# Patient Record
Sex: FEMALE | Race: WHITE | Employment: UNEMPLOYED
[De-identification: names, ages, dates, MRNs, and addresses within clinical notes are randomized per-mention and may not be internally consistent; named-entity substitution may affect disease eponyms.]

---

## 2017-01-04 ENCOUNTER — TELEPHONE (OUTPATIENT)
Dept: GASTROENTEROLOGY | Facility: CLINIC | Age: 42
End: 2017-01-04

## 2017-01-11 ENCOUNTER — TELEPHONE (OUTPATIENT)
Dept: GASTROENTEROLOGY | Facility: CLINIC | Age: 42
End: 2017-01-11

## 2017-01-20 ENCOUNTER — TELEPHONE (OUTPATIENT)
Dept: FAMILY MEDICINE CLINIC | Facility: CLINIC | Age: 42
End: 2017-01-20

## 2017-01-25 RX ORDER — TIZANIDINE 4 MG/1
TABLET ORAL
Qty: 21 TABLET | Refills: 0 | OUTPATIENT
Start: 2017-01-25

## 2017-04-10 ENCOUNTER — OFFICE VISIT (OUTPATIENT)
Dept: FAMILY MEDICINE CLINIC | Age: 42
End: 2017-04-10
Payer: MEDICARE

## 2017-04-10 ENCOUNTER — HOSPITAL ENCOUNTER (OUTPATIENT)
Age: 42
Setting detail: SPECIMEN
Discharge: HOME | End: 2017-04-10
Payer: MEDICARE

## 2017-04-10 VITALS
BODY MASS INDEX: 36.37 KG/M2 | HEART RATE: 78 BPM | WEIGHT: 213 LBS | SYSTOLIC BLOOD PRESSURE: 140 MMHG | DIASTOLIC BLOOD PRESSURE: 90 MMHG | OXYGEN SATURATION: 98 % | TEMPERATURE: 97.9 F | HEIGHT: 64 IN

## 2017-04-10 DIAGNOSIS — Z00.00 HEALTHCARE MAINTENANCE: ICD-10-CM

## 2017-04-10 DIAGNOSIS — I10 ESSENTIAL HYPERTENSION: ICD-10-CM

## 2017-04-10 DIAGNOSIS — G89.29 CHRONIC MIDLINE LOW BACK PAIN WITHOUT SCIATICA: ICD-10-CM

## 2017-04-10 DIAGNOSIS — F10.10 ALCOHOL USE DISORDER, MILD, ABUSE: Primary | ICD-10-CM

## 2017-04-10 DIAGNOSIS — E66.9 OBESITY, UNSPECIFIED OBESITY SEVERITY, UNSPECIFIED OBESITY TYPE: ICD-10-CM

## 2017-04-10 DIAGNOSIS — K21.9 GASTROESOPHAGEAL REFLUX DISEASE WITHOUT ESOPHAGITIS: ICD-10-CM

## 2017-04-10 DIAGNOSIS — Z76.89 ENCOUNTER TO ESTABLISH CARE: ICD-10-CM

## 2017-04-10 DIAGNOSIS — J30.2 SEASONAL ALLERGIC RHINITIS, UNSPECIFIED ALLERGIC RHINITIS TRIGGER: ICD-10-CM

## 2017-04-10 DIAGNOSIS — M54.50 CHRONIC MIDLINE LOW BACK PAIN WITHOUT SCIATICA: ICD-10-CM

## 2017-04-10 LAB
ABSOLUTE EOS #: 0.2 K/UL (ref 0–0.4)
ABSOLUTE LYMPH #: 3.9 K/UL (ref 1–4.8)
ABSOLUTE MONO #: 0.8 K/UL (ref 0.1–1.2)
ALBUMIN SERPL-MCNC: 4.1 G/DL (ref 3.5–5.2)
ALBUMIN/GLOBULIN RATIO: 1.2 (ref 1–2.5)
ALP BLD-CCNC: 85 U/L (ref 35–104)
ALT SERPL-CCNC: 11 U/L (ref 5–33)
ANION GAP SERPL CALCULATED.3IONS-SCNC: 16 MMOL/L (ref 9–17)
AST SERPL-CCNC: 15 U/L
BASOPHILS # BLD: 0 % (ref 0–2)
BASOPHILS ABSOLUTE: 0 K/UL (ref 0–0.2)
BILIRUB SERPL-MCNC: 0.22 MG/DL (ref 0.3–1.2)
BUN BLDV-MCNC: 13 MG/DL (ref 6–20)
BUN/CREAT BLD: ABNORMAL (ref 9–20)
CALCIUM SERPL-MCNC: 8.9 MG/DL (ref 8.6–10.4)
CHLORIDE BLD-SCNC: 104 MMOL/L (ref 98–107)
CHOLESTEROL/HDL RATIO: 3.7
CHOLESTEROL: 202 MG/DL
CO2: 25 MMOL/L (ref 20–31)
CREAT SERPL-MCNC: 0.55 MG/DL (ref 0.5–0.9)
DIFFERENTIAL TYPE: ABNORMAL
EOSINOPHILS RELATIVE PERCENT: 1 % (ref 1–4)
GFR AFRICAN AMERICAN: >60 ML/MIN
GFR NON-AFRICAN AMERICAN: >60 ML/MIN
GFR SERPL CREATININE-BSD FRML MDRD: ABNORMAL ML/MIN/{1.73_M2}
GFR SERPL CREATININE-BSD FRML MDRD: ABNORMAL ML/MIN/{1.73_M2}
GLUCOSE BLD-MCNC: 99 MG/DL (ref 70–99)
HCT VFR BLD CALC: 42.3 % (ref 36–46)
HDLC SERPL-MCNC: 55 MG/DL
HEMOGLOBIN: 14.3 G/DL (ref 12–16)
LDL CHOLESTEROL: 130 MG/DL (ref 0–130)
LYMPHOCYTES # BLD: 31 % (ref 24–44)
MCH RBC QN AUTO: 32.6 PG (ref 26–34)
MCHC RBC AUTO-ENTMCNC: 33.8 G/DL (ref 31–37)
MCV RBC AUTO: 96.3 FL (ref 80–100)
MONOCYTES # BLD: 6 % (ref 2–11)
PDW BLD-RTO: 13.8 % (ref 12.5–15.4)
PLATELET # BLD: 284 K/UL (ref 140–450)
PLATELET ESTIMATE: ABNORMAL
PMV BLD AUTO: 8.1 FL (ref 6–12)
POTASSIUM SERPL-SCNC: 4.6 MMOL/L (ref 3.7–5.3)
RBC # BLD: 4.39 M/UL (ref 4–5.2)
RBC # BLD: ABNORMAL 10*6/UL
SEG NEUTROPHILS: 62 % (ref 36–66)
SEGMENTED NEUTROPHILS ABSOLUTE COUNT: 7.6 K/UL (ref 1.8–7.7)
SODIUM BLD-SCNC: 145 MMOL/L (ref 135–144)
TOTAL PROTEIN: 7.4 G/DL (ref 6.4–8.3)
TRIGL SERPL-MCNC: 85 MG/DL
VLDLC SERPL CALC-MCNC: ABNORMAL MG/DL (ref 1–30)
WBC # BLD: 12.5 K/UL (ref 3.5–11)
WBC # BLD: ABNORMAL 10*3/UL

## 2017-04-10 PROCEDURE — 99203 OFFICE O/P NEW LOW 30 MIN: CPT | Performed by: NURSE PRACTITIONER

## 2017-04-10 RX ORDER — ONDANSETRON 4 MG/1
4 TABLET, FILM COATED ORAL DAILY PRN
Qty: 30 TABLET | Refills: 0 | Status: SHIPPED | OUTPATIENT
Start: 2017-04-10 | End: 2017-05-09 | Stop reason: SDUPTHER

## 2017-04-10 RX ORDER — RANITIDINE 150 MG/1
150 TABLET ORAL 2 TIMES DAILY
Qty: 60 TABLET | Refills: 3 | Status: SHIPPED | OUTPATIENT
Start: 2017-04-10 | End: 2017-09-21 | Stop reason: SDUPTHER

## 2017-04-10 RX ORDER — TRIAMTERENE AND HYDROCHLOROTHIAZIDE 37.5; 25 MG/1; MG/1
1 TABLET ORAL DAILY
Qty: 30 TABLET | Refills: 3 | Status: SHIPPED | OUTPATIENT
Start: 2017-04-10 | End: 2017-09-21 | Stop reason: SDUPTHER

## 2017-04-10 RX ORDER — OLMESARTAN MEDOXOMIL 40 MG/1
TABLET ORAL
Qty: 30 TABLET | Refills: 5 | Status: SHIPPED | OUTPATIENT
Start: 2017-04-10 | End: 2017-11-07 | Stop reason: SDUPTHER

## 2017-04-10 RX ORDER — MELOXICAM 15 MG/1
15 TABLET ORAL DAILY
Qty: 30 TABLET | Refills: 2 | Status: SHIPPED | OUTPATIENT
Start: 2017-04-10 | End: 2017-07-06 | Stop reason: SDUPTHER

## 2017-04-10 RX ORDER — LORATADINE 10 MG/1
10 TABLET ORAL DAILY
Qty: 30 TABLET | Refills: 3 | Status: SHIPPED | OUTPATIENT
Start: 2017-04-10 | End: 2017-09-21 | Stop reason: SDUPTHER

## 2017-04-10 RX ORDER — ESCITALOPRAM OXALATE 20 MG/1
20 TABLET ORAL DAILY
Qty: 30 TABLET | Refills: 3 | Status: SHIPPED | OUTPATIENT
Start: 2017-04-10 | End: 2017-12-27 | Stop reason: ALTCHOICE

## 2017-04-10 RX ORDER — TIZANIDINE 4 MG/1
4 TABLET ORAL 2 TIMES DAILY
Qty: 60 TABLET | Refills: 2 | Status: SHIPPED | OUTPATIENT
Start: 2017-04-10 | End: 2017-07-06 | Stop reason: SDUPTHER

## 2017-04-10 ASSESSMENT — PATIENT HEALTH QUESTIONNAIRE - PHQ9
6. FEELING BAD ABOUT YOURSELF - OR THAT YOU ARE A FAILURE OR HAVE LET YOURSELF OR YOUR FAMILY DOWN: 3
2. FEELING DOWN, DEPRESSED OR HOPELESS: 3
3. TROUBLE FALLING OR STAYING ASLEEP: 3
8. MOVING OR SPEAKING SO SLOWLY THAT OTHER PEOPLE COULD HAVE NOTICED. OR THE OPPOSITE, BEING SO FIGETY OR RESTLESS THAT YOU HAVE BEEN MOVING AROUND A LOT MORE THAN USUAL: 3
5. POOR APPETITE OR OVEREATING: 3
10. IF YOU CHECKED OFF ANY PROBLEMS, HOW DIFFICULT HAVE THESE PROBLEMS MADE IT FOR YOU TO DO YOUR WORK, TAKE CARE OF THINGS AT HOME, OR GET ALONG WITH OTHER PEOPLE: 2
SUM OF ALL RESPONSES TO PHQ QUESTIONS 1-9: 25
4. FEELING TIRED OR HAVING LITTLE ENERGY: 3
9. THOUGHTS THAT YOU WOULD BE BETTER OFF DEAD, OR OF HURTING YOURSELF: 3
SUM OF ALL RESPONSES TO PHQ9 QUESTIONS 1 & 2: 4
7. TROUBLE CONCENTRATING ON THINGS, SUCH AS READING THE NEWSPAPER OR WATCHING TELEVISION: 3
1. LITTLE INTEREST OR PLEASURE IN DOING THINGS: 1

## 2017-04-10 ASSESSMENT — ENCOUNTER SYMPTOMS
COUGH: 0
SHORTNESS OF BREATH: 0
RESPIRATORY NEGATIVE: 1
DIARRHEA: 0
GASTROINTESTINAL NEGATIVE: 1
ALLERGIC/IMMUNOLOGIC NEGATIVE: 1
EYES NEGATIVE: 1
BACK PAIN: 1
ABDOMINAL PAIN: 0
WHEEZING: 0
CONSTIPATION: 0

## 2017-04-17 ENCOUNTER — TELEPHONE (OUTPATIENT)
Dept: FAMILY MEDICINE CLINIC | Age: 42
End: 2017-04-17

## 2017-04-17 VITALS — TEMPERATURE: 99 F

## 2017-04-17 DIAGNOSIS — N39.0 URINARY TRACT INFECTION, SITE UNSPECIFIED: Primary | ICD-10-CM

## 2017-04-17 LAB
BILIRUBIN, POC: ABNORMAL
BLOOD URINE, POC: ABNORMAL
CLARITY, POC: ABNORMAL
COLOR, POC: YELLOW
GLUCOSE URINE, POC: ABNORMAL
KETONES, POC: ABNORMAL
LEUKOCYTE EST, POC: ABNORMAL
NITRITE, POC: ABNORMAL
PH, POC: 5.5
PROTEIN, POC: ABNORMAL
SPECIFIC GRAVITY, POC: 1.03
UROBILINOGEN, POC: 0.2

## 2017-04-17 PROCEDURE — 81003 URINALYSIS AUTO W/O SCOPE: CPT | Performed by: NURSE PRACTITIONER

## 2017-04-20 RX ORDER — NITROFURANTOIN 25; 75 MG/1; MG/1
100 CAPSULE ORAL 2 TIMES DAILY
Qty: 10 CAPSULE | Refills: 0 | Status: SHIPPED | OUTPATIENT
Start: 2017-04-20 | End: 2017-04-25

## 2017-04-21 ENCOUNTER — TELEPHONE (OUTPATIENT)
Dept: FAMILY MEDICINE CLINIC | Age: 42
End: 2017-04-21

## 2017-05-04 ENCOUNTER — TELEPHONE (OUTPATIENT)
Dept: FAMILY MEDICINE CLINIC | Age: 42
End: 2017-05-04

## 2017-05-08 RX ORDER — METRONIDAZOLE 7.5 MG/G
GEL VAGINAL
Qty: 25 G | Refills: 0 | Status: SHIPPED | OUTPATIENT
Start: 2017-05-08 | End: 2017-05-15

## 2017-05-12 RX ORDER — ONDANSETRON 4 MG/1
TABLET, FILM COATED ORAL
Qty: 30 TABLET | Refills: 0 | Status: SHIPPED | OUTPATIENT
Start: 2017-05-12 | End: 2017-08-29

## 2017-07-06 DIAGNOSIS — M54.50 CHRONIC MIDLINE LOW BACK PAIN WITHOUT SCIATICA: ICD-10-CM

## 2017-07-06 DIAGNOSIS — G89.29 CHRONIC MIDLINE LOW BACK PAIN WITHOUT SCIATICA: ICD-10-CM

## 2017-07-07 RX ORDER — TIZANIDINE 4 MG/1
TABLET ORAL
Qty: 30 TABLET | Refills: 0 | Status: SHIPPED | OUTPATIENT
Start: 2017-07-07 | End: 2017-08-15 | Stop reason: SDUPTHER

## 2017-07-07 RX ORDER — MELOXICAM 15 MG/1
TABLET ORAL
Qty: 30 TABLET | Refills: 0 | Status: SHIPPED | OUTPATIENT
Start: 2017-07-07 | End: 2017-08-15 | Stop reason: SDUPTHER

## 2017-08-15 DIAGNOSIS — M54.50 CHRONIC MIDLINE LOW BACK PAIN WITHOUT SCIATICA: ICD-10-CM

## 2017-08-15 DIAGNOSIS — G89.29 CHRONIC MIDLINE LOW BACK PAIN WITHOUT SCIATICA: ICD-10-CM

## 2017-08-16 ENCOUNTER — HOSPITAL ENCOUNTER (EMERGENCY)
Age: 42
Discharge: HOME | End: 2017-08-16
Attending: EMERGENCY MEDICINE
Payer: MEDICARE

## 2017-08-16 VITALS
WEIGHT: 224.5 LBS | TEMPERATURE: 98.8 F | SYSTOLIC BLOOD PRESSURE: 158 MMHG | HEIGHT: 62 IN | RESPIRATION RATE: 12 BRPM | DIASTOLIC BLOOD PRESSURE: 92 MMHG | HEART RATE: 77 BPM | OXYGEN SATURATION: 98 % | BODY MASS INDEX: 41.31 KG/M2

## 2017-08-16 DIAGNOSIS — J40 BRONCHITIS: Primary | ICD-10-CM

## 2017-08-16 PROCEDURE — 99283 EMERGENCY DEPT VISIT LOW MDM: CPT

## 2017-08-16 RX ORDER — AMOXICILLIN AND CLAVULANATE POTASSIUM 875; 125 MG/1; MG/1
1 TABLET, FILM COATED ORAL 2 TIMES DAILY
Qty: 20 TABLET | Refills: 0 | Status: SHIPPED | OUTPATIENT
Start: 2017-08-16 | End: 2017-08-26

## 2017-08-16 RX ORDER — MELOXICAM 15 MG/1
TABLET ORAL
Qty: 30 TABLET | Refills: 0 | Status: SHIPPED | OUTPATIENT
Start: 2017-08-16 | End: 2017-09-21 | Stop reason: SDUPTHER

## 2017-08-16 RX ORDER — TIZANIDINE 4 MG/1
TABLET ORAL
Qty: 30 TABLET | Refills: 0 | Status: SHIPPED | OUTPATIENT
Start: 2017-08-16 | End: 2017-08-29 | Stop reason: SDUPTHER

## 2017-08-16 RX ORDER — GUAIFENESIN AND CODEINE PHOSPHATE 100; 10 MG/5ML; MG/5ML
5 SOLUTION ORAL 3 TIMES DAILY PRN
Qty: 120 ML | Refills: 0 | Status: SHIPPED | OUTPATIENT
Start: 2017-08-16 | End: 2017-08-23

## 2017-08-16 ASSESSMENT — PAIN DESCRIPTION - LOCATION: LOCATION: CHEST;EAR

## 2017-08-16 ASSESSMENT — PAIN DESCRIPTION - FREQUENCY: FREQUENCY: CONTINUOUS

## 2017-08-16 ASSESSMENT — PAIN SCALES - GENERAL: PAINLEVEL_OUTOF10: 10

## 2017-08-21 ENCOUNTER — HOSPITAL ENCOUNTER (EMERGENCY)
Age: 42
Discharge: HOME | End: 2017-08-21
Attending: EMERGENCY MEDICINE
Payer: MEDICARE

## 2017-08-21 VITALS
HEIGHT: 62 IN | HEART RATE: 76 BPM | WEIGHT: 229.8 LBS | BODY MASS INDEX: 42.29 KG/M2 | OXYGEN SATURATION: 97 % | TEMPERATURE: 98.4 F | RESPIRATION RATE: 18 BRPM | DIASTOLIC BLOOD PRESSURE: 88 MMHG | SYSTOLIC BLOOD PRESSURE: 171 MMHG

## 2017-08-21 DIAGNOSIS — J01.10 ACUTE FRONTAL SINUSITIS, RECURRENCE NOT SPECIFIED: Primary | ICD-10-CM

## 2017-08-21 DIAGNOSIS — J20.9 ACUTE BRONCHITIS, UNSPECIFIED ORGANISM: ICD-10-CM

## 2017-08-21 PROCEDURE — 99282 EMERGENCY DEPT VISIT SF MDM: CPT

## 2017-08-21 RX ORDER — IBUPROFEN 600 MG/1
600 TABLET ORAL EVERY 6 HOURS PRN
Qty: 30 TABLET | Refills: 0 | Status: SHIPPED | OUTPATIENT
Start: 2017-08-21 | End: 2017-12-27 | Stop reason: ALTCHOICE

## 2017-08-21 RX ORDER — GUAIFENESIN AND CODEINE PHOSPHATE 100; 10 MG/5ML; MG/5ML
10 SOLUTION ORAL 4 TIMES DAILY PRN
Qty: 280 ML | Refills: 0 | Status: SHIPPED | OUTPATIENT
Start: 2017-08-21 | End: 2017-09-06 | Stop reason: SDUPTHER

## 2017-08-21 RX ORDER — TRIAMCINOLONE ACETONIDE 55 UG/1
2 SPRAY, METERED NASAL 2 TIMES DAILY
Qty: 1 INHALER | Refills: 0 | Status: SHIPPED | OUTPATIENT
Start: 2017-08-21 | End: 2017-12-27 | Stop reason: ALTCHOICE

## 2017-08-21 RX ORDER — IBUPROFEN 800 MG/1
800 TABLET ORAL ONCE
Status: DISCONTINUED | OUTPATIENT
Start: 2017-08-21 | End: 2017-08-21 | Stop reason: HOSPADM

## 2017-08-21 RX ORDER — BENZONATATE 100 MG/1
100 CAPSULE ORAL 3 TIMES DAILY PRN
Qty: 30 CAPSULE | Refills: 0 | Status: SHIPPED | OUTPATIENT
Start: 2017-08-21 | End: 2017-08-28

## 2017-08-21 ASSESSMENT — ENCOUNTER SYMPTOMS
EYES NEGATIVE: 1
CHEST TIGHTNESS: 0
GASTROINTESTINAL NEGATIVE: 1
COUGH: 1
WHEEZING: 0

## 2017-08-21 ASSESSMENT — PAIN SCALES - GENERAL: PAINLEVEL_OUTOF10: 7

## 2017-08-21 ASSESSMENT — PAIN DESCRIPTION - FREQUENCY: FREQUENCY: CONTINUOUS

## 2017-08-21 ASSESSMENT — PAIN DESCRIPTION - DESCRIPTORS: DESCRIPTORS: THROBBING;SHARP

## 2017-08-21 ASSESSMENT — PAIN DESCRIPTION - LOCATION: LOCATION: CHEST;HEAD

## 2017-08-29 ENCOUNTER — OFFICE VISIT (OUTPATIENT)
Dept: FAMILY MEDICINE CLINIC | Age: 42
End: 2017-08-29
Payer: MEDICARE

## 2017-08-29 VITALS
OXYGEN SATURATION: 98 % | SYSTOLIC BLOOD PRESSURE: 132 MMHG | DIASTOLIC BLOOD PRESSURE: 88 MMHG | WEIGHT: 228 LBS | HEART RATE: 79 BPM | BODY MASS INDEX: 41.7 KG/M2 | TEMPERATURE: 98.6 F

## 2017-08-29 DIAGNOSIS — J40 BRONCHITIS: Primary | ICD-10-CM

## 2017-08-29 DIAGNOSIS — M54.50 CHRONIC MIDLINE LOW BACK PAIN WITHOUT SCIATICA: ICD-10-CM

## 2017-08-29 DIAGNOSIS — M96.1 FAILED BACK SYNDROME, LUMBAR: ICD-10-CM

## 2017-08-29 DIAGNOSIS — G89.29 CHRONIC MIDLINE LOW BACK PAIN WITHOUT SCIATICA: ICD-10-CM

## 2017-08-29 PROCEDURE — 99213 OFFICE O/P EST LOW 20 MIN: CPT | Performed by: NURSE PRACTITIONER

## 2017-08-29 RX ORDER — ORPHENADRINE CITRATE 30 MG/ML
60 INJECTION INTRAMUSCULAR; INTRAVENOUS ONCE
Status: COMPLETED | OUTPATIENT
Start: 2017-08-29 | End: 2017-08-29

## 2017-08-29 RX ORDER — METHYLPREDNISOLONE 4 MG/1
TABLET ORAL
Qty: 1 KIT | Refills: 0 | Status: SHIPPED | OUTPATIENT
Start: 2017-08-29 | End: 2017-09-04

## 2017-08-29 RX ORDER — TIZANIDINE 4 MG/1
4 TABLET ORAL EVERY 6 HOURS PRN
Qty: 120 TABLET | Refills: 2 | Status: SHIPPED | OUTPATIENT
Start: 2017-08-29 | End: 2017-11-07 | Stop reason: SDUPTHER

## 2017-08-29 RX ORDER — HYDROCODONE BITARTRATE AND ACETAMINOPHEN 5; 325 MG/1; MG/1
1 TABLET ORAL 2 TIMES DAILY
Qty: 14 TABLET | Refills: 0 | Status: SHIPPED | OUTPATIENT
Start: 2017-08-29 | End: 2017-09-05

## 2017-08-29 RX ORDER — KETOROLAC TROMETHAMINE 30 MG/ML
30 INJECTION, SOLUTION INTRAMUSCULAR; INTRAVENOUS ONCE
Status: COMPLETED | OUTPATIENT
Start: 2017-08-29 | End: 2017-08-29

## 2017-08-29 RX ORDER — GUAIFENESIN 600 MG/1
600 TABLET, EXTENDED RELEASE ORAL 2 TIMES DAILY
Qty: 30 TABLET | Refills: 0 | Status: SHIPPED | OUTPATIENT
Start: 2017-08-29 | End: 2017-11-07 | Stop reason: SDUPTHER

## 2017-08-29 RX ORDER — ONDANSETRON 4 MG/1
4 TABLET, FILM COATED ORAL DAILY PRN
Qty: 20 TABLET | Refills: 0 | Status: SHIPPED | OUTPATIENT
Start: 2017-08-29 | End: 2017-11-07 | Stop reason: SDUPTHER

## 2017-08-29 RX ORDER — BENZONATATE 100 MG/1
100 CAPSULE ORAL 3 TIMES DAILY PRN
COMMUNITY
End: 2017-09-06 | Stop reason: SDUPTHER

## 2017-08-29 RX ADMIN — ORPHENADRINE CITRATE 60 MG: 30 INJECTION INTRAMUSCULAR; INTRAVENOUS at 14:49

## 2017-08-29 RX ADMIN — KETOROLAC TROMETHAMINE 30 MG: 30 INJECTION, SOLUTION INTRAMUSCULAR; INTRAVENOUS at 14:49

## 2017-08-30 ASSESSMENT — ENCOUNTER SYMPTOMS
SHORTNESS OF BREATH: 0
WHEEZING: 1
ABDOMINAL PAIN: 0
CHEST TIGHTNESS: 1
ALLERGIC/IMMUNOLOGIC NEGATIVE: 1
DIARRHEA: 0
COUGH: 1
GASTROINTESTINAL NEGATIVE: 1
CONSTIPATION: 0
EYES NEGATIVE: 1
BACK PAIN: 1

## 2017-09-07 RX ORDER — BENZONATATE 100 MG/1
100 CAPSULE ORAL 3 TIMES DAILY PRN
Qty: 30 CAPSULE | Refills: 0 | Status: SHIPPED | OUTPATIENT
Start: 2017-09-07 | End: 2017-12-27 | Stop reason: ALTCHOICE

## 2017-09-07 RX ORDER — GUAIFENESIN AND CODEINE PHOSPHATE 100; 10 MG/5ML; MG/5ML
10 SOLUTION ORAL 4 TIMES DAILY PRN
Qty: 120 ML | Refills: 0 | Status: SHIPPED | OUTPATIENT
Start: 2017-09-07 | End: 2017-09-14

## 2017-09-12 PROBLEM — J40 BRONCHITIS: Status: ACTIVE | Noted: 2017-09-12

## 2017-09-12 RX ORDER — PREDNISONE 20 MG/1
20 TABLET ORAL DAILY
Qty: 5 TABLET | Refills: 0 | Status: SHIPPED | OUTPATIENT
Start: 2017-09-12 | End: 2017-09-17

## 2017-09-12 RX ORDER — LEVOFLOXACIN 500 MG/1
500 TABLET, FILM COATED ORAL DAILY
Qty: 10 TABLET | Refills: 0 | Status: SHIPPED | OUTPATIENT
Start: 2017-09-12 | End: 2017-09-22

## 2017-09-21 DIAGNOSIS — J30.2 SEASONAL ALLERGIC RHINITIS, UNSPECIFIED ALLERGIC RHINITIS TRIGGER: ICD-10-CM

## 2017-09-21 DIAGNOSIS — M54.50 CHRONIC MIDLINE LOW BACK PAIN WITHOUT SCIATICA: ICD-10-CM

## 2017-09-21 DIAGNOSIS — G89.29 CHRONIC MIDLINE LOW BACK PAIN WITHOUT SCIATICA: ICD-10-CM

## 2017-09-21 DIAGNOSIS — I10 ESSENTIAL HYPERTENSION: ICD-10-CM

## 2017-09-21 DIAGNOSIS — K21.9 GASTROESOPHAGEAL REFLUX DISEASE WITHOUT ESOPHAGITIS: ICD-10-CM

## 2017-09-22 RX ORDER — ALCOHOL 62 ML/100ML
LIQUID TOPICAL
Qty: 30 TABLET | Refills: 5 | Status: SHIPPED | OUTPATIENT
Start: 2017-09-22 | End: 2018-05-03 | Stop reason: SDUPTHER

## 2017-09-22 RX ORDER — TRIAMTERENE AND HYDROCHLOROTHIAZIDE 37.5; 25 MG/1; MG/1
TABLET ORAL
Qty: 30 TABLET | Refills: 5 | Status: SHIPPED | OUTPATIENT
Start: 2017-09-22 | End: 2018-04-02 | Stop reason: SDUPTHER

## 2017-09-22 RX ORDER — RANITIDINE 150 MG/1
TABLET ORAL
Qty: 60 TABLET | Refills: 5 | Status: SHIPPED | OUTPATIENT
Start: 2017-09-22 | End: 2018-04-02 | Stop reason: SDUPTHER

## 2017-09-22 RX ORDER — MELOXICAM 15 MG/1
TABLET ORAL
Qty: 30 TABLET | Refills: 0 | Status: SHIPPED | OUTPATIENT
Start: 2017-09-22 | End: 2017-10-23 | Stop reason: SDUPTHER

## 2017-10-23 DIAGNOSIS — G89.29 CHRONIC MIDLINE LOW BACK PAIN WITHOUT SCIATICA: ICD-10-CM

## 2017-10-23 DIAGNOSIS — M54.50 CHRONIC MIDLINE LOW BACK PAIN WITHOUT SCIATICA: ICD-10-CM

## 2017-10-23 RX ORDER — MELOXICAM 15 MG/1
TABLET ORAL
Qty: 30 TABLET | Refills: 0 | Status: SHIPPED | OUTPATIENT
Start: 2017-10-23 | End: 2017-11-29 | Stop reason: SDUPTHER

## 2017-11-07 ENCOUNTER — OFFICE VISIT (OUTPATIENT)
Dept: FAMILY MEDICINE CLINIC | Age: 42
End: 2017-11-07
Payer: MEDICARE

## 2017-11-07 VITALS
TEMPERATURE: 98.2 F | BODY MASS INDEX: 43.53 KG/M2 | OXYGEN SATURATION: 98 % | SYSTOLIC BLOOD PRESSURE: 138 MMHG | HEART RATE: 72 BPM | DIASTOLIC BLOOD PRESSURE: 80 MMHG | WEIGHT: 238 LBS

## 2017-11-07 DIAGNOSIS — G89.29 CHRONIC MIDLINE LOW BACK PAIN WITHOUT SCIATICA: ICD-10-CM

## 2017-11-07 DIAGNOSIS — J18.9 COMMUNITY ACQUIRED PNEUMONIA, UNSPECIFIED LATERALITY: Primary | ICD-10-CM

## 2017-11-07 DIAGNOSIS — M96.1 FAILED BACK SYNDROME, LUMBAR: ICD-10-CM

## 2017-11-07 DIAGNOSIS — M54.50 CHRONIC MIDLINE LOW BACK PAIN WITHOUT SCIATICA: ICD-10-CM

## 2017-11-07 DIAGNOSIS — I10 ESSENTIAL HYPERTENSION: ICD-10-CM

## 2017-11-07 DIAGNOSIS — J44.1 CHRONIC OBSTRUCTIVE PULMONARY DISEASE WITH ACUTE EXACERBATION (HCC): ICD-10-CM

## 2017-11-07 PROCEDURE — G8427 DOCREV CUR MEDS BY ELIG CLIN: HCPCS | Performed by: NURSE PRACTITIONER

## 2017-11-07 PROCEDURE — G8926 SPIRO NO PERF OR DOC: HCPCS | Performed by: NURSE PRACTITIONER

## 2017-11-07 PROCEDURE — 4004F PT TOBACCO SCREEN RCVD TLK: CPT | Performed by: NURSE PRACTITIONER

## 2017-11-07 PROCEDURE — 99214 OFFICE O/P EST MOD 30 MIN: CPT | Performed by: NURSE PRACTITIONER

## 2017-11-07 PROCEDURE — G8417 CALC BMI ABV UP PARAM F/U: HCPCS | Performed by: NURSE PRACTITIONER

## 2017-11-07 PROCEDURE — 3023F SPIROM DOC REV: CPT | Performed by: NURSE PRACTITIONER

## 2017-11-07 PROCEDURE — G8484 FLU IMMUNIZE NO ADMIN: HCPCS | Performed by: NURSE PRACTITIONER

## 2017-11-07 RX ORDER — ALBUTEROL SULFATE 90 UG/1
2 AEROSOL, METERED RESPIRATORY (INHALATION) EVERY 6 HOURS PRN
Qty: 1 INHALER | Refills: 3 | Status: SHIPPED | OUTPATIENT
Start: 2017-11-07 | End: 2017-12-27 | Stop reason: ALTCHOICE

## 2017-11-07 RX ORDER — DOXYCYCLINE HYCLATE 100 MG/1
100 CAPSULE ORAL 2 TIMES DAILY
Qty: 20 CAPSULE | Refills: 0 | Status: SHIPPED | OUTPATIENT
Start: 2017-11-07 | End: 2017-11-20 | Stop reason: SDUPTHER

## 2017-11-07 RX ORDER — GUAIFENESIN 600 MG/1
600 TABLET, EXTENDED RELEASE ORAL 2 TIMES DAILY
Qty: 30 TABLET | Refills: 0 | Status: SHIPPED | OUTPATIENT
Start: 2017-11-07 | End: 2017-12-27 | Stop reason: ALTCHOICE

## 2017-11-07 RX ORDER — TIZANIDINE 4 MG/1
4 TABLET ORAL EVERY 6 HOURS PRN
Qty: 120 TABLET | Refills: 2 | Status: SHIPPED | OUTPATIENT
Start: 2017-11-07 | End: 2018-03-02 | Stop reason: SDUPTHER

## 2017-11-07 RX ORDER — PREDNISONE 20 MG/1
20 TABLET ORAL DAILY
Qty: 10 TABLET | Refills: 0 | Status: SHIPPED | OUTPATIENT
Start: 2017-11-07 | End: 2017-11-17

## 2017-11-07 RX ORDER — ONDANSETRON 4 MG/1
4 TABLET, FILM COATED ORAL DAILY PRN
Qty: 20 TABLET | Refills: 0 | Status: SHIPPED | OUTPATIENT
Start: 2017-11-07 | End: 2017-12-27 | Stop reason: ALTCHOICE

## 2017-11-07 RX ORDER — OLMESARTAN MEDOXOMIL 40 MG/1
TABLET ORAL
Qty: 90 TABLET | Refills: 1 | Status: SHIPPED | OUTPATIENT
Start: 2017-11-07 | End: 2018-05-03 | Stop reason: SDUPTHER

## 2017-11-07 ASSESSMENT — ENCOUNTER SYMPTOMS
CONSTIPATION: 0
COUGH: 1
BACK PAIN: 1
WHEEZING: 1
SHORTNESS OF BREATH: 0
ALLERGIC/IMMUNOLOGIC NEGATIVE: 1
DIARRHEA: 0
EYES NEGATIVE: 1
CHEST TIGHTNESS: 1
ABDOMINAL PAIN: 0
GASTROINTESTINAL NEGATIVE: 1

## 2017-11-07 NOTE — PROGRESS NOTES
Patient was given an inhaler as a sample. Medication was working. Needed prior auth that was apparently denied nothing else was sent in for the patient for breathing issues. Patient still has a bad cough and is fatigue. Visit Information    Have you changed or started any medications since your last visit including any over-the-counter medicines, vitamins, or herbal medicines? no   Have you stopped taking any of your medications? Is so, why? -  no  Are you having any side effects from any of your medications? - no    Have you seen any other physician or provider since your last visit?  no   Have you had any other diagnostic tests since your last visit?  no   Have you been seen in the emergency room and/or had an admission in a hospital since we last saw you?  no   Have you had your routine dental cleaning in the past 6 months?  no     Do you have an active MyChart account? If no, what is the barrier?   No:     Patient Care Team:  Klarissa Colin NP as PCP - General (Certified Nurse Practitioner)  Dangelo Galan MD as Referring Physician (Family Medicine)  Lenard Luz MD as Obstetrician (Obstetrics & Gynecology)    Medical History Review  Past Medical, Family, and Social History reviewed and does contribute to the patient presenting condition    Health Maintenance   Topic Date Due    Cervical cancer screen  01/18/1997    Flu vaccine (1) 09/01/2017    Lipid screen  04/10/2022    DTaP/Tdap/Td vaccine (2 - Td) 04/10/2025    Pneumococcal med risk  Completed    HIV screen  Completed

## 2017-11-07 NOTE — PROGRESS NOTES
Surgical History:   Procedure Laterality Date     SECTION      ELBOW JOINT FUSION      HERNIA REPAIR      HYSTERECTOMY      SPINE SURGERY         Family History   Problem Relation Age of Onset    Eczema Mother     COPD Mother     Other Mother     Liver Disease Mother     Heart Disease Mother        Social History   Substance Use Topics    Smoking status: Former Smoker     Packs/day: 0.50    Smokeless tobacco: Never Used    Alcohol use 4.8 oz/week     4 Glasses of wine, 4 Shots of liquor per week      Current Outpatient Prescriptions   Medication Sig Dispense Refill    guaiFENesin (MUCINEX) 600 MG extended release tablet Take 1 tablet by mouth 2 times daily 30 tablet 0    olmesartan (BENICAR) 40 MG tablet take 1 tablet by mouth once daily 90 tablet 1    ondansetron (ZOFRAN) 4 MG tablet Take 1 tablet by mouth daily as needed for Nausea or Vomiting 20 tablet 0    tiZANidine (ZANAFLEX) 4 MG tablet Take 1 tablet by mouth every 6 hours as needed (back pain) 120 tablet 2    doxycycline hyclate (VIBRAMYCIN) 100 MG capsule Take 1 capsule by mouth 2 times daily for 10 days Take with food, but avoid dairy, calcium and MTV's 2 hours before and after the dose 20 capsule 0    predniSONE (DELTASONE) 20 MG tablet Take 1 tablet by mouth daily for 10 days 10 tablet 0    albuterol sulfate HFA (PROAIR HFA) 108 (90 Base) MCG/ACT inhaler Inhale 2 puffs into the lungs every 6 hours as needed for Wheezing 1 Inhaler 3    meloxicam (MOBIC) 15 MG tablet take 1 tablet by mouth once daily 30 tablet 0    triamterene-hydrochlorothiazide (MAXZIDE-25) 37.5-25 MG per tablet take 1 tablet by mouth once daily 30 tablet 5    RA LORATADINE 10 MG tablet take 1 tablet by mouth once daily 30 tablet 5    ranitidine (ZANTAC) 150 MG tablet take 1 tablet by mouth twice a day 60 tablet 5    Tiotropium Bromide-Olodaterol 2.5-2.5 MCG/ACT AERS Inhale 2 puffs into the lungs daily 1 Inhaler 5    benzonatate (TESSALON) 100 MG capsule °F (36.8 °C) (Oral)   Wt 238 lb (108 kg)   SpO2 98%   BMI 43.53 kg/m²   Physical Exam   Constitutional: She is oriented to person, place, and time. Vital signs are normal. She appears well-developed and well-nourished. HENT:   Head: Atraumatic. Nose: Nose normal.   Mouth/Throat: Oropharynx is clear and moist.   Eyes: Conjunctivae are normal. Right eye exhibits no discharge. Left eye exhibits no discharge. Neck: Normal range of motion. Neck supple. No thyromegaly present. Cardiovascular: Normal rate, regular rhythm and normal heart sounds. Exam reveals no gallop and no friction rub. No murmur heard. Pulmonary/Chest: Effort normal. No accessory muscle usage. No respiratory distress. She has no wheezes. She has no rhonchi. She has rales in the right lower field and the left lower field. Abdominal: Soft. Bowel sounds are normal. She exhibits no distension. There is no tenderness. Musculoskeletal: Normal range of motion. She exhibits no edema. Neurological: She is alert and oriented to person, place, and time. Coordination normal.   Skin: Skin is warm and dry. No rash noted. No erythema. Psychiatric: She has a normal mood and affect. Her behavior is normal. Judgment and thought content normal.   Vitals reviewed. Assessment:      1. Community acquired pneumonia, unspecified laterality    2. Essential hypertension    3. Chronic obstructive pulmonary disease with acute exacerbation ([facility] [address]    4. Chronic midline low back pain without sciatica    5. Failed back syndrome, lumbar        Plan:     1. Community acquired pneumonia, unspecified laterality  Doxy, mucinex,     2. Essential hypertension    - olmesartan (BENICAR) 40 MG tablet; take 1 tablet by mouth once daily  Dispense: 90 tablet; Refill: 1    3. Chronic obstructive pulmonary disease with acute exacerbation (HCC)    - XR CHEST STANDARD (2 VW); Future  - Tiotropium Bromide-Olodaterol (STIOLTO RESPIMAT) 2.5-2.5 MCG/ACT AERS;  Inhale 2 puffs

## 2017-11-20 ENCOUNTER — OFFICE VISIT (OUTPATIENT)
Dept: FAMILY MEDICINE CLINIC | Age: 42
End: 2017-11-20
Payer: MEDICARE

## 2017-11-20 VITALS
HEART RATE: 74 BPM | SYSTOLIC BLOOD PRESSURE: 120 MMHG | OXYGEN SATURATION: 98 % | TEMPERATURE: 98 F | WEIGHT: 245 LBS | DIASTOLIC BLOOD PRESSURE: 76 MMHG | BODY MASS INDEX: 44.81 KG/M2

## 2017-11-20 DIAGNOSIS — B96.89 ACUTE BACTERIAL SINUSITIS: Primary | ICD-10-CM

## 2017-11-20 DIAGNOSIS — M96.1 FAILED BACK SYNDROME, LUMBAR: ICD-10-CM

## 2017-11-20 DIAGNOSIS — J01.90 ACUTE BACTERIAL SINUSITIS: Primary | ICD-10-CM

## 2017-11-20 DIAGNOSIS — M48.061 NEURAL FORAMINAL STENOSIS OF LUMBAR SPINE: ICD-10-CM

## 2017-11-20 PROCEDURE — G8417 CALC BMI ABV UP PARAM F/U: HCPCS | Performed by: NURSE PRACTITIONER

## 2017-11-20 PROCEDURE — 1036F TOBACCO NON-USER: CPT | Performed by: NURSE PRACTITIONER

## 2017-11-20 PROCEDURE — 99214 OFFICE O/P EST MOD 30 MIN: CPT | Performed by: NURSE PRACTITIONER

## 2017-11-20 PROCEDURE — G8427 DOCREV CUR MEDS BY ELIG CLIN: HCPCS | Performed by: NURSE PRACTITIONER

## 2017-11-20 PROCEDURE — G8484 FLU IMMUNIZE NO ADMIN: HCPCS | Performed by: NURSE PRACTITIONER

## 2017-11-20 RX ORDER — DULOXETIN HYDROCHLORIDE 60 MG/1
60 CAPSULE, DELAYED RELEASE ORAL DAILY
Qty: 30 CAPSULE | Refills: 3 | Status: SHIPPED | OUTPATIENT
Start: 2017-12-20 | End: 2018-04-02 | Stop reason: SDUPTHER

## 2017-11-20 RX ORDER — KETOROLAC TROMETHAMINE 30 MG/ML
30 INJECTION, SOLUTION INTRAMUSCULAR; INTRAVENOUS ONCE
Status: COMPLETED | OUTPATIENT
Start: 2017-11-20 | End: 2017-11-20

## 2017-11-20 RX ORDER — DOXYCYCLINE HYCLATE 100 MG/1
100 CAPSULE ORAL 2 TIMES DAILY
Qty: 14 CAPSULE | Refills: 0 | Status: SHIPPED | OUTPATIENT
Start: 2017-11-20 | End: 2017-11-27

## 2017-11-20 RX ORDER — DULOXETIN HYDROCHLORIDE 30 MG/1
30 CAPSULE, DELAYED RELEASE ORAL DAILY
Qty: 30 CAPSULE | Refills: 0 | Status: SHIPPED | OUTPATIENT
Start: 2017-11-20 | End: 2017-12-27 | Stop reason: DRUGHIGH

## 2017-11-20 RX ADMIN — KETOROLAC TROMETHAMINE 30 MG: 30 INJECTION, SOLUTION INTRAMUSCULAR; INTRAVENOUS at 15:42

## 2017-11-20 NOTE — PROGRESS NOTES
Family History   Problem Relation Age of Onset    Eczema Mother     COPD Mother     Other Mother     Liver Disease Mother     Heart Disease Mother        Social History   Substance Use Topics    Smoking status: Former Smoker     Packs/day: 0.50    Smokeless tobacco: Never Used    Alcohol use 4.8 oz/week     4 Glasses of wine, 4 Shots of liquor per week      Current Outpatient Prescriptions   Medication Sig Dispense Refill    doxycycline hyclate (VIBRAMYCIN) 100 MG capsule Take 1 capsule by mouth 2 times daily for 7 days Take with food, but avoid dairy, calcium and MTV's 2 hours before and after the dose 14 capsule 0    DULoxetine (CYMBALTA) 30 MG extended release capsule Take 1 capsule by mouth daily 30 capsule 0    [START ON 12/20/2017] DULoxetine (CYMBALTA) 60 MG extended release capsule Take 1 capsule by mouth daily 30 capsule 3    guaiFENesin (MUCINEX) 600 MG extended release tablet Take 1 tablet by mouth 2 times daily 30 tablet 0    olmesartan (BENICAR) 40 MG tablet take 1 tablet by mouth once daily 90 tablet 1    ondansetron (ZOFRAN) 4 MG tablet Take 1 tablet by mouth daily as needed for Nausea or Vomiting 20 tablet 0    tiZANidine (ZANAFLEX) 4 MG tablet Take 1 tablet by mouth every 6 hours as needed (back pain) 120 tablet 2    albuterol sulfate HFA (PROAIR HFA) 108 (90 Base) MCG/ACT inhaler Inhale 2 puffs into the lungs every 6 hours as needed for Wheezing 1 Inhaler 3    Tiotropium Bromide-Olodaterol (STIOLTO RESPIMAT) 2.5-2.5 MCG/ACT AERS Inhale 2 puffs into the lungs daily 1 Inhaler 0    meloxicam (MOBIC) 15 MG tablet take 1 tablet by mouth once daily 30 tablet 0    triamterene-hydrochlorothiazide (MAXZIDE-25) 37.5-25 MG per tablet take 1 tablet by mouth once daily 30 tablet 5    RA LORATADINE 10 MG tablet take 1 tablet by mouth once daily 30 tablet 5    ranitidine (ZANTAC) 150 MG tablet take 1 tablet by mouth twice a day 60 tablet 5    ibuprofen (ADVIL;MOTRIN) 600 MG tablet Take 1 tablet by mouth every 6 hours as needed for Pain 30 tablet 0    escitalopram (LEXAPRO) 20 MG tablet Take 1 tablet by mouth daily 30 tablet 3    benzonatate (TESSALON) 100 MG capsule Take 1 capsule by mouth 3 times daily as needed for Cough 30 capsule 0    triamcinolone (NASACORT ALLERGY 24HR) 55 MCG/ACT nasal inhaler 2 sprays by Nasal route 2 times daily for 14 days 1 Inhaler 0     No current facility-administered medications for this visit. Allergies   Allergen Reactions    Dye [Iodides] Shortness Of Breath    Morphine Nausea And Vomiting       Health Maintenance   Topic Date Due    Cervical cancer screen  11/07/2018 (Originally 1/18/1997)    Flu vaccine (1) 11/07/2018 (Originally 9/1/2017)    Lipid screen  04/10/2022    DTaP/Tdap/Td vaccine (2 - Td) 04/10/2025    Pneumococcal med risk  Completed    HIV screen  Completed          Review of Systems   Constitutional: Positive for fatigue. Negative for activity change, appetite change and fever. HENT: Positive for congestion. Negative for ear pain. Eyes: Negative. Respiratory: Positive for cough, chest tightness and wheezing. Negative for shortness of breath. Cardiovascular: Negative. Negative for chest pain and palpitations. Gastrointestinal: Negative. Negative for abdominal pain, constipation and diarrhea. Endocrine: Negative. Negative for cold intolerance and heat intolerance. Genitourinary: Negative. Negative for difficulty urinating, frequency and urgency. Musculoskeletal: Positive for arthralgias and back pain. Skin: Negative. Negative for pallor and rash. Allergic/Immunologic: Negative. Neurological: Negative. Negative for weakness, numbness and headaches. Hematological: Negative. Psychiatric/Behavioral: Positive for dysphoric mood and sleep disturbance. The patient is nervous/anxious.         Objective:     /76 (Site: Left Arm, Position: Sitting, Cuff Size: Large Adult)   Pulse 74   Temp 98 °F (36.7 °C) (Oral)   Wt 245 lb (111.1 kg)   SpO2 98%   BMI 44.81 kg/m²   Physical Exam   Constitutional: She is oriented to person, place, and time. Vital signs are normal. She appears well-developed and well-nourished. HENT:   Head: Atraumatic. Nose: Mucosal edema and rhinorrhea present. Right sinus exhibits maxillary sinus tenderness. Left sinus exhibits maxillary sinus tenderness. Mouth/Throat: Oropharynx is clear and moist.   Eyes: Conjunctivae are normal. Right eye exhibits no discharge. Left eye exhibits no discharge. Neck: Normal range of motion. Neck supple. No thyromegaly present. Cardiovascular: Normal rate, regular rhythm and normal heart sounds. Exam reveals no gallop and no friction rub. No murmur heard. Pulmonary/Chest: Effort normal. No accessory muscle usage. No respiratory distress. She has no wheezes. She has no rhonchi. Abdominal: Soft. Bowel sounds are normal. She exhibits no distension. There is no tenderness. Musculoskeletal: Normal range of motion. She exhibits no edema. Neurological: She is alert and oriented to person, place, and time. Coordination normal.   Skin: Skin is warm and dry. No rash noted. No erythema. Psychiatric: She has a normal mood and affect. Her behavior is normal. Judgment and thought content normal.   Vitals reviewed. Assessment:      1. Acute bacterial sinusitis    2. Failed back syndrome, lumbar    3.  Neural foraminal stenosis of lumbar spine                     Plan:      Orders Placed This Encounter   Procedures   Chiki Walden MD, [facility] [place]*     Referral Priority:   Routine     Referral Type:   Consult for Advice and Opinion     Referral Reason:   Specialty Services Required     Referred to Provider:   Micha Lerner MD     Requested Specialty:   Pain Management     Number of Visits Requested:   1     Orders Placed This Encounter   Medications    doxycycline hyclate (VIBRAMYCIN) 100 MG capsule     Sig: Take 1 capsule by mouth 2 times daily for 7 days Take with food, but avoid dairy, calcium and MTV's 2 hours before and after the dose     Dispense:  14 capsule     Refill:  0    DULoxetine (CYMBALTA) 30 MG extended release capsule     Sig: Take 1 capsule by mouth daily     Dispense:  30 capsule     Refill:  0    DULoxetine (CYMBALTA) 60 MG extended release capsule     Sig: Take 1 capsule by mouth daily     Dispense:  30 capsule     Refill:  3    ketorolac (TORADOL) injection 30 mg         No Follow-up on file. Patient given educational materials - see patient instructions. Discussed use, benefit, and side effects of prescribed medications. All patient questions answered. Pt voiced understanding. Reviewed health maintenance. Instructed to continue current medications, diet and exercise. Patient agreed with treatment plan. Follow up as directed.      Electronically signed by Josiane Bustillo NP on 11/22/2017

## 2017-11-20 NOTE — PROGRESS NOTES
Patient has not had chest xray done she is in to much pain. Almost cancelled todays appt to go to the er. Pain management will not see patient and wouldn't give her a reason why. Patient states she doesn't deserve to be in this much pain. Patient is still coughing, yellow/white mucus. Finished prednisone and antibiotic. Still no better. Visit Information    Have you changed or started any medications since your last visit including any over-the-counter medicines, vitamins, or herbal medicines? no   Have you stopped taking any of your medications? Is so, why? -  no  Are you having any side effects from any of your medications? - no    Have you seen any other physician or provider since your last visit?  no   Have you had any other diagnostic tests since your last visit?  no   Have you been seen in the emergency room and/or had an admission in a hospital since we last saw you?  no   Have you had your routine dental cleaning in the past 6 months?  no     Do you have an active MyChart account? If no, what is the barrier?   No:     Patient Care Team:  Idalmis Simpson NP as PCP - General (Certified Nurse Practitioner)  Robina Wilde MD as Obstetrician (Obstetrics & Gynecology)  Reji Jarquin MD as Consulting Physician (Neurosurgery)    Medical History Review  Past Medical, Family, and Social History reviewed and does contribute to the patient presenting condition    Health Maintenance   Topic Date Due    Cervical cancer screen  11/07/2018 (Originally 1/18/1997)    Flu vaccine (1) 11/07/2018 (Originally 9/1/2017)    Lipid screen  04/10/2022    DTaP/Tdap/Td vaccine (2 - Td) 04/10/2025    Pneumococcal med risk  Completed    HIV screen  Completed

## 2017-11-22 ASSESSMENT — ENCOUNTER SYMPTOMS
CONSTIPATION: 0
GASTROINTESTINAL NEGATIVE: 1
COUGH: 1
ALLERGIC/IMMUNOLOGIC NEGATIVE: 1
BACK PAIN: 1
WHEEZING: 1
SHORTNESS OF BREATH: 0
CHEST TIGHTNESS: 1
DIARRHEA: 0
EYES NEGATIVE: 1
ABDOMINAL PAIN: 0

## 2017-11-29 DIAGNOSIS — G89.29 CHRONIC MIDLINE LOW BACK PAIN WITHOUT SCIATICA: ICD-10-CM

## 2017-11-29 DIAGNOSIS — M54.50 CHRONIC MIDLINE LOW BACK PAIN WITHOUT SCIATICA: ICD-10-CM

## 2017-11-29 RX ORDER — MELOXICAM 15 MG/1
TABLET ORAL
Qty: 30 TABLET | Refills: 0 | Status: SHIPPED | OUTPATIENT
Start: 2017-11-29 | End: 2018-01-04 | Stop reason: SDUPTHER

## 2017-11-29 NOTE — TELEPHONE ENCOUNTER
Last visit 11/20/17  Next visit NONE   Next Visit Date:  Future Appointments  Date Time Provider Nena Garrison   12/7/2017 2:30 PM Salima Jackson MD [facility] SURGERY Pain [address] Maintenance   Topic Date Due    Cervical cancer screen  11/07/2018 (Originally 1/18/1997)    Flu vaccine (1) 11/07/2018 (Originally 9/1/2017)    Lipid screen  04/10/2022    DTaP/Tdap/Td vaccine (2 - Td) 04/10/2025    Pneumococcal med risk  Completed    HIV screen  Completed       Hemoglobin A1C (%)   Date Value   07/22/2016 4.9             ( goal A1C is < 7)   Microalb/Crt.  Ratio (mcg/mg creat)   Date Value   03/08/2016 18     LDL Cholesterol (mg/dL)   Date Value   04/10/2017 130       (goal LDL is <100)   AST (U/L)   Date Value   04/10/2017 15     ALT (U/L)   Date Value   04/10/2017 11     BUN (mg/dL)   Date Value   04/10/2017 13     BP Readings from Last 3 Encounters:   11/20/17 120/76   11/07/17 138/80   08/29/17 132/88          (goal 120/80)    All Future Testing planned in [facility]  Lab Frequency Next Occurrence   XR CHEST STANDARD (2 VW) Once 12/29/2017               Patient Active Problem List:     Essential hypertension     Gastroesophageal reflux disease without esophagitis     Seasonal allergies     Smoker     Alcohol use disorder, mild, abuse     Obesity     Failed back syndrome, lumbar     Chronic midline low back pain without sciatica     Bronchitis     Chronic obstructive pulmonary disease with acute exacerbation (HCC)     Neural foraminal stenosis of lumbar spine

## 2017-12-27 ENCOUNTER — HOSPITAL ENCOUNTER (INPATIENT)
Age: 42
LOS: 3 days | Discharge: HOME | DRG: 463 | End: 2017-12-30
Admitting: INTERNAL MEDICINE
Payer: MEDICARE

## 2017-12-27 ENCOUNTER — APPOINTMENT (OUTPATIENT)
Dept: CT IMAGING | Age: 42
DRG: 463 | End: 2017-12-27
Payer: MEDICARE

## 2017-12-27 DIAGNOSIS — N10 ACUTE PYELONEPHRITIS: Primary | ICD-10-CM

## 2017-12-27 PROBLEM — N13.30 HYDRONEPHROSIS OF RIGHT KIDNEY: Status: ACTIVE | Noted: 2017-12-27

## 2017-12-27 PROBLEM — E66.01 OBESITY, MORBID, BMI 40.0-49.9 (HCC): Status: ACTIVE | Noted: 2017-04-10

## 2017-12-27 PROBLEM — N12 PYELONEPHRITIS: Status: ACTIVE | Noted: 2017-12-27

## 2017-12-27 LAB
-: ABNORMAL
ABSOLUTE EOS #: 0 K/UL (ref 0–0.4)
ABSOLUTE IMMATURE GRANULOCYTE: ABNORMAL K/UL (ref 0–0.3)
ABSOLUTE LYMPH #: 1.5 K/UL (ref 1–4.8)
ABSOLUTE MONO #: 1.2 K/UL (ref 0.2–0.8)
ALBUMIN SERPL-MCNC: 3.6 G/DL (ref 3.5–5.2)
ALBUMIN/GLOBULIN RATIO: NORMAL (ref 1–2.5)
ALP BLD-CCNC: 77 U/L (ref 35–104)
ALT SERPL-CCNC: 9 U/L (ref 5–33)
AMORPHOUS: ABNORMAL
ANION GAP SERPL CALCULATED.3IONS-SCNC: 16 MMOL/L (ref 9–17)
AST SERPL-CCNC: 11 U/L
BACTERIA: ABNORMAL
BASOPHILS # BLD: 1 % (ref 0–2)
BASOPHILS ABSOLUTE: 0.1 K/UL (ref 0–0.2)
BILIRUB SERPL-MCNC: 0.38 MG/DL (ref 0.3–1.2)
BILIRUBIN DIRECT: 0.09 MG/DL
BILIRUBIN URINE: NEGATIVE
BILIRUBIN, INDIRECT: 0.29 MG/DL (ref 0–1)
BUN BLDV-MCNC: 8 MG/DL (ref 6–20)
BUN/CREAT BLD: 15 (ref 9–20)
CALCIUM SERPL-MCNC: 8.7 MG/DL (ref 8.6–10.4)
CASTS UA: ABNORMAL /LPF
CHLORIDE BLD-SCNC: 98 MMOL/L (ref 98–107)
CK MB: <1 NG/ML
CO2: 23 MMOL/L (ref 20–31)
COLOR: YELLOW
COMMENT UA: ABNORMAL
CREAT SERPL-MCNC: 0.55 MG/DL (ref 0.5–0.9)
CRYSTALS, UA: ABNORMAL /HPF
DIFFERENTIAL TYPE: ABNORMAL
EOSINOPHILS RELATIVE PERCENT: 0 % (ref 1–4)
EPITHELIAL CELLS UA: ABNORMAL /HPF (ref 0–5)
GFR AFRICAN AMERICAN: >60 ML/MIN
GFR NON-AFRICAN AMERICAN: >60 ML/MIN
GFR SERPL CREATININE-BSD FRML MDRD: ABNORMAL ML/MIN/{1.73_M2}
GFR SERPL CREATININE-BSD FRML MDRD: ABNORMAL ML/MIN/{1.73_M2}
GLOBULIN: NORMAL G/DL (ref 1.5–3.8)
GLUCOSE BLD-MCNC: 145 MG/DL (ref 70–99)
GLUCOSE URINE: NEGATIVE
HCG QUALITATIVE: NEGATIVE
HCT VFR BLD CALC: 35.3 % (ref 36–46)
HEMOGLOBIN: 12.3 G/DL (ref 12–16)
IMMATURE GRANULOCYTES: ABNORMAL %
KETONES, URINE: ABNORMAL
LEUKOCYTE ESTERASE, URINE: ABNORMAL
LIPASE: 13 U/L (ref 13–60)
LYMPHOCYTES # BLD: 8 % (ref 24–44)
MCH RBC QN AUTO: 32.7 PG (ref 26–34)
MCHC RBC AUTO-ENTMCNC: 34.9 G/DL (ref 31–37)
MCV RBC AUTO: 94 FL (ref 80–100)
MONOCYTES # BLD: 6 % (ref 1–7)
MUCUS: ABNORMAL
NITRITE, URINE: POSITIVE
OTHER OBSERVATIONS UA: ABNORMAL
PDW BLD-RTO: 12.8 % (ref 11.5–14.5)
PH UA: 6 (ref 5–8)
PLATELET # BLD: 222 K/UL (ref 130–400)
PLATELET ESTIMATE: ABNORMAL
PMV BLD AUTO: ABNORMAL FL (ref 6–12)
POTASSIUM SERPL-SCNC: 3 MMOL/L (ref 3.7–5.3)
PROTEIN UA: ABNORMAL
RBC # BLD: 3.75 M/UL (ref 4–5.2)
RBC # BLD: ABNORMAL 10*6/UL
RBC UA: ABNORMAL /HPF (ref 0–2)
RENAL EPITHELIAL, UA: ABNORMAL /HPF
SEG NEUTROPHILS: 85 % (ref 36–66)
SEGMENTED NEUTROPHILS ABSOLUTE COUNT: 16 K/UL (ref 1.8–7.7)
SODIUM BLD-SCNC: 137 MMOL/L (ref 135–144)
SPECIFIC GRAVITY UA: 1.02 (ref 1–1.03)
TOTAL CK: 27 U/L (ref 26–192)
TOTAL PROTEIN: 6.6 G/DL (ref 6.4–8.3)
TRICHOMONAS: ABNORMAL
TURBIDITY: CLEAR
URINE HGB: ABNORMAL
UROBILINOGEN, URINE: NORMAL
WBC # BLD: 18.8 K/UL (ref 3.5–11)
WBC # BLD: ABNORMAL 10*3/UL
WBC UA: ABNORMAL /HPF (ref 0–5)
YEAST: ABNORMAL

## 2017-12-27 PROCEDURE — 87149 DNA/RNA DIRECT PROBE: CPT

## 2017-12-27 PROCEDURE — 74176 CT ABD & PELVIS W/O CONTRAST: CPT

## 2017-12-27 PROCEDURE — 84703 CHORIONIC GONADOTROPIN ASSAY: CPT

## 2017-12-27 PROCEDURE — 96376 TX/PRO/DX INJ SAME DRUG ADON: CPT

## 2017-12-27 PROCEDURE — 83690 ASSAY OF LIPASE: CPT

## 2017-12-27 PROCEDURE — 85025 COMPLETE CBC W/AUTO DIFF WBC: CPT

## 2017-12-27 PROCEDURE — 87086 URINE CULTURE/COLONY COUNT: CPT

## 2017-12-27 PROCEDURE — 99222 1ST HOSP IP/OBS MODERATE 55: CPT | Performed by: INTERNAL MEDICINE

## 2017-12-27 PROCEDURE — 81001 URINALYSIS AUTO W/SCOPE: CPT

## 2017-12-27 PROCEDURE — 6360000002 HC RX W HCPCS: Performed by: INTERNAL MEDICINE

## 2017-12-27 PROCEDURE — 99285 EMERGENCY DEPT VISIT HI MDM: CPT

## 2017-12-27 PROCEDURE — 96372 THER/PROPH/DIAG INJ SC/IM: CPT

## 2017-12-27 PROCEDURE — G0378 HOSPITAL OBSERVATION PER HR: HCPCS

## 2017-12-27 PROCEDURE — 87205 SMEAR GRAM STAIN: CPT

## 2017-12-27 PROCEDURE — 96375 TX/PRO/DX INJ NEW DRUG ADDON: CPT

## 2017-12-27 PROCEDURE — 6370000000 HC RX 637 (ALT 250 FOR IP): Performed by: INTERNAL MEDICINE

## 2017-12-27 PROCEDURE — 1200000000 HC SEMI PRIVATE

## 2017-12-27 PROCEDURE — 96374 THER/PROPH/DIAG INJ IV PUSH: CPT

## 2017-12-27 PROCEDURE — 36415 COLL VENOUS BLD VENIPUNCTURE: CPT

## 2017-12-27 PROCEDURE — 87040 BLOOD CULTURE FOR BACTERIA: CPT

## 2017-12-27 PROCEDURE — 80048 BASIC METABOLIC PNL TOTAL CA: CPT

## 2017-12-27 PROCEDURE — 2580000003 HC RX 258

## 2017-12-27 PROCEDURE — 6370000000 HC RX 637 (ALT 250 FOR IP)

## 2017-12-27 PROCEDURE — 87186 SC STD MICRODIL/AGAR DIL: CPT

## 2017-12-27 PROCEDURE — 80076 HEPATIC FUNCTION PANEL: CPT

## 2017-12-27 PROCEDURE — 2580000003 HC RX 258: Performed by: INTERNAL MEDICINE

## 2017-12-27 PROCEDURE — 82553 CREATINE MB FRACTION: CPT

## 2017-12-27 PROCEDURE — 87077 CULTURE AEROBIC IDENTIFY: CPT

## 2017-12-27 PROCEDURE — 82550 ASSAY OF CK (CPK): CPT

## 2017-12-27 PROCEDURE — 6360000002 HC RX W HCPCS

## 2017-12-27 RX ORDER — SODIUM CHLORIDE 0.9 % (FLUSH) 0.9 %
10 SYRINGE (ML) INJECTION PRN
Status: DISCONTINUED | OUTPATIENT
Start: 2017-12-27 | End: 2017-12-30 | Stop reason: HOSPADM

## 2017-12-27 RX ORDER — 0.9 % SODIUM CHLORIDE 0.9 %
1000 INTRAVENOUS SOLUTION INTRAVENOUS ONCE
Status: COMPLETED | OUTPATIENT
Start: 2017-12-27 | End: 2017-12-27

## 2017-12-27 RX ORDER — NICOTINE 21 MG/24HR
1 PATCH, TRANSDERMAL 24 HOURS TRANSDERMAL DAILY PRN
Status: DISCONTINUED | OUTPATIENT
Start: 2017-12-27 | End: 2017-12-30 | Stop reason: HOSPADM

## 2017-12-27 RX ORDER — BISACODYL 10 MG
10 SUPPOSITORY, RECTAL RECTAL DAILY PRN
Status: DISCONTINUED | OUTPATIENT
Start: 2017-12-27 | End: 2017-12-30 | Stop reason: HOSPADM

## 2017-12-27 RX ORDER — ONDANSETRON 2 MG/ML
4 INJECTION INTRAMUSCULAR; INTRAVENOUS ONCE
Status: COMPLETED | OUTPATIENT
Start: 2017-12-27 | End: 2017-12-27

## 2017-12-27 RX ORDER — GUAIFENESIN 600 MG/1
600 TABLET, EXTENDED RELEASE ORAL 2 TIMES DAILY
Status: DISCONTINUED | OUTPATIENT
Start: 2017-12-27 | End: 2017-12-27

## 2017-12-27 RX ORDER — HYDROCODONE BITARTRATE AND ACETAMINOPHEN 5; 325 MG/1; MG/1
2 TABLET ORAL EVERY 4 HOURS PRN
Status: DISCONTINUED | OUTPATIENT
Start: 2017-12-27 | End: 2017-12-30 | Stop reason: HOSPADM

## 2017-12-27 RX ORDER — FLUTICASONE PROPIONATE 50 MCG
2 SPRAY, SUSPENSION (ML) NASAL DAILY
Status: DISCONTINUED | OUTPATIENT
Start: 2017-12-27 | End: 2017-12-27 | Stop reason: CLARIF

## 2017-12-27 RX ORDER — ALBUTEROL SULFATE 90 UG/1
2 AEROSOL, METERED RESPIRATORY (INHALATION) EVERY 6 HOURS PRN
Status: DISCONTINUED | OUTPATIENT
Start: 2017-12-27 | End: 2017-12-30 | Stop reason: HOSPADM

## 2017-12-27 RX ORDER — MELOXICAM 7.5 MG/1
15 TABLET ORAL DAILY
Status: DISCONTINUED | OUTPATIENT
Start: 2017-12-27 | End: 2017-12-30 | Stop reason: HOSPADM

## 2017-12-27 RX ORDER — ESCITALOPRAM OXALATE 10 MG/1
20 TABLET ORAL DAILY
Status: DISCONTINUED | OUTPATIENT
Start: 2017-12-27 | End: 2017-12-27 | Stop reason: CLARIF

## 2017-12-27 RX ORDER — CETIRIZINE HYDROCHLORIDE 10 MG/1
10 TABLET ORAL DAILY
Status: DISCONTINUED | OUTPATIENT
Start: 2017-12-27 | End: 2017-12-27

## 2017-12-27 RX ORDER — LOSARTAN POTASSIUM 100 MG/1
100 TABLET ORAL DAILY
Status: DISCONTINUED | OUTPATIENT
Start: 2017-12-27 | End: 2017-12-30 | Stop reason: HOSPADM

## 2017-12-27 RX ORDER — BENZONATATE 100 MG/1
100 CAPSULE ORAL 3 TIMES DAILY PRN
Status: DISCONTINUED | OUTPATIENT
Start: 2017-12-27 | End: 2017-12-27

## 2017-12-27 RX ORDER — TIZANIDINE 4 MG/1
4 TABLET ORAL EVERY 6 HOURS PRN
Status: DISCONTINUED | OUTPATIENT
Start: 2017-12-27 | End: 2017-12-29

## 2017-12-27 RX ORDER — FAMOTIDINE 20 MG/1
20 TABLET, FILM COATED ORAL DAILY
Status: DISCONTINUED | OUTPATIENT
Start: 2017-12-27 | End: 2017-12-30 | Stop reason: HOSPADM

## 2017-12-27 RX ORDER — DULOXETIN HYDROCHLORIDE 30 MG/1
30 CAPSULE, DELAYED RELEASE ORAL DAILY
Status: DISCONTINUED | OUTPATIENT
Start: 2017-12-27 | End: 2017-12-27

## 2017-12-27 RX ORDER — SODIUM CHLORIDE 0.9 % (FLUSH) 0.9 %
10 SYRINGE (ML) INJECTION EVERY 12 HOURS SCHEDULED
Status: DISCONTINUED | OUTPATIENT
Start: 2017-12-27 | End: 2017-12-30 | Stop reason: HOSPADM

## 2017-12-27 RX ORDER — DULOXETIN HYDROCHLORIDE 60 MG/1
60 CAPSULE, DELAYED RELEASE ORAL DAILY
Status: DISCONTINUED | OUTPATIENT
Start: 2017-12-27 | End: 2017-12-30 | Stop reason: HOSPADM

## 2017-12-27 RX ORDER — ONDANSETRON 2 MG/ML
4 INJECTION INTRAMUSCULAR; INTRAVENOUS EVERY 4 HOURS PRN
Status: DISCONTINUED | OUTPATIENT
Start: 2017-12-27 | End: 2017-12-30 | Stop reason: HOSPADM

## 2017-12-27 RX ORDER — ACETAMINOPHEN 325 MG/1
650 TABLET ORAL EVERY 4 HOURS PRN
Status: DISCONTINUED | OUTPATIENT
Start: 2017-12-27 | End: 2017-12-30 | Stop reason: HOSPADM

## 2017-12-27 RX ORDER — ESCITALOPRAM OXALATE 10 MG/1
20 TABLET ORAL DAILY
Status: DISCONTINUED | OUTPATIENT
Start: 2017-12-27 | End: 2017-12-27

## 2017-12-27 RX ORDER — HYDROCODONE BITARTRATE AND ACETAMINOPHEN 5; 325 MG/1; MG/1
1 TABLET ORAL EVERY 4 HOURS PRN
Status: DISCONTINUED | OUTPATIENT
Start: 2017-12-27 | End: 2017-12-30 | Stop reason: HOSPADM

## 2017-12-27 RX ORDER — POTASSIUM CHLORIDE 7.45 MG/ML
10 INJECTION INTRAVENOUS PRN
Status: DISCONTINUED | OUTPATIENT
Start: 2017-12-27 | End: 2017-12-30 | Stop reason: HOSPADM

## 2017-12-27 RX ORDER — SODIUM CHLORIDE 9 MG/ML
INJECTION, SOLUTION INTRAVENOUS CONTINUOUS
Status: DISCONTINUED | OUTPATIENT
Start: 2017-12-27 | End: 2017-12-30 | Stop reason: HOSPADM

## 2017-12-27 RX ORDER — POTASSIUM CHLORIDE 20MEQ/15ML
40 LIQUID (ML) ORAL PRN
Status: DISCONTINUED | OUTPATIENT
Start: 2017-12-27 | End: 2017-12-30 | Stop reason: HOSPADM

## 2017-12-27 RX ORDER — M-VIT,TX,IRON,MINS/CALC/FOLIC 27MG-0.4MG
1 TABLET ORAL DAILY
COMMUNITY
End: 2019-04-30 | Stop reason: SDUPTHER

## 2017-12-27 RX ORDER — POTASSIUM CHLORIDE 20 MEQ/1
40 TABLET, EXTENDED RELEASE ORAL PRN
Status: DISCONTINUED | OUTPATIENT
Start: 2017-12-27 | End: 2017-12-30 | Stop reason: HOSPADM

## 2017-12-27 RX ADMIN — ACETAMINOPHEN 650 MG: 325 TABLET ORAL at 18:40

## 2017-12-27 RX ADMIN — Medication 1 MG: at 09:59

## 2017-12-27 RX ADMIN — ENOXAPARIN SODIUM 40 MG: 40 INJECTION SUBCUTANEOUS at 21:52

## 2017-12-27 RX ADMIN — WATER 1 G: 1 INJECTION INTRAMUSCULAR; INTRAVENOUS; SUBCUTANEOUS at 10:04

## 2017-12-27 RX ADMIN — HYDROCODONE BITARTRATE AND ACETAMINOPHEN 2 TABLET: 5; 325 TABLET ORAL at 19:42

## 2017-12-27 RX ADMIN — SODIUM CHLORIDE: 9 INJECTION, SOLUTION INTRAVENOUS at 16:00

## 2017-12-27 RX ADMIN — TIZANIDINE 4 MG: 4 TABLET ORAL at 18:40

## 2017-12-27 RX ADMIN — ONDANSETRON 4 MG: 2 INJECTION INTRAMUSCULAR; INTRAVENOUS at 09:59

## 2017-12-27 RX ADMIN — SODIUM CHLORIDE 1000 ML: 9 INJECTION, SOLUTION INTRAVENOUS at 09:59

## 2017-12-27 RX ADMIN — MELOXICAM 15 MG: 7.5 TABLET ORAL at 18:01

## 2017-12-27 RX ADMIN — LOSARTAN POTASSIUM 100 MG: 100 TABLET, FILM COATED ORAL at 18:04

## 2017-12-27 RX ADMIN — Medication 1 MG: at 14:41

## 2017-12-27 RX ADMIN — POTASSIUM CHLORIDE 60 MEQ: 20 TABLET, EXTENDED RELEASE ORAL at 12:01

## 2017-12-27 RX ADMIN — DULOXETINE HYDROCHLORIDE 60 MG: 60 CAPSULE, DELAYED RELEASE ORAL at 21:52

## 2017-12-27 ASSESSMENT — PAIN DESCRIPTION - LOCATION
LOCATION: ABDOMEN
LOCATION: ABDOMEN

## 2017-12-27 ASSESSMENT — PAIN SCALES - GENERAL
PAINLEVEL_OUTOF10: 10
PAINLEVEL_OUTOF10: 5
PAINLEVEL_OUTOF10: 8
PAINLEVEL_OUTOF10: 2
PAINLEVEL_OUTOF10: 4
PAINLEVEL_OUTOF10: 8
PAINLEVEL_OUTOF10: 9
PAINLEVEL_OUTOF10: 10
PAINLEVEL_OUTOF10: 10
PAINLEVEL_OUTOF10: 4

## 2017-12-27 ASSESSMENT — ENCOUNTER SYMPTOMS
EYE PAIN: 0
EYE ITCHING: 0
BACK PAIN: 1
NAUSEA: 1
COUGH: 1
EYE DISCHARGE: 0
ABDOMINAL PAIN: 1
PHOTOPHOBIA: 0
ABDOMINAL DISTENTION: 1
SINUS PAIN: 0
SORE THROAT: 0
VOMITING: 1

## 2017-12-27 ASSESSMENT — PAIN DESCRIPTION - PAIN TYPE
TYPE: ACUTE PAIN
TYPE: ACUTE PAIN

## 2017-12-27 ASSESSMENT — PAIN DESCRIPTION - DESCRIPTORS: DESCRIPTORS: ACHING;SHARP

## 2017-12-27 NOTE — FLOWSHEET NOTE
Patient arrived to room 2002 via wheelchair from ED. Patient is alert and oriented and denies pain and nausea at this time. Bed locked and placed in lowest position. Side rails up x2. Call light placed at the patient's bedside. Bedside report completed. Pulses palpable. Vital signs obtained (see flowsheet). RN educated the patient on plan of care. Patient verbalizes understanding.

## 2017-12-27 NOTE — H&P
change in bowel habits, positive for abdominal pain   GENITOURINARY:  negative for difficulty of urination, positive for burning with urination, frequency   INTEGUMENT:  negative for rash, skin lesions, easy bruising   HEMATOLOGIC/LYMPHATIC:  negative for swelling/edema   ALLERGIC/IMMUNOLOGIC:  negative for urticaria , itching  ENDOCRINE:  negative increase in drinking, increase in urination, hot or cold intolerance  MUSCULOSKELETAL:  negative joint pains, muscle aches, swelling of joints  NEUROLOGICAL:  negative for headaches, dizziness, lightheadedness, numbness, pain, tingling extremities  BEHAVIOR/PSYCH:  negative for depression, anxiety    Physical Exam:   /84   Pulse 81   Temp 98.1 °F (36.7 °C)   Resp 16   Ht 5' 2\" (1.575 m)   Wt 242 lb 1 oz (109.8 kg)   SpO2 98%   BMI 44.27 kg/m²   Temp (24hrs), Av.6 °F (37 °C), Min:98.1 °F (36.7 °C), Max:99.6 °F (37.6 °C)    No results for input(s): POCGLU in the last 72 hours. No intake or output data in the 24 hours ending 17 1716    General Appearance:  alert, well appearing, and in no acute distress  Mental status: oriented to person, place, and time with normal affect  Head:  normocephalic, atraumatic. Eye: no icterus, redness, pupils equal and reactive, extraocular eye movements intact, conjunctiva clear  Ear: normal external ear, no discharge, hearing intact  Nose:  no drainage noted  Mouth: mucous membranes moist  Neck: supple, no carotid bruits, thyroid not palpable  Lungs: Bilateral equal air entry, clear to ausculation, no wheezing, rales or rhonchi, normal effort  Cardiovascular: normal rate, regular rhythm, no murmur, gallop, rub.   Abdomen: Soft, nondistended, normal bowel sounds, no hepatomegaly or splenomegaly, positive right-sided abdominal pain  Neurologic: There are no new focal motor or sensory deficits, normal muscle tone and bulk, no abnormal sensation, normal speech, cranial nerves II through XII grossly intact  Skin: No gross AM   Result Value Ref Range    WBC 18.8 (H) 3.5 - 11.0 k/uL    RBC 3.75 (L) 4.0 - 5.2 m/uL    Hemoglobin 12.3 12.0 - 16.0 g/dL    Hematocrit 35.3 (L) 36 - 46 %    MCV 94.0 80 - 100 fL    MCH 32.7 26 - 34 pg    MCHC 34.9 31 - 37 g/dL    RDW 12.8 11.5 - 14.5 %    Platelets 854 201 - 722 k/uL    MPV NOT REPORTED 6.0 - 12.0 fL    Differential Type NOT REPORTED     Immature Granulocytes NOT REPORTED 0 %    Absolute Immature Granulocyte NOT REPORTED 0.00 - 0.30 k/uL    WBC Morphology NOT REPORTED     RBC Morphology NOT REPORTED     Platelet Estimate NOT REPORTED     Seg Neutrophils 85 (H) 36 - 66 %    Lymphocytes 8 (L) 24 - 44 %    Monocytes 6 1 - 7 %    Eosinophils % 0 (L) 1 - 4 %    Basophils 1 0 - 2 %    Segs Absolute 16.00 (H) 1.8 - 7.7 k/uL    Absolute Lymph # 1.50 1.0 - 4.8 k/uL    Absolute Mono # 1.20 (H) 0.2 - 0.8 k/uL    Absolute Eos # 0.00 0.0 - 0.4 k/uL    Basophils # 0.10 0.0 - 0.2 k/uL   CK    Collection Time: 12/27/17  8:49 AM   Result Value Ref Range    Total CK 27 26 - 192 U/L   CK MB    Collection Time: 12/27/17  8:49 AM   Result Value Ref Range    CK-MB <1.0 <5.4 ng/mL   HCG Qualitative, Serum    Collection Time: 12/27/17  8:49 AM   Result Value Ref Range    hCG Qual NEGATIVE NEG   Hepatic Function Panel    Collection Time: 12/27/17  8:49 AM   Result Value Ref Range    Alb 3.6 3.5 - 5.2 g/dL    Alkaline Phosphatase 77 35 - 104 U/L    ALT 9 5 - 33 U/L    AST 11 <32 U/L    Total Bilirubin 0.38 0.3 - 1.2 mg/dL    Bilirubin, Direct 0.09 <0.31 mg/dL    Bilirubin, Indirect 0.29 0.00 - 1.00 mg/dL    Total Protein 6.6 6.4 - 8.3 g/dL    Globulin NOT REPORTED 1.5 - 3.8 g/dL    Albumin/Globulin Ratio NOT REPORTED 1.0 - 2.5   Culture Blood #1    Collection Time: 12/27/17  8:50 AM   Result Value Ref Range    Specimen Description       . BLOOD LH SILVER 5ML RED 1ML Performed at [address] [facility] [address]    Specimen Description  [facility] [phone]     Special Requests NOT REPORTED

## 2017-12-27 NOTE — ED PROVIDER NOTES
[address] [place] [address] ED  eMERGENCY dEPARTMENT eNCOUnter      Pt Name: Nishant Mendez  MRN: [id number]  [place] 1975  Date of evaluation: 12/27/2017  Provider: Sadia Simmons MD    CHIEF COMPLAINT       Chief Complaint   Patient presents with    Abdominal Pain     UTI symptoms started yesterday    Fever         HISTORY OF PRESENT ILLNESS  (Location/Symptom, Timing/Onset, Context/Setting, Quality, Duration, Modifying Factors, Severity.)   Nishant Mendez is a 40 y.o. female who presents to the emergency department Presents with dysuria for the past 24 hours developed a fever earlier this morning with nausea vomiting. Patient states that she's had frequency of urination off-and-on for the past several days however states the pain is a 10 on a scale of all 1-10 initially associated with nausea and vomiting. Nursing Notes were reviewed.     ALLERGIES     Dye [iodides] and Morphine    CURRENT MEDICATIONS       Previous Medications    ALBUTEROL SULFATE HFA (PROAIR HFA) 108 (90 BASE) MCG/ACT INHALER    Inhale 2 puffs into the lungs every 6 hours as needed for Wheezing    BENZONATATE (TESSALON) 100 MG CAPSULE    Take 1 capsule by mouth 3 times daily as needed for Cough    DULOXETINE (CYMBALTA) 30 MG EXTENDED RELEASE CAPSULE    Take 1 capsule by mouth daily    DULOXETINE (CYMBALTA) 60 MG EXTENDED RELEASE CAPSULE    Take 1 capsule by mouth daily    ESCITALOPRAM (LEXAPRO) 20 MG TABLET    Take 1 tablet by mouth daily    GUAIFENESIN (MUCINEX) 600 MG EXTENDED RELEASE TABLET    Take 1 tablet by mouth 2 times daily    IBUPROFEN (ADVIL;MOTRIN) 600 MG TABLET    Take 1 tablet by mouth every 6 hours as needed for Pain    MELOXICAM (MOBIC) 15 MG TABLET    take 1 tablet by mouth once daily    OLMESARTAN (BENICAR) 40 MG TABLET    take 1 tablet by mouth once daily    ONDANSETRON (ZOFRAN) 4 MG TABLET    Take 1 tablet by mouth daily as needed for Nausea or Vomiting    RA LORATADINE 10 MG TABLET    take 1 tablet by mouth once daily    RANITIDINE (ZANTAC) 150 MG TABLET    take 1 tablet by mouth twice a day    TIOTROPIUM BROMIDE-OLODATEROL (STIOLTO RESPIMAT) 2.5-2.5 MCG/ACT AERS    Inhale 2 puffs into the lungs daily    TIZANIDINE (ZANAFLEX) 4 MG TABLET    Take 1 tablet by mouth every 6 hours as needed (back pain)    TRIAMCINOLONE (NASACORT ALLERGY 24HR) 55 MCG/ACT NASAL INHALER    2 sprays by Nasal route 2 times daily for 14 days    TRIAMTERENE-HYDROCHLOROTHIAZIDE (MAXZIDE-25) 37.5-25 MG PER TABLET    take 1 tablet by mouth once daily       PAST MEDICAL HISTORY         Diagnosis Date    Alcohol use disorder, mild, abuse 4/10/2017    Chronic midline low back pain without sciatica 2017    Chronic obstructive pulmonary disease with acute exacerbation ([facility] [address] 2017    Depression     Essential hypertension 3/8/2016    Gastroesophageal reflux disease without esophagitis 3/8/2016    Obesity 4/10/2017    Seasonal allergies 3/8/2016       SURGICAL HISTORY           Procedure Laterality Date     SECTION      ELBOW JOINT FUSION      HERNIA REPAIR      HYSTERECTOMY      SPINE SURGERY           FAMILY HISTORY           Problem Relation Age of Onset    Eczema Mother     COPD Mother     Other Mother     Liver Disease Mother     Heart Disease Mother      Family Status   Relation Status    Mother Alive    Father Alive        SOCIAL HISTORY      reports that she has quit smoking. She smoked 0.50 packs per day. She has never used smokeless tobacco. She reports that she drinks about 4.8 oz of alcohol per week . She reports that she does not use drugs. REVIEW OF SYSTEMS    (2-9 systems for level 4, 10 or more for level 5)     Review of Systems   Constitutional: Positive for activity change, appetite change, chills, diaphoresis, fatigue and fever. HENT: Positive for congestion. Negative for sinus pain and sore throat. Eyes: Negative for photophobia, pain, discharge, itching and visual disturbance.    Respiratory: pelvis. Prior hysterectomy. Prominent bilateral inguinal lymph nodes without overt inguinal or pelvic sidewall lymphadenopathy. Peritoneum/Retroperitoneum: Mild atherosclerotic calcification of the abdominal aorta. No retroperitoneal lymphadenopathy. Psoas muscles normal in size and symmetric in appearance. Bones/Soft Tissues: Calcifications in the midline anterior pelvic wall which may be related to sequela of prior surgery. Fat containing ventral hernia in the midline anterior abdominal wall on image 102, series 2. Prior spinal fusion at L5-S1. Mild diffuse degenerative changes throughout the spine. 1. Mild enlargement of the right kidney with perinephric stranding and mild right-sided periureteral stranding and equivocal mild hydroureter without obstructing calculus. Findings may be related to a recently passed calculus. Punctate nonobstructing midpole left renal calculus. Pyelonephritis difficult to exclude in the absence of IV contrast administration. 2. Appendix not definitively visualized. 3. Cholelithiasis. 4. Probable adrenal hyperplasia. 5. Prior hysterectomy. Interpretation per the Radiologist below, if available at the time of this note:    CT ABDOMEN PELVIS WO IV CONTRAST   Final Result   1. Mild enlargement of the right kidney with perinephric stranding and mild   right-sided periureteral stranding and equivocal mild hydroureter without   obstructing calculus. Findings may be related to a recently passed calculus. Punctate nonobstructing midpole left renal calculus. Pyelonephritis   difficult to exclude in the absence of IV contrast administration. 2. Appendix not definitively visualized. 3. Cholelithiasis. 4. Probable adrenal hyperplasia. 5. Prior hysterectomy.                ED BEDSIDE ULTRASOUND:   Performed by ED Physician - none    LABS:  Labs Reviewed   BASIC METABOLIC PANEL - Abnormal; Notable for the following:        Result Value    Glucose 145 (*)     Potassium 3.0

## 2017-12-28 PROBLEM — B96.20 E COLI BACTEREMIA: Status: ACTIVE | Noted: 2017-12-28

## 2017-12-28 PROBLEM — R78.81 E COLI BACTEREMIA: Status: ACTIVE | Noted: 2017-12-28

## 2017-12-28 LAB
ANION GAP SERPL CALCULATED.3IONS-SCNC: 11 MMOL/L (ref 9–17)
BUN BLDV-MCNC: 9 MG/DL (ref 6–20)
BUN/CREAT BLD: 18 (ref 9–20)
CALCIUM SERPL-MCNC: 8.3 MG/DL (ref 8.6–10.4)
CHLORIDE BLD-SCNC: 104 MMOL/L (ref 98–107)
CO2: 25 MMOL/L (ref 20–31)
CREAT SERPL-MCNC: 0.51 MG/DL (ref 0.5–0.9)
CULTURE: ABNORMAL
CULTURE: ABNORMAL
GFR AFRICAN AMERICAN: >60 ML/MIN
GFR NON-AFRICAN AMERICAN: >60 ML/MIN
GFR SERPL CREATININE-BSD FRML MDRD: ABNORMAL ML/MIN/{1.73_M2}
GFR SERPL CREATININE-BSD FRML MDRD: ABNORMAL ML/MIN/{1.73_M2}
GLUCOSE BLD-MCNC: 98 MG/DL (ref 70–99)
HCT VFR BLD CALC: 33.8 % (ref 36–46)
HEMOGLOBIN: 11.3 G/DL (ref 12–16)
Lab: ABNORMAL
MCH RBC QN AUTO: 31.7 PG (ref 26–34)
MCHC RBC AUTO-ENTMCNC: 33.3 G/DL (ref 31–37)
MCV RBC AUTO: 95.3 FL (ref 80–100)
ORGANISM: ABNORMAL
PDW BLD-RTO: 13.1 % (ref 11.5–14.5)
PLATELET # BLD: 198 K/UL (ref 130–400)
PMV BLD AUTO: ABNORMAL FL (ref 6–12)
POTASSIUM SERPL-SCNC: 4 MMOL/L (ref 3.7–5.3)
RBC # BLD: 3.55 M/UL (ref 4–5.2)
SODIUM BLD-SCNC: 140 MMOL/L (ref 135–144)
SPECIMEN DESCRIPTION: ABNORMAL
SPECIMEN DESCRIPTION: ABNORMAL
STATUS: ABNORMAL
WBC # BLD: 10 K/UL (ref 3.5–11)

## 2017-12-28 PROCEDURE — 6370000000 HC RX 637 (ALT 250 FOR IP): Performed by: INTERNAL MEDICINE

## 2017-12-28 PROCEDURE — 2500000003 HC RX 250 WO HCPCS: Performed by: INTERNAL MEDICINE

## 2017-12-28 PROCEDURE — 96372 THER/PROPH/DIAG INJ SC/IM: CPT

## 2017-12-28 PROCEDURE — 96376 TX/PRO/DX INJ SAME DRUG ADON: CPT

## 2017-12-28 PROCEDURE — 36415 COLL VENOUS BLD VENIPUNCTURE: CPT

## 2017-12-28 PROCEDURE — 6360000002 HC RX W HCPCS: Performed by: INTERNAL MEDICINE

## 2017-12-28 PROCEDURE — 85027 COMPLETE CBC AUTOMATED: CPT

## 2017-12-28 PROCEDURE — 80048 BASIC METABOLIC PNL TOTAL CA: CPT

## 2017-12-28 PROCEDURE — 1200000000 HC SEMI PRIVATE

## 2017-12-28 PROCEDURE — 99232 SBSQ HOSP IP/OBS MODERATE 35: CPT | Performed by: INTERNAL MEDICINE

## 2017-12-28 PROCEDURE — 2580000003 HC RX 258: Performed by: INTERNAL MEDICINE

## 2017-12-28 PROCEDURE — G0378 HOSPITAL OBSERVATION PER HR: HCPCS

## 2017-12-28 RX ORDER — AMLODIPINE BESYLATE 5 MG/1
5 TABLET ORAL DAILY
Status: DISCONTINUED | OUTPATIENT
Start: 2017-12-28 | End: 2017-12-30 | Stop reason: HOSPADM

## 2017-12-28 RX ADMIN — SODIUM CHLORIDE: 9 INJECTION, SOLUTION INTRAVENOUS at 04:55

## 2017-12-28 RX ADMIN — SODIUM CHLORIDE: 9 INJECTION, SOLUTION INTRAVENOUS at 17:34

## 2017-12-28 RX ADMIN — CEFTRIAXONE SODIUM 1 G: 10 INJECTION, POWDER, FOR SOLUTION INTRAVENOUS at 09:46

## 2017-12-28 RX ADMIN — ENOXAPARIN SODIUM 40 MG: 40 INJECTION SUBCUTANEOUS at 07:49

## 2017-12-28 RX ADMIN — AMLODIPINE BESYLATE 5 MG: 5 TABLET ORAL at 14:54

## 2017-12-28 RX ADMIN — FAMOTIDINE 20 MG: 20 TABLET, FILM COATED ORAL at 07:49

## 2017-12-28 RX ADMIN — HYDROCODONE BITARTRATE AND ACETAMINOPHEN 2 TABLET: 5; 325 TABLET ORAL at 19:07

## 2017-12-28 RX ADMIN — TIZANIDINE 4 MG: 4 TABLET ORAL at 11:11

## 2017-12-28 RX ADMIN — MELOXICAM 15 MG: 7.5 TABLET ORAL at 07:48

## 2017-12-28 RX ADMIN — ONDANSETRON 4 MG: 2 INJECTION INTRAMUSCULAR; INTRAVENOUS at 23:37

## 2017-12-28 RX ADMIN — DULOXETINE HYDROCHLORIDE 60 MG: 60 CAPSULE, DELAYED RELEASE ORAL at 07:48

## 2017-12-28 RX ADMIN — HYDROCODONE BITARTRATE AND ACETAMINOPHEN 2 TABLET: 5; 325 TABLET ORAL at 00:16

## 2017-12-28 RX ADMIN — LOSARTAN POTASSIUM 100 MG: 100 TABLET, FILM COATED ORAL at 07:48

## 2017-12-28 RX ADMIN — HYDROCODONE BITARTRATE AND ACETAMINOPHEN 2 TABLET: 5; 325 TABLET ORAL at 23:37

## 2017-12-28 RX ADMIN — HYDROCODONE BITARTRATE AND ACETAMINOPHEN 2 TABLET: 5; 325 TABLET ORAL at 04:55

## 2017-12-28 RX ADMIN — HYDROCODONE BITARTRATE AND ACETAMINOPHEN 2 TABLET: 5; 325 TABLET ORAL at 13:37

## 2017-12-28 RX ADMIN — HYDROCODONE BITARTRATE AND ACETAMINOPHEN 2 TABLET: 5; 325 TABLET ORAL at 09:11

## 2017-12-28 ASSESSMENT — PAIN DESCRIPTION - DESCRIPTORS: DESCRIPTORS: BURNING;CRAMPING

## 2017-12-28 ASSESSMENT — PAIN SCALES - GENERAL
PAINLEVEL_OUTOF10: 0
PAINLEVEL_OUTOF10: 8
PAINLEVEL_OUTOF10: 8
PAINLEVEL_OUTOF10: 7
PAINLEVEL_OUTOF10: 4
PAINLEVEL_OUTOF10: 7
PAINLEVEL_OUTOF10: 7
PAINLEVEL_OUTOF10: 3
PAINLEVEL_OUTOF10: 8
PAINLEVEL_OUTOF10: 7
PAINLEVEL_OUTOF10: 0

## 2017-12-28 ASSESSMENT — PAIN DESCRIPTION - ORIENTATION
ORIENTATION: RIGHT
ORIENTATION: RIGHT

## 2017-12-28 ASSESSMENT — PAIN DESCRIPTION - PAIN TYPE
TYPE: ACUTE PAIN
TYPE: ACUTE PAIN

## 2017-12-28 ASSESSMENT — PAIN DESCRIPTION - LOCATION
LOCATION: ABDOMEN
LOCATION: ABDOMEN;BACK

## 2017-12-28 ASSESSMENT — PAIN DESCRIPTION - DIRECTION: RADIATING_TOWARDS: RIGHT BACK

## 2017-12-28 NOTE — PLAN OF CARE
Problem: Falls - Risk of  Goal: Absence of falls  Outcome: Met This Shift  Patient is a fall risk during this admission. Fall risk assessment was performed. Patient is absent of falls. Bed is in the lowest position. Wheels on the bed are locked. Call light and bed side table are within reach. Clutter is removed. Patient was educated to call out when needing assistance or wanting to get out of bed. Patient offered toileting assistance during rounding. Hourly rounds have been performed. Problem: Pain:  Goal: Control of acute pain  Control of acute pain   Outcome: Ongoing  Patient taking Norco every 4 prn for,pain control.  Will cont to monitor

## 2017-12-28 NOTE — PROGRESS NOTES
appetite. HENT: Negative for ear pain, hearing loss, nosebleeds, sore throat and tinnitus. Eyes: Negative for blurred vision, double vision, photophobia and pain. Respiratory: Negative for cough, hemoptysis, sputum production, shortness of breath and wheezing. Cardiovascular: Negative for palpitations, orthopnea, claudication, leg swelling and PND. Gastrointestinal: Negative for blood in stool, constipation, diarrhea, melena, nausea and vomiting. Positive for acid reflux and right-sided abdominal pain with this chief complaint. Genitourinary: Negative for dysuria, flank pain, frequency, hematuria and urgency. Musculoskeletal: Negative for  falls, joint pain, myalgias and neck pain. Positive for chronic low back pain with out sciatica. Skin: Negative for itching and rash. Neurological: Negative for dizziness, tingling, tremors, sensory change, focal weakness, seizures, weakness and headaches. Endo/Heme/Allergies: Does not bruise/bleed easily. Psychiatric/Behavioral: Negative for depression. The patient is not nervous/anxious. Medications: Allergies:    Allergies   Allergen Reactions    Dye [Iodides] Shortness Of Breath    Morphine Nausea And Vomiting       Current Meds:   potassium chloride 10 mEq/100 mL IVPB (Peripheral Line) PRN   potassium chloride (KLOR-CON M) extended release tablet 40 mEq PRN   Or    potassium chloride 20 MEQ/15ML (10%) oral solution 40 mEq PRN   Or    potassium chloride 10 mEq/100 mL IVPB (Peripheral Line) PRN   meloxicam (MOBIC) tablet 15 mg Daily   famotidine (PEPCID) tablet 20 mg Daily   tiZANidine (ZANAFLEX) tablet 4 mg Q6H PRN   albuterol sulfate  (90 Base) MCG/ACT inhaler 2 puff Q6H PRN   DULoxetine (CYMBALTA) extended release capsule 60 mg Daily   losartan (COZAAR) tablet 100 mg Daily   0.9 % sodium chloride infusion Continuous   sodium chloride flush 0.9 % injection 10 mL 2 times per day   sodium chloride flush 0.9 % injection 10 mL PRN

## 2017-12-29 LAB
ABSOLUTE EOS #: 0.2 K/UL (ref 0–0.4)
ABSOLUTE IMMATURE GRANULOCYTE: ABNORMAL K/UL (ref 0–0.3)
ABSOLUTE LYMPH #: 1.6 K/UL (ref 1–4.8)
ABSOLUTE MONO #: 0.5 K/UL (ref 0.2–0.8)
ANION GAP SERPL CALCULATED.3IONS-SCNC: 11 MMOL/L (ref 9–17)
BASOPHILS # BLD: 1 % (ref 0–2)
BASOPHILS ABSOLUTE: 0.1 K/UL (ref 0–0.2)
BUN BLDV-MCNC: 7 MG/DL (ref 6–20)
BUN/CREAT BLD: 17 (ref 9–20)
CALCIUM SERPL-MCNC: 8.3 MG/DL (ref 8.6–10.4)
CHLORIDE BLD-SCNC: 103 MMOL/L (ref 98–107)
CO2: 25 MMOL/L (ref 20–31)
CREAT SERPL-MCNC: 0.41 MG/DL (ref 0.5–0.9)
CULTURE: ABNORMAL
DIFFERENTIAL TYPE: ABNORMAL
EOSINOPHILS RELATIVE PERCENT: 2 % (ref 1–4)
GFR AFRICAN AMERICAN: >60 ML/MIN
GFR NON-AFRICAN AMERICAN: >60 ML/MIN
GFR SERPL CREATININE-BSD FRML MDRD: ABNORMAL ML/MIN/{1.73_M2}
GFR SERPL CREATININE-BSD FRML MDRD: ABNORMAL ML/MIN/{1.73_M2}
GLUCOSE BLD-MCNC: 85 MG/DL (ref 70–99)
HCT VFR BLD CALC: 32.2 % (ref 36–46)
HEMOGLOBIN: 10.6 G/DL (ref 12–16)
IMMATURE GRANULOCYTES: ABNORMAL %
LYMPHOCYTES # BLD: 21 % (ref 24–44)
Lab: ABNORMAL
MAGNESIUM: 1.9 MG/DL (ref 1.6–2.6)
MCH RBC QN AUTO: 31.4 PG (ref 26–34)
MCHC RBC AUTO-ENTMCNC: 33 G/DL (ref 31–37)
MCV RBC AUTO: 95 FL (ref 80–100)
MONOCYTES # BLD: 7 % (ref 1–7)
ORGANISM: ABNORMAL
PDW BLD-RTO: 12.5 % (ref 11.5–14.5)
PLATELET # BLD: 180 K/UL (ref 130–400)
PLATELET ESTIMATE: ABNORMAL
PMV BLD AUTO: ABNORMAL FL (ref 6–12)
POTASSIUM SERPL-SCNC: 3.7 MMOL/L (ref 3.7–5.3)
RBC # BLD: 3.39 M/UL (ref 4–5.2)
RBC # BLD: ABNORMAL 10*6/UL
SEG NEUTROPHILS: 69 % (ref 36–66)
SEGMENTED NEUTROPHILS ABSOLUTE COUNT: 5.2 K/UL (ref 1.8–7.7)
SODIUM BLD-SCNC: 139 MMOL/L (ref 135–144)
SPECIMEN DESCRIPTION: ABNORMAL
SPECIMEN DESCRIPTION: ABNORMAL
STATUS: ABNORMAL
WBC # BLD: 7.6 K/UL (ref 3.5–11)
WBC # BLD: ABNORMAL 10*3/UL

## 2017-12-29 PROCEDURE — 96376 TX/PRO/DX INJ SAME DRUG ADON: CPT

## 2017-12-29 PROCEDURE — G0378 HOSPITAL OBSERVATION PER HR: HCPCS

## 2017-12-29 PROCEDURE — 6370000000 HC RX 637 (ALT 250 FOR IP): Performed by: INTERNAL MEDICINE

## 2017-12-29 PROCEDURE — 6360000002 HC RX W HCPCS: Performed by: INTERNAL MEDICINE

## 2017-12-29 PROCEDURE — 94640 AIRWAY INHALATION TREATMENT: CPT

## 2017-12-29 PROCEDURE — 1200000000 HC SEMI PRIVATE

## 2017-12-29 PROCEDURE — 99232 SBSQ HOSP IP/OBS MODERATE 35: CPT | Performed by: INTERNAL MEDICINE

## 2017-12-29 PROCEDURE — 83735 ASSAY OF MAGNESIUM: CPT

## 2017-12-29 PROCEDURE — 2580000003 HC RX 258: Performed by: INTERNAL MEDICINE

## 2017-12-29 PROCEDURE — 80048 BASIC METABOLIC PNL TOTAL CA: CPT

## 2017-12-29 PROCEDURE — 85025 COMPLETE CBC W/AUTO DIFF WBC: CPT

## 2017-12-29 PROCEDURE — 36415 COLL VENOUS BLD VENIPUNCTURE: CPT

## 2017-12-29 PROCEDURE — 96372 THER/PROPH/DIAG INJ SC/IM: CPT

## 2017-12-29 RX ORDER — CIPROFLOXACIN 2 MG/ML
400 INJECTION, SOLUTION INTRAVENOUS EVERY 12 HOURS
Status: DISCONTINUED | OUTPATIENT
Start: 2017-12-29 | End: 2017-12-30 | Stop reason: HOSPADM

## 2017-12-29 RX ORDER — METOPROLOL TARTRATE 5 MG/5ML
5 INJECTION INTRAVENOUS ONCE
Status: COMPLETED | OUTPATIENT
Start: 2017-12-30 | End: 2017-12-30

## 2017-12-29 RX ORDER — CETIRIZINE HYDROCHLORIDE 10 MG/1
10 TABLET ORAL DAILY
Status: DISCONTINUED | OUTPATIENT
Start: 2017-12-29 | End: 2017-12-30 | Stop reason: HOSPADM

## 2017-12-29 RX ADMIN — CEFTRIAXONE SODIUM 1 G: 10 INJECTION, POWDER, FOR SOLUTION INTRAVENOUS at 09:47

## 2017-12-29 RX ADMIN — AMLODIPINE BESYLATE 5 MG: 5 TABLET ORAL at 07:50

## 2017-12-29 RX ADMIN — DULOXETINE HYDROCHLORIDE 60 MG: 60 CAPSULE, DELAYED RELEASE ORAL at 07:49

## 2017-12-29 RX ADMIN — SODIUM CHLORIDE: 9 INJECTION, SOLUTION INTRAVENOUS at 07:48

## 2017-12-29 RX ADMIN — CETIRIZINE HYDROCHLORIDE 10 MG: 10 TABLET, FILM COATED ORAL at 15:19

## 2017-12-29 RX ADMIN — ENOXAPARIN SODIUM 40 MG: 40 INJECTION SUBCUTANEOUS at 07:50

## 2017-12-29 RX ADMIN — HYDROCODONE BITARTRATE AND ACETAMINOPHEN 2 TABLET: 5; 325 TABLET ORAL at 07:48

## 2017-12-29 RX ADMIN — ALBUTEROL SULFATE 2 PUFF: 90 AEROSOL, METERED RESPIRATORY (INHALATION) at 10:15

## 2017-12-29 RX ADMIN — FAMOTIDINE 20 MG: 20 TABLET, FILM COATED ORAL at 07:50

## 2017-12-29 RX ADMIN — HYDROCODONE BITARTRATE AND ACETAMINOPHEN 2 TABLET: 5; 325 TABLET ORAL at 15:19

## 2017-12-29 RX ADMIN — ONDANSETRON 4 MG: 2 INJECTION INTRAMUSCULAR; INTRAVENOUS at 15:19

## 2017-12-29 RX ADMIN — CIPROFLOXACIN 400 MG: 2 INJECTION, SOLUTION INTRAVENOUS at 16:58

## 2017-12-29 RX ADMIN — MELOXICAM 15 MG: 7.5 TABLET ORAL at 07:49

## 2017-12-29 RX ADMIN — LOSARTAN POTASSIUM 100 MG: 100 TABLET, FILM COATED ORAL at 07:49

## 2017-12-29 ASSESSMENT — PAIN SCALES - GENERAL
PAINLEVEL_OUTOF10: 10
PAINLEVEL_OUTOF10: 4
PAINLEVEL_OUTOF10: 2
PAINLEVEL_OUTOF10: 5
PAINLEVEL_OUTOF10: 8
PAINLEVEL_OUTOF10: 0

## 2017-12-29 ASSESSMENT — PAIN DESCRIPTION - PAIN TYPE
TYPE: ACUTE PAIN
TYPE: ACUTE PAIN

## 2017-12-29 ASSESSMENT — PAIN DESCRIPTION - ORIENTATION
ORIENTATION: RIGHT
ORIENTATION: RIGHT

## 2017-12-29 ASSESSMENT — PAIN DESCRIPTION - DESCRIPTORS
DESCRIPTORS: ACHING
DESCRIPTORS: ACHING;SHARP;SHOOTING

## 2017-12-29 ASSESSMENT — PAIN DESCRIPTION - FREQUENCY: FREQUENCY: CONTINUOUS

## 2017-12-29 ASSESSMENT — PAIN DESCRIPTION - DIRECTION: RADIATING_TOWARDS: RIGHT BACK

## 2017-12-29 ASSESSMENT — PAIN DESCRIPTION - LOCATION
LOCATION: ABDOMEN
LOCATION: ABDOMEN;BACK

## 2017-12-29 NOTE — PROGRESS NOTES
[facility] - Progress Note    12/29/2017   2:42 PM    Name:  Tsering Garvin  Age:  40 y.o. female  MRN:    [id number]     Acct:     [de-identified]   Room:  2002/2002-02   Day: 2  Hospital: [facility] Date: 12/27/2017  8:11 AM  PCP: Tiffany Henry NP    Subjective:     C/C:   Chief Complaint   Patient presents with    Abdominal Pain     UTI symptoms started yesterday    Fever       Interval History: Status: Slightly improved. Patient is not complaining of a frontal headache since last night. Leukocytosis has returned to normal. Hemoglobin continues to drop somewhat. Patient remains febrile. The patient is currently getting Rocephin 1 g every 24 hours. Initial blood cultures 1 out of 2 are positive for gram-negative rods: E. coli. Sensitivities are back and the organism is pan sensitive. The patient will be changed to Cipro 400 mg IV every 12 hours with the hope that she may be converted to orals and discharged improved tomorrow. She will need a 14 day course of antibiotic therapy as 1/2 blood cultures were positive for E. coli. Patient denies nausea, vomiting, diarrhea or constipation. History: \"The patient is a 40 y.o. / female who presents with Abdominal Pain (UTI symptoms started yesterday) and Fever   and she is admitted to the hospital for the management of  Pyelonephritis. Started with frequency and dysuria that started [place] mirna. Pain progressively worsened and pain was 10/10 today prompting ER visit. She had fever with her symptoms which improved with tylenol, the tylenol did not help the pain. The pain was a severe pain which was similar prior kidney stone. She's had nausea since the onset of her symptoms which is progressively worsened as the pain worsened. She denies any other associated symptoms. Other than the Tylenol for the fever and pain no treatments were tried. She denies any modifying factors. \"    ROS:  Constitutional: Positive chloride flush 0.9 % injection 10 mL 2 times per day   sodium chloride flush 0.9 % injection 10 mL PRN   acetaminophen (TYLENOL) tablet 650 mg Q4H PRN   HYDROcodone-acetaminophen (NORCO) 5-325 MG per tablet 1 tablet Q4H PRN   Or    HYDROcodone-acetaminophen (NORCO) 5-325 MG per tablet 2 tablet Q4H PRN   magnesium hydroxide (MILK OF MAGNESIA) 400 MG/5ML suspension 30 mL Daily PRN   bisacodyl (DULCOLAX) suppository 10 mg Daily PRN   ondansetron (ZOFRAN) injection 4 mg Q4H PRN   nicotine (NICODERM CQ) 21 MG/24HR 1 patch Daily PRN   enoxaparin (LOVENOX) injection 40 mg Daily   cefTRIAXone (ROCEPHIN) 1 g in sterile water 10 mL IV syringe Q24H       Data:     Code Status:  Full Code    Labs:    Hematology:  Recent Labs      12/27/17   0849  12/28/17   0613  12/29/17   0624   WBC  18.8*  10.0  7.6   RBC  3.75*  3.55*  3.39*   HGB  12.3  11.3*  10.6*   HCT  35.3*  33.8*  32.2*   MCV  94.0  95.3  95.0   MCH  32.7  31.7  31.4   MCHC  34.9  33.3  33.0   RDW  12.8  13.1  12.5   PLT  222  198  180   MPV  NOT REPORTED  NOT REPORTED  NOT REPORTED     Chemistry:  Recent Labs      12/27/17   0849  12/28/17   0613  12/29/17   0624   NA  137  140  139   K  3.0*  4.0  3.7   CL  98  104  103   CO2  23  25  25   GLUCOSE  145*  98  85   BUN  8  9  7   CREATININE  0.55  0.51  0.41*   MG   --    --   1.9   ANIONGAP  16  11  11   LABGLOM  >60  >60  >60   GFRAA  >60  >60  >60   CALCIUM  8.7  8.3*  8.3*   CKTOTAL  27   --    --    CKMB  <1.0   --    --      Recent Labs      12/27/17   0849   PROT  6.6   LABALBU  3.6   AST  11   ALT  9   ALKPHOS  77   BILITOT  0.38   BILIDIR  0.09   LIPASE  13     UA:  Recent Labs      12/27/17   0842   COLORU  YELLOW   PHUR  6.0   WBCUA  50    RBCUA  20 TO 50   MUCUS  NOT REPORTED   TRICHOMONAS  NOT REPORTED   YEAST  NOT REPORTED   BACTERIA  MANY*   SPECGRAV  1.020   LEUKOCYTESUR  MOD*   UROBILINOGEN  Normal   BILIRUBINUR  NEGATIVE   GLUCOSEU  NEGATIVE   AMORPHOUS  NOT REPORTED     Culture and

## 2017-12-30 VITALS
TEMPERATURE: 98.1 F | HEIGHT: 62 IN | HEART RATE: 65 BPM | SYSTOLIC BLOOD PRESSURE: 168 MMHG | RESPIRATION RATE: 18 BRPM | DIASTOLIC BLOOD PRESSURE: 87 MMHG | WEIGHT: 252.4 LBS | OXYGEN SATURATION: 94 % | BODY MASS INDEX: 46.45 KG/M2

## 2017-12-30 PROBLEM — R30.0 DYSURIA: Status: ACTIVE | Noted: 2017-12-30

## 2017-12-30 LAB
ABSOLUTE EOS #: 0.2 K/UL (ref 0–0.4)
ABSOLUTE IMMATURE GRANULOCYTE: ABNORMAL K/UL (ref 0–0.3)
ABSOLUTE LYMPH #: 2 K/UL (ref 1–4.8)
ABSOLUTE MONO #: 0.5 K/UL (ref 0.2–0.8)
ANION GAP SERPL CALCULATED.3IONS-SCNC: 10 MMOL/L (ref 9–17)
BASOPHILS # BLD: 1 % (ref 0–2)
BASOPHILS ABSOLUTE: 0.1 K/UL (ref 0–0.2)
BUN BLDV-MCNC: 7 MG/DL (ref 6–20)
BUN/CREAT BLD: 14 (ref 9–20)
CALCIUM SERPL-MCNC: 8.4 MG/DL (ref 8.6–10.4)
CHLORIDE BLD-SCNC: 102 MMOL/L (ref 98–107)
CO2: 29 MMOL/L (ref 20–31)
CREAT SERPL-MCNC: 0.5 MG/DL (ref 0.5–0.9)
DIFFERENTIAL TYPE: ABNORMAL
EOSINOPHILS RELATIVE PERCENT: 3 % (ref 1–4)
GFR AFRICAN AMERICAN: >60 ML/MIN
GFR NON-AFRICAN AMERICAN: >60 ML/MIN
GFR SERPL CREATININE-BSD FRML MDRD: ABNORMAL ML/MIN/{1.73_M2}
GFR SERPL CREATININE-BSD FRML MDRD: ABNORMAL ML/MIN/{1.73_M2}
GLUCOSE BLD-MCNC: 92 MG/DL (ref 70–99)
HCT VFR BLD CALC: 32.7 % (ref 36–46)
HEMOGLOBIN: 10.8 G/DL (ref 12–16)
IMMATURE GRANULOCYTES: ABNORMAL %
LYMPHOCYTES # BLD: 27 % (ref 24–44)
MAGNESIUM: 1.8 MG/DL (ref 1.6–2.6)
MCH RBC QN AUTO: 31.2 PG (ref 26–34)
MCHC RBC AUTO-ENTMCNC: 32.9 G/DL (ref 31–37)
MCV RBC AUTO: 94.7 FL (ref 80–100)
MONOCYTES # BLD: 7 % (ref 1–7)
PDW BLD-RTO: 12.7 % (ref 11.5–14.5)
PLATELET # BLD: 234 K/UL (ref 130–400)
PLATELET ESTIMATE: ABNORMAL
PMV BLD AUTO: ABNORMAL FL (ref 6–12)
POTASSIUM SERPL-SCNC: 3.5 MMOL/L (ref 3.7–5.3)
RBC # BLD: 3.45 M/UL (ref 4–5.2)
RBC # BLD: ABNORMAL 10*6/UL
SEG NEUTROPHILS: 62 % (ref 36–66)
SEGMENTED NEUTROPHILS ABSOLUTE COUNT: 4.7 K/UL (ref 1.8–7.7)
SODIUM BLD-SCNC: 141 MMOL/L (ref 135–144)
WBC # BLD: 7.5 K/UL (ref 3.5–11)
WBC # BLD: ABNORMAL 10*3/UL

## 2017-12-30 PROCEDURE — 83735 ASSAY OF MAGNESIUM: CPT

## 2017-12-30 PROCEDURE — 85025 COMPLETE CBC W/AUTO DIFF WBC: CPT

## 2017-12-30 PROCEDURE — 96365 THER/PROPH/DIAG IV INF INIT: CPT

## 2017-12-30 PROCEDURE — 36415 COLL VENOUS BLD VENIPUNCTURE: CPT

## 2017-12-30 PROCEDURE — 6360000002 HC RX W HCPCS: Performed by: INTERNAL MEDICINE

## 2017-12-30 PROCEDURE — 96376 TX/PRO/DX INJ SAME DRUG ADON: CPT

## 2017-12-30 PROCEDURE — 2500000003 HC RX 250 WO HCPCS: Performed by: NURSE PRACTITIONER

## 2017-12-30 PROCEDURE — 96372 THER/PROPH/DIAG INJ SC/IM: CPT

## 2017-12-30 PROCEDURE — 96375 TX/PRO/DX INJ NEW DRUG ADDON: CPT

## 2017-12-30 PROCEDURE — G0378 HOSPITAL OBSERVATION PER HR: HCPCS

## 2017-12-30 PROCEDURE — 6370000000 HC RX 637 (ALT 250 FOR IP): Performed by: INTERNAL MEDICINE

## 2017-12-30 PROCEDURE — 99232 SBSQ HOSP IP/OBS MODERATE 35: CPT | Performed by: INTERNAL MEDICINE

## 2017-12-30 PROCEDURE — 80048 BASIC METABOLIC PNL TOTAL CA: CPT

## 2017-12-30 RX ORDER — HYDROCODONE BITARTRATE AND ACETAMINOPHEN 5; 325 MG/1; MG/1
1 TABLET ORAL EVERY 4 HOURS PRN
Qty: 20 TABLET | Refills: 0 | Status: SHIPPED | OUTPATIENT
Start: 2017-12-30 | End: 2018-01-04

## 2017-12-30 RX ORDER — PHENAZOPYRIDINE HYDROCHLORIDE 200 MG/1
200 TABLET, FILM COATED ORAL
Qty: 9 TABLET | Refills: 0 | Status: SHIPPED | OUTPATIENT
Start: 2017-12-30 | End: 2018-01-02

## 2017-12-30 RX ORDER — TRIAMTERENE AND HYDROCHLOROTHIAZIDE 37.5; 25 MG/1; MG/1
1 TABLET ORAL DAILY
Status: DISCONTINUED | OUTPATIENT
Start: 2017-12-30 | End: 2017-12-30 | Stop reason: HOSPADM

## 2017-12-30 RX ORDER — CIPROFLOXACIN 500 MG/1
500 TABLET, FILM COATED ORAL 2 TIMES DAILY
Qty: 20 TABLET | Refills: 0 | Status: SHIPPED | OUTPATIENT
Start: 2017-12-30 | End: 2018-01-09

## 2017-12-30 RX ORDER — PHENAZOPYRIDINE HYDROCHLORIDE 200 MG/1
200 TABLET, FILM COATED ORAL
Status: DISCONTINUED | OUTPATIENT
Start: 2017-12-30 | End: 2017-12-30 | Stop reason: HOSPADM

## 2017-12-30 RX ADMIN — CIPROFLOXACIN 400 MG: 2 INJECTION, SOLUTION INTRAVENOUS at 05:30

## 2017-12-30 RX ADMIN — ONDANSETRON 4 MG: 2 INJECTION INTRAMUSCULAR; INTRAVENOUS at 05:45

## 2017-12-30 RX ADMIN — CETIRIZINE HYDROCHLORIDE 10 MG: 10 TABLET, FILM COATED ORAL at 08:47

## 2017-12-30 RX ADMIN — ONDANSETRON 4 MG: 2 INJECTION INTRAMUSCULAR; INTRAVENOUS at 00:36

## 2017-12-30 RX ADMIN — MELOXICAM 15 MG: 7.5 TABLET ORAL at 08:46

## 2017-12-30 RX ADMIN — DULOXETINE HYDROCHLORIDE 60 MG: 60 CAPSULE, DELAYED RELEASE ORAL at 08:47

## 2017-12-30 RX ADMIN — HYDROCODONE BITARTRATE AND ACETAMINOPHEN 2 TABLET: 5; 325 TABLET ORAL at 09:40

## 2017-12-30 RX ADMIN — METOROPROLOL TARTRATE 5 MG: 5 INJECTION, SOLUTION INTRAVENOUS at 00:36

## 2017-12-30 RX ADMIN — HYDROCODONE BITARTRATE AND ACETAMINOPHEN 2 TABLET: 5; 325 TABLET ORAL at 00:36

## 2017-12-30 RX ADMIN — ENOXAPARIN SODIUM 40 MG: 40 INJECTION SUBCUTANEOUS at 08:47

## 2017-12-30 RX ADMIN — FAMOTIDINE 20 MG: 20 TABLET, FILM COATED ORAL at 08:47

## 2017-12-30 RX ADMIN — LOSARTAN POTASSIUM 100 MG: 100 TABLET, FILM COATED ORAL at 08:46

## 2017-12-30 RX ADMIN — AMLODIPINE BESYLATE 5 MG: 5 TABLET ORAL at 08:47

## 2017-12-30 RX ADMIN — TRIAMTERENE AND HYDROCHLOROTHIAZIDE 1 TABLET: 37.5; 25 TABLET ORAL at 10:45

## 2017-12-30 RX ADMIN — HYDROCODONE BITARTRATE AND ACETAMINOPHEN 2 TABLET: 5; 325 TABLET ORAL at 05:45

## 2017-12-30 RX ADMIN — PHENAZOPYRIDINE HYDROCHLORIDE 200 MG: 200 TABLET ORAL at 10:45

## 2017-12-30 ASSESSMENT — PAIN SCALES - GENERAL
PAINLEVEL_OUTOF10: 0
PAINLEVEL_OUTOF10: 6
PAINLEVEL_OUTOF10: 8
PAINLEVEL_OUTOF10: 7
PAINLEVEL_OUTOF10: 0
PAINLEVEL_OUTOF10: 9

## 2017-12-30 NOTE — DISCHARGE SUMMARY
[facility]    Discharge Summary     Patient ID: Kanchan Sue  :  1975   MRN: [id number]     ACCOUNT:  [de-identified]   Patient's PCP: Ute Galeana NP  Admit Date: 2017   Discharge Date: 2017     Length of Stay: 3  Code Status:  Full Code  Admitting Physician: Ant Dutton DO  Discharge Physician: Bird Watters MD     Active Discharge Diagnoses:     Primary Problem  Pyelonephritis      [place] Problems    Diagnosis Date Noted    Dysuria [R30.0] 2017    E coli bacteremia [R78.81] 2017    Pyelonephritis [N12] 2017    Hydronephrosis of right kidney [N13.30] 2017    Obesity, morbid, BMI 40.0-49.9 ([address] [E66.01] 04/10/2017    Essential hypertension [I10] 2016       Admission Condition:  fair     Discharged Condition: good    Hospital Stay:     Hospital Course:  Kanchan Sue is a 40 y.o. female who was admitted for the management of   Pyelonephritis , presented to ER with Abdominal Pain (UTI symptoms started yesterday) and Fever      39 yo female admitted with Fever and Dysuria and abdominal pain, found to have a UTI and pyelonephritis. She has E.coli bacteremia. She was treated with IV antibiotics. E.coli is sensitive to Cipro. She was given scripts for Cipro x 10 days, Pyridium and Norco prn pain. Significant therapeutic interventions: IVF, IV antibiotics    Significant Diagnostic Studies:       Radiology:    Ct Abdomen Pelvis Wo Iv Contrast    Result Date: 2017  EXAMINATION: CT OF THE ABDOMEN AND PELVIS WITHOUT CONTRAST 2017 9:43 am TECHNIQUE: CT of the abdomen and pelvis was performed without the administration of intravenous contrast. Multiplanar reformatted images are provided for review.  Dose modulation, iterative reconstruction, and/or weight based adjustment of the mA/kV was utilized to reduce the radiation dose to as low as reasonably

## 2017-12-30 NOTE — DISCHARGE INSTR - DIET

## 2017-12-30 NOTE — PROGRESS NOTES
**OR** potassium chloride **OR** potassium chloride, albuterol sulfate HFA, sodium chloride flush, acetaminophen, HYDROcodone 5 mg - acetaminophen **OR** HYDROcodone 5 mg - acetaminophen, magnesium hydroxide, bisacodyl, ondansetron, nicotine    Data:     Past Medical History:   has a past medical history of Alcohol use disorder, mild, abuse; Chronic midline low back pain without sciatica; Depression; Essential hypertension; Gastroesophageal reflux disease without esophagitis; Hydronephrosis of right kidney; Obesity; and Seasonal allergies. Social History:   reports that she quit smoking about 3 months ago. She smoked 0.50 packs per day. She has never used smokeless tobacco. She reports that she drinks about 4.8 oz of alcohol per week . She reports that she does not use drugs. Family History:   Family History   Problem Relation Age of Onset    Eczema Mother     COPD Mother     Other Mother     Liver Disease Mother     Heart Disease Mother        Vitals:  BP (!) 168/87   Pulse 65   Temp 98.1 °F (36.7 °C) (Oral)   Resp 18   Ht 5' 2\" (1.575 m)   Wt 252 lb 6.4 oz (114.5 kg)   SpO2 94%   BMI 46.16 kg/m²   Temp (24hrs), Av.3 °F (36.8 °C), Min:98 °F (36.7 °C), Max:98.8 °F (37.1 °C)    No results for input(s): POCGLU in the last 72 hours. I/O (24Hr):     Intake/Output Summary (Last 24 hours) at 17 0912  Last data filed at 17 8376   Gross per 24 hour   Intake             1226 ml   Output             1800 ml   Net             -574 ml       Labs:    Hematology:  Recent Labs      17   2803  17   0624  17   0530   WBC  10.0  7.6  7.5   HGB  11.3*  10.6*  10.8*   HCT  33.8*  32.2*  32.7*   PLT  198  180  234     Chemistry:  Recent Labs      17   0613  17   0624  17   0530   NA  140  139  141   K  4.0  3.7  3.5*   CL  104  103  102   CO2  25  25  29   GLUCOSE  98  85  92   BUN  9  7  7   CREATININE  0.51  0.41*  0.50   MG   --   1.9  1.8   CALCIUM  8.3*  8.3* splenomegaly  Extremities:  no edema, redness, tenderness in the calves  Skin:  no gross lesions, rashes, induration    Assessment:        Primary Problem  Pyelonephritis    Active Hospital Problems    Diagnosis Date Noted    Dysuria [R30.0] 12/30/2017    E coli bacteremia [R78.81] 12/28/2017    Pyelonephritis [N12] 12/27/2017    Hydronephrosis of right kidney [N13.30] 12/27/2017    Obesity, morbid, BMI 40.0-49.9 ([facility] [address] [E66.01] 04/10/2017    Essential hypertension [I10] 03/08/2016       Plan:        1. Acute pyelonephritis/ E.coli bacteremia- IV Cipro, change to po for total 14 days on discharge, [place] for pain  2. Dysuria- start Pyridium  3. [place] HTN- resume Maxide  4. Obesity  5.  Gi/DVT proph    DC planning home later today    DW nurse and patient, who is agreeable to plan    Wesley Perez MD  12/30/2017  9:12 AM

## 2018-01-02 LAB
CULTURE: NORMAL
CULTURE: NORMAL
Lab: NORMAL
SPECIMEN DESCRIPTION: NORMAL
SPECIMEN DESCRIPTION: NORMAL
STATUS: NORMAL

## 2018-01-04 ENCOUNTER — TELEPHONE (OUTPATIENT)
Dept: FAMILY MEDICINE CLINIC | Age: 43
End: 2018-01-04

## 2018-01-04 DIAGNOSIS — M54.50 CHRONIC MIDLINE LOW BACK PAIN WITHOUT SCIATICA: ICD-10-CM

## 2018-01-04 DIAGNOSIS — G89.29 CHRONIC MIDLINE LOW BACK PAIN WITHOUT SCIATICA: ICD-10-CM

## 2018-01-04 RX ORDER — MELOXICAM 15 MG/1
TABLET ORAL
Qty: 30 TABLET | Refills: 0 | Status: SHIPPED | OUTPATIENT
Start: 2018-01-04 | End: 2018-02-04 | Stop reason: SDUPTHER

## 2018-01-04 NOTE — TELEPHONE ENCOUNTER
Last seen: 11/20/17  Next appt: none    Next Visit Date:  No future appointments. Health Maintenance   Topic Date Due    Potassium monitoring  1975    Creatinine monitoring  1975    Cervical cancer screen  11/07/2018 (Originally 1/18/1997)    Flu vaccine (1) 11/07/2018 (Originally 9/1/2017)    Lipid screen  04/10/2022    DTaP/Tdap/Td vaccine (2 - Td) 04/10/2025    Pneumococcal med risk  Completed    HIV screen  Completed       Hemoglobin A1C (%)   Date Value   07/22/2016 4.9             ( goal A1C is < 7)   Microalb/Crt.  Ratio (mcg/mg creat)   Date Value   03/08/2016 18     LDL Cholesterol (mg/dL)   Date Value   04/10/2017 130       (goal LDL is <100)   AST (U/L)   Date Value   12/27/2017 11     ALT (U/L)   Date Value   12/27/2017 9     BUN (mg/dL)   Date Value   12/30/2017 7     BP Readings from Last 3 Encounters:   12/30/17 (!) 168/87   11/20/17 120/76   11/07/17 138/80          (goal 120/80)    All Future Testing planned in [facility]  Lab Frequency Next Occurrence   XR CHEST STANDARD (2 VW) Once 12/29/2017               Patient Active Problem List:     Essential hypertension     Gastroesophageal reflux disease without esophagitis     Seasonal allergies     Smoker     Alcohol use disorder, mild, abuse     Obesity, morbid, BMI 40.0-49.9 ([facility])     Failed back syndrome, lumbar     Chronic midline low back pain without sciatica     Bronchitis     Neural foraminal stenosis of lumbar spine     Pyelonephritis     Hydronephrosis of right kidney     E coli bacteremia     Dysuria

## 2018-02-04 DIAGNOSIS — M54.50 CHRONIC MIDLINE LOW BACK PAIN WITHOUT SCIATICA: ICD-10-CM

## 2018-02-04 DIAGNOSIS — G89.29 CHRONIC MIDLINE LOW BACK PAIN WITHOUT SCIATICA: ICD-10-CM

## 2018-02-05 RX ORDER — MELOXICAM 15 MG/1
TABLET ORAL
Qty: 90 TABLET | Refills: 1 | Status: SHIPPED | OUTPATIENT
Start: 2018-02-05 | End: 2018-08-06 | Stop reason: SDUPTHER

## 2018-02-05 NOTE — TELEPHONE ENCOUNTER
Last filled 1/4/18 #30 with 0 RF  Last seen 11/2017    Next Visit Date:  No future appointments. Health Maintenance   Topic Date Due    Cervical cancer screen  11/07/2018 (Originally 1/18/1997)    Flu vaccine (1) 11/07/2018 (Originally 9/1/2017)    Potassium monitoring  12/30/2018    Creatinine monitoring  12/30/2018    Lipid screen  04/10/2022    DTaP/Tdap/Td vaccine (2 - Td) 04/10/2025    Pneumococcal med risk  Completed    HIV screen  Completed       Hemoglobin A1C (%)   Date Value   07/22/2016 4.9             ( goal A1C is < 7)   Microalb/Crt.  Ratio (mcg/mg creat)   Date Value   03/08/2016 18     LDL Cholesterol (mg/dL)   Date Value   04/10/2017 130       (goal LDL is <100)   AST (U/L)   Date Value   12/27/2017 11     ALT (U/L)   Date Value   12/27/2017 9     BUN (mg/dL)   Date Value   12/30/2017 7     BP Readings from Last 3 Encounters:   12/30/17 (!) 168/87   11/20/17 120/76   11/07/17 138/80          (goal 120/80)    All Future Testing planned in [facility]              Patient Active Problem List:     Essential hypertension     Gastroesophageal reflux disease without esophagitis     Seasonal allergies     Smoker     Alcohol use disorder, mild, abuse     Obesity, morbid, BMI 40.0-49.9 (HCC)     Failed back syndrome, lumbar     Chronic midline low back pain without sciatica     Bronchitis     Neural foraminal stenosis of lumbar spine     Pyelonephritis     Hydronephrosis of right kidney     E coli bacteremia     Dysuria

## 2018-03-02 RX ORDER — TIZANIDINE 4 MG/1
TABLET ORAL
Qty: 120 TABLET | Refills: 2 | Status: SHIPPED | OUTPATIENT
Start: 2018-03-02 | End: 2018-06-03 | Stop reason: SDUPTHER

## 2018-03-06 ENCOUNTER — HOSPITAL ENCOUNTER (EMERGENCY)
Age: 43
Discharge: HOME | End: 2018-03-06
Attending: EMERGENCY MEDICINE
Payer: MEDICARE

## 2018-03-06 ENCOUNTER — APPOINTMENT (OUTPATIENT)
Dept: ULTRASOUND IMAGING | Age: 43
End: 2018-03-06
Payer: MEDICARE

## 2018-03-06 VITALS
OXYGEN SATURATION: 97 % | HEART RATE: 92 BPM | WEIGHT: 233.1 LBS | HEIGHT: 63 IN | DIASTOLIC BLOOD PRESSURE: 76 MMHG | TEMPERATURE: 98.3 F | BODY MASS INDEX: 41.3 KG/M2 | RESPIRATION RATE: 16 BRPM | SYSTOLIC BLOOD PRESSURE: 144 MMHG

## 2018-03-06 DIAGNOSIS — N76.0 BACTERIAL VAGINITIS: Primary | ICD-10-CM

## 2018-03-06 DIAGNOSIS — B96.89 BACTERIAL VAGINITIS: Primary | ICD-10-CM

## 2018-03-06 LAB
-: ABNORMAL
ABSOLUTE EOS #: 0.1 K/UL (ref 0–0.4)
ABSOLUTE IMMATURE GRANULOCYTE: ABNORMAL K/UL (ref 0–0.3)
ABSOLUTE LYMPH #: 2 K/UL (ref 1–4.8)
ABSOLUTE MONO #: 0.4 K/UL (ref 0.2–0.8)
ALBUMIN SERPL-MCNC: 4.4 G/DL (ref 3.5–5.2)
ALBUMIN/GLOBULIN RATIO: ABNORMAL (ref 1–2.5)
ALP BLD-CCNC: 78 U/L (ref 35–104)
ALT SERPL-CCNC: 8 U/L (ref 5–33)
AMORPHOUS: ABNORMAL
ANION GAP SERPL CALCULATED.3IONS-SCNC: 16 MMOL/L (ref 9–17)
AST SERPL-CCNC: 11 U/L
BACTERIA: ABNORMAL
BASOPHILS # BLD: 0 % (ref 0–2)
BASOPHILS ABSOLUTE: 0 K/UL (ref 0–0.2)
BILIRUB SERPL-MCNC: 0.4 MG/DL (ref 0.3–1.2)
BILIRUBIN URINE: NEGATIVE
BUN BLDV-MCNC: 13 MG/DL (ref 6–20)
BUN/CREAT BLD: 27 (ref 9–20)
CALCIUM SERPL-MCNC: 8.7 MG/DL (ref 8.6–10.4)
CASTS UA: ABNORMAL /LPF
CHLORIDE BLD-SCNC: 98 MMOL/L (ref 98–107)
CO2: 23 MMOL/L (ref 20–31)
COLOR: YELLOW
COMMENT UA: ABNORMAL
CREAT SERPL-MCNC: 0.48 MG/DL (ref 0.5–0.9)
CRYSTALS, UA: ABNORMAL /HPF
DIFFERENTIAL TYPE: ABNORMAL
DIRECT EXAM: ABNORMAL
EOSINOPHILS RELATIVE PERCENT: 1 % (ref 1–4)
EPITHELIAL CELLS UA: ABNORMAL /HPF (ref 0–5)
GFR AFRICAN AMERICAN: >60 ML/MIN
GFR NON-AFRICAN AMERICAN: >60 ML/MIN
GFR SERPL CREATININE-BSD FRML MDRD: ABNORMAL ML/MIN/{1.73_M2}
GFR SERPL CREATININE-BSD FRML MDRD: ABNORMAL ML/MIN/{1.73_M2}
GLUCOSE BLD-MCNC: 105 MG/DL (ref 70–99)
GLUCOSE URINE: NEGATIVE
HCG, PREGNANCY URINE (POC): NEGATIVE
HCT VFR BLD CALC: 41.5 % (ref 36–46)
HEMOGLOBIN: 14.3 G/DL (ref 12–16)
IMMATURE GRANULOCYTES: ABNORMAL %
KETONES, URINE: NEGATIVE
LACTIC ACID, SEPSIS WHOLE BLOOD: NORMAL MMOL/L (ref 0.5–1.9)
LACTIC ACID, SEPSIS: 1.1 MMOL/L (ref 0.5–1.9)
LEUKOCYTE ESTERASE, URINE: ABNORMAL
LIPASE: 19 U/L (ref 13–60)
LYMPHOCYTES # BLD: 14 % (ref 24–44)
Lab: ABNORMAL
MCH RBC QN AUTO: 32.3 PG (ref 26–34)
MCHC RBC AUTO-ENTMCNC: 34.4 G/DL (ref 31–37)
MCV RBC AUTO: 93.9 FL (ref 80–100)
MONOCYTES # BLD: 3 % (ref 1–7)
MUCUS: ABNORMAL
NITRITE, URINE: NEGATIVE
NRBC AUTOMATED: ABNORMAL PER 100 WBC
OTHER OBSERVATIONS UA: ABNORMAL
PDW BLD-RTO: 14 % (ref 11.5–14.5)
PH UA: 6 (ref 5–8)
PLATELET # BLD: 326 K/UL (ref 130–400)
PLATELET ESTIMATE: ABNORMAL
PMV BLD AUTO: 7.7 FL (ref 6–12)
POTASSIUM SERPL-SCNC: 3.8 MMOL/L (ref 3.7–5.3)
PROTEIN UA: NEGATIVE
RBC # BLD: 4.41 M/UL (ref 4–5.2)
RBC # BLD: ABNORMAL 10*6/UL
RBC UA: ABNORMAL /HPF (ref 0–2)
RENAL EPITHELIAL, UA: ABNORMAL /HPF
SEG NEUTROPHILS: 82 % (ref 36–66)
SEGMENTED NEUTROPHILS ABSOLUTE COUNT: 11.5 K/UL (ref 1.8–7.7)
SODIUM BLD-SCNC: 137 MMOL/L (ref 135–144)
SPECIFIC GRAVITY UA: 1.03 (ref 1–1.03)
SPECIMEN DESCRIPTION: ABNORMAL
STATUS: ABNORMAL
TOTAL PROTEIN: 6.9 G/DL (ref 6.4–8.3)
TRICHOMONAS: ABNORMAL
TURBIDITY: ABNORMAL
URINE HGB: NEGATIVE
UROBILINOGEN, URINE: NORMAL
WBC # BLD: 14 K/UL (ref 3.5–11)
WBC # BLD: ABNORMAL 10*3/UL
WBC UA: ABNORMAL /HPF (ref 0–5)
YEAST: ABNORMAL

## 2018-03-06 PROCEDURE — 76856 US EXAM PELVIC COMPLETE: CPT

## 2018-03-06 PROCEDURE — 87480 CANDIDA DNA DIR PROBE: CPT

## 2018-03-06 PROCEDURE — 81001 URINALYSIS AUTO W/SCOPE: CPT

## 2018-03-06 PROCEDURE — 96374 THER/PROPH/DIAG INJ IV PUSH: CPT

## 2018-03-06 PROCEDURE — 80053 COMPREHEN METABOLIC PANEL: CPT

## 2018-03-06 PROCEDURE — S0028 INJECTION, FAMOTIDINE, 20 MG: HCPCS | Performed by: EMERGENCY MEDICINE

## 2018-03-06 PROCEDURE — 96375 TX/PRO/DX INJ NEW DRUG ADDON: CPT

## 2018-03-06 PROCEDURE — 99284 EMERGENCY DEPT VISIT MOD MDM: CPT

## 2018-03-06 PROCEDURE — 85025 COMPLETE CBC W/AUTO DIFF WBC: CPT

## 2018-03-06 PROCEDURE — 76830 TRANSVAGINAL US NON-OB: CPT

## 2018-03-06 PROCEDURE — 83690 ASSAY OF LIPASE: CPT

## 2018-03-06 PROCEDURE — 87660 TRICHOMONAS VAGIN DIR PROBE: CPT

## 2018-03-06 PROCEDURE — 87491 CHLMYD TRACH DNA AMP PROBE: CPT

## 2018-03-06 PROCEDURE — 2500000003 HC RX 250 WO HCPCS: Performed by: EMERGENCY MEDICINE

## 2018-03-06 PROCEDURE — 93976 VASCULAR STUDY: CPT

## 2018-03-06 PROCEDURE — 83605 ASSAY OF LACTIC ACID: CPT

## 2018-03-06 PROCEDURE — 87510 GARDNER VAG DNA DIR PROBE: CPT

## 2018-03-06 PROCEDURE — 87591 N.GONORRHOEAE DNA AMP PROB: CPT

## 2018-03-06 PROCEDURE — 84703 CHORIONIC GONADOTROPIN ASSAY: CPT

## 2018-03-06 PROCEDURE — 2580000003 HC RX 258: Performed by: EMERGENCY MEDICINE

## 2018-03-06 PROCEDURE — 6370000000 HC RX 637 (ALT 250 FOR IP): Performed by: EMERGENCY MEDICINE

## 2018-03-06 PROCEDURE — 6360000002 HC RX W HCPCS: Performed by: EMERGENCY MEDICINE

## 2018-03-06 RX ORDER — AZITHROMYCIN 250 MG/1
1000 TABLET, FILM COATED ORAL ONCE
Status: COMPLETED | OUTPATIENT
Start: 2018-03-06 | End: 2018-03-06

## 2018-03-06 RX ORDER — MORPHINE SULFATE 2 MG/ML
2 INJECTION, SOLUTION INTRAMUSCULAR; INTRAVENOUS ONCE
Status: DISCONTINUED | OUTPATIENT
Start: 2018-03-06 | End: 2018-03-06

## 2018-03-06 RX ORDER — METOCLOPRAMIDE HYDROCHLORIDE 5 MG/ML
10 INJECTION INTRAMUSCULAR; INTRAVENOUS ONCE
Status: COMPLETED | OUTPATIENT
Start: 2018-03-06 | End: 2018-03-06

## 2018-03-06 RX ORDER — 0.9 % SODIUM CHLORIDE 0.9 %
1000 INTRAVENOUS SOLUTION INTRAVENOUS ONCE
Status: COMPLETED | OUTPATIENT
Start: 2018-03-06 | End: 2018-03-06

## 2018-03-06 RX ORDER — KETOROLAC TROMETHAMINE 30 MG/ML
30 INJECTION, SOLUTION INTRAMUSCULAR; INTRAVENOUS ONCE
Status: COMPLETED | OUTPATIENT
Start: 2018-03-06 | End: 2018-03-06

## 2018-03-06 RX ORDER — ONDANSETRON 4 MG/1
4 TABLET, ORALLY DISINTEGRATING ORAL ONCE
Status: COMPLETED | OUTPATIENT
Start: 2018-03-06 | End: 2018-03-06

## 2018-03-06 RX ORDER — METRONIDAZOLE 500 MG/1
500 TABLET ORAL 2 TIMES DAILY
Qty: 20 TABLET | Refills: 0 | Status: SHIPPED | OUTPATIENT
Start: 2018-03-06 | End: 2018-05-21 | Stop reason: ALTCHOICE

## 2018-03-06 RX ORDER — ONDANSETRON 4 MG/1
4 TABLET, ORALLY DISINTEGRATING ORAL EVERY 6 HOURS PRN
Qty: 12 TABLET | Refills: 0 | Status: SHIPPED | OUTPATIENT
Start: 2018-03-06 | End: 2018-05-21 | Stop reason: ALTCHOICE

## 2018-03-06 RX ORDER — 0.9 % SODIUM CHLORIDE 0.9 %
1000 INTRAVENOUS SOLUTION INTRAVENOUS ONCE
Status: DISCONTINUED | OUTPATIENT
Start: 2018-03-06 | End: 2018-03-06

## 2018-03-06 RX ADMIN — ONDANSETRON 4 MG: 4 TABLET, ORALLY DISINTEGRATING ORAL at 04:17

## 2018-03-06 RX ADMIN — SODIUM CHLORIDE 1000 ML: 9 INJECTION, SOLUTION INTRAVENOUS at 05:50

## 2018-03-06 RX ADMIN — CEFTRIAXONE SODIUM 1 G: 10 INJECTION, POWDER, FOR SOLUTION INTRAVENOUS at 06:15

## 2018-03-06 RX ADMIN — METOCLOPRAMIDE 10 MG: 5 INJECTION, SOLUTION INTRAMUSCULAR; INTRAVENOUS at 05:50

## 2018-03-06 RX ADMIN — AZITHROMYCIN 1000 MG: 250 TABLET, FILM COATED ORAL at 08:10

## 2018-03-06 RX ADMIN — KETOROLAC TROMETHAMINE 30 MG: 30 INJECTION, SOLUTION INTRAMUSCULAR at 05:57

## 2018-03-06 RX ADMIN — FAMOTIDINE 20 MG: 10 INJECTION, SOLUTION INTRAVENOUS at 05:51

## 2018-03-06 ASSESSMENT — ENCOUNTER SYMPTOMS
WHEEZING: 0
SHORTNESS OF BREATH: 0
VOMITING: 0
VOICE CHANGE: 0
COUGH: 0
FACIAL SWELLING: 0
NAUSEA: 0
DIARRHEA: 0
BACK PAIN: 1
ABDOMINAL PAIN: 0

## 2018-03-06 ASSESSMENT — PAIN DESCRIPTION - DESCRIPTORS: DESCRIPTORS: ACHING;SHARP

## 2018-03-06 ASSESSMENT — PAIN DESCRIPTION - LOCATION: LOCATION: ABDOMEN;FLANK

## 2018-03-06 ASSESSMENT — PAIN DESCRIPTION - PAIN TYPE: TYPE: ACUTE PAIN

## 2018-03-06 ASSESSMENT — PAIN DESCRIPTION - FREQUENCY: FREQUENCY: CONTINUOUS

## 2018-03-06 ASSESSMENT — PAIN SCALES - GENERAL
PAINLEVEL_OUTOF10: 10
PAINLEVEL_OUTOF10: 10
PAINLEVEL_OUTOF10: 6
PAINLEVEL_OUTOF10: 3

## 2018-03-06 NOTE — ED PROVIDER NOTES
Medical Decision Making: The patient was initially seen by Dr. Mike Page and signed out to me after discussing the case with him thoroughly. Ultrasound per radiologist is negative. Testing shows presence of bacterial vaginosis. She is feeling much improved now and is much less nauseous. She was additionally given oral Zithromax and she was prescribed Flagyl and Zofran. Treatment diagnosis and follow-up were discussed with the patient    Us Non Ob Transvaginal    Result Date: 3/6/2018  EXAMINATION: PELVIC ULTRASOUND; DOPPLER EVALUATION 3/6/2018 TECHNIQUE: Transabdominal and transvaginal pelvic ultrasound was performed with color doppler flow evaluation with spectral waveform analysis. COMPARISON: CT abdomen pelvis without contrast from 12/27/2017 HISTORY: ORDERING SYSTEM PROVIDED HISTORY: ABDOMINAL PAIN, RLQ TECHNOLOGIST PROVIDED HISTORY: Ordering Physician Provided Reason for Exam: UTI symptoms x 4days, pelvic pain/nausea, hyst 81-year-old female with right lower quadrant abdominal pain, nausea for 4 days; prior hysterectomy FINDINGS: Measurements: Uterus:  Surgically absent. Endometrial stripe:  Prior hysterectomy. Right Ovary:  1.9 x 3.0 x 1.8 cm for a volume of 5.3 mL. Left Ovary:  2.0 x 1.6 x 2.1 cm for a volume of 3.5 mL. Ultrasound Findings: Uterus: Surgically absent. Endometrial stripe: Prior hysterectomy. Nabothian cysts in the cervix on image 14. Right Ovary: Right ovary is within normal limits. Dominant right ovarian follicle measuring 1.2 x 0.7 x 0.9 cm on image 20. There is normal arterial and venous Doppler flow. Left Ovary:  Left ovary is within normal limits. There is normal arterial and venous doppler flow. Left ovarian follicles. Free Fluid: No evidence of free fluid. 1. Normal arterial and venous Doppler flow in association with both ovaries. 2. Prior hysterectomy. 3. Bilateral ovarian follicles.      Us Pelvis Complete    Result Date: 3/6/2018  EXAMINATION: PELVIC ULTRASOUND; DOPPLER Measurements: Uterus:  Surgically absent. Endometrial stripe:  Prior hysterectomy. Right Ovary:  1.9 x 3.0 x 1.8 cm for a volume of 5.3 mL. Left Ovary:  2.0 x 1.6 x 2.1 cm for a volume of 3.5 mL. Ultrasound Findings: Uterus: Surgically absent. Endometrial stripe: Prior hysterectomy. Nabothian cysts in the cervix on image 14. Right Ovary: Right ovary is within normal limits. Dominant right ovarian follicle measuring 1.2 x 0.7 x 0.9 cm on image 20. There is normal arterial and venous Doppler flow. Left Ovary:  Left ovary is within normal limits. There is normal arterial and venous doppler flow. Left ovarian follicles. Free Fluid: No evidence of free fluid. 1. Normal arterial and venous Doppler flow in association with both ovaries. 2. Prior hysterectomy. 3. Bilateral ovarian follicles. CONSULTS:  None    PROCEDURES:  None    FINAL IMPRESSION      1.  Bacterial vaginitis         DISPOSITION/PLAN   DISPOSITION Decision To Discharge 03/06/2018 08:01:26 AM       PATIENT REFERRED TO:   Sylvia Jackson NP  [address] [address] [phone]      As needed    [facility] ED  [address]  [phone]    If symptoms worsen      DISCHARGE MEDICATIONS:     New Prescriptions    METRONIDAZOLE (FLAGYL) 500 MG TABLET    Take 1 tablet by mouth 2 times daily    ONDANSETRON (ZOFRAN ODT) 4 MG DISINTEGRATING TABLET    Take 1 tablet by mouth every 6 hours as needed for Nausea or Vomiting         (Please note that portions of this note were completed with a voice recognition program.  Efforts were made to edit the dictations but occasionally words are mis-transcribed.)    Aissatou Wilde MD  Attending Emergency Physician         Aissatou Wilde MD  03/06/18 0950

## 2018-03-06 NOTE — ED PROVIDER NOTES
pelvic exam and we'll send cultures. Amount and/or Complexity of Data Reviewed  Clinical lab tests: ordered    6:06 AM  Pelvic exam with Valerie Wheat RN as chaperone  Copious thin yellowish white vaginal discharge and odor  No bleeding  Right adnexal tenderness  Patient states that she's had a partial hysterectomy meaning her tubes and ovaries remain however uterus has been removed. She denies any new sexual partners or risk for STD. She does have a genital piercing which she says she's had for many years and does not appear to be infected  She is allergic to IV contrast, she states that she gets short of breath. Will order a pelvic ultrasound to be done when ultrasound arrives in the morning     Treated with Rocephin currently in for presumed UTI, pelvic ultrasound ordered to rule out ovarian pathology. The ultrasound comes back clean and symptoms improve okay to discharge with UTI treatment and close PCP follow-up    S/o to Day Team  ED Course         Procedures    FINAL IMPRESSION    No diagnosis found. DISPOSITION/PLAN   DISPOSITION        PATIENT REFERRED TO:  No follow-up provider specified. DISCHARGE MEDICATIONS:  New Prescriptions    No medications on file              Summation      Patient Course:      ED Medications administered this visit:    Medications   0.9 % sodium chloride bolus (1,000 mLs Intravenous New Bag 3/6/18 0550)   ondansetron (ZOFRAN-ODT) disintegrating tablet 4 mg (4 mg Oral Given 3/6/18 3147)   metoclopramide (REGLAN) injection 10 mg (10 mg Intravenous Given 3/6/18 0550)   famotidine (PEPCID) injection 20 mg (20 mg Intravenous Given 3/6/18 0551)   ketorolac (TORADOL) injection 30 mg (30 mg Intravenous Given 3/6/18 0557)   cefTRIAXone (ROCEPHIN) 1 g in sterile water 10 mL IV syringe (0 g Intravenous Stopped 3/6/18 0620)       New Prescriptions from this visit:    New Prescriptions    No medications on file       Follow-up:  No follow-up provider specified.       Final Impression: No diagnosis found.            (Please note that portions of this note were completed with a voice recognition program.  Efforts were made to edit the dictations but occasionally words are mis-transcribed.)           Parmjit Lovell MD  03/06/18 9042

## 2018-03-07 LAB
C TRACH DNA GENITAL QL NAA+PROBE: NEGATIVE
N. GONORRHOEAE DNA: NEGATIVE

## 2018-05-03 DIAGNOSIS — I10 ESSENTIAL HYPERTENSION: ICD-10-CM

## 2018-05-03 DIAGNOSIS — J30.2 SEASONAL ALLERGIC RHINITIS: ICD-10-CM

## 2018-05-03 RX ORDER — LORATADINE 10 MG/1
TABLET ORAL
Qty: 90 TABLET | Refills: 1 | Status: SHIPPED | OUTPATIENT
Start: 2018-05-03 | End: 2018-11-28 | Stop reason: SDUPTHER

## 2018-05-03 RX ORDER — OLMESARTAN MEDOXOMIL 40 MG/1
TABLET ORAL
Qty: 90 TABLET | Refills: 1 | Status: SHIPPED | OUTPATIENT
Start: 2018-05-03 | End: 2018-11-24 | Stop reason: SDUPTHER

## 2018-05-21 ENCOUNTER — OFFICE VISIT (OUTPATIENT)
Dept: FAMILY MEDICINE CLINIC | Age: 43
End: 2018-05-21
Payer: MEDICARE

## 2018-05-21 ENCOUNTER — HOSPITAL ENCOUNTER (OUTPATIENT)
Age: 43
Setting detail: SPECIMEN
Discharge: HOME | End: 2018-05-21
Payer: MEDICARE

## 2018-05-21 VITALS
OXYGEN SATURATION: 99 % | HEART RATE: 71 BPM | TEMPERATURE: 98.3 F | SYSTOLIC BLOOD PRESSURE: 154 MMHG | WEIGHT: 248.2 LBS | DIASTOLIC BLOOD PRESSURE: 84 MMHG | BODY MASS INDEX: 43.97 KG/M2

## 2018-05-21 DIAGNOSIS — M54.50 CHRONIC MIDLINE LOW BACK PAIN WITHOUT SCIATICA: ICD-10-CM

## 2018-05-21 DIAGNOSIS — G89.29 CHRONIC MIDLINE LOW BACK PAIN WITHOUT SCIATICA: ICD-10-CM

## 2018-05-21 DIAGNOSIS — R82.90 URINE ABNORMALITY: Primary | ICD-10-CM

## 2018-05-21 DIAGNOSIS — N89.8 VAGINAL DISCHARGE: ICD-10-CM

## 2018-05-21 DIAGNOSIS — N13.30 HYDRONEPHROSIS OF RIGHT KIDNEY: ICD-10-CM

## 2018-05-21 DIAGNOSIS — M96.1 FAILED BACK SYNDROME, LUMBAR: ICD-10-CM

## 2018-05-21 LAB
BILIRUBIN, POC: NORMAL
BLOOD URINE, POC: NORMAL
CLARITY, POC: CLEAR
COLOR, POC: NORMAL
GLUCOSE URINE, POC: NORMAL
KETONES, POC: NORMAL
LEUKOCYTE EST, POC: NORMAL
NITRITE, POC: NORMAL
PH, POC: 6.5
PROTEIN, POC: NORMAL
SPECIFIC GRAVITY, POC: 1.02
UROBILINOGEN, POC: NORMAL

## 2018-05-21 PROCEDURE — 4004F PT TOBACCO SCREEN RCVD TLK: CPT | Performed by: NURSE PRACTITIONER

## 2018-05-21 PROCEDURE — G8417 CALC BMI ABV UP PARAM F/U: HCPCS | Performed by: NURSE PRACTITIONER

## 2018-05-21 PROCEDURE — 81002 URINALYSIS NONAUTO W/O SCOPE: CPT | Performed by: NURSE PRACTITIONER

## 2018-05-21 PROCEDURE — G8427 DOCREV CUR MEDS BY ELIG CLIN: HCPCS | Performed by: NURSE PRACTITIONER

## 2018-05-21 PROCEDURE — 96160 PT-FOCUSED HLTH RISK ASSMT: CPT | Performed by: NURSE PRACTITIONER

## 2018-05-21 PROCEDURE — 99213 OFFICE O/P EST LOW 20 MIN: CPT | Performed by: NURSE PRACTITIONER

## 2018-05-21 ASSESSMENT — PATIENT HEALTH QUESTIONNAIRE - PHQ9
SUM OF ALL RESPONSES TO PHQ QUESTIONS 1-9: 19
8. MOVING OR SPEAKING SO SLOWLY THAT OTHER PEOPLE COULD HAVE NOTICED. OR THE OPPOSITE, BEING SO FIGETY OR RESTLESS THAT YOU HAVE BEEN MOVING AROUND A LOT MORE THAN USUAL: 3
7. TROUBLE CONCENTRATING ON THINGS, SUCH AS READING THE NEWSPAPER OR WATCHING TELEVISION: 3
6. FEELING BAD ABOUT YOURSELF - OR THAT YOU ARE A FAILURE OR HAVE LET YOURSELF OR YOUR FAMILY DOWN: 0
4. FEELING TIRED OR HAVING LITTLE ENERGY: 3
9. THOUGHTS THAT YOU WOULD BE BETTER OFF DEAD, OR OF HURTING YOURSELF: 0
10. IF YOU CHECKED OFF ANY PROBLEMS, HOW DIFFICULT HAVE THESE PROBLEMS MADE IT FOR YOU TO DO YOUR WORK, TAKE CARE OF THINGS AT HOME, OR GET ALONG WITH OTHER PEOPLE: 3
SUM OF ALL RESPONSES TO PHQ9 QUESTIONS 1 & 2: 4
3. TROUBLE FALLING OR STAYING ASLEEP: 3
5. POOR APPETITE OR OVEREATING: 3
2. FEELING DOWN, DEPRESSED OR HOPELESS: 1
1. LITTLE INTEREST OR PLEASURE IN DOING THINGS: 3

## 2018-05-21 ASSESSMENT — ENCOUNTER SYMPTOMS
RESPIRATORY NEGATIVE: 1
COUGH: 0

## 2018-05-22 LAB
CULTURE: NORMAL
CULTURE: NORMAL
DIRECT EXAM: NORMAL
Lab: NORMAL
Lab: NORMAL
SPECIMEN DESCRIPTION: NORMAL
SPECIMEN DESCRIPTION: NORMAL
STATUS: NORMAL
STATUS: NORMAL

## 2018-07-05 ENCOUNTER — HOSPITAL ENCOUNTER (EMERGENCY)
Age: 43
Discharge: HOME | End: 2018-07-05
Attending: EMERGENCY MEDICINE
Payer: MEDICARE

## 2018-07-05 VITALS
HEART RATE: 76 BPM | OXYGEN SATURATION: 97 % | BODY MASS INDEX: 47.29 KG/M2 | TEMPERATURE: 98.3 F | RESPIRATION RATE: 18 BRPM | HEIGHT: 62 IN | DIASTOLIC BLOOD PRESSURE: 86 MMHG | SYSTOLIC BLOOD PRESSURE: 152 MMHG | WEIGHT: 257 LBS

## 2018-07-05 DIAGNOSIS — T78.40XA ALLERGIC REACTION, INITIAL ENCOUNTER: Primary | ICD-10-CM

## 2018-07-05 PROCEDURE — 6370000000 HC RX 637 (ALT 250 FOR IP): Performed by: EMERGENCY MEDICINE

## 2018-07-05 PROCEDURE — 99282 EMERGENCY DEPT VISIT SF MDM: CPT

## 2018-07-05 PROCEDURE — 6360000002 HC RX W HCPCS: Performed by: EMERGENCY MEDICINE

## 2018-07-05 PROCEDURE — 96372 THER/PROPH/DIAG INJ SC/IM: CPT

## 2018-07-05 RX ORDER — DEXAMETHASONE SODIUM PHOSPHATE 10 MG/ML
10 INJECTION INTRAMUSCULAR; INTRAVENOUS ONCE
Status: COMPLETED | OUTPATIENT
Start: 2018-07-05 | End: 2018-07-05

## 2018-07-05 RX ORDER — EPINEPHRINE 1 MG/ML
0.3 INJECTION, SOLUTION, CONCENTRATE INTRAVENOUS ONCE
Status: COMPLETED | OUTPATIENT
Start: 2018-07-05 | End: 2018-07-05

## 2018-07-05 RX ORDER — FAMOTIDINE 20 MG/1
20 TABLET, FILM COATED ORAL 2 TIMES DAILY
Qty: 60 TABLET | Refills: 0 | Status: SHIPPED | OUTPATIENT
Start: 2018-07-05 | End: 2019-04-30

## 2018-07-05 RX ORDER — FAMOTIDINE 20 MG/1
20 TABLET, FILM COATED ORAL ONCE
Status: COMPLETED | OUTPATIENT
Start: 2018-07-05 | End: 2018-07-05

## 2018-07-05 RX ORDER — METHYLPREDNISOLONE 4 MG/1
TABLET ORAL
Qty: 1 KIT | Refills: 0 | Status: SHIPPED | OUTPATIENT
Start: 2018-07-05 | End: 2018-07-11

## 2018-07-05 RX ADMIN — DEXAMETHASONE SODIUM PHOSPHATE 10 MG: 10 INJECTION INTRAMUSCULAR; INTRAVENOUS at 13:05

## 2018-07-05 RX ADMIN — EPINEPHRINE 0.3 MG: 1 INJECTION INTRAMUSCULAR; INTRAVENOUS; SUBCUTANEOUS at 13:55

## 2018-07-05 RX ADMIN — FAMOTIDINE 20 MG: 20 TABLET ORAL at 13:05

## 2018-07-05 ASSESSMENT — ENCOUNTER SYMPTOMS
SORE THROAT: 0
GASTROINTESTINAL NEGATIVE: 1
COUGH: 0
TROUBLE SWALLOWING: 0
ALLERGIC/IMMUNOLOGIC NEGATIVE: 1
SHORTNESS OF BREATH: 0

## 2018-07-05 NOTE — ED PROVIDER NOTES
 Drug use: No    Sexual activity: Yes     Other Topics Concern    None     Social History Narrative    None       SCREENINGS             PHYSICAL EXAM    (up to 7 for level 4, 8 or more for level 5)     ED Triage Vitals [07/05/18 1257]   BP Temp Temp Source Pulse Resp SpO2 Height Weight   (!) 152/86 98.3 °F (36.8 °C) Oral 76 18 97 % 5' 2\" (1.575 m) 257 lb (116.6 kg)       Physical Exam   Constitutional: She is oriented to person, place, and time. She appears well-developed and well-nourished. No distress. HENT:   Head: Normocephalic and atraumatic. Left Ear: External ear normal.   Nose: Nose normal.   Mouth/Throat: Oropharynx is clear and moist. No oropharyngeal exudate. Eyes: Pupils are equal, round, and reactive to light. No scleral icterus. Neck: Normal range of motion. No JVD present. No thyromegaly present. Cardiovascular: Normal rate, normal heart sounds and intact distal pulses. Pulmonary/Chest: Breath sounds normal. No respiratory distress. She has no wheezes. She has no rales. She exhibits no tenderness. Abdominal: Bowel sounds are normal. She exhibits no distension. There is no tenderness. There is no rebound. Musculoskeletal: She exhibits no edema, tenderness or deformity. Neurological: She is alert and oriented to person, place, and time. She displays normal reflexes. No cranial nerve deficit. Coordination normal.   Skin: Skin is dry. Rash noted. She is not diaphoretic. Psychiatric: She has a normal mood and affect. Her behavior is normal. Judgment and thought content normal.   Nursing note and vitals reviewed.       DIAGNOSTIC RESULTS     EKG: All EKG's are interpreted by the Emergency Department Physician who either signs or Co-signs this chart in the absence of a cardiologist.        RADIOLOGY:   Non-plain film images such as CT, Ultrasound and MRI are read by the radiologist. Plain radiographic images are visualized and preliminarily interpreted by the emergency physician daily, Disp-60 tablet, R-0Print                Problem List:  Patient Active Problem List   Diagnosis Code    Essential hypertension I10    Gastroesophageal reflux disease without esophagitis K21.9    Seasonal allergies J30.2    Smoker F17.200    Alcohol use disorder, mild, abuse F10.10    Obesity, morbid, BMI 40.0-49.9 ([facility]) E66.01    Failed back syndrome, lumbar M96.1    Chronic midline low back pain without sciatica M54.5, G89.29    Bronchitis J40    Neural foraminal stenosis of lumbar spine M99.83    Pyelonephritis N12    Hydronephrosis of right kidney N13.30    E coli bacteremia R78.81    Dysuria R30.0           Summation      Patient Course:  30-year-old female coming with rash suggestive for an ALLERGIC reaction, was given dexamethasone, Pepcid and epinephrine feeling better. Continue Medrol Dosepak and Pepcid. Advised to continue Benadryl        ED Medications administered this visit:    Medications   dexamethasone (DECADRON) injection 10 mg (10 mg Intramuscular Given 7/5/18 1305)   famotidine (PEPCID) tablet 20 mg (20 mg Oral Given 7/5/18 1305)   EPINEPHrine PF 1 MG/ML injection 0.3 mg (0.3 mg Intramuscular Given 7/5/18 1355)       New Prescriptions from this visit:    Discharge Medication List as of 7/5/2018  2:17 PM      START taking these medications    Details   methylPREDNISolone (MEDROL, MARLENY,) 4 MG tablet Take by mouth., Disp-1 kit, R-0Print      famotidine (PEPCID) 20 MG tablet Take 1 tablet by mouth 2 times daily, Disp-60 tablet, R-0Print             Follow-up:  Heather Benitez, APRN - CNP  [address] [address]  [phone]      As needed        Final Impression:   1.  Allergic reaction, initial encounter               (Please note that portions of this note were completed with a voice recognition program.  Efforts were made to edit the dictations but occasionally words are mis-transcribed.)    Stanislav Valdivia MD (electronically signed)  Attending Emergency Physician           Karlene Arnold MD  07/05/18 5771       Karlene Arnold MD  07/05/18 6480

## 2018-07-05 NOTE — ED NOTES
ASSESSMENT:   presents to ED with significant other  with c/o rash and itching to rt sidxe of face, lt shoulder and rt arm. States started yest on ant lower rt forearm and since then has spread,   This am awoke at 0500 this am with facial rash and swelling. Took 2 benadryl and went back to bed. Awoke again 1230 with feeling worse. Denies diff breathing or swallowing. Cannot find out the reason for the rash.      Fco Espinosa RN  07/05/18 3211

## 2018-07-11 ENCOUNTER — HOSPITAL ENCOUNTER (OUTPATIENT)
Dept: MRI IMAGING | Age: 43
Discharge: HOME | End: 2018-07-13
Payer: MEDICARE

## 2018-07-11 DIAGNOSIS — M54.42 LEFT-SIDED LOW BACK PAIN WITH LEFT-SIDED SCIATICA, UNSPECIFIED CHRONICITY: ICD-10-CM

## 2018-07-11 LAB
CREAT SERPL-MCNC: 0.53 MG/DL (ref 0.5–0.9)
GFR AFRICAN AMERICAN: >60 ML/MIN
GFR NON-AFRICAN AMERICAN: >60 ML/MIN
GFR SERPL CREATININE-BSD FRML MDRD: NORMAL ML/MIN/{1.73_M2}
GFR SERPL CREATININE-BSD FRML MDRD: NORMAL ML/MIN/{1.73_M2}

## 2018-07-11 PROCEDURE — 82565 ASSAY OF CREATININE: CPT

## 2018-07-11 PROCEDURE — A9579 GAD-BASE MR CONTRAST NOS,1ML: HCPCS | Performed by: NEUROLOGICAL SURGERY

## 2018-07-11 PROCEDURE — 36415 COLL VENOUS BLD VENIPUNCTURE: CPT

## 2018-07-11 PROCEDURE — 72158 MRI LUMBAR SPINE W/O & W/DYE: CPT

## 2018-07-11 PROCEDURE — 6360000004 HC RX CONTRAST MEDICATION: Performed by: NEUROLOGICAL SURGERY

## 2018-07-11 RX ADMIN — GADOTERIDOL 20 ML: 279.3 INJECTION, SOLUTION INTRAVENOUS at 13:38

## 2018-07-13 ENCOUNTER — TELEPHONE (OUTPATIENT)
Dept: FAMILY MEDICINE CLINIC | Age: 43
End: 2018-07-13

## 2018-07-14 ENCOUNTER — HOSPITAL ENCOUNTER (EMERGENCY)
Age: 43
Discharge: HOME | End: 2018-07-14
Attending: EMERGENCY MEDICINE
Payer: MEDICARE

## 2018-07-14 VITALS
HEART RATE: 100 BPM | SYSTOLIC BLOOD PRESSURE: 102 MMHG | TEMPERATURE: 98 F | DIASTOLIC BLOOD PRESSURE: 52 MMHG | BODY MASS INDEX: 39.13 KG/M2 | OXYGEN SATURATION: 94 % | HEIGHT: 66 IN | RESPIRATION RATE: 23 BRPM | WEIGHT: 243.5 LBS

## 2018-07-14 DIAGNOSIS — T78.40XA ALLERGIC REACTION, INITIAL ENCOUNTER: Primary | ICD-10-CM

## 2018-07-14 LAB
-: ABNORMAL
ABSOLUTE EOS #: 0.3 K/UL (ref 0–0.4)
ABSOLUTE IMMATURE GRANULOCYTE: ABNORMAL K/UL (ref 0–0.3)
ABSOLUTE LYMPH #: 1.5 K/UL (ref 1–4.8)
ABSOLUTE MONO #: 0.2 K/UL (ref 0.2–0.8)
AMORPHOUS: ABNORMAL
ANION GAP SERPL CALCULATED.3IONS-SCNC: 16 MMOL/L (ref 9–17)
BACTERIA: ABNORMAL
BASOPHILS # BLD: 1 % (ref 0–2)
BASOPHILS ABSOLUTE: 0 K/UL (ref 0–0.2)
BILIRUBIN URINE: NEGATIVE
BUN BLDV-MCNC: 8 MG/DL (ref 6–20)
BUN/CREAT BLD: 18 (ref 9–20)
CALCIUM SERPL-MCNC: 8.8 MG/DL (ref 8.6–10.4)
CASTS UA: ABNORMAL /LPF
CHLORIDE BLD-SCNC: 99 MMOL/L (ref 98–107)
CO2: 24 MMOL/L (ref 20–31)
COLOR: YELLOW
COMMENT UA: ABNORMAL
CREAT SERPL-MCNC: 0.45 MG/DL (ref 0.5–0.9)
CRYSTALS, UA: ABNORMAL /HPF
DIFFERENTIAL TYPE: ABNORMAL
EOSINOPHILS RELATIVE PERCENT: 4 % (ref 1–4)
EPITHELIAL CELLS UA: ABNORMAL /HPF (ref 0–5)
GFR AFRICAN AMERICAN: >60 ML/MIN
GFR NON-AFRICAN AMERICAN: >60 ML/MIN
GFR SERPL CREATININE-BSD FRML MDRD: ABNORMAL ML/MIN/{1.73_M2}
GFR SERPL CREATININE-BSD FRML MDRD: ABNORMAL ML/MIN/{1.73_M2}
GLUCOSE BLD-MCNC: 103 MG/DL (ref 70–99)
GLUCOSE URINE: NEGATIVE
HCT VFR BLD CALC: 62.4 % (ref 36–46)
HEMOGLOBIN: 20.6 G/DL (ref 12–16)
IMMATURE GRANULOCYTES: ABNORMAL %
KETONES, URINE: NEGATIVE
LEUKOCYTE ESTERASE, URINE: NEGATIVE
LYMPHOCYTES # BLD: 19 % (ref 24–44)
MCH RBC QN AUTO: 31.4 PG (ref 26–34)
MCHC RBC AUTO-ENTMCNC: 33 G/DL (ref 31–37)
MCV RBC AUTO: 95.3 FL (ref 80–100)
MONOCYTES # BLD: 3 % (ref 1–7)
MUCUS: ABNORMAL
NITRITE, URINE: NEGATIVE
NRBC AUTOMATED: ABNORMAL PER 100 WBC
OTHER OBSERVATIONS UA: ABNORMAL
PDW BLD-RTO: 14.1 % (ref 11.5–14.5)
PH UA: 6.5 (ref 5–8)
PLATELET # BLD: 184 K/UL (ref 130–400)
PLATELET ESTIMATE: ABNORMAL
PMV BLD AUTO: 7.4 FL (ref 6–12)
POTASSIUM SERPL-SCNC: 3.6 MMOL/L (ref 3.7–5.3)
PROTEIN UA: NEGATIVE
RBC # BLD: 6.55 M/UL (ref 4–5.2)
RBC # BLD: ABNORMAL 10*6/UL
RBC UA: ABNORMAL /HPF (ref 0–2)
RENAL EPITHELIAL, UA: ABNORMAL /HPF
SEG NEUTROPHILS: 73 % (ref 36–66)
SEGMENTED NEUTROPHILS ABSOLUTE COUNT: 5.9 K/UL (ref 1.8–7.7)
SODIUM BLD-SCNC: 139 MMOL/L (ref 135–144)
SPECIFIC GRAVITY UA: 1.01 (ref 1–1.03)
TRICHOMONAS: ABNORMAL
TURBIDITY: ABNORMAL
URINE HGB: NEGATIVE
UROBILINOGEN, URINE: NORMAL
WBC # BLD: 8 K/UL (ref 3.5–11)
WBC # BLD: ABNORMAL 10*3/UL
WBC UA: ABNORMAL /HPF (ref 0–5)
YEAST: ABNORMAL

## 2018-07-14 PROCEDURE — 80048 BASIC METABOLIC PNL TOTAL CA: CPT

## 2018-07-14 PROCEDURE — 99283 EMERGENCY DEPT VISIT LOW MDM: CPT

## 2018-07-14 PROCEDURE — 2580000003 HC RX 258: Performed by: NURSE PRACTITIONER

## 2018-07-14 PROCEDURE — 96375 TX/PRO/DX INJ NEW DRUG ADDON: CPT

## 2018-07-14 PROCEDURE — 2500000003 HC RX 250 WO HCPCS: Performed by: NURSE PRACTITIONER

## 2018-07-14 PROCEDURE — S0028 INJECTION, FAMOTIDINE, 20 MG: HCPCS | Performed by: NURSE PRACTITIONER

## 2018-07-14 PROCEDURE — 96376 TX/PRO/DX INJ SAME DRUG ADON: CPT

## 2018-07-14 PROCEDURE — 87086 URINE CULTURE/COLONY COUNT: CPT

## 2018-07-14 PROCEDURE — 96374 THER/PROPH/DIAG INJ IV PUSH: CPT

## 2018-07-14 PROCEDURE — 6370000000 HC RX 637 (ALT 250 FOR IP): Performed by: NURSE PRACTITIONER

## 2018-07-14 PROCEDURE — 81001 URINALYSIS AUTO W/SCOPE: CPT

## 2018-07-14 PROCEDURE — 85025 COMPLETE CBC W/AUTO DIFF WBC: CPT

## 2018-07-14 PROCEDURE — 96372 THER/PROPH/DIAG INJ SC/IM: CPT

## 2018-07-14 PROCEDURE — 6360000002 HC RX W HCPCS: Performed by: NURSE PRACTITIONER

## 2018-07-14 RX ORDER — PREDNISONE 20 MG/1
20 TABLET ORAL 2 TIMES DAILY
Qty: 21 TABLET | Refills: 0 | Status: SHIPPED | OUTPATIENT
Start: 2018-07-14 | End: 2018-10-01

## 2018-07-14 RX ORDER — HYDROXYZINE HYDROCHLORIDE 25 MG/1
25 TABLET, FILM COATED ORAL EVERY 6 HOURS PRN
Qty: 20 TABLET | Refills: 0 | Status: SHIPPED | OUTPATIENT
Start: 2018-07-14 | End: 2018-07-24

## 2018-07-14 RX ORDER — 0.9 % SODIUM CHLORIDE 0.9 %
1000 INTRAVENOUS SOLUTION INTRAVENOUS ONCE
Status: COMPLETED | OUTPATIENT
Start: 2018-07-14 | End: 2018-07-14

## 2018-07-14 RX ORDER — DIPHENHYDRAMINE HYDROCHLORIDE 50 MG/ML
25 INJECTION INTRAMUSCULAR; INTRAVENOUS ONCE
Status: COMPLETED | OUTPATIENT
Start: 2018-07-14 | End: 2018-07-14

## 2018-07-14 RX ORDER — EPINEPHRINE 1 MG/ML
0.3 INJECTION, SOLUTION, CONCENTRATE INTRAVENOUS ONCE
Status: COMPLETED | OUTPATIENT
Start: 2018-07-14 | End: 2018-07-14

## 2018-07-14 RX ORDER — HYDROXYZINE HYDROCHLORIDE 25 MG/1
25 TABLET, FILM COATED ORAL ONCE
Status: COMPLETED | OUTPATIENT
Start: 2018-07-14 | End: 2018-07-14

## 2018-07-14 RX ORDER — ONDANSETRON 2 MG/ML
4 INJECTION INTRAMUSCULAR; INTRAVENOUS ONCE
Status: COMPLETED | OUTPATIENT
Start: 2018-07-14 | End: 2018-07-14

## 2018-07-14 RX ORDER — METHYLPREDNISOLONE SODIUM SUCCINATE 125 MG/2ML
125 INJECTION, POWDER, LYOPHILIZED, FOR SOLUTION INTRAMUSCULAR; INTRAVENOUS ONCE
Status: COMPLETED | OUTPATIENT
Start: 2018-07-14 | End: 2018-07-14

## 2018-07-14 RX ADMIN — HYDROXYZINE HYDROCHLORIDE 25 MG: 25 TABLET ORAL at 20:31

## 2018-07-14 RX ADMIN — DIPHENHYDRAMINE HYDROCHLORIDE 25 MG: 50 INJECTION, SOLUTION INTRAMUSCULAR; INTRAVENOUS at 18:00

## 2018-07-14 RX ADMIN — FAMOTIDINE 20 MG: 10 INJECTION, SOLUTION INTRAVENOUS at 17:03

## 2018-07-14 RX ADMIN — ONDANSETRON 4 MG: 2 INJECTION INTRAMUSCULAR; INTRAVENOUS at 18:27

## 2018-07-14 RX ADMIN — DIPHENHYDRAMINE HYDROCHLORIDE 25 MG: 50 INJECTION, SOLUTION INTRAMUSCULAR; INTRAVENOUS at 17:07

## 2018-07-14 RX ADMIN — METHYLPREDNISOLONE SODIUM SUCCINATE 125 MG: 125 INJECTION, POWDER, FOR SOLUTION INTRAMUSCULAR; INTRAVENOUS at 17:03

## 2018-07-14 RX ADMIN — EPINEPHRINE 0.3 MG: 1 INJECTION INTRAMUSCULAR; INTRAVENOUS; SUBCUTANEOUS at 18:26

## 2018-07-14 RX ADMIN — SODIUM CHLORIDE 1000 ML: 9 INJECTION, SOLUTION INTRAVENOUS at 18:34

## 2018-07-14 ASSESSMENT — ENCOUNTER SYMPTOMS
VOMITING: 0
SINUS PRESSURE: 0
WHEEZING: 0
SHORTNESS OF BREATH: 0
ABDOMINAL PAIN: 0
SORE THROAT: 0
COLOR CHANGE: 0
NAUSEA: 0
CONSTIPATION: 0
RHINORRHEA: 0
DIARRHEA: 0
COUGH: 0

## 2018-07-14 ASSESSMENT — PAIN DESCRIPTION - FREQUENCY: FREQUENCY: CONTINUOUS

## 2018-07-14 ASSESSMENT — PAIN DESCRIPTION - DESCRIPTORS: DESCRIPTORS: ITCHING;BURNING

## 2018-07-14 ASSESSMENT — PAIN SCALES - GENERAL: PAINLEVEL_OUTOF10: 9

## 2018-07-14 ASSESSMENT — PAIN DESCRIPTION - LOCATION: LOCATION: GENERALIZED

## 2018-07-14 NOTE — ED PROVIDER NOTES
[address] [place] [address] ED  eMERGENCY dEPARTMENT eNCOUnter      Pt Name: Serina Stoddard  MRN: [id number]  [employer] 1975  Date of evaluation: 7/14/2018  Provider: Merlin Brake NP, HAYDEE - [place] 0394       Chief Complaint   Patient presents with    Allergic Reaction     seen ED 7/5 for same    Urticaria    Oral Swelling         HISTORY OF PRESENT ILLNESS  (Location/Symptom, Timing/Onset, Context/Setting, Quality, Duration, Modifying Factors, Severity.)   Serina Stoddard is a 44 y.o. female who presents to the emergency department Today by private vehicle for evaluation of allergic reaction. The patient states that she has been having hives and itching to the skin since 5 July. She is not sure what she is coming in contact with that she is allergic to. She denies any new lotions, soaps, detergents. She was seen here on the fifth and was placed on a Medrol Dosepak and Pepcid after she was given IV medications. She states that she still continues to have severe itching. She did take a Benadryl as prescribed. Nursing Notes were reviewed. ALLERGIES     Patient has no known allergies.     CURRENT MEDICATIONS       Discharge Medication List as of 7/14/2018  8:27 PM      CONTINUE these medications which have NOT CHANGED    Details   famotidine (PEPCID) 20 MG tablet Take 1 tablet by mouth 2 times daily, Disp-60 tablet, R-0Print      tiZANidine (ZANAFLEX) 4 MG tablet take 1 tablet by mouth every 6 hours if needed for BACK PAIN, Disp-120 tablet, R-0Normal      loratadine (CLARITIN) 10 MG tablet take 1 tablet by mouth once daily, Disp-90 tablet, R-1Normal      olmesartan (BENICAR) 40 MG tablet take 1 tablet by mouth once daily, Disp-90 tablet, R-1Normal      DULoxetine (CYMBALTA) 60 MG extended release capsule take 1 capsule by mouth once daily, Disp-30 capsule, R-5Normal      triamterene-hydrochlorothiazide (MAXZIDE-25) 37.5-25 MG per tablet take 1 tablet by mouth once daily, Disp-30 tablet,

## 2018-07-14 NOTE — ED NOTES
Patient back to room with complaint of hives, itching, and difficulty swallowing. Patient states this started before the 5th and then she was seen here. She states her eye had been swollen and it got better after her shot there, but then it got worse. Patient states she has been itching since and that the medryl dose douglas she took provided no relief. Patient states that now it feels like she has something in her throat and that it's hard to swallow. Patient is A & O x 4. Patient's oral mucosa is pink, moist, and intact. Throat is clear internally, no swelling to the outside. Patient has red welts and red rash across approximately most of the body (back is clear, and patchy legs). Right eyelid has no swelling, but per patient, feels as if it is starting to swell again.        Sagrario Ventura, BEN  07/14/18 3932

## 2018-07-15 LAB
CULTURE: NORMAL
Lab: NORMAL
SPECIMEN DESCRIPTION: NORMAL
STATUS: NORMAL

## 2018-07-16 ENCOUNTER — CARE COORDINATION (OUTPATIENT)
Dept: CARE COORDINATION | Age: 43
End: 2018-07-16

## 2018-07-16 RX ORDER — TIZANIDINE 4 MG/1
TABLET ORAL
Qty: 120 TABLET | Refills: 0 | Status: SHIPPED | OUTPATIENT
Start: 2018-07-16 | End: 2018-10-18 | Stop reason: SDUPTHER

## 2018-08-06 DIAGNOSIS — M54.50 CHRONIC MIDLINE LOW BACK PAIN WITHOUT SCIATICA: ICD-10-CM

## 2018-08-06 DIAGNOSIS — G89.29 CHRONIC MIDLINE LOW BACK PAIN WITHOUT SCIATICA: ICD-10-CM

## 2018-08-06 RX ORDER — MELOXICAM 15 MG/1
TABLET ORAL
Qty: 90 TABLET | Refills: 1 | Status: SHIPPED | OUTPATIENT
Start: 2018-08-06 | End: 2019-02-18 | Stop reason: SDUPTHER

## 2018-08-06 NOTE — TELEPHONE ENCOUNTER
Filled 2/5/18 #90 with 1 RF    Next Visit Date:  Future Appointments  Date Time Provider Nena Garrison   8/9/2018 1:30 PM HAYDEE Browne - CNP [facility] [address] [address] [employer] Maintenance   Topic Date Due    Cervical cancer screen  11/07/2018 (Originally 1/18/1997)    Flu vaccine (1) 11/07/2018 (Originally 9/1/2018)    Potassium monitoring  07/14/2019    Creatinine monitoring  07/14/2019    Diabetes screen  07/22/2019    Lipid screen  04/10/2022    DTaP/Tdap/Td vaccine (2 - Td) 04/10/2025    Pneumococcal med risk  Completed    HIV screen  Completed       Hemoglobin A1C (%)   Date Value   07/22/2016 4.9             ( goal A1C is < 7)   Microalb/Crt.  Ratio (mcg/mg creat)   Date Value   03/08/2016 18     LDL Cholesterol (mg/dL)   Date Value   04/10/2017 130   03/08/2016 130       (goal LDL is <100)   AST (U/L)   Date Value   03/06/2018 11     ALT (U/L)   Date Value   03/06/2018 8     BUN (mg/dL)   Date Value   07/14/2018 8     BP Readings from Last 3 Encounters:   07/14/18 (!) 102/52   07/05/18 (!) 152/86   05/21/18 (!) 154/84          (goal 120/80)    All Future Testing planned in [facility]              Patient Active Problem List:     Essential hypertension     Gastroesophageal reflux disease without esophagitis     Seasonal allergies     Smoker     Alcohol use disorder, mild, abuse     Obesity, morbid, BMI 40.0-49.9 (HCC)     Failed back syndrome, lumbar     Chronic midline low back pain without sciatica     Bronchitis     Neural foraminal stenosis of lumbar spine     Pyelonephritis     Hydronephrosis of right kidney     E coli bacteremia     Dysuria

## 2018-08-30 ENCOUNTER — OFFICE VISIT (OUTPATIENT)
Dept: OBGYN CLINIC | Age: 43
End: 2018-08-30
Payer: MEDICARE

## 2018-08-30 ENCOUNTER — HOSPITAL ENCOUNTER (OUTPATIENT)
Age: 43
Setting detail: SPECIMEN
Discharge: HOME | End: 2018-08-30
Payer: MEDICARE

## 2018-08-30 VITALS
BODY MASS INDEX: 40.84 KG/M2 | RESPIRATION RATE: 18 BRPM | SYSTOLIC BLOOD PRESSURE: 129 MMHG | WEIGHT: 253 LBS | DIASTOLIC BLOOD PRESSURE: 87 MMHG | HEART RATE: 83 BPM

## 2018-08-30 DIAGNOSIS — R10.2 PELVIC PAIN IN FEMALE: ICD-10-CM

## 2018-08-30 DIAGNOSIS — R32 URINARY INCONTINENCE, UNSPECIFIED TYPE: ICD-10-CM

## 2018-08-30 DIAGNOSIS — R23.2 HOT FLASHES: Primary | ICD-10-CM

## 2018-08-30 DIAGNOSIS — R23.2 HOT FLASHES: ICD-10-CM

## 2018-08-30 DIAGNOSIS — N76.0 ACUTE VAGINITIS: ICD-10-CM

## 2018-08-30 DIAGNOSIS — Z90.710 STATUS POST HYSTERECTOMY: ICD-10-CM

## 2018-08-30 LAB
ESTRADIOL LEVEL: 42 PG/ML (ref 27–314)
FOLLICLE STIMULATING HORMONE: 5.3 U/L (ref 1.7–21.5)
LH: 5.5 U/L (ref 1–95.6)
TSH SERPL DL<=0.05 MIU/L-ACNC: 0.78 MIU/L (ref 0.3–5)

## 2018-08-30 PROCEDURE — 4004F PT TOBACCO SCREEN RCVD TLK: CPT | Performed by: SPECIALIST

## 2018-08-30 PROCEDURE — 99203 OFFICE O/P NEW LOW 30 MIN: CPT | Performed by: SPECIALIST

## 2018-08-30 PROCEDURE — G8427 DOCREV CUR MEDS BY ELIG CLIN: HCPCS | Performed by: SPECIALIST

## 2018-08-30 PROCEDURE — G8417 CALC BMI ABV UP PARAM F/U: HCPCS | Performed by: SPECIALIST

## 2018-08-30 RX ORDER — FLUCONAZOLE 100 MG/1
100 TABLET ORAL DAILY
Qty: 7 TABLET | Refills: 0 | Status: SHIPPED | OUTPATIENT
Start: 2018-08-30 | End: 2018-10-01

## 2018-08-30 RX ORDER — METRONIDAZOLE 500 MG/1
500 TABLET ORAL 2 TIMES DAILY
Qty: 14 TABLET | Refills: 0 | Status: SHIPPED | OUTPATIENT
Start: 2018-08-30 | End: 2018-10-01

## 2018-08-30 ASSESSMENT — ENCOUNTER SYMPTOMS
COUGH: 0
VOMITING: 0
EYE PAIN: 0
APNEA: 0
NAUSEA: 0
ABDOMINAL DISTENTION: 0
ABDOMINAL PAIN: 0
DIARRHEA: 0
CONSTIPATION: 0

## 2018-08-30 NOTE — PROGRESS NOTES
Subjective:      Patient ID: Emily Miller is a 44 y.o. female. Chief Complaint   Patient presents with    Menopause     Pt is here today to discuss menopause. She states that about 4 months ago she started having hot flashes. She also says that she is having trouble holding her bladder. She says that for the past 7 months she has had horrible pelvic pain. she is not able to have intercourse. /87   Pulse 83   Resp 18   Wt 253 lb (114.8 kg)   BMI 40.84 kg/m²   No LMP recorded. Patient has had a hysterectomy. No obstetric history on file.     Past Medical History:   Diagnosis Date    Alcohol use disorder, mild, abuse 4/10/2017    Chronic midline low back pain without sciatica 8/29/2017    Depression     Essential hypertension 3/8/2016    Gastroesophageal reflux disease without esophagitis 3/8/2016    Hydronephrosis of right kidney 12/27/2017    Obesity 4/10/2017    Seasonal allergies 3/8/2016     Current Outpatient Prescriptions Ordered in [facility]   Medication Sig Dispense Refill    fluconazole (DIFLUCAN) 100 MG tablet Take 1 tablet by mouth daily for 7 days 7 tablet 0    metroNIDAZOLE (FLAGYL) 500 MG tablet Take 1 tablet by mouth 2 times daily for 7 days 14 tablet 0    meloxicam (MOBIC) 15 MG tablet take 1 tablet by mouth once daily 90 tablet 1    tiZANidine (ZANAFLEX) 4 MG tablet take 1 tablet by mouth every 6 hours if needed for back pain 120 tablet 0    famotidine (PEPCID) 20 MG tablet Take 1 tablet by mouth 2 times daily 60 tablet 0    loratadine (CLARITIN) 10 MG tablet take 1 tablet by mouth once daily 90 tablet 1    olmesartan (BENICAR) 40 MG tablet take 1 tablet by mouth once daily 90 tablet 1    DULoxetine (CYMBALTA) 60 MG extended release capsule take 1 capsule by mouth once daily 30 capsule 5    triamterene-hydrochlorothiazide (MAXZIDE-25) 37.5-25 MG per tablet take 1 tablet by mouth once daily 30 tablet 5    ranitidine (ZANTAC) 150 MG tablet take 1 tablet by mouth twice ear pain. Eyes: Negative for pain and visual disturbance. Respiratory: Negative for apnea and cough. Cardiovascular: Negative for chest pain, palpitations and leg swelling. Gastrointestinal: Negative for abdominal distention, abdominal pain, constipation, diarrhea, nausea and vomiting. Endocrine: Negative. Genitourinary: Positive for pelvic pain. Negative for difficulty urinating, dysuria and menstrual problem. Hot flashes  Urinary incontinence     Musculoskeletal: Negative for neck pain and neck stiffness. Skin: Negative. Neurological: Negative for light-headedness and numbness. Hematological: Negative. Does not bruise/bleed easily. Objective:   Physical Exam   Constitutional: She is oriented to person, place, and time. Vital signs are normal. She appears well-developed and well-nourished. HENT:   Head: Normocephalic and atraumatic. Neck: Normal range of motion. Neck supple. No thyromegaly present. Cardiovascular: Normal rate and regular rhythm. Pulmonary/Chest: Effort normal and breath sounds normal. She has no wheezes. Abdominal: Soft. Bowel sounds are normal. She exhibits no distension and no mass. There is tenderness. There is no guarding. Genitourinary: Vaginal discharge found. Musculoskeletal: Normal range of motion. Neurological: She is alert and oriented to person, place, and time. Skin: Skin is dry. Psychiatric: She has a normal mood and affect. Her behavior is normal. Thought content normal.   Nursing note and vitals reviewed. Assessment:      Patient, status post hysterectomy, with hot flashes. Will evaluate with FSH, LH, TSH, and Estradiol. Patient with pelvic pain, found to have vaginitis. Will treat with Flagyl and Diflucan. Will evaluate with ultrasound. Patient with urinary incontinence. Will evaluate with urodynamics.       Plan:      Orders Placed This Encounter   Procedures    US Non OB Transvaginal    US Pelvis Complete    Follicle Stimulating Hormone    Luteinizing Hormone    TSH    Estradiol     Orders Placed This Encounter   Medications    fluconazole (DIFLUCAN) 100 MG tablet     Sig: Take 1 tablet by mouth daily for 7 days     Dispense:  7 tablet     Refill:  0    metroNIDAZOLE (FLAGYL) 500 MG tablet     Sig: Take 1 tablet by mouth 2 times daily for 7 days     Dispense:  14 tablet     Refill:  0      Appointment for ultrasound. Appointment for urodynamic testing. Dwayne Cornelius am scribing for, and in the presence of Dr. Lena Worthy. Electronically signed by: Leandro Gale 8/30/18 3:37 PM       I agree to the above documentation placed by my scribe Leandro Gale. I reviewed the scribe's note and agree with the documented findings and plan of care. Any areas of disagreement are noted on the chart. I have personally evaluated this patient. Additional findings are as noted. I agree with the chief complaint, past medical history, past surgical history, allergies, medications, social and family history as documented unless otherwise noted below.      Electronically signed by Lena Worthy MD on 8/31/2018 at 2:46 AM

## 2018-10-01 ENCOUNTER — TELEPHONE (OUTPATIENT)
Dept: OBGYN CLINIC | Age: 43
End: 2018-10-01

## 2018-10-02 RX ORDER — AZITHROMYCIN 250 MG/1
TABLET, FILM COATED ORAL
Qty: 1 PACKET | Refills: 0 | Status: SHIPPED | OUTPATIENT
Start: 2018-10-02 | End: 2018-10-06

## 2018-10-08 ENCOUNTER — TELEPHONE (OUTPATIENT)
Dept: FAMILY MEDICINE CLINIC | Age: 43
End: 2018-10-08

## 2018-10-08 NOTE — TELEPHONE ENCOUNTER
Patient called and was crying. Her depression is really bad. She is either crying or she doesn't want to get out of bed. She does not want to go on the same medication she was on. Please call and let her know if there is something else she can take. She is out of the medication too.

## 2018-10-08 NOTE — TELEPHONE ENCOUNTER
Spoke with pt. Pt denies suicidal or self harm ideas. Pt was offered sooner appointments to be evaluated. Pt declined stating she only wants to see Amanda. Pt was encouraged to call office to check for cancellations. Pt is scheduled for 10/18/18. Pt states she knows what to do if her depression gets really bad. Pt was reminded to go to ED or seek help immediately if she has suicidal ideations.   FYI only

## 2018-10-11 ENCOUNTER — OFFICE VISIT (OUTPATIENT)
Dept: OBGYN CLINIC | Age: 43
End: 2018-10-11
Payer: MEDICARE

## 2018-10-11 DIAGNOSIS — R10.2 PELVIC PAIN IN FEMALE: ICD-10-CM

## 2018-10-11 PROCEDURE — 76856 US EXAM PELVIC COMPLETE: CPT | Performed by: SPECIALIST

## 2018-10-11 PROCEDURE — 76830 TRANSVAGINAL US NON-OB: CPT | Performed by: SPECIALIST

## 2018-10-16 ENCOUNTER — OFFICE VISIT (OUTPATIENT)
Dept: OBGYN CLINIC | Age: 43
End: 2018-10-16
Payer: MEDICARE

## 2018-10-16 VITALS
WEIGHT: 263.8 LBS | HEART RATE: 75 BPM | BODY MASS INDEX: 46.74 KG/M2 | HEIGHT: 63 IN | DIASTOLIC BLOOD PRESSURE: 84 MMHG | SYSTOLIC BLOOD PRESSURE: 137 MMHG

## 2018-10-16 DIAGNOSIS — N94.19 DYSPAREUNIA DUE TO MEDICAL CONDITION IN FEMALE: Primary | ICD-10-CM

## 2018-10-16 DIAGNOSIS — R10.2 PELVIC PAIN IN FEMALE: ICD-10-CM

## 2018-10-16 DIAGNOSIS — Z90.710 STATUS POST HYSTERECTOMY: ICD-10-CM

## 2018-10-16 PROCEDURE — G8417 CALC BMI ABV UP PARAM F/U: HCPCS | Performed by: SPECIALIST

## 2018-10-16 PROCEDURE — G8427 DOCREV CUR MEDS BY ELIG CLIN: HCPCS | Performed by: SPECIALIST

## 2018-10-16 PROCEDURE — G8484 FLU IMMUNIZE NO ADMIN: HCPCS | Performed by: SPECIALIST

## 2018-10-16 PROCEDURE — 4004F PT TOBACCO SCREEN RCVD TLK: CPT | Performed by: SPECIALIST

## 2018-10-16 PROCEDURE — 99212 OFFICE O/P EST SF 10 MIN: CPT | Performed by: SPECIALIST

## 2018-10-16 ASSESSMENT — ENCOUNTER SYMPTOMS
CONSTIPATION: 0
ABDOMINAL DISTENTION: 0
VOMITING: 0
APNEA: 0
DIARRHEA: 0
NAUSEA: 0
COUGH: 0
ABDOMINAL PAIN: 0
EYE PAIN: 0

## 2018-10-18 ENCOUNTER — OFFICE VISIT (OUTPATIENT)
Dept: FAMILY MEDICINE CLINIC | Age: 43
End: 2018-10-18
Payer: MEDICARE

## 2018-10-18 VITALS
WEIGHT: 263 LBS | HEART RATE: 74 BPM | SYSTOLIC BLOOD PRESSURE: 120 MMHG | OXYGEN SATURATION: 99 % | DIASTOLIC BLOOD PRESSURE: 70 MMHG | TEMPERATURE: 98.1 F | BODY MASS INDEX: 46.59 KG/M2

## 2018-10-18 DIAGNOSIS — F32.A DEPRESSION, UNSPECIFIED DEPRESSION TYPE: Primary | ICD-10-CM

## 2018-10-18 DIAGNOSIS — J01.90 ACUTE BACTERIAL SINUSITIS: ICD-10-CM

## 2018-10-18 DIAGNOSIS — I10 ESSENTIAL HYPERTENSION: ICD-10-CM

## 2018-10-18 DIAGNOSIS — Z23 NEED FOR INFLUENZA VACCINATION: ICD-10-CM

## 2018-10-18 DIAGNOSIS — G89.29 OTHER CHRONIC PAIN: ICD-10-CM

## 2018-10-18 DIAGNOSIS — F41.9 ANXIETY: ICD-10-CM

## 2018-10-18 DIAGNOSIS — F51.04 PSYCHOPHYSIOLOGICAL INSOMNIA: ICD-10-CM

## 2018-10-18 DIAGNOSIS — K21.9 GASTROESOPHAGEAL REFLUX DISEASE WITHOUT ESOPHAGITIS: ICD-10-CM

## 2018-10-18 DIAGNOSIS — B96.89 ACUTE BACTERIAL SINUSITIS: ICD-10-CM

## 2018-10-18 PROBLEM — R30.0 DYSURIA: Status: RESOLVED | Noted: 2017-12-30 | Resolved: 2018-10-18

## 2018-10-18 PROBLEM — R78.81 E COLI BACTEREMIA: Status: RESOLVED | Noted: 2017-12-28 | Resolved: 2018-10-18

## 2018-10-18 PROBLEM — B96.20 E COLI BACTEREMIA: Status: RESOLVED | Noted: 2017-12-28 | Resolved: 2018-10-18

## 2018-10-18 PROCEDURE — G8482 FLU IMMUNIZE ORDER/ADMIN: HCPCS | Performed by: NURSE PRACTITIONER

## 2018-10-18 PROCEDURE — 90471 IMMUNIZATION ADMIN: CPT | Performed by: NURSE PRACTITIONER

## 2018-10-18 PROCEDURE — G8427 DOCREV CUR MEDS BY ELIG CLIN: HCPCS | Performed by: NURSE PRACTITIONER

## 2018-10-18 PROCEDURE — 99214 OFFICE O/P EST MOD 30 MIN: CPT | Performed by: NURSE PRACTITIONER

## 2018-10-18 PROCEDURE — G8417 CALC BMI ABV UP PARAM F/U: HCPCS | Performed by: NURSE PRACTITIONER

## 2018-10-18 PROCEDURE — 90688 IIV4 VACCINE SPLT 0.5 ML IM: CPT | Performed by: NURSE PRACTITIONER

## 2018-10-18 PROCEDURE — 96160 PT-FOCUSED HLTH RISK ASSMT: CPT | Performed by: NURSE PRACTITIONER

## 2018-10-18 PROCEDURE — 4004F PT TOBACCO SCREEN RCVD TLK: CPT | Performed by: NURSE PRACTITIONER

## 2018-10-18 RX ORDER — OLMESARTAN MEDOXOMIL 40 MG/1
TABLET ORAL
Qty: 90 TABLET | Refills: 1 | OUTPATIENT
Start: 2018-10-18

## 2018-10-18 RX ORDER — GABAPENTIN 300 MG/1
CAPSULE ORAL
Qty: 60 CAPSULE | Refills: 0 | Status: SHIPPED | OUTPATIENT
Start: 2018-10-18 | End: 2019-04-30 | Stop reason: ALTCHOICE

## 2018-10-18 RX ORDER — AMOXICILLIN AND CLAVULANATE POTASSIUM 875; 125 MG/1; MG/1
1 TABLET, FILM COATED ORAL EVERY 12 HOURS
Qty: 20 TABLET | Refills: 0 | Status: SHIPPED | OUTPATIENT
Start: 2018-10-18 | End: 2018-10-28

## 2018-10-18 ASSESSMENT — PATIENT HEALTH QUESTIONNAIRE - PHQ9
SUM OF ALL RESPONSES TO PHQ QUESTIONS 1-9: 25
10. IF YOU CHECKED OFF ANY PROBLEMS, HOW DIFFICULT HAVE THESE PROBLEMS MADE IT FOR YOU TO DO YOUR WORK, TAKE CARE OF THINGS AT HOME, OR GET ALONG WITH OTHER PEOPLE: 3
3. TROUBLE FALLING OR STAYING ASLEEP: 3
7. TROUBLE CONCENTRATING ON THINGS, SUCH AS READING THE NEWSPAPER OR WATCHING TELEVISION: 3
1. LITTLE INTEREST OR PLEASURE IN DOING THINGS: 3
5. POOR APPETITE OR OVEREATING: 3
SUM OF ALL RESPONSES TO PHQ9 QUESTIONS 1 & 2: 6
9. THOUGHTS THAT YOU WOULD BE BETTER OFF DEAD, OR OF HURTING YOURSELF: 3
2. FEELING DOWN, DEPRESSED OR HOPELESS: 3
SUM OF ALL RESPONSES TO PHQ QUESTIONS 1-9: 25
8. MOVING OR SPEAKING SO SLOWLY THAT OTHER PEOPLE COULD HAVE NOTICED. OR THE OPPOSITE, BEING SO FIGETY OR RESTLESS THAT YOU HAVE BEEN MOVING AROUND A LOT MORE THAN USUAL: 1
6. FEELING BAD ABOUT YOURSELF - OR THAT YOU ARE A FAILURE OR HAVE LET YOURSELF OR YOUR FAMILY DOWN: 3
4. FEELING TIRED OR HAVING LITTLE ENERGY: 3

## 2018-10-18 ASSESSMENT — ENCOUNTER SYMPTOMS
ALLERGIC/IMMUNOLOGIC NEGATIVE: 1
COUGH: 0
EYES NEGATIVE: 1
WHEEZING: 1
SHORTNESS OF BREATH: 0

## 2018-10-18 NOTE — TELEPHONE ENCOUNTER
Last visit: 10/18/18    Next Visit Date:  No future appointments. Health Maintenance   Topic Date Due    Cervical cancer screen  11/07/2018 (Originally 1/18/1997)    Potassium monitoring  07/14/2019    Creatinine monitoring  07/14/2019    Diabetes screen  07/22/2019    Lipid screen  04/10/2022    DTaP/Tdap/Td vaccine (2 - Td) 04/10/2025    Flu vaccine  Completed    Pneumococcal med risk  Completed    HIV screen  Completed       Hemoglobin A1C (%)   Date Value   07/22/2016 4.9             ( goal A1C is < 7)   Microalb/Crt.  Ratio (mcg/mg creat)   Date Value   03/08/2016 18     LDL Cholesterol (mg/dL)   Date Value   04/10/2017 130   03/08/2016 130       (goal LDL is <100)   AST (U/L)   Date Value   03/06/2018 11     ALT (U/L)   Date Value   03/06/2018 8     BUN (mg/dL)   Date Value   07/14/2018 8     BP Readings from Last 3 Encounters:   10/18/18 120/70   10/16/18 137/84   08/30/18 129/87          (goal 120/80)    All Future Testing planned in [facility]  Lab Frequency Next Occurrence   [facility] Psychotropic (combinatorial pharmacogenomics test) Once 10/18/2018               Patient Active Problem List:     Essential hypertension     Gastroesophageal reflux disease without esophagitis     Seasonal allergies     Smoker     Alcohol use disorder, mild, abuse     Obesity, morbid, BMI 40.0-49.9 (HCC)     Failed back syndrome, lumbar     Chronic midline low back pain without sciatica     Bronchitis     Neural foraminal stenosis of lumbar spine     Pyelonephritis     Hydronephrosis of right kidney     Psychophysiological insomnia     Anxiety

## 2018-10-18 NOTE — PATIENT INSTRUCTIONS
Patient Education   Patient Education        Influenza (Flu) Vaccine (Inactivated or Recombinant): What You Need to Know  Why get vaccinated? Influenza (\"flu\") is a contagious disease that spreads around the [facility] every winter, usually between October and May. Flu is caused by influenza viruses and is spread mainly by coughing, sneezing, and close contact. Anyone can get flu. Flu strikes suddenly and can last several days. Symptoms vary by age, but can include:  · Fever/chills. · Sore throat. · Muscle aches. · Fatigue. · Cough. · Headache. · Runny or stuffy nose. Flu can also lead to pneumonia and blood infections, and cause diarrhea and seizures in children. If you have a medical condition, such as heart or lung disease, flu can make it worse. Flu is more dangerous for some people. Infants and young children, people 72years of age and older, pregnant women, and people with certain health conditions or a weakened immune system are at greatest risk. Each year thousands of people in the [facility] die from flu, and many more are hospitalized. Flu vaccine can:  · Keep you from getting flu. · Make flu less severe if you do get it. · Keep you from spreading flu to your family and other people. Inactivated and recombinant flu vaccines  A dose of flu vaccine is recommended every flu season. Children 6 months through 6years of age may need two doses during the same flu season. Everyone else needs only one dose each flu season. Some inactivated flu vaccines contain a very small amount of a mercury-based preservative called thimerosal. Studies have not shown thimerosal in vaccines to be harmful, but flu vaccines that do not contain thimerosal are available. There is no live flu virus in flu shots. They cannot cause the flu. There are many flu viruses, and they are always changing.  Each year a new flu vaccine is made to protect against three or four viruses that are likely to cause disease in the upcoming flu season. But even when the vaccine doesn't exactly match these viruses, it may still provide some protection. Flu vaccine cannot prevent:  · Flu that is caused by a virus not covered by the vaccine. · Illnesses that look like flu but are not. Some people should not get this vaccine  Tell the person who is giving you the vaccine:  · If you have any severe (life-threatening) allergies. If you ever had a life-threatening allergic reaction after a dose of flu vaccine, or have a severe allergy to any part of this vaccine, you may be advised not to get vaccinated. Most, but not all, types of flu vaccine contain a small amount of egg protein. · If you ever had Guillain-Barré syndrome (also called GBS) Some people with a history of GBS should not get this vaccine. This should be discussed with your doctor. · If you are not feeling well. It is usually okay to get flu vaccine when you have a mild illness, but you might be asked to come back when you feel better. Risks of a vaccine reaction  With any medicine, including vaccines, there is a chance of reactions. These are usually mild and go away on their own, but serious reactions are also possible. Most people who get a flu shot do not have any problems with it. Minor problems following a flu shot include:  · Soreness, redness, or swelling where the shot was given  · Hoarseness  · Sore, red or itchy eyes  · Cough  · Fever  · Aches  · Headache  · Itching  · Fatigue  If these problems occur, they usually begin soon after the shot and last 1 or 2 days. More serious problems following a flu shot can include the following:  · There may be a small increased risk of Guillain-Barré Syndrome (GBS) after inactivated flu vaccine. This risk has been estimated at 1 or 2 additional cases per million people vaccinated. This is much lower than the risk of severe complications from flu, which can be prevented by flu vaccine.   · Heron Betancourt children who get the flu shot [phone]. [facility] does not give medical advice. The National Vaccine Injury Compensation Program  The National Vaccine Injury Compensation Program (VICP) is a federal program that was created to compensate people who may have been injured by certain vaccines. Persons who believe they may have been injured by a vaccine can learn about the program and about filing a claim by calling 3-343.216.3240 or visiting the [employer] website at www.[place].gov/vaccinecompensation. There is a time limit to file a claim for compensation. How can I learn more? · Ask your healthcare provider. He or she can give you the vaccine package insert or suggest other sources of information. · Call your local or state health department. · Contact the Centers for Disease Control and Prevention (CDC):  ¨ Call 3-492.951.5316 (1-800-CDC-INFO) or  ¨ Visit CDC's website at www.cdc.gov/flu  Vaccine Information Statement  Inactivated Influenza Vaccine  8/7/2015)  [address] 891ME-11  Department of Health and Human Services  Centers for Disease Control and Prevention  Many Vaccine Information Statements are available in Georgian and other languages. See www.immunize.org/vis. Muchas hojas de información sobre vacunas están disponibles en español y en otros idiomas. Visite www.immunize.org/vis. Care instructions adapted under license by [facility] ([facility]). If you have questions about a medical condition or this instruction, always ask your healthcare professional. [place] [address] any warranty or liability for your use of this information. Stopping Smoking: Care Instructions  Your Care Instructions  Cigarette smokers crave the nicotine in cigarettes. Giving it up is much harder than simply changing a habit. Your body has to stop craving the nicotine. It is hard to quit, but you can do it. There are many tools that people use to quit smoking. You may find that combining tools works best for you.   There are several steps to

## 2018-10-18 NOTE — PROGRESS NOTES
APRN - CNP as PCP - [facility] Attributed Provider  Bridger Hernández MD as Obstetrician (Obstetrics & Gynecology)  Stephanie Matthews MD as Consulting Physician (Neurosurgery)    Medical History Review  Past Medical, Family, and Social History reviewed and does contribute to the patient presenting condition    Health Maintenance   Topic Date Due    Cervical cancer screen  11/07/2018 (Originally 1/18/1997)    Flu vaccine (1) 11/07/2018 (Originally 9/1/2018)    Potassium monitoring  07/14/2019    Creatinine monitoring  07/14/2019    Diabetes screen  07/22/2019    Lipid screen  04/10/2022    DTaP/Tdap/Td vaccine (2 - Td) 04/10/2025    Pneumococcal med risk  Completed    HIV screen  Completed

## 2018-10-22 ENCOUNTER — TELEPHONE (OUTPATIENT)
Dept: FAMILY MEDICINE CLINIC | Age: 43
End: 2018-10-22

## 2018-10-22 RX ORDER — TIZANIDINE 4 MG/1
TABLET ORAL
Qty: 120 TABLET | Refills: 0 | Status: SHIPPED | OUTPATIENT
Start: 2018-10-22 | End: 2018-11-28 | Stop reason: SDUPTHER

## 2018-10-22 RX ORDER — RANITIDINE 150 MG/1
TABLET ORAL
Qty: 60 TABLET | Refills: 5 | Status: SHIPPED | OUTPATIENT
Start: 2018-10-22 | End: 2019-04-30 | Stop reason: SDUPTHER

## 2018-10-22 RX ORDER — TRIAMTERENE AND HYDROCHLOROTHIAZIDE 37.5; 25 MG/1; MG/1
TABLET ORAL
Qty: 30 TABLET | Refills: 5 | Status: SHIPPED | OUTPATIENT
Start: 2018-10-22 | End: 2019-04-30 | Stop reason: SDUPTHER

## 2018-10-22 NOTE — TELEPHONE ENCOUNTER
pt states she was supposed to get an inhaler at [facility] 10/18/18 and is also asking for a cough med to [facility] [[email or web address]]

## 2018-10-24 ENCOUNTER — APPOINTMENT (OUTPATIENT)
Dept: GENERAL RADIOLOGY | Age: 43
End: 2018-10-24
Payer: MEDICARE

## 2018-10-24 ENCOUNTER — HOSPITAL ENCOUNTER (EMERGENCY)
Age: 43
Discharge: HOME | End: 2018-10-24
Attending: EMERGENCY MEDICINE
Payer: MEDICARE

## 2018-10-24 VITALS
HEART RATE: 73 BPM | RESPIRATION RATE: 20 BRPM | TEMPERATURE: 97.7 F | HEIGHT: 63 IN | DIASTOLIC BLOOD PRESSURE: 79 MMHG | BODY MASS INDEX: 46.05 KG/M2 | SYSTOLIC BLOOD PRESSURE: 142 MMHG | WEIGHT: 259.9 LBS | OXYGEN SATURATION: 98 %

## 2018-10-24 DIAGNOSIS — J06.9 VIRAL URI WITH COUGH: Primary | ICD-10-CM

## 2018-10-24 PROCEDURE — 94664 DEMO&/EVAL PT USE INHALER: CPT

## 2018-10-24 PROCEDURE — 6360000002 HC RX W HCPCS: Performed by: EMERGENCY MEDICINE

## 2018-10-24 PROCEDURE — 71046 X-RAY EXAM CHEST 2 VIEWS: CPT

## 2018-10-24 PROCEDURE — 99283 EMERGENCY DEPT VISIT LOW MDM: CPT

## 2018-10-24 PROCEDURE — 6370000000 HC RX 637 (ALT 250 FOR IP): Performed by: EMERGENCY MEDICINE

## 2018-10-24 PROCEDURE — 94640 AIRWAY INHALATION TREATMENT: CPT

## 2018-10-24 RX ORDER — ALBUTEROL SULFATE 90 UG/1
2 AEROSOL, METERED RESPIRATORY (INHALATION) EVERY 6 HOURS PRN
Qty: 1 INHALER | Refills: 3 | Status: SHIPPED | OUTPATIENT
Start: 2018-10-24 | End: 2019-04-30 | Stop reason: SDUPTHER

## 2018-10-24 RX ORDER — ONDANSETRON 4 MG/1
4 TABLET, ORALLY DISINTEGRATING ORAL ONCE
Status: COMPLETED | OUTPATIENT
Start: 2018-10-24 | End: 2018-10-24

## 2018-10-24 RX ORDER — ALBUTEROL SULFATE 90 UG/1
2 AEROSOL, METERED RESPIRATORY (INHALATION)
Status: DISCONTINUED | OUTPATIENT
Start: 2018-10-24 | End: 2018-10-24 | Stop reason: HOSPADM

## 2018-10-24 RX ORDER — IPRATROPIUM BROMIDE AND ALBUTEROL SULFATE 2.5; .5 MG/3ML; MG/3ML
1 SOLUTION RESPIRATORY (INHALATION)
Status: DISCONTINUED | OUTPATIENT
Start: 2018-10-24 | End: 2018-10-24 | Stop reason: HOSPADM

## 2018-10-24 RX ORDER — ACETAMINOPHEN 325 MG/1
650 TABLET ORAL ONCE
Status: COMPLETED | OUTPATIENT
Start: 2018-10-24 | End: 2018-10-24

## 2018-10-24 RX ORDER — ALBUTEROL SULFATE 2.5 MG/3ML
5 SOLUTION RESPIRATORY (INHALATION)
Status: DISCONTINUED | OUTPATIENT
Start: 2018-10-24 | End: 2018-10-24 | Stop reason: HOSPADM

## 2018-10-24 RX ORDER — PREDNISONE 20 MG/1
40 TABLET ORAL ONCE
Status: COMPLETED | OUTPATIENT
Start: 2018-10-24 | End: 2018-10-24

## 2018-10-24 RX ORDER — PREDNISONE 20 MG/1
40 TABLET ORAL DAILY
Qty: 6 TABLET | Refills: 0 | Status: SHIPPED | OUTPATIENT
Start: 2018-10-24 | End: 2018-10-27

## 2018-10-24 RX ADMIN — PREDNISONE 40 MG: 20 TABLET ORAL at 04:31

## 2018-10-24 RX ADMIN — ONDANSETRON 4 MG: 4 TABLET, ORALLY DISINTEGRATING ORAL at 05:12

## 2018-10-24 RX ADMIN — IPRATROPIUM BROMIDE AND ALBUTEROL SULFATE 1 AMPULE: .5; 3 SOLUTION RESPIRATORY (INHALATION) at 04:32

## 2018-10-24 RX ADMIN — IPRATROPIUM BROMIDE AND ALBUTEROL SULFATE 1 AMPULE: .5; 3 SOLUTION RESPIRATORY (INHALATION) at 04:26

## 2018-10-24 RX ADMIN — ACETAMINOPHEN 650 MG: 325 TABLET ORAL at 04:50

## 2018-10-24 ASSESSMENT — PAIN SCALES - GENERAL: PAINLEVEL_OUTOF10: 5

## 2018-10-24 NOTE — ED PROVIDER NOTES
+sinus congestion and nasal drainage  CV: No CP, No palpitations  Pulm: +SOB, No chest tightness, +cough  GI: No abdominal pain, +Nausea, No Vomiting, No diarrhea  Neuro: No HA  MSK: No msk injuries    Except as noted above the remainder of the review of systems was reviewed and negative. PHYSICAL EXAM    (up to 7 for level 4, 8 or more for level 5)     ED Triage Vitals [10/24/18 0351]   BP Temp Temp Source Pulse Resp SpO2 Height Weight   (!) 142/79 97.7 °F (36.5 °C) Oral 73 20 98 % 5' 3\" (1.6 m) 259 lb 14.4 oz (117.9 kg)       Physical Exam   Constitutional: She is oriented to person, place, and time. She appears well-developed and well-nourished. No distress. HENT:   Head: Normocephalic and atraumatic. Eyes: Pupils are equal, round, and reactive to light. EOM are normal.   Neck: Normal range of motion. Neck supple. Cardiovascular: Normal rate, regular rhythm, normal heart sounds and intact distal pulses. Pulmonary/Chest: Effort normal. No respiratory distress. Inspiratory and expiratory wheezing in bilateral lung fields   Abdominal: Soft. There is no tenderness. There is no guarding. Musculoskeletal: Normal range of motion. She exhibits no tenderness or deformity. Lymphadenopathy:     She has no cervical adenopathy. Neurological: She is alert and oriented to person, place, and time. Skin: Skin is warm and dry. Psychiatric: She has a normal mood and affect. Her behavior is normal. Judgment and thought content normal.   Nursing note and vitals reviewed. DIAGNOSTIC RESULTS     RADIOLOGY:   Non-plain film images such as CT, Ultrasound and MRI are read by theradiologist. Plain radiographic images are visualized and preliminarily interpreted by the emergency physician with the below findings:    Interpretation per the Radiologist below, if available at the time of this note:    XR CHEST STANDARD (2 VW)   Final Result   No acute cardiopulmonary disease.            ED BEDSIDE ULTRASOUND: butoccasionally words are mis-transcribed.)    Giselle Varela MD  Attending Emergency Physician     Giselle Varela MD  10/24/18 0910

## 2018-10-24 NOTE — PROGRESS NOTES
· Bronchodilator assessment   []    Bronchodilator Assessment    FEV1 % PREDICTED 32  FEV1 actual: 0.9  PEFR  159  PEFR % Predicted 39  RR 16  Bronchodilator assessment at level  3  BRONCHODILATOR ASSESSMENT SCORE  Score 1 2 3 4   Breath Sounds   []  Clear []  Mild Wheezing with good aeration [x]  Moderate I/E wheezing with adequate aeration []  Poor Aeration or diffuse wheezing   Respiratory Rate [x]  Less than 20 []  20-25 []  Greater than 25  []  Greater than 35    Dyspnea []  No SOB  [x]  SOB with minimal activity []  Speaking in partial sentences []  Acute/ At rest   Peakflow (asthma) []  80 % or greater predicted/PB  [x]  Unable []  70% or greater predicted/PB  [x]  Unable []  51%-70% predicted/PB  [x]  Unable []  Less than 50% predicted/PB  [x]  Unable due to distress   FEV1 % Predicted []  Greater than 69%  [x]  Unable  []  Less than 50%-69%  [x]  Unable  []  Less than 35%-49%  [x]  Unable  []  Less than 35%  [x]  Unable due to distress     · Bronchodilator assessment   []    Bronchodilator Assessment    FEV1 % PREDICTED 32  FEV1 actual: 0.9  PEFR  161  PEFR % Predicted 40  RR 20  Bronchodilator assessment at level  2  BRONCHODILATOR ASSESSMENT SCORE  Score 1 2 3 4   Breath Sounds   []  Clear [x]  Mild Wheezing with good aeration []  Moderate I/E wheezing with adequate aeration []  Poor Aeration or diffuse wheezing   Respiratory Rate []  Less than 20 [x]  20-25 []  Greater than 25  []  Greater than 35    Dyspnea []  No SOB  [x]  SOB with minimal activity []  Speaking in partial sentences []  Acute/ At rest   Peakflow (asthma) []  80 % or greater predicted/PB  []  Unable []  70% or greater predicted/PB  []  Unable []  51%-70% predicted/PB  []  Unable [x]  Less than 50% predicted/PB  []  Unable due to distress   FEV1 % Predicted []  Greater than 69%  []  Unable  []  Less than 50%-69%  []  Unable  []  Less than 35%-49%  []  Unable  [x]  Less than 35%  []  Unable due to distress   · Bronchodilator assessment

## 2018-10-25 ENCOUNTER — CARE COORDINATION (OUTPATIENT)
Dept: CARE COORDINATION | Age: 43
End: 2018-10-25

## 2018-11-07 ENCOUNTER — TELEPHONE (OUTPATIENT)
Dept: FAMILY MEDICINE CLINIC | Age: 43
End: 2018-11-07

## 2018-11-07 DIAGNOSIS — F41.9 ANXIETY: Primary | ICD-10-CM

## 2018-11-07 DIAGNOSIS — R10.2 PELVIC PAIN: Primary | ICD-10-CM

## 2018-11-07 DIAGNOSIS — F32.89 OTHER DEPRESSION: ICD-10-CM

## 2018-11-07 PROBLEM — F32.A DEPRESSION: Status: ACTIVE | Noted: 2018-11-07

## 2018-11-07 RX ORDER — DESVENLAFAXINE 25 MG/1
25 TABLET, EXTENDED RELEASE ORAL DAILY
Qty: 30 TABLET | Refills: 1 | Status: SHIPPED | OUTPATIENT
Start: 2018-11-07 | End: 2019-02-07

## 2018-11-09 RX ORDER — TRAMADOL HYDROCHLORIDE 50 MG/1
50 TABLET ORAL EVERY 4 HOURS PRN
Qty: 18 TABLET | Refills: 0 | Status: SHIPPED | OUTPATIENT
Start: 2018-11-09 | End: 2018-11-12

## 2018-11-13 DIAGNOSIS — F32.A DEPRESSION, UNSPECIFIED DEPRESSION TYPE: ICD-10-CM

## 2018-11-13 DIAGNOSIS — F41.9 ANXIETY: ICD-10-CM

## 2018-11-24 DIAGNOSIS — I10 ESSENTIAL HYPERTENSION: ICD-10-CM

## 2018-11-26 RX ORDER — OLMESARTAN MEDOXOMIL 40 MG/1
TABLET ORAL
Qty: 90 TABLET | Refills: 1 | Status: SHIPPED | OUTPATIENT
Start: 2018-11-26 | End: 2019-04-30 | Stop reason: SDUPTHER

## 2018-11-26 RX ORDER — DULOXETIN HYDROCHLORIDE 60 MG/1
CAPSULE, DELAYED RELEASE ORAL
Qty: 30 CAPSULE | Refills: 5 | OUTPATIENT
Start: 2018-11-26

## 2018-11-26 NOTE — TELEPHONE ENCOUNTER
Last visit: 10/18/18  Last Med refill:5/2018    Next Visit Date: none    cymbalta refused: formulary change      No future appointments. Health Maintenance   Topic Date Due    Cervical cancer screen  01/18/1997    Potassium monitoring  07/14/2019    Creatinine monitoring  07/14/2019    Diabetes screen  07/22/2019    Lipid screen  04/10/2022    DTaP/Tdap/Td vaccine (2 - Td) 04/10/2025    Flu vaccine  Completed    Pneumococcal med risk  Completed    HIV screen  Completed       Hemoglobin A1C (%)   Date Value   07/22/2016 4.9             ( goal A1C is < 7)   Microalb/Crt.  Ratio (mcg/mg creat)   Date Value   03/08/2016 18     LDL Cholesterol (mg/dL)   Date Value   04/10/2017 130   03/08/2016 130       (goal LDL is <100)   AST (U/L)   Date Value   03/06/2018 11     ALT (U/L)   Date Value   03/06/2018 8     BUN (mg/dL)   Date Value   07/14/2018 8     BP Readings from Last 3 Encounters:   10/24/18 (!) 142/79   10/18/18 120/70   10/16/18 137/84          (goal 120/80)    All Future Testing planned in [facility]              Patient Active Problem List:     Essential hypertension     Gastroesophageal reflux disease without esophagitis     Seasonal allergies     Smoker     Alcohol use disorder, mild, abuse     Obesity, morbid, BMI 40.0-49.9 ([facility])     Failed back syndrome, lumbar     Chronic midline low back pain without sciatica     Bronchitis     Neural foraminal stenosis of lumbar spine     Pyelonephritis     Hydronephrosis of right kidney     Psychophysiological insomnia     Anxiety     Depression

## 2018-11-28 DIAGNOSIS — J30.2 SEASONAL ALLERGIC RHINITIS: ICD-10-CM

## 2018-11-28 RX ORDER — LORATADINE 10 MG/1
TABLET ORAL
Qty: 90 TABLET | Refills: 1 | Status: SHIPPED | OUTPATIENT
Start: 2018-11-28 | End: 2019-08-07 | Stop reason: SDUPTHER

## 2018-11-28 RX ORDER — TIZANIDINE 4 MG/1
TABLET ORAL
Qty: 120 TABLET | Refills: 0 | Status: SHIPPED | OUTPATIENT
Start: 2018-11-28 | End: 2018-12-23 | Stop reason: SDUPTHER

## 2018-12-07 ENCOUNTER — TELEPHONE (OUTPATIENT)
Dept: FAMILY MEDICINE CLINIC | Age: 43
End: 2018-12-07

## 2018-12-12 ENCOUNTER — TELEPHONE (OUTPATIENT)
Dept: FAMILY MEDICINE CLINIC | Age: 43
End: 2018-12-12

## 2018-12-12 NOTE — TELEPHONE ENCOUNTER
Spoke with patient to offer her an appt to discuss this. Patient states she would like to make an appt when Eugene Fabienne comes back and will call then.

## 2018-12-26 RX ORDER — TIZANIDINE 4 MG/1
TABLET ORAL
Qty: 120 TABLET | Refills: 0 | Status: SHIPPED | OUTPATIENT
Start: 2018-12-26 | End: 2019-04-01 | Stop reason: SDUPTHER

## 2019-02-07 ENCOUNTER — OFFICE VISIT (OUTPATIENT)
Dept: FAMILY MEDICINE CLINIC | Age: 44
End: 2019-02-07
Payer: MEDICARE

## 2019-02-07 VITALS
OXYGEN SATURATION: 98 % | BODY MASS INDEX: 44.29 KG/M2 | TEMPERATURE: 97.8 F | HEART RATE: 87 BPM | WEIGHT: 250 LBS | SYSTOLIC BLOOD PRESSURE: 120 MMHG | DIASTOLIC BLOOD PRESSURE: 70 MMHG

## 2019-02-07 DIAGNOSIS — M48.061 NEURAL FORAMINAL STENOSIS OF LUMBAR SPINE: ICD-10-CM

## 2019-02-07 DIAGNOSIS — Z90.711 HISTORY OF PARTIAL HYSTERECTOMY: ICD-10-CM

## 2019-02-07 DIAGNOSIS — F51.04 PSYCHOPHYSIOLOGICAL INSOMNIA: ICD-10-CM

## 2019-02-07 DIAGNOSIS — M96.1 LUMBAR POST-LAMINECTOMY SYNDROME: ICD-10-CM

## 2019-02-07 DIAGNOSIS — F32.89 OTHER DEPRESSION: Primary | ICD-10-CM

## 2019-02-07 PROCEDURE — 99214 OFFICE O/P EST MOD 30 MIN: CPT | Performed by: NURSE PRACTITIONER

## 2019-02-07 PROCEDURE — G8417 CALC BMI ABV UP PARAM F/U: HCPCS | Performed by: NURSE PRACTITIONER

## 2019-02-07 PROCEDURE — G8427 DOCREV CUR MEDS BY ELIG CLIN: HCPCS | Performed by: NURSE PRACTITIONER

## 2019-02-07 PROCEDURE — 96160 PT-FOCUSED HLTH RISK ASSMT: CPT | Performed by: NURSE PRACTITIONER

## 2019-02-07 PROCEDURE — G8482 FLU IMMUNIZE ORDER/ADMIN: HCPCS | Performed by: NURSE PRACTITIONER

## 2019-02-07 PROCEDURE — 4004F PT TOBACCO SCREEN RCVD TLK: CPT | Performed by: NURSE PRACTITIONER

## 2019-02-07 RX ORDER — ARIPIPRAZOLE 2 MG/1
2 TABLET ORAL DAILY
Qty: 30 TABLET | Refills: 5 | Status: SHIPPED | OUTPATIENT
Start: 2019-02-07 | End: 2019-04-30 | Stop reason: ALTCHOICE

## 2019-02-07 RX ORDER — DULOXETIN HYDROCHLORIDE 30 MG/1
30 CAPSULE, DELAYED RELEASE ORAL DAILY
Qty: 30 CAPSULE | Refills: 5 | Status: SHIPPED | OUTPATIENT
Start: 2019-02-07 | End: 2019-04-30 | Stop reason: ALTCHOICE

## 2019-02-07 RX ORDER — KETOROLAC TROMETHAMINE 30 MG/ML
30 INJECTION, SOLUTION INTRAMUSCULAR; INTRAVENOUS ONCE
Status: COMPLETED | OUTPATIENT
Start: 2019-02-07 | End: 2019-02-07

## 2019-02-07 RX ORDER — QUETIAPINE FUMARATE 50 MG/1
50 TABLET, FILM COATED ORAL NIGHTLY
Qty: 30 TABLET | Refills: 5 | Status: SHIPPED | OUTPATIENT
Start: 2019-02-07 | End: 2019-04-30 | Stop reason: ALTCHOICE

## 2019-02-07 RX ADMIN — KETOROLAC TROMETHAMINE 30 MG: 30 INJECTION, SOLUTION INTRAMUSCULAR; INTRAVENOUS at 08:28

## 2019-02-07 ASSESSMENT — PATIENT HEALTH QUESTIONNAIRE - PHQ9
3. TROUBLE FALLING OR STAYING ASLEEP: 3
5. POOR APPETITE OR OVEREATING: 3
7. TROUBLE CONCENTRATING ON THINGS, SUCH AS READING THE NEWSPAPER OR WATCHING TELEVISION: 3
SUM OF ALL RESPONSES TO PHQ QUESTIONS 1-9: 22
1. LITTLE INTEREST OR PLEASURE IN DOING THINGS: 3
SUM OF ALL RESPONSES TO PHQ9 QUESTIONS 1 & 2: 6
9. THOUGHTS THAT YOU WOULD BE BETTER OFF DEAD, OR OF HURTING YOURSELF: 1
6. FEELING BAD ABOUT YOURSELF - OR THAT YOU ARE A FAILURE OR HAVE LET YOURSELF OR YOUR FAMILY DOWN: 3
4. FEELING TIRED OR HAVING LITTLE ENERGY: 3
SUM OF ALL RESPONSES TO PHQ QUESTIONS 1-9: 22
2. FEELING DOWN, DEPRESSED OR HOPELESS: 3
8. MOVING OR SPEAKING SO SLOWLY THAT OTHER PEOPLE COULD HAVE NOTICED. OR THE OPPOSITE, BEING SO FIGETY OR RESTLESS THAT YOU HAVE BEEN MOVING AROUND A LOT MORE THAN USUAL: 0
10. IF YOU CHECKED OFF ANY PROBLEMS, HOW DIFFICULT HAVE THESE PROBLEMS MADE IT FOR YOU TO DO YOUR WORK, TAKE CARE OF THINGS AT HOME, OR GET ALONG WITH OTHER PEOPLE: 2

## 2019-02-07 ASSESSMENT — ENCOUNTER SYMPTOMS
EYES NEGATIVE: 1
SHORTNESS OF BREATH: 0
COUGH: 0
BACK PAIN: 1
ALLERGIC/IMMUNOLOGIC NEGATIVE: 1

## 2019-02-18 DIAGNOSIS — M54.50 CHRONIC MIDLINE LOW BACK PAIN WITHOUT SCIATICA: ICD-10-CM

## 2019-02-18 DIAGNOSIS — G89.29 CHRONIC MIDLINE LOW BACK PAIN WITHOUT SCIATICA: ICD-10-CM

## 2019-02-19 RX ORDER — MELOXICAM 15 MG/1
TABLET ORAL
Qty: 90 TABLET | Refills: 1 | Status: SHIPPED | OUTPATIENT
Start: 2019-02-19 | End: 2019-09-04 | Stop reason: SDUPTHER

## 2019-04-02 RX ORDER — TIZANIDINE 4 MG/1
TABLET ORAL
Qty: 120 TABLET | Refills: 0 | Status: SHIPPED | OUTPATIENT
Start: 2019-04-02 | End: 2019-06-07 | Stop reason: SDUPTHER

## 2019-04-02 NOTE — TELEPHONE ENCOUNTER
Last visit: 2/7/19  Last Med refill: 12/26/18  Does patient have enough medication for 72 hours: Yes    Next Visit Date:  Future Appointments   Date Time Provider Nena Garrison   5/7/2019 11:20 AM HAYDEE Benitez CNP [facility] [address] Maintenance   Topic Date Due    Cervical cancer screen  02/07/2020 (Originally 1/18/1997)    Potassium monitoring  07/14/2019    Creatinine monitoring  07/14/2019    Diabetes screen  07/22/2019    Lipid screen  04/10/2022    DTaP/Tdap/Td vaccine (2 - Td) 04/10/2025    Flu vaccine  Completed    Pneumococcal 0-64 years at Risk Vaccine  Completed    HIV screen  Completed       Hemoglobin A1C (%)   Date Value   07/22/2016 4.9             ( goal A1C is < 7)   Microalb/Crt.  Ratio (mcg/mg creat)   Date Value   03/08/2016 18     LDL Cholesterol (mg/dL)   Date Value   04/10/2017 130   03/08/2016 130       (goal LDL is <100)   AST (U/L)   Date Value   03/06/2018 11     ALT (U/L)   Date Value   03/06/2018 8     BUN (mg/dL)   Date Value   07/14/2018 8     BP Readings from Last 3 Encounters:   02/07/19 120/70   10/24/18 (!) 142/79   10/18/18 120/70          (goal 120/80)    All Future Testing planned in [facility]              Patient Active Problem List:     Essential hypertension     Gastroesophageal reflux disease without esophagitis     Seasonal allergies     Smoker     Alcohol use disorder, mild, abuse     Obesity, morbid, BMI 40.0-49.9 (HCC)     Failed back syndrome, lumbar     Chronic midline low back pain without sciatica     Bronchitis     Neural foraminal stenosis of lumbar spine     Pyelonephritis     Hydronephrosis of right kidney     Psychophysiological insomnia     Anxiety     Depression     History of partial hysterectomy

## 2019-04-30 ENCOUNTER — OFFICE VISIT (OUTPATIENT)
Dept: FAMILY MEDICINE CLINIC | Age: 44
End: 2019-04-30
Payer: MEDICARE

## 2019-04-30 VITALS
DIASTOLIC BLOOD PRESSURE: 72 MMHG | BODY MASS INDEX: 46.16 KG/M2 | HEART RATE: 93 BPM | SYSTOLIC BLOOD PRESSURE: 124 MMHG | WEIGHT: 260.6 LBS | OXYGEN SATURATION: 96 %

## 2019-04-30 DIAGNOSIS — R53.83 OTHER FATIGUE: ICD-10-CM

## 2019-04-30 DIAGNOSIS — R23.2 HOT FLASHES: Primary | ICD-10-CM

## 2019-04-30 DIAGNOSIS — Z13.31 POSITIVE DEPRESSION SCREENING: ICD-10-CM

## 2019-04-30 DIAGNOSIS — I10 ESSENTIAL HYPERTENSION: ICD-10-CM

## 2019-04-30 DIAGNOSIS — J30.2 SEASONAL ALLERGIES: ICD-10-CM

## 2019-04-30 DIAGNOSIS — R06.83 SNORES: ICD-10-CM

## 2019-04-30 DIAGNOSIS — E04.9 GOITER: ICD-10-CM

## 2019-04-30 DIAGNOSIS — M54.50 CHRONIC MIDLINE LOW BACK PAIN WITHOUT SCIATICA: ICD-10-CM

## 2019-04-30 DIAGNOSIS — G89.29 CHRONIC MIDLINE LOW BACK PAIN WITHOUT SCIATICA: ICD-10-CM

## 2019-04-30 DIAGNOSIS — K21.9 GASTROESOPHAGEAL REFLUX DISEASE WITHOUT ESOPHAGITIS: ICD-10-CM

## 2019-04-30 DIAGNOSIS — Z86.69 HISTORY OF SLEEP APNEA: ICD-10-CM

## 2019-04-30 PROCEDURE — G8431 POS CLIN DEPRES SCRN F/U DOC: HCPCS | Performed by: NURSE PRACTITIONER

## 2019-04-30 PROCEDURE — 99214 OFFICE O/P EST MOD 30 MIN: CPT | Performed by: NURSE PRACTITIONER

## 2019-04-30 PROCEDURE — G8427 DOCREV CUR MEDS BY ELIG CLIN: HCPCS | Performed by: NURSE PRACTITIONER

## 2019-04-30 PROCEDURE — G8417 CALC BMI ABV UP PARAM F/U: HCPCS | Performed by: NURSE PRACTITIONER

## 2019-04-30 PROCEDURE — 4004F PT TOBACCO SCREEN RCVD TLK: CPT | Performed by: NURSE PRACTITIONER

## 2019-04-30 RX ORDER — RANITIDINE 150 MG/1
TABLET ORAL
Qty: 180 TABLET | Refills: 1 | Status: SHIPPED | OUTPATIENT
Start: 2019-04-30 | End: 2019-11-05 | Stop reason: SDUPTHER

## 2019-04-30 RX ORDER — KETOROLAC TROMETHAMINE 30 MG/ML
30 INJECTION, SOLUTION INTRAMUSCULAR; INTRAVENOUS ONCE
Status: COMPLETED | OUTPATIENT
Start: 2019-04-30 | End: 2019-05-01

## 2019-04-30 RX ORDER — TRIAMTERENE AND HYDROCHLOROTHIAZIDE 37.5; 25 MG/1; MG/1
TABLET ORAL
Qty: 90 TABLET | Refills: 1 | Status: SHIPPED | OUTPATIENT
Start: 2019-04-30 | End: 2019-11-05 | Stop reason: SDUPTHER

## 2019-04-30 RX ORDER — M-VIT,TX,IRON,MINS/CALC/FOLIC 27MG-0.4MG
1 TABLET ORAL DAILY
Qty: 90 TABLET | Refills: 1 | Status: SHIPPED | OUTPATIENT
Start: 2019-04-30 | End: 2019-12-05 | Stop reason: SDUPTHER

## 2019-04-30 RX ORDER — ORPHENADRINE CITRATE 30 MG/ML
60 INJECTION INTRAMUSCULAR; INTRAVENOUS ONCE
Status: COMPLETED | OUTPATIENT
Start: 2019-04-30 | End: 2019-05-01

## 2019-04-30 RX ORDER — MONTELUKAST SODIUM 10 MG/1
10 TABLET ORAL NIGHTLY
Qty: 90 TABLET | Refills: 0 | Status: SHIPPED | OUTPATIENT
Start: 2019-04-30 | End: 2019-08-07 | Stop reason: SDUPTHER

## 2019-04-30 RX ORDER — ALBUTEROL SULFATE 90 UG/1
2 AEROSOL, METERED RESPIRATORY (INHALATION) EVERY 6 HOURS PRN
Qty: 1 INHALER | Refills: 3 | Status: SHIPPED | OUTPATIENT
Start: 2019-04-30 | End: 2020-03-02 | Stop reason: ALTCHOICE

## 2019-04-30 RX ORDER — LORATADINE 10 MG/1
TABLET ORAL
Qty: 90 TABLET | Refills: 1 | Status: CANCELLED | OUTPATIENT
Start: 2019-04-30

## 2019-04-30 RX ORDER — OLMESARTAN MEDOXOMIL 40 MG/1
TABLET ORAL
Qty: 90 TABLET | Refills: 1 | Status: SHIPPED | OUTPATIENT
Start: 2019-04-30 | End: 2019-10-31 | Stop reason: SDUPTHER

## 2019-04-30 ASSESSMENT — ENCOUNTER SYMPTOMS
TROUBLE SWALLOWING: 1
BACK PAIN: 1
COUGH: 1
EYES NEGATIVE: 1
SHORTNESS OF BREATH: 0
ALLERGIC/IMMUNOLOGIC NEGATIVE: 1

## 2019-04-30 NOTE — PROGRESS NOTES
[address] [place] [id number]  Dept: [phone]  Dept Fax: [phone]      Steven Peterson is a 38 y.o. female who presents today for hermedical conditions/complaints as noted below. Steven Peterson is c/o of Cough; Lower Back Pain; and Sweats            HPI:      ROSENDA lackey is here today with complaint of hot flashes. She did have a tubal and partial hyst. She has her ovaries. She did see Dr. Sagrario Landaverde and he did do blood work. This shows that she is still ovulating. She is is soaking her sheets 5 of 7 nights. He told her she did not need medication. Sinus drainage-she has been using claritin for many years. She does not feel that it is effective for her any longer. She is having a lot of sinus drainage and coughing. Back pain-she is using heat on her lower back for her pain. She feels that the heat is better than any other treatment she has used. She does worry about damaging her skin due to the discoloration     Sleep apnea-has used a cpap in the past. She does not have the machine any longer. She is very fatigued. She is not ever feeling rested. She wakes herself gasping and by loud snores. Her neck has been swollen. She feels that she has been having difficulty swallowing. She has had her thyroid levels checked in the past. They were normal.   Hemoglobin A1C (%)   Date Value   07/22/2016 4.9             ( goal A1Cis < 7)   Microalb/Crt.  Ratio (mcg/mg creat)   Date Value   03/08/2016 18     LDL Cholesterol (mg/dL)   Date Value   04/10/2017 130   03/08/2016 130       (goal LDL is <100)   AST (U/L)   Date Value   03/06/2018 11     ALT (U/L)   Date Value   03/06/2018 8     BUN (mg/dL)   Date Value   07/14/2018 8     BP Readings from Last 3 Encounters:   04/30/19 124/72   02/07/19 120/70   10/24/18 (!) 142/79          (goal 120/80)    Past Medical History:   Diagnosis Date    Alcohol use disorder, mild, abuse 4/10/2017    Anxiety 10/18/2018    Chronic midline low back pain without sciatica 2017    Depression     Essential hypertension 3/8/2016    Gastroesophageal reflux disease without esophagitis 3/8/2016    Hydronephrosis of right kidney 2017    Obesity 4/10/2017    Seasonal allergies 3/8/2016      Past Surgical History:   Procedure Laterality Date     SECTION      ELBOW JOINT FUSION      HERNIA REPAIR      HYSTERECTOMY      SPINE SURGERY         Family History   Problem Relation Age of Onset    Eczema Mother     COPD Mother     Other Mother     Liver Disease Mother     Heart Disease Mother           Social History     Tobacco Use    Smoking status: Current Every Day Smoker     Packs/day: 0.25     Types: Cigarettes     Last attempt to quit: 2017     Years since quittin.6    Smokeless tobacco: Never Used   Substance Use Topics    Alcohol use: No     Alcohol/week: 4.8 oz     Types: 4 Glasses of wine, 4 Shots of liquor per week         Current Outpatient Medications   Medication Sig Dispense Refill    olmesartan (BENICAR) 40 MG tablet take 1 tablet by mouth once daily 90 tablet 1    albuterol sulfate HFA (PROAIR HFA) 108 (90 Base) MCG/ACT inhaler Inhale 2 puffs into the lungs every 6 hours as needed for Wheezing 1 Inhaler 3    ranitidine (ZANTAC) 150 MG tablet take 1 tablet by mouth twice a day 180 tablet 1    triamterene-hydrochlorothiazide (MAXZIDE-25) 37.5-25 MG per tablet take 1 tablet by mouth once daily 90 tablet 1    Multiple Vitamins-Minerals (THERAPEUTIC MULTIVITAMIN-MINERALS) tablet Take 1 tablet by mouth daily 90 tablet 1    montelukast (SINGULAIR) 10 MG tablet Take 1 tablet by mouth nightly 90 tablet 0    tiZANidine (ZANAFLEX) 4 MG tablet take 1 tablet by mouth every 6 hours if needed for BACK PAIN 120 tablet 0    meloxicam (MOBIC) 15 MG tablet take 1 tablet by mouth once daily 90 tablet 1    loratadine (CLARITIN) 10 MG tablet take 1 tablet by mouth once daily 90 Upper Arm, Position: Sitting, Cuff Size: Large Adult)   Pulse 93   Wt 260 lb 9.6 oz (118.2 kg)   SpO2 96%   BMI 46.16 kg/m²   Physical Exam   Constitutional: She is oriented to person, place, and time. She appears well-developed and well-nourished. HENT:   Head: Normocephalic. Eyes: Conjunctivae are normal.   Neck: Thyromegaly: mild. Cardiovascular: Normal rate and regular rhythm. Pulmonary/Chest: Effort normal and breath sounds normal.   Neurological: She is alert and oriented to person, place, and time. Skin: Skin is warm and dry. Mottled appearance to low back, noted scar to mid spine   Psychiatric: Her behavior is normal. Judgment and thought content normal. She exhibits a depressed mood. Assessment:      1. Hot flashes    2. Seasonal allergic rhinitis    3. Essential hypertension    4. Gastroesophageal reflux disease without esophagitis    5. Snores    6. History of sleep apnea    7. Other fatigue    8. Goiter    9. Positive depression screening                     Plan:      Orders Placed This Encounter   Procedures    US THYROID     Standing Status:   Future     Standing Expiration Date:   4/30/2020     Order Specific Question:   Reason for exam:     Answer:   goiter    Baseline Diagnostic Sleep Study     Standing Status:   Future     Standing Expiration Date:   4/30/2020     Order Specific Question:   Adult or Pediatric     Answer:   Adult Study (>7 Years)     Order Specific Question:   Location For Sleep Study     Answer: [facility] Specific Question:   Select Sleep Lab Location     Answer:   [facility]    Positive Screen for Clinical Depression with a Documented Follow-up Plan      1. Seasonal allergic rhinitis    - montelukast (SINGULAIR) 10 MG tablet; Take 1 tablet by mouth nightly  Dispense: 90 tablet; Refill: 0    2. Essential hypertension    - olmesartan (BENICAR) 40 MG tablet; take 1 tablet by mouth once daily  Dispense: 90 tablet;  Refill: 1  - triamterene-hydrochlorothiazide (MAXZIDE-25) 37.5-25 MG per tablet; take 1 tablet by mouth once daily  Dispense: 90 tablet; Refill: 1    3. Gastroesophageal reflux disease without esophagitis    - ranitidine (ZANTAC) 150 MG tablet; take 1 tablet by mouth twice a day  Dispense: 180 tablet; Refill: 1    4. Hot flashes  Will start black cohash    5. Snores    - Baseline Diagnostic Sleep Study; Future    6. History of sleep apnea    - Baseline Diagnostic Sleep Study; Future    7. Other fatigue    - Baseline Diagnostic Sleep Study; Future    8. Goiter    - US THYROID; Future    9. Positive depression screening    - Positive Screen for Clinical Depression with a Documented Follow-up Plan     10. Chronic midline low back pain without sciatica    - ketorolac (TORADOL) injection 30 mg  - orphenadrine (NORFLEX) injection 60 mg      No follow-ups on file. Patient given educational materials - see patientinstructions. Discussed use, benefit, and side effects of prescribed medications. All patient questions answered. Pt voiced understanding. Reviewed health maintenance. Instructed to continue current medications, diet and exercise. Patient agreedwith treatment plan. Follow up as directed. Electronically signed by HAYDEE Durham CNP on 4/30/2019  On the basis of positive PHQ-9 screening ( ), the following plan was implemented: false positive screen suspected- will re-evaluate at next visit. Patient will follow-up in 3 month(s) with PCP.

## 2019-05-01 RX ADMIN — ORPHENADRINE CITRATE 60 MG: 30 INJECTION INTRAMUSCULAR; INTRAVENOUS at 12:30

## 2019-05-01 RX ADMIN — KETOROLAC TROMETHAMINE 30 MG: 30 INJECTION, SOLUTION INTRAMUSCULAR; INTRAVENOUS at 12:29

## 2019-05-03 ENCOUNTER — HOSPITAL ENCOUNTER (OUTPATIENT)
Dept: ULTRASOUND IMAGING | Age: 44
Discharge: HOME | End: 2019-05-05
Payer: MEDICARE

## 2019-05-03 DIAGNOSIS — E04.9 GOITER: ICD-10-CM

## 2019-05-03 PROCEDURE — 76536 US EXAM OF HEAD AND NECK: CPT

## 2019-05-09 DIAGNOSIS — E04.9 GOITER: Primary | ICD-10-CM

## 2019-05-13 ENCOUNTER — APPOINTMENT (OUTPATIENT)
Dept: GENERAL RADIOLOGY | Age: 44
End: 2019-05-13
Payer: MEDICARE

## 2019-05-13 ENCOUNTER — HOSPITAL ENCOUNTER (EMERGENCY)
Age: 44
Discharge: HOME | End: 2019-05-13
Attending: EMERGENCY MEDICINE
Payer: MEDICARE

## 2019-05-13 VITALS
SYSTOLIC BLOOD PRESSURE: 157 MMHG | BODY MASS INDEX: 47.84 KG/M2 | HEIGHT: 62 IN | TEMPERATURE: 98.9 F | DIASTOLIC BLOOD PRESSURE: 88 MMHG | HEART RATE: 92 BPM | OXYGEN SATURATION: 94 % | WEIGHT: 260 LBS | RESPIRATION RATE: 22 BRPM

## 2019-05-13 DIAGNOSIS — M54.50 ACUTE EXACERBATION OF CHRONIC LOW BACK PAIN: Primary | ICD-10-CM

## 2019-05-13 DIAGNOSIS — G89.29 ACUTE EXACERBATION OF CHRONIC LOW BACK PAIN: Primary | ICD-10-CM

## 2019-05-13 PROCEDURE — 81003 URINALYSIS AUTO W/O SCOPE: CPT

## 2019-05-13 PROCEDURE — 72100 X-RAY EXAM L-S SPINE 2/3 VWS: CPT

## 2019-05-13 PROCEDURE — 6360000002 HC RX W HCPCS: Performed by: NURSE PRACTITIONER

## 2019-05-13 PROCEDURE — 99283 EMERGENCY DEPT VISIT LOW MDM: CPT

## 2019-05-13 PROCEDURE — 84703 CHORIONIC GONADOTROPIN ASSAY: CPT

## 2019-05-13 PROCEDURE — 96372 THER/PROPH/DIAG INJ SC/IM: CPT

## 2019-05-13 RX ORDER — DEXAMETHASONE SODIUM PHOSPHATE 10 MG/ML
10 INJECTION, SOLUTION INTRAMUSCULAR; INTRAVENOUS ONCE
Status: COMPLETED | OUTPATIENT
Start: 2019-05-13 | End: 2019-05-13

## 2019-05-13 RX ORDER — PREDNISONE 50 MG/1
50 TABLET ORAL DAILY
Qty: 5 TABLET | Refills: 0 | Status: SHIPPED | OUTPATIENT
Start: 2019-05-13 | End: 2019-09-25 | Stop reason: ALTCHOICE

## 2019-05-13 RX ORDER — HYDROCODONE BITARTRATE AND ACETAMINOPHEN 5; 325 MG/1; MG/1
1 TABLET ORAL EVERY 6 HOURS PRN
Qty: 12 TABLET | Refills: 0 | Status: SHIPPED | OUTPATIENT
Start: 2019-05-13 | End: 2019-05-16

## 2019-05-13 RX ORDER — HYDROMORPHONE HYDROCHLORIDE 1 MG/ML
1 INJECTION, SOLUTION INTRAMUSCULAR; INTRAVENOUS; SUBCUTANEOUS ONCE
Status: COMPLETED | OUTPATIENT
Start: 2019-05-13 | End: 2019-05-13

## 2019-05-13 RX ADMIN — HYDROMORPHONE HYDROCHLORIDE 1 MG: 1 INJECTION, SOLUTION INTRAMUSCULAR; INTRAVENOUS; SUBCUTANEOUS at 11:56

## 2019-05-13 RX ADMIN — DEXAMETHASONE SODIUM PHOSPHATE 10 MG: 10 INJECTION, SOLUTION INTRAMUSCULAR; INTRAVENOUS at 11:56

## 2019-05-13 ASSESSMENT — PAIN DESCRIPTION - DESCRIPTORS: DESCRIPTORS: CONSTANT

## 2019-05-13 ASSESSMENT — ENCOUNTER SYMPTOMS
RHINORRHEA: 0
DIARRHEA: 0
VOMITING: 0
SORE THROAT: 0
WHEEZING: 0
SINUS PRESSURE: 0
SHORTNESS OF BREATH: 0
COUGH: 0
CONSTIPATION: 0
BACK PAIN: 1
ABDOMINAL PAIN: 0
COLOR CHANGE: 0
NAUSEA: 0

## 2019-05-13 ASSESSMENT — PAIN SCALES - GENERAL
PAINLEVEL_OUTOF10: 10
PAINLEVEL_OUTOF10: 10
PAINLEVEL_OUTOF10: 8

## 2019-05-13 ASSESSMENT — PAIN DESCRIPTION - FREQUENCY: FREQUENCY: CONTINUOUS

## 2019-05-13 ASSESSMENT — PAIN DESCRIPTION - PAIN TYPE: TYPE: ACUTE PAIN

## 2019-05-13 ASSESSMENT — PAIN DESCRIPTION - LOCATION: LOCATION: BACK
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
## 2019-05-15 LAB — HCG, PREGNANCY URINE (POC): NEGATIVE

## 2019-06-07 ENCOUNTER — TELEPHONE (OUTPATIENT)
Dept: FAMILY MEDICINE CLINIC | Age: 44
End: 2019-06-07

## 2019-06-07 RX ORDER — TIZANIDINE 4 MG/1
TABLET ORAL
Qty: 120 TABLET | Refills: 0 | Status: SHIPPED | OUTPATIENT
Start: 2019-06-07 | End: 2019-07-05 | Stop reason: SDUPTHER

## 2019-06-07 NOTE — TELEPHONE ENCOUNTER
Last visit: 4/30/19  Last Med refill: 4/2/19  Does patient have enough medication for 72 hours: No:     Next Visit Date:  Future Appointments   Date Time Provider Nena Garrison   6/11/2019  7:30 PM [facility] RM 3 [facility] None   9/23/2019  4:15 PM Brenda Infante MD [address] Maintenance   Topic Date Due    Cervical cancer screen  02/07/2020 (Originally 1/18/1997)    Potassium monitoring  07/14/2019    Creatinine monitoring  07/14/2019    Diabetes screen  07/22/2019    Lipid screen  04/10/2022    DTaP/Tdap/Td vaccine (2 - Td) 04/10/2025    Flu vaccine  Completed    Pneumococcal 0-64 years Vaccine  Completed    HIV screen  Completed       Hemoglobin A1C (%)   Date Value   07/22/2016 4.9             ( goal A1C is < 7)   Microalb/Crt.  Ratio (mcg/mg creat)   Date Value   03/08/2016 18     LDL Cholesterol (mg/dL)   Date Value   04/10/2017 130   03/08/2016 130       (goal LDL is <100)   AST (U/L)   Date Value   03/06/2018 11     ALT (U/L)   Date Value   03/06/2018 8     BUN (mg/dL)   Date Value   07/14/2018 8     BP Readings from Last 3 Encounters:   05/13/19 (!) 157/88   04/30/19 124/72   02/07/19 120/70          (goal 120/80)    All Future Testing planned in [facility]  Lab Frequency Next Occurrence   Baseline Diagnostic Sleep Study Once 05/30/2019               Patient Active Problem List:     Essential hypertension     Gastroesophageal reflux disease without esophagitis     Seasonal allergies     Smoker     Alcohol use disorder, mild, abuse     Obesity, morbid, BMI 40.0-49.9 ([facility])     Failed back syndrome, lumbar     Chronic midline low back pain without sciatica     Bronchitis     Neural foraminal stenosis of lumbar spine     Pyelonephritis     Hydronephrosis of right kidney     Psychophysiological insomnia     Anxiety     Depression     History of partial hysterectomy     Hot flashes     Snores     History of sleep apnea     Other fatigue     Goiter

## 2019-07-08 RX ORDER — TIZANIDINE 4 MG/1
TABLET ORAL
Qty: 120 TABLET | Refills: 0 | Status: SHIPPED | OUTPATIENT
Start: 2019-07-08 | End: 2019-08-07 | Stop reason: SDUPTHER

## 2019-08-07 ENCOUNTER — NURSE TRIAGE (OUTPATIENT)
Dept: OTHER | Facility: CLINIC | Age: 44
End: 2019-08-07

## 2019-08-07 DIAGNOSIS — J30.2 SEASONAL ALLERGIC RHINITIS: ICD-10-CM

## 2019-08-07 DIAGNOSIS — J30.2 SEASONAL ALLERGIES: ICD-10-CM

## 2019-08-07 RX ORDER — LORATADINE 10 MG/1
TABLET ORAL
Qty: 90 TABLET | Refills: 1 | Status: SHIPPED | OUTPATIENT
Start: 2019-08-07 | End: 2020-04-15

## 2019-08-07 RX ORDER — TIZANIDINE 4 MG/1
TABLET ORAL
Qty: 120 TABLET | Refills: 1 | Status: SHIPPED | OUTPATIENT
Start: 2019-08-07 | End: 2019-10-03 | Stop reason: SDUPTHER

## 2019-08-07 RX ORDER — MONTELUKAST SODIUM 10 MG/1
10 TABLET ORAL NIGHTLY
Qty: 90 TABLET | Refills: 1 | Status: SHIPPED | OUTPATIENT
Start: 2019-08-07 | End: 2020-01-29

## 2019-08-12 ENCOUNTER — HOSPITAL ENCOUNTER (OUTPATIENT)
Age: 44
Setting detail: SPECIMEN
Discharge: HOME | End: 2019-08-12
Payer: MEDICARE

## 2019-08-12 ENCOUNTER — OFFICE VISIT (OUTPATIENT)
Dept: FAMILY MEDICINE CLINIC | Age: 44
End: 2019-08-12
Payer: MEDICARE

## 2019-08-12 VITALS
SYSTOLIC BLOOD PRESSURE: 144 MMHG | HEART RATE: 74 BPM | DIASTOLIC BLOOD PRESSURE: 80 MMHG | WEIGHT: 266 LBS | OXYGEN SATURATION: 96 % | BODY MASS INDEX: 48.65 KG/M2

## 2019-08-12 DIAGNOSIS — Z13.1 SCREENING FOR DIABETES MELLITUS: ICD-10-CM

## 2019-08-12 DIAGNOSIS — Z12.39 SCREENING FOR BREAST CANCER: ICD-10-CM

## 2019-08-12 DIAGNOSIS — Z13.220 SCREENING FOR HYPERLIPIDEMIA: ICD-10-CM

## 2019-08-12 DIAGNOSIS — R35.0 URINARY FREQUENCY: Primary | ICD-10-CM

## 2019-08-12 DIAGNOSIS — N30.00 ACUTE CYSTITIS WITHOUT HEMATURIA: ICD-10-CM

## 2019-08-12 DIAGNOSIS — Z13.0 SCREENING FOR DEFICIENCY ANEMIA: ICD-10-CM

## 2019-08-12 DIAGNOSIS — R19.7 DIARRHEA, UNSPECIFIED TYPE: ICD-10-CM

## 2019-08-12 LAB
ALBUMIN SERPL-MCNC: 3.8 G/DL (ref 3.5–5.2)
ALBUMIN/GLOBULIN RATIO: 1.2 (ref 1–2.5)
ALP BLD-CCNC: 83 U/L (ref 35–104)
ALT SERPL-CCNC: 15 U/L (ref 5–33)
ANION GAP SERPL CALCULATED.3IONS-SCNC: 11 MMOL/L (ref 9–17)
AST SERPL-CCNC: 14 U/L
BILIRUB SERPL-MCNC: 0.18 MG/DL (ref 0.3–1.2)
BILIRUBIN, POC: ABNORMAL
BLOOD URINE, POC: ABNORMAL
BUN BLDV-MCNC: 9 MG/DL (ref 6–20)
BUN/CREAT BLD: ABNORMAL (ref 9–20)
CALCIUM SERPL-MCNC: 9.2 MG/DL (ref 8.6–10.4)
CHLORIDE BLD-SCNC: 100 MMOL/L (ref 98–107)
CHOLESTEROL/HDL RATIO: 5.1
CHOLESTEROL: 159 MG/DL
CLARITY, POC: ABNORMAL
CO2: 27 MMOL/L (ref 20–31)
COLOR, POC: ABNORMAL
CREAT SERPL-MCNC: 0.58 MG/DL (ref 0.5–0.9)
GFR AFRICAN AMERICAN: >60 ML/MIN
GFR NON-AFRICAN AMERICAN: >60 ML/MIN
GFR SERPL CREATININE-BSD FRML MDRD: ABNORMAL ML/MIN/{1.73_M2}
GFR SERPL CREATININE-BSD FRML MDRD: ABNORMAL ML/MIN/{1.73_M2}
GLUCOSE BLD-MCNC: 123 MG/DL (ref 70–99)
GLUCOSE URINE, POC: ABNORMAL
HCT VFR BLD CALC: 35.5 % (ref 36.3–47.1)
HDLC SERPL-MCNC: 31 MG/DL
HEMOGLOBIN: 11.6 G/DL (ref 11.9–15.1)
KETONES, POC: ABNORMAL
LDL CHOLESTEROL: 91 MG/DL (ref 0–130)
LEUKOCYTE EST, POC: ABNORMAL
MCH RBC QN AUTO: 32.2 PG (ref 25.2–33.5)
MCHC RBC AUTO-ENTMCNC: 32.7 G/DL (ref 28.4–34.8)
MCV RBC AUTO: 98.6 FL (ref 82.6–102.9)
NITRITE, POC: ABNORMAL
NRBC AUTOMATED: 0 PER 100 WBC
PDW BLD-RTO: 13.3 % (ref 11.8–14.4)
PH, POC: 7
PLATELET # BLD: 339 K/UL (ref 138–453)
PMV BLD AUTO: 9.8 FL (ref 8.1–13.5)
POTASSIUM SERPL-SCNC: 4.1 MMOL/L (ref 3.7–5.3)
PROTEIN, POC: ABNORMAL
RBC # BLD: 3.6 M/UL (ref 3.95–5.11)
SODIUM BLD-SCNC: 138 MMOL/L (ref 135–144)
SPECIFIC GRAVITY, POC: 1.01
TOTAL PROTEIN: 6.9 G/DL (ref 6.4–8.3)
TRIGL SERPL-MCNC: 183 MG/DL
UROBILINOGEN, POC: 0.2
VLDLC SERPL CALC-MCNC: ABNORMAL MG/DL (ref 1–30)
WBC # BLD: 14.7 K/UL (ref 3.5–11.3)

## 2019-08-12 PROCEDURE — 4004F PT TOBACCO SCREEN RCVD TLK: CPT | Performed by: NURSE PRACTITIONER

## 2019-08-12 PROCEDURE — 99214 OFFICE O/P EST MOD 30 MIN: CPT | Performed by: NURSE PRACTITIONER

## 2019-08-12 PROCEDURE — G8417 CALC BMI ABV UP PARAM F/U: HCPCS | Performed by: NURSE PRACTITIONER

## 2019-08-12 PROCEDURE — G8427 DOCREV CUR MEDS BY ELIG CLIN: HCPCS | Performed by: NURSE PRACTITIONER

## 2019-08-12 RX ORDER — CHOLECALCIFEROL (VITAMIN D3) 125 MCG
1 CAPSULE ORAL DAILY
Qty: 90 CAPSULE | Refills: 3 | Status: SHIPPED | OUTPATIENT
Start: 2019-08-12 | End: 2020-08-10

## 2019-08-12 RX ORDER — CIPROFLOXACIN 250 MG/1
250 TABLET, FILM COATED ORAL 2 TIMES DAILY
Qty: 6 TABLET | Refills: 0 | Status: SHIPPED | OUTPATIENT
Start: 2019-08-12 | End: 2019-08-15

## 2019-08-12 RX ORDER — FLUCONAZOLE 150 MG/1
150 TABLET ORAL ONCE
Qty: 1 TABLET | Refills: 1 | Status: SHIPPED | OUTPATIENT
Start: 2019-08-12 | End: 2019-08-12

## 2019-08-12 ASSESSMENT — ENCOUNTER SYMPTOMS
COUGH: 0
RESPIRATORY NEGATIVE: 1
DIARRHEA: 1
ABDOMINAL DISTENTION: 1

## 2019-08-12 NOTE — PROGRESS NOTES
[facility]  [facility] [address]  [address] [place] [id number]  Dept: [phone]  Dept Fax: [phone]      Nisa Moyer is a 38 y.o. female who presents today for hermedical conditions/complaints as noted below. Nisa Moyer is c/o of Diarrhea and Nausea            HPI:      HPI  Lucas Luna is here for diarrhea. She is feels as though there is something in her pelvis \"I feel pregnant\". She is not sexually active, she has no way of being pregnant. She feels bloated and as though there is \"something\" in the abd that she feels shifting, est when bending to tie shoes. She is having urinary frequency. No specific pain with urination. She did have a hernia repair with mesh years ago. She did have a bladder sling earlier this year with Dr. Andrey Trevino. She has had uterus removed, does still have ovaries. Hemoglobin A1C (%)   Date Value   2016 4.9             ( goal A1Cis < 7)   Microalb/Crt.  Ratio (mcg/mg creat)   Date Value   2016 18     LDL Cholesterol (mg/dL)   Date Value   04/10/2017 130   2016 130       (goal LDL is <100)   AST (U/L)   Date Value   2018 11     ALT (U/L)   Date Value   2018 8     BUN (mg/dL)   Date Value   2018 8     BP Readings from Last 3 Encounters:   19 (!) 144/80   19 (!) 157/88   19 124/72          (goal 120/80)    Past Medical History:   Diagnosis Date    Alcohol use disorder, mild, abuse 4/10/2017    Anxiety 10/18/2018    Chronic midline low back pain without sciatica 2017    Depression     Essential hypertension 3/8/2016    Gastroesophageal reflux disease without esophagitis 3/8/2016    Hydronephrosis of right kidney 2017    Obesity 4/10/2017    Seasonal allergies 3/8/2016      Past Surgical History:   Procedure Laterality Date     SECTION      ELBOW JOINT FUSION      HERNIA REPAIR      HYSTERECTOMY      SPINE SURGERY         Family History   Problem Relation Age of Onset    Eczema Mother     COPD Mother     Other Mother     Liver Disease Mother     Heart Disease Mother           Social History     Tobacco Use    Smoking status: Current Every Day Smoker     Packs/day: 0.25     Types: Cigarettes     Last attempt to quit: 2017     Years since quittin.9    Smokeless tobacco: Never Used   Substance Use Topics    Alcohol use: No     Alcohol/week: 8.0 standard drinks     Types: 4 Glasses of wine, 4 Shots of liquor per week         Current Outpatient Medications   Medication Sig Dispense Refill    Lactobacillus (PROBIOTIC ACIDOPHILUS) CAPS Take 1 capsule by mouth daily 90 capsule 3    ciprofloxacin (CIPRO) 250 MG tablet Take 1 tablet by mouth 2 times daily for 3 days 6 tablet 0    fluconazole (DIFLUCAN) 150 MG tablet Take 1 tablet by mouth once for 1 dose 1 tablet 1    tiZANidine (ZANAFLEX) 4 MG tablet take 1 tablet by mouth every 6 hours if needed for BACK PAIN 120 tablet 1    loratadine (CLARITIN) 10 MG tablet take 1 tablet by mouth once daily 90 tablet 1    montelukast (SINGULAIR) 10 MG tablet take 1 tablet by mouth NIGHTLY 90 tablet 1    olmesartan (BENICAR) 40 MG tablet take 1 tablet by mouth once daily 90 tablet 1    albuterol sulfate HFA (PROAIR HFA) 108 (90 Base) MCG/ACT inhaler Inhale 2 puffs into the lungs every 6 hours as needed for Wheezing 1 Inhaler 3    ranitidine (ZANTAC) 150 MG tablet take 1 tablet by mouth twice a day 180 tablet 1    triamterene-hydrochlorothiazide (MAXZIDE-25) 37.5-25 MG per tablet take 1 tablet by mouth once daily 90 tablet 1    Multiple Vitamins-Minerals (THERAPEUTIC MULTIVITAMIN-MINERALS) tablet Take 1 tablet by mouth daily 90 tablet 1    meloxicam (MOBIC) 15 MG tablet take 1 tablet by mouth once daily 90 tablet 1    predniSONE (DELTASONE) 50 MG tablet Take 1 tablet by mouth daily (Patient not taking: Reported on 2019) 5 tablet 0     No current facility-administered medications for this visit.

## 2019-08-14 LAB
CULTURE: ABNORMAL
Lab: ABNORMAL
SPECIMEN DESCRIPTION: ABNORMAL

## 2019-09-04 DIAGNOSIS — G89.29 CHRONIC MIDLINE LOW BACK PAIN WITHOUT SCIATICA: ICD-10-CM

## 2019-09-04 DIAGNOSIS — M54.50 CHRONIC MIDLINE LOW BACK PAIN WITHOUT SCIATICA: ICD-10-CM

## 2019-09-04 RX ORDER — MELOXICAM 15 MG/1
TABLET ORAL
Qty: 90 TABLET | Refills: 1 | Status: SHIPPED | OUTPATIENT
Start: 2019-09-04 | End: 2020-03-11

## 2019-09-23 ENCOUNTER — INITIAL CONSULT (OUTPATIENT)
Dept: ENDOCRINOLOGY | Age: 44
End: 2019-09-23
Payer: MEDICARE

## 2019-09-23 VITALS
DIASTOLIC BLOOD PRESSURE: 80 MMHG | HEIGHT: 62 IN | HEART RATE: 84 BPM | BODY MASS INDEX: 47.15 KG/M2 | WEIGHT: 256.2 LBS | SYSTOLIC BLOOD PRESSURE: 110 MMHG

## 2019-09-23 DIAGNOSIS — Z83.49 FAMILY HISTORY OF HYPOTHYROIDISM: ICD-10-CM

## 2019-09-23 DIAGNOSIS — R53.83 FATIGUE, UNSPECIFIED TYPE: ICD-10-CM

## 2019-09-23 DIAGNOSIS — R06.83 SNORING: ICD-10-CM

## 2019-09-23 DIAGNOSIS — I10 ESSENTIAL HYPERTENSION: ICD-10-CM

## 2019-09-23 DIAGNOSIS — E66.01 MORBID OBESITY (HCC): ICD-10-CM

## 2019-09-23 DIAGNOSIS — N95.1 PERIMENOPAUSAL: ICD-10-CM

## 2019-09-23 DIAGNOSIS — R73.09 ABNORMAL GLUCOSE: ICD-10-CM

## 2019-09-23 DIAGNOSIS — R63.5 WEIGHT GAIN: Primary | ICD-10-CM

## 2019-09-23 PROCEDURE — 99244 OFF/OP CNSLTJ NEW/EST MOD 40: CPT | Performed by: INTERNAL MEDICINE

## 2019-09-23 PROCEDURE — G8427 DOCREV CUR MEDS BY ELIG CLIN: HCPCS | Performed by: INTERNAL MEDICINE

## 2019-09-23 PROCEDURE — G8417 CALC BMI ABV UP PARAM F/U: HCPCS | Performed by: INTERNAL MEDICINE

## 2019-09-23 NOTE — PROGRESS NOTES
BOWEL O.K. NO ABDOMINAL PAIN. Genito - Urinary: NO POLYURIA . NO DYSURIA. Gyn: S/P PARTIAL HYSTERECTOMY FOR BLEEDING  Rheumatological: HAS ACHES AND PAINS IN MUSCLES   Neurological: NO HX OF TIA  OR STROKE. NO HX OF SEIZURES. NO PARESTHESIAS IN FEET. Hematological: NO HX OF ANEMIA OR BLEEDING  Endocrine: NO HX  OF DIABETES . OBESITY AS NOTED ABOVE. NO THYROID PROBLEMS. Dermatological: NO RASHES  Psychiatric: HAS HX OF  DEPRESSION . NOT ON MEDS        Objective:     Physical Exam    38 y.o. WHITE FEMALE, MORBIDLY OBESE, IN NO DISTRESS  VITALS REVIEWED. Eyes: SABRINA. NO FACIAL PLETHORA  ENT: THROAT CLEAR. Frutoso Golder HEARING- NORMAL. Neck: NO MASSES. NO ADENOPATHY. THYROID NOT ENLARGED. NO NODULES FELT. MILD TENDERNESS FELT LOWER PART NECK ON PALPATION. NO CAROTID BRUIT  SOME  INCREASE IN FAT PAD OVER LOWER POSTERIOR NECK . SOME FULLNESS SUPRACLAVICULAR FAT PADS  Heart: REGULAR. NO MURMUR   Lungs: BREATHING COMFORTABLY. LUNGS CLEAR. NO WHEEZES  Abdomen: SOFT. NO TENDERNESS. NO MASSES LIVER AND SPLEEN NOT PALPABLE. NO PURPLE STRIAE  Extremities: NO EDEMA. NO CALF TENDERNESS. Peripheral vascular : PEDAL PULSES GOOD  Rheumatological : NO JOINT SWELLING  Neurological/Memory: ALERT  AND ORIENTED  X 3 ,MONOFILAMENT SENSATIONS NORMAL. REFLEXES NORMAL. Psychiatric:  MOOD AND AFFECT NORMAL  Skin: NO RASHES. NO BRUISES    Assessment:     LAB / IMAGING:    THYROID ULTRASOUND         Thyroid Gland:  Thyroid gland demonstrates normal echotexture and vascularity.       Nodules: No thyroid nodules are present. 5/3/2019     Mildly enlarged thyroid, otherwise normal in appearance.             Urine culture clean catch   Order: [id number]   Collected:  8/12/2019 12:18   Specimen Information: Urine, clean catch          Component 1mo ago   Specimen Description . CLEAN CATCH URINE    Special Requests NOT REPORTED    Culture ESCHERICHIA COLI >[id number] CFU/MLAbnormal             Comprehensive Metabolic Panel   Order: [id number] diet andexercise. Patient agreed with treatment plan. Follow up as directed.      Electronicallysigned by Quiana Egan MD on 9/22/19 at 9:26 PM

## 2019-09-25 DIAGNOSIS — R30.0 DYSURIA: Primary | ICD-10-CM

## 2019-09-25 RX ORDER — PREDNISONE 20 MG/1
20 TABLET ORAL DAILY
Qty: 5 TABLET | Refills: 0 | Status: SHIPPED | OUTPATIENT
Start: 2019-09-25 | End: 2019-09-30

## 2019-10-01 ENCOUNTER — HOSPITAL ENCOUNTER (OUTPATIENT)
Age: 44
Discharge: HOME | End: 2019-10-01
Payer: MEDICARE

## 2019-10-01 DIAGNOSIS — I10 ESSENTIAL HYPERTENSION: ICD-10-CM

## 2019-10-01 DIAGNOSIS — R73.09 ABNORMAL GLUCOSE: ICD-10-CM

## 2019-10-01 DIAGNOSIS — E66.01 MORBID OBESITY (HCC): ICD-10-CM

## 2019-10-01 DIAGNOSIS — N95.1 PERIMENOPAUSAL: ICD-10-CM

## 2019-10-01 DIAGNOSIS — Z83.49 FAMILY HISTORY OF HYPOTHYROIDISM: ICD-10-CM

## 2019-10-01 DIAGNOSIS — R63.5 WEIGHT GAIN: ICD-10-CM

## 2019-10-01 LAB
BILIRUBIN URINE: NEGATIVE
COLOR: YELLOW
COMMENT UA: NORMAL
CREATININE URINE: 208.3 MG/DL (ref 28–217)
ESTRADIOL LEVEL: 191 PG/ML (ref 27–314)
FOLLICLE STIMULATING HORMONE: 3.5 U/L (ref 1.7–21.5)
GLUCOSE FASTING: 117 MG/DL (ref 70–99)
GLUCOSE URINE: NEGATIVE
KETONES, URINE: NEGATIVE
LEUKOCYTE ESTERASE, URINE: NEGATIVE
MICROALBUMIN/CREAT 24H UR: 14 MG/L
MICROALBUMIN/CREAT UR-RTO: 7 MCG/MG CREAT
NITRITE, URINE: NEGATIVE
PH UA: 5.5 (ref 5–8)
PROTEIN UA: NEGATIVE
SPECIFIC GRAVITY UA: 1.03 (ref 1–1.03)
TSH SERPL DL<=0.05 MIU/L-ACNC: 0.84 MIU/L (ref 0.3–5)
TURBIDITY: CLEAR
URINE HGB: NEGATIVE
UROBILINOGEN, URINE: NORMAL
VITAMIN B-12: 581 PG/ML (ref 232–1245)
VITAMIN D 25-HYDROXY: 33.4 NG/ML (ref 30–100)

## 2019-10-01 PROCEDURE — 82043 UR ALBUMIN QUANTITATIVE: CPT

## 2019-10-01 PROCEDURE — 82570 ASSAY OF URINE CREATININE: CPT

## 2019-10-01 PROCEDURE — 81003 URINALYSIS AUTO W/O SCOPE: CPT

## 2019-10-01 PROCEDURE — 36415 COLL VENOUS BLD VENIPUNCTURE: CPT

## 2019-10-01 PROCEDURE — 86376 MICROSOMAL ANTIBODY EACH: CPT

## 2019-10-01 PROCEDURE — 84443 ASSAY THYROID STIM HORMONE: CPT

## 2019-10-01 PROCEDURE — 82670 ASSAY OF TOTAL ESTRADIOL: CPT

## 2019-10-01 PROCEDURE — 83036 HEMOGLOBIN GLYCOSYLATED A1C: CPT

## 2019-10-01 PROCEDURE — 83001 ASSAY OF GONADOTROPIN (FSH): CPT

## 2019-10-01 PROCEDURE — 82607 VITAMIN B-12: CPT

## 2019-10-01 PROCEDURE — 82947 ASSAY GLUCOSE BLOOD QUANT: CPT

## 2019-10-01 PROCEDURE — 82306 VITAMIN D 25 HYDROXY: CPT

## 2019-10-01 PROCEDURE — 86800 THYROGLOBULIN ANTIBODY: CPT

## 2019-10-02 LAB
ESTIMATED AVERAGE GLUCOSE: 117 MG/DL
HBA1C MFR BLD: 5.7 % (ref 4–6)
THYROGLOBULIN AB: <20 IU/ML (ref 0–40)
THYROID PEROXIDASE (TPO) AB: <10 IU/ML (ref 0–35)

## 2019-10-03 ENCOUNTER — OFFICE VISIT (OUTPATIENT)
Dept: FAMILY MEDICINE CLINIC | Age: 44
End: 2019-10-03
Payer: MEDICARE

## 2019-10-03 ENCOUNTER — HOSPITAL ENCOUNTER (OUTPATIENT)
Age: 44
Discharge: HOME | End: 2019-10-05
Payer: MEDICARE

## 2019-10-03 ENCOUNTER — HOSPITAL ENCOUNTER (OUTPATIENT)
Dept: GENERAL RADIOLOGY | Age: 44
Discharge: HOME | End: 2019-10-05
Payer: MEDICARE

## 2019-10-03 ENCOUNTER — HOSPITAL ENCOUNTER (OUTPATIENT)
Dept: MAMMOGRAPHY | Age: 44
Discharge: HOME | End: 2019-10-05
Payer: MEDICARE

## 2019-10-03 VITALS
DIASTOLIC BLOOD PRESSURE: 100 MMHG | SYSTOLIC BLOOD PRESSURE: 140 MMHG | HEART RATE: 71 BPM | WEIGHT: 266 LBS | BODY MASS INDEX: 48.64 KG/M2 | OXYGEN SATURATION: 98 %

## 2019-10-03 DIAGNOSIS — R10.32 LEFT LOWER QUADRANT ABDOMINAL PAIN: Primary | ICD-10-CM

## 2019-10-03 DIAGNOSIS — M25.561 ACUTE PAIN OF RIGHT KNEE: ICD-10-CM

## 2019-10-03 DIAGNOSIS — R35.0 URINARY FREQUENCY: ICD-10-CM

## 2019-10-03 DIAGNOSIS — M96.1 LUMBAR POST-LAMINECTOMY SYNDROME: ICD-10-CM

## 2019-10-03 DIAGNOSIS — R10.32 LEFT LOWER QUADRANT ABDOMINAL PAIN: ICD-10-CM

## 2019-10-03 DIAGNOSIS — Z12.39 SCREENING FOR BREAST CANCER: ICD-10-CM

## 2019-10-03 LAB
BILIRUBIN, POC: NORMAL
BLOOD URINE, POC: NORMAL
CLARITY, POC: NORMAL
COLOR, POC: NORMAL
GLUCOSE URINE, POC: NORMAL
KETONES, POC: NORMAL
LEUKOCYTE EST, POC: NORMAL
NITRITE, POC: NORMAL
PH, POC: 7
PROTEIN, POC: NORMAL
SPECIFIC GRAVITY, POC: 1.01
UROBILINOGEN, POC: 0.2

## 2019-10-03 PROCEDURE — 99214 OFFICE O/P EST MOD 30 MIN: CPT | Performed by: NURSE PRACTITIONER

## 2019-10-03 PROCEDURE — 81003 URINALYSIS AUTO W/O SCOPE: CPT | Performed by: NURSE PRACTITIONER

## 2019-10-03 PROCEDURE — 4004F PT TOBACCO SCREEN RCVD TLK: CPT | Performed by: NURSE PRACTITIONER

## 2019-10-03 PROCEDURE — G8417 CALC BMI ABV UP PARAM F/U: HCPCS | Performed by: NURSE PRACTITIONER

## 2019-10-03 PROCEDURE — G8484 FLU IMMUNIZE NO ADMIN: HCPCS | Performed by: NURSE PRACTITIONER

## 2019-10-03 PROCEDURE — 73560 X-RAY EXAM OF KNEE 1 OR 2: CPT

## 2019-10-03 PROCEDURE — 74019 RADEX ABDOMEN 2 VIEWS: CPT

## 2019-10-03 PROCEDURE — 77063 BREAST TOMOSYNTHESIS BI: CPT

## 2019-10-03 PROCEDURE — G8427 DOCREV CUR MEDS BY ELIG CLIN: HCPCS | Performed by: NURSE PRACTITIONER

## 2019-10-03 RX ORDER — KETOROLAC TROMETHAMINE 30 MG/ML
30 INJECTION, SOLUTION INTRAMUSCULAR; INTRAVENOUS ONCE
Status: COMPLETED | OUTPATIENT
Start: 2019-10-03 | End: 2019-10-03

## 2019-10-03 RX ORDER — HYDROCODONE BITARTRATE AND ACETAMINOPHEN 5; 325 MG/1; MG/1
1 TABLET ORAL 2 TIMES DAILY
Qty: 28 TABLET | Refills: 0 | Status: SHIPPED | OUTPATIENT
Start: 2019-10-03 | End: 2019-10-17

## 2019-10-03 RX ORDER — ORPHENADRINE CITRATE 30 MG/ML
60 INJECTION INTRAMUSCULAR; INTRAVENOUS ONCE
Status: COMPLETED | OUTPATIENT
Start: 2019-10-03 | End: 2019-10-03

## 2019-10-03 RX ORDER — TIZANIDINE 4 MG/1
TABLET ORAL
Qty: 120 TABLET | Refills: 1 | Status: SHIPPED | OUTPATIENT
Start: 2019-10-03 | End: 2019-12-05 | Stop reason: SDUPTHER

## 2019-10-03 RX ADMIN — KETOROLAC TROMETHAMINE 30 MG: 30 INJECTION, SOLUTION INTRAMUSCULAR; INTRAVENOUS at 10:16

## 2019-10-03 RX ADMIN — ORPHENADRINE CITRATE 60 MG: 30 INJECTION INTRAMUSCULAR; INTRAVENOUS at 10:17

## 2019-10-03 ASSESSMENT — ENCOUNTER SYMPTOMS
ABDOMINAL PAIN: 1
COUGH: 0
EYES NEGATIVE: 1
ALLERGIC/IMMUNOLOGIC NEGATIVE: 1
RESPIRATORY NEGATIVE: 1

## 2019-10-08 DIAGNOSIS — N64.89 BREAST ASYMMETRY: Primary | ICD-10-CM

## 2019-10-24 ENCOUNTER — TELEPHONE (OUTPATIENT)
Dept: FAMILY MEDICINE CLINIC | Age: 44
End: 2019-10-24

## 2019-10-28 ENCOUNTER — TELEPHONE (OUTPATIENT)
Dept: FAMILY MEDICINE CLINIC | Age: 44
End: 2019-10-28

## 2019-10-30 ENCOUNTER — HOSPITAL ENCOUNTER (OUTPATIENT)
Dept: MAMMOGRAPHY | Age: 44
End: 2019-10-30
Payer: MEDICARE

## 2019-10-30 ENCOUNTER — HOSPITAL ENCOUNTER (OUTPATIENT)
Dept: ULTRASOUND IMAGING | Age: 44
Discharge: HOME | End: 2019-11-01
Payer: MEDICARE

## 2019-10-30 DIAGNOSIS — N64.89 BREAST ASYMMETRY: ICD-10-CM

## 2019-10-30 PROCEDURE — 76642 ULTRASOUND BREAST LIMITED: CPT

## 2019-10-31 DIAGNOSIS — I10 ESSENTIAL HYPERTENSION: ICD-10-CM

## 2019-11-01 RX ORDER — OLMESARTAN MEDOXOMIL 40 MG/1
TABLET ORAL
Qty: 90 TABLET | Refills: 0 | Status: SHIPPED | OUTPATIENT
Start: 2019-11-01 | End: 2020-02-04 | Stop reason: SDUPTHER

## 2019-11-05 ENCOUNTER — HOSPITAL ENCOUNTER (EMERGENCY)
Age: 44
Discharge: HOME | End: 2019-11-05
Attending: EMERGENCY MEDICINE
Payer: MEDICARE

## 2019-11-05 ENCOUNTER — APPOINTMENT (OUTPATIENT)
Dept: GENERAL RADIOLOGY | Age: 44
End: 2019-11-05
Payer: MEDICARE

## 2019-11-05 VITALS
TEMPERATURE: 98.2 F | BODY MASS INDEX: 46.56 KG/M2 | OXYGEN SATURATION: 97 % | SYSTOLIC BLOOD PRESSURE: 124 MMHG | HEIGHT: 62 IN | HEART RATE: 96 BPM | RESPIRATION RATE: 20 BRPM | WEIGHT: 253 LBS | DIASTOLIC BLOOD PRESSURE: 81 MMHG

## 2019-11-05 DIAGNOSIS — M23.91 INTERNAL DERANGEMENT OF RIGHT KNEE: Primary | ICD-10-CM

## 2019-11-05 DIAGNOSIS — I10 ESSENTIAL HYPERTENSION: ICD-10-CM

## 2019-11-05 DIAGNOSIS — K21.9 GASTROESOPHAGEAL REFLUX DISEASE WITHOUT ESOPHAGITIS: ICD-10-CM

## 2019-11-05 PROCEDURE — 73562 X-RAY EXAM OF KNEE 3: CPT

## 2019-11-05 PROCEDURE — 6370000000 HC RX 637 (ALT 250 FOR IP): Performed by: EMERGENCY MEDICINE

## 2019-11-05 PROCEDURE — 96372 THER/PROPH/DIAG INJ SC/IM: CPT

## 2019-11-05 PROCEDURE — 99283 EMERGENCY DEPT VISIT LOW MDM: CPT

## 2019-11-05 PROCEDURE — 6360000002 HC RX W HCPCS: Performed by: EMERGENCY MEDICINE

## 2019-11-05 RX ORDER — RANITIDINE 150 MG/1
TABLET ORAL
Qty: 180 TABLET | Refills: 1 | Status: SHIPPED | OUTPATIENT
Start: 2019-11-05 | End: 2019-12-02

## 2019-11-05 RX ORDER — KETOROLAC TROMETHAMINE 30 MG/ML
60 INJECTION, SOLUTION INTRAMUSCULAR; INTRAVENOUS ONCE
Status: COMPLETED | OUTPATIENT
Start: 2019-11-05 | End: 2019-11-05

## 2019-11-05 RX ORDER — DIPHENHYDRAMINE HCL 25 MG
25 TABLET ORAL ONCE
Status: COMPLETED | OUTPATIENT
Start: 2019-11-05 | End: 2019-11-05

## 2019-11-05 RX ORDER — IBUPROFEN 800 MG/1
800 TABLET ORAL EVERY 8 HOURS PRN
Qty: 30 TABLET | Refills: 1 | Status: SHIPPED | OUTPATIENT
Start: 2019-11-05 | End: 2020-03-02 | Stop reason: ALTCHOICE

## 2019-11-05 RX ORDER — IBUPROFEN 800 MG/1
800 TABLET ORAL EVERY 8 HOURS PRN
Qty: 30 TABLET | Refills: 0 | Status: SHIPPED | OUTPATIENT
Start: 2019-11-05 | End: 2020-03-02 | Stop reason: ALTCHOICE

## 2019-11-05 RX ORDER — TRIAMTERENE AND HYDROCHLOROTHIAZIDE 37.5; 25 MG/1; MG/1
TABLET ORAL
Qty: 90 TABLET | Refills: 1 | Status: SHIPPED | OUTPATIENT
Start: 2019-11-05 | End: 2020-04-30 | Stop reason: SDUPTHER

## 2019-11-05 RX ORDER — HYDROCODONE BITARTRATE AND ACETAMINOPHEN 5; 325 MG/1; MG/1
1 TABLET ORAL EVERY 6 HOURS PRN
Qty: 15 TABLET | Refills: 0 | Status: SHIPPED | OUTPATIENT
Start: 2019-11-05 | End: 2019-11-08

## 2019-11-05 RX ORDER — HYDROCODONE BITARTRATE AND ACETAMINOPHEN 5; 325 MG/1; MG/1
1 TABLET ORAL EVERY 6 HOURS PRN
Qty: 12 TABLET | Refills: 0 | Status: SHIPPED | OUTPATIENT
Start: 2019-11-05 | End: 2019-11-08

## 2019-11-05 RX ORDER — ONDANSETRON 4 MG/1
4 TABLET, ORALLY DISINTEGRATING ORAL ONCE
Status: COMPLETED | OUTPATIENT
Start: 2019-11-05 | End: 2019-11-05

## 2019-11-05 RX ORDER — HYDROMORPHONE HYDROCHLORIDE 1 MG/ML
1 INJECTION, SOLUTION INTRAMUSCULAR; INTRAVENOUS; SUBCUTANEOUS ONCE
Status: COMPLETED | OUTPATIENT
Start: 2019-11-05 | End: 2019-11-05

## 2019-11-05 RX ADMIN — ONDANSETRON 4 MG: 4 TABLET, ORALLY DISINTEGRATING ORAL at 06:44

## 2019-11-05 RX ADMIN — HYDROMORPHONE HYDROCHLORIDE 1 MG: 1 INJECTION, SOLUTION INTRAMUSCULAR; INTRAVENOUS; SUBCUTANEOUS at 06:44

## 2019-11-05 RX ADMIN — DIPHENHYDRAMINE HCL 25 MG: 25 TABLET ORAL at 06:44

## 2019-11-05 RX ADMIN — KETOROLAC TROMETHAMINE 60 MG: 30 INJECTION, SOLUTION INTRAMUSCULAR at 06:44

## 2019-11-05 ASSESSMENT — PAIN SCALES - GENERAL
PAINLEVEL_OUTOF10: 10
PAINLEVEL_OUTOF10: 10

## 2019-11-05 ASSESSMENT — PAIN DESCRIPTION - LOCATION: LOCATION: KNEE

## 2019-11-05 ASSESSMENT — PAIN DESCRIPTION - PAIN TYPE: TYPE: ACUTE PAIN

## 2019-11-05 ASSESSMENT — PAIN DESCRIPTION - ORIENTATION: ORIENTATION: RIGHT

## 2019-12-02 ENCOUNTER — TELEPHONE (OUTPATIENT)
Dept: FAMILY MEDICINE CLINIC | Age: 44
End: 2019-12-02

## 2019-12-02 RX ORDER — FAMOTIDINE 20 MG/1
20 TABLET, FILM COATED ORAL NIGHTLY PRN
Qty: 90 TABLET | Refills: 1 | Status: SHIPPED | OUTPATIENT
Start: 2019-12-02 | End: 2020-09-26

## 2019-12-05 RX ORDER — ASPIRIN 81 MG
TABLET, DELAYED RELEASE (ENTERIC COATED) ORAL
Qty: 90 TABLET | Refills: 1 | Status: SHIPPED | OUTPATIENT
Start: 2019-12-05 | End: 2020-09-08

## 2019-12-05 RX ORDER — TIZANIDINE 4 MG/1
TABLET ORAL
Qty: 120 TABLET | Refills: 1 | Status: SHIPPED | OUTPATIENT
Start: 2019-12-05 | End: 2020-02-05

## 2019-12-12 ENCOUNTER — HOSPITAL ENCOUNTER (OUTPATIENT)
Dept: MRI IMAGING | Age: 44
Discharge: HOME | End: 2019-12-14
Payer: MEDICARE

## 2019-12-12 DIAGNOSIS — S83.241A ACUTE MEDIAL MENISCAL TEAR, RIGHT, INITIAL ENCOUNTER: ICD-10-CM

## 2019-12-12 PROCEDURE — 73721 MRI JNT OF LWR EXTRE W/O DYE: CPT

## 2019-12-30 ENCOUNTER — TELEPHONE (OUTPATIENT)
Dept: FAMILY MEDICINE CLINIC | Age: 44
End: 2019-12-30

## 2020-01-06 ENCOUNTER — OFFICE VISIT (OUTPATIENT)
Dept: ORTHOPEDIC SURGERY | Age: 45
End: 2020-01-06
Payer: MEDICARE

## 2020-01-06 VITALS — WEIGHT: 247 LBS | BODY MASS INDEX: 45.45 KG/M2 | HEIGHT: 62 IN

## 2020-01-06 PROCEDURE — 20610 DRAIN/INJ JOINT/BURSA W/O US: CPT | Performed by: PHYSICIAN ASSISTANT

## 2020-01-06 PROCEDURE — G8427 DOCREV CUR MEDS BY ELIG CLIN: HCPCS | Performed by: PHYSICIAN ASSISTANT

## 2020-01-06 PROCEDURE — G8417 CALC BMI ABV UP PARAM F/U: HCPCS | Performed by: PHYSICIAN ASSISTANT

## 2020-01-06 PROCEDURE — G8484 FLU IMMUNIZE NO ADMIN: HCPCS | Performed by: PHYSICIAN ASSISTANT

## 2020-01-06 PROCEDURE — 4004F PT TOBACCO SCREEN RCVD TLK: CPT | Performed by: PHYSICIAN ASSISTANT

## 2020-01-06 PROCEDURE — 99203 OFFICE O/P NEW LOW 30 MIN: CPT | Performed by: PHYSICIAN ASSISTANT

## 2020-01-06 RX ORDER — LIDOCAINE HYDROCHLORIDE 10 MG/ML
2 INJECTION, SOLUTION EPIDURAL; INFILTRATION; INTRACAUDAL; PERINEURAL ONCE
Status: COMPLETED | OUTPATIENT
Start: 2020-01-06 | End: 2020-01-06

## 2020-01-06 RX ORDER — BETAMETHASONE SODIUM PHOSPHATE AND BETAMETHASONE ACETATE 3; 3 MG/ML; MG/ML
12 INJECTION, SUSPENSION INTRA-ARTICULAR; INTRALESIONAL; INTRAMUSCULAR; SOFT TISSUE ONCE
Status: COMPLETED | OUTPATIENT
Start: 2020-01-06 | End: 2020-01-06

## 2020-01-06 RX ADMIN — LIDOCAINE HYDROCHLORIDE 2 ML: 10 INJECTION, SOLUTION EPIDURAL; INFILTRATION; INTRACAUDAL; PERINEURAL at 11:21

## 2020-01-06 RX ADMIN — BETAMETHASONE SODIUM PHOSPHATE AND BETAMETHASONE ACETATE 12 MG: 3; 3 INJECTION, SUSPENSION INTRA-ARTICULAR; INTRALESIONAL; INTRAMUSCULAR; SOFT TISSUE at 11:21

## 2020-01-06 ASSESSMENT — ENCOUNTER SYMPTOMS
VOMITING: 0
RHINORRHEA: 0
NAUSEA: 0
COUGH: 0
COLOR CHANGE: 0
EYES NEGATIVE: 1

## 2020-01-06 NOTE — PROGRESS NOTES
Patient ID: Jomar Saleh is a 38 y.o. female. Chief Complaint   Patient presents with    Knee Pain     Right         HPI  Ms. Solange Jiménez is a female presents for evaluation of right knee pain x2 months. Patient states she was vacuuming on 2019 when she twisted her left knee and her foot since still felt a pop in the knee. She is evaluated in the ED where she had x-rays that were negative subsequently evaluated and an MRI was performed which demonstrated medial meniscus tear without ligamentous injury. She is here to discuss the MRI as well as treatment options. Patient is currently in a hinged knee brace which does provide support of the knee. She states her pain is more severe to the medial aspect of the knee and is made worse with twisting and turning of the knee however if she keeps the knee and a straight plane of motion she has minimal pain when walking. She is currently using a walker for assistance however does not use any assistive devices prior to the onset of this pain. She also would like to be evaluated for left knee pain. Her left knee has been bothering her for approximately 6 weeks now. Pain is more severe to the medial aspect of the left knee however she denies any twisting, trauma or fall to this left knee to her knowledge.         Past Medical History:   Diagnosis Date    Alcohol use disorder, mild, abuse 4/10/2017    Anxiety 10/18/2018    Chronic midline low back pain without sciatica 2017    Depression     Essential hypertension 3/8/2016    Gastroesophageal reflux disease without esophagitis 3/8/2016    Hydronephrosis of right kidney 2017    Obesity 4/10/2017    Seasonal allergies 3/8/2016     Past Surgical History:   Procedure Laterality Date     SECTION      ELBOW JOINT FUSION      HERNIA REPAIR      HYSTERECTOMY      SPINE SURGERY       Family History   Problem Relation Age of Onset    Eczema Mother     COPD Mother    [facility] Other Mother     Liver Disease Mother     Heart Disease Mother      Social History     Occupational History    Not on file   Tobacco Use    Smoking status: Current Every Day Smoker     Packs/day: 1.00     Types: Cigarettes     Last attempt to quit: 2017     Years since quittin.3    Smokeless tobacco: Never Used   Substance and Sexual Activity    Alcohol use: No     Alcohol/week: 8.0 standard drinks     Types: 4 Glasses of wine, 4 Shots of liquor per week    Drug use: No    Sexual activity: Yes        Review of Systems   Constitutional: Negative for chills and fever. HENT: Negative for congestion and rhinorrhea. Eyes: Negative. Respiratory: Negative for cough. Cardiovascular: Negative for chest pain and leg swelling. Gastrointestinal: Negative for nausea and vomiting. Musculoskeletal: Positive for arthralgias (Bilateral knee pain, right MMT). Skin: Negative for color change and rash. Neurological: Negative for dizziness and numbness. Physical Exam  Constitutional:       Appearance: She is well-developed. HENT:      Head: Normocephalic and atraumatic. Neck:      Musculoskeletal: Normal range of motion and neck supple. Cardiovascular:      Rate and Rhythm: Normal rate. Pulmonary:      Effort: Pulmonary effort is normal.   Abdominal:      Palpations: Abdomen is soft. Musculoskeletal:      Comments: Right Knee: Inspection: Mild effusion present. No evidence of erythema, ecchymosis or deformity. Palpation: Moderate tenderness to medial joint line. No tenderness to lateral joint, quadriceps or patellar tendon. ROM: Extension: 5, Flexion: 85  Positive wears medially  Ligamentous exam is grossly intact. Negative Lachman's, negative posterior drawer, and No laxity to varus or valgus stress. Left Knee: Inspection: No evidence of effusion, erythema, ecchymosis or deformity. Palpation: Mild tenderness the medial joint.   No tenderness to the lateral joint line, quadriceps or patellar tendon. ROM: Extension: 0, Flexion: 115  Negative McMurrey's Both medially and Laterally  Ligamentous exam is grossly intact. Negative Lachman's, negative posterior drawer, and No laxity to varus or valgus stress. Skin:     General: Skin is warm and dry. Findings: No rash. Neurological:      Mental Status: She is alert and oriented to person, place, and time. Sensory: Sensation is intact. No sensory deficit. Motor: Motor function is intact. No weakness. Psychiatric:         Behavior: Behavior normal.         Assessment:     Encounter Diagnoses   Name Primary?  Acute medial meniscus tear of right knee, initial encounter Yes    Acute pain of left knee        X-Rays taken in clinic today and preliminarily reviewed by me  X-rays taken of the left knee AP and lateral views demonstrate mild medial joint space narrowing. No evidence of lateral joint space or patellofemoral narrowing. No evidence of fracture dislocation. Previous Imagin19 Right Knee MRI  Impression   Complex medial meniscal tear with medial meniscal extrusion and displacement.       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       Joint effusion with question of mild synovitis manifest by thickening of the   medial patellofemoral plica. Procedure: An informed verbal consent for the procedure was obtained and risks including, but not limited to: allergy to medications, injection, bleeding, stiffness of joint, recurrence of symptoms, loss of function, swelling, drainage, irrigation, need for surgery and pseudo-septic inflammation, were explained to the patient. Also, discussed was the potential for further injections, irrigation and debridement and surgery. Alternate means of treatment have also been discussed with the patient. Under sterile conditions the inferolateral portal right knee is prepped with Betadine and alcohol.   Using a 25-gauge needle 4 cc of lidocaine is injected superficially. Subsequently using a 21-gauge needle 2 cc of Celestone is injected into the right knee joint. A bandage is applied to the injection site. Patient tolerated procedure well. Plan:     MMT right knee:  Discussed MRI results of right knee showing medial meniscus tear. Discussed the nature and extent of patient's problem as well as treatment options available to her. Discussed conservative versus operative management. At this time patient would like to pursue right knee arthroscopy discussion with Dr. Lion Mercedes. However in the meantime would like something for pain relief. I offered her a cortisone injection which he ultimately decided to pursue. Left Knee OA, Mild:   I had a discussion with the patient with regards to the nature and extent of her problem. We also discussed treatment options available to her including non-operative and operative intervention. To this end we discussed use of NSAIDs, cortisone and viscosupplementation injections, weight loss, activity modification, physical therapy, bracing, and use of assistive walking devices. We also had discussions about total knee arthroplasties. As outlined above she has attempted treatment to include use of bracing and [id number]. At this time she would like to proceed with over-the-counter NSAIDs and activity modification  . Orders Placed This Encounter   Procedures    XR KNEE LEFT (1-2 VIEWS)     Standing Status:   Future     Number of Occurrences:   1     Standing Expiration Date:   1/6/2021    ND ARTHROCENTESIS ASPIR&/INJ MAJOR JT/BURSA W/O US        ROSENDO Neves      Please note that this chart was generated using voicerecognition Dragon dictation software. Although every effort was made to ensurethe accuracy of this automated transcription, some errors in transcription may haveoccurred.

## 2020-01-29 RX ORDER — MONTELUKAST SODIUM 10 MG/1
TABLET ORAL
Qty: 90 TABLET | Refills: 1 | Status: SHIPPED | OUTPATIENT
Start: 2020-01-29 | End: 2020-07-31

## 2020-02-04 RX ORDER — OLMESARTAN MEDOXOMIL 40 MG/1
TABLET ORAL
Qty: 90 TABLET | Refills: 0 | Status: SHIPPED | OUTPATIENT
Start: 2020-02-04 | End: 2020-04-30 | Stop reason: SDUPTHER

## 2020-02-05 RX ORDER — TIZANIDINE 4 MG/1
TABLET ORAL
Qty: 120 TABLET | Refills: 1 | Status: SHIPPED | OUTPATIENT
Start: 2020-02-05 | End: 2020-04-14

## 2020-02-05 NOTE — TELEPHONE ENCOUNTER
Last visit: 10/3/19  Last Med refill: 12/5/19  Does patient have enough medication for 72 hours: Yes    Next Visit Date:  Future Appointments   Date Time Provider Nena Garrison   2/12/2020 10:40 AM Jonothan Nyhan,  [facility]   Topic Date Due    Cervical cancer screen  02/07/2020 (Originally 1/18/1997)    Flu vaccine (1) 10/03/2020 (Originally 9/1/2019)    Potassium monitoring  08/12/2020    Creatinine monitoring  08/12/2020    A1C test (Diabetic or Prediabetic)  10/01/2020    Lipid screen  08/12/2024    DTaP/Tdap/Td vaccine (2 - Td) 04/10/2025    Shingles Vaccine (1 of 2) 01/18/2026    Pneumococcal 0-64 years Vaccine  Completed    HIV screen  Completed    Hepatitis A vaccine  Aged Out    Hepatitis B vaccine  Aged Out    Hib vaccine  Aged Out    Meningococcal (ACWY) vaccine  Aged Out       Hemoglobin A1C (%)   Date Value   10/01/2019 5.7   07/22/2016 4.9             ( goal A1C is < 7)   Microalb/Crt.  Ratio (mcg/mg creat)   Date Value   10/01/2019 7     LDL Cholesterol (mg/dL)   Date Value   08/12/2019 91   04/10/2017 130       (goal LDL is <100)   AST (U/L)   Date Value   08/12/2019 14     ALT (U/L)   Date Value   08/12/2019 15     BUN (mg/dL)   Date Value   08/12/2019 9     BP Readings from Last 3 Encounters:   11/05/19 124/81   10/03/19 (!) 140/100   09/23/19 110/80          (goal 120/80)    All Future Testing planned in [facility]  Lab Frequency Next Occurrence   Baseline Diagnostic Sleep Study Once 05/30/2019   CHE DIGITAL DIAGNOSTIC W OR WO CAD BILATERAL Once 01/11/2020               Patient Active Problem List:     Essential hypertension     Gastroesophageal reflux disease without esophagitis     Seasonal allergies     Smoker     Alcohol use disorder, mild, abuse     Obesity, morbid, BMI 40.0-49.9 (HCC)     Failed back syndrome, lumbar     Chronic midline low back pain without sciatica     Bronchitis     Neural foraminal stenosis of lumbar spine Pyelonephritis     Hydronephrosis of right kidney     Psychophysiological insomnia     Anxiety     Depression     History of partial hysterectomy     Hot flashes     Snores     History of sleep apnea     Other fatigue     Goiter

## 2020-02-12 ENCOUNTER — OFFICE VISIT (OUTPATIENT)
Dept: ORTHOPEDIC SURGERY | Age: 45
End: 2020-02-12
Payer: MEDICARE

## 2020-02-12 PROCEDURE — G8484 FLU IMMUNIZE NO ADMIN: HCPCS | Performed by: ORTHOPAEDIC SURGERY

## 2020-02-12 PROCEDURE — 99213 OFFICE O/P EST LOW 20 MIN: CPT | Performed by: ORTHOPAEDIC SURGERY

## 2020-02-12 PROCEDURE — 4004F PT TOBACCO SCREEN RCVD TLK: CPT | Performed by: ORTHOPAEDIC SURGERY

## 2020-02-12 PROCEDURE — G8428 CUR MEDS NOT DOCUMENT: HCPCS | Performed by: ORTHOPAEDIC SURGERY

## 2020-02-12 PROCEDURE — G8417 CALC BMI ABV UP PARAM F/U: HCPCS | Performed by: ORTHOPAEDIC SURGERY

## 2020-02-27 ENCOUNTER — ANESTHESIA EVENT (OUTPATIENT)
Dept: OPERATING ROOM | Age: 45
End: 2020-02-27
Payer: MEDICARE

## 2020-03-02 ENCOUNTER — HOSPITAL ENCOUNTER (OUTPATIENT)
Dept: PREADMISSION TESTING | Age: 45
Discharge: HOME | End: 2020-03-06
Payer: MEDICARE

## 2020-03-02 VITALS
DIASTOLIC BLOOD PRESSURE: 75 MMHG | HEIGHT: 63 IN | BODY MASS INDEX: 43.77 KG/M2 | WEIGHT: 247 LBS | SYSTOLIC BLOOD PRESSURE: 121 MMHG | OXYGEN SATURATION: 98 % | RESPIRATION RATE: 18 BRPM | TEMPERATURE: 98.2 F | HEART RATE: 77 BPM

## 2020-03-02 LAB
ABSOLUTE EOS #: 0.2 K/UL (ref 0–0.4)
ABSOLUTE IMMATURE GRANULOCYTE: ABNORMAL K/UL (ref 0–0.3)
ABSOLUTE LYMPH #: 3.2 K/UL (ref 1–4.8)
ABSOLUTE MONO #: 0.5 K/UL (ref 0.1–1.3)
ANION GAP SERPL CALCULATED.3IONS-SCNC: 14 MMOL/L (ref 9–17)
BASOPHILS # BLD: 1 % (ref 0–2)
BASOPHILS ABSOLUTE: 0.1 K/UL (ref 0–0.2)
BUN BLDV-MCNC: 12 MG/DL (ref 6–20)
BUN/CREAT BLD: ABNORMAL (ref 9–20)
CALCIUM SERPL-MCNC: 9.4 MG/DL (ref 8.6–10.4)
CHLORIDE BLD-SCNC: 100 MMOL/L (ref 98–107)
CO2: 24 MMOL/L (ref 20–31)
CREAT SERPL-MCNC: 0.52 MG/DL (ref 0.5–0.9)
DIFFERENTIAL TYPE: ABNORMAL
EOSINOPHILS RELATIVE PERCENT: 2 % (ref 0–4)
GFR AFRICAN AMERICAN: >60 ML/MIN
GFR NON-AFRICAN AMERICAN: >60 ML/MIN
GFR SERPL CREATININE-BSD FRML MDRD: ABNORMAL ML/MIN/{1.73_M2}
GFR SERPL CREATININE-BSD FRML MDRD: ABNORMAL ML/MIN/{1.73_M2}
GLUCOSE BLD-MCNC: 147 MG/DL (ref 70–99)
HCT VFR BLD CALC: 39.2 % (ref 36–46)
HEMOGLOBIN: 13.4 G/DL (ref 12–16)
IMMATURE GRANULOCYTES: ABNORMAL %
LYMPHOCYTES # BLD: 26 % (ref 24–44)
MCH RBC QN AUTO: 31.9 PG (ref 26–34)
MCHC RBC AUTO-ENTMCNC: 34.2 G/DL (ref 31–37)
MCV RBC AUTO: 93.3 FL (ref 80–100)
MONOCYTES # BLD: 5 % (ref 1–7)
NRBC AUTOMATED: ABNORMAL PER 100 WBC
PDW BLD-RTO: 14.1 % (ref 11.5–14.9)
PLATELET # BLD: 375 K/UL (ref 150–450)
PLATELET ESTIMATE: ABNORMAL
PMV BLD AUTO: 7.5 FL (ref 6–12)
POTASSIUM SERPL-SCNC: 3.9 MMOL/L (ref 3.7–5.3)
RBC # BLD: 4.21 M/UL (ref 4–5.2)
RBC # BLD: ABNORMAL 10*6/UL
SEG NEUTROPHILS: 66 % (ref 36–66)
SEGMENTED NEUTROPHILS ABSOLUTE COUNT: 8.1 K/UL (ref 1.3–9.1)
SODIUM BLD-SCNC: 138 MMOL/L (ref 135–144)
WBC # BLD: 12.2 K/UL (ref 3.5–11)
WBC # BLD: ABNORMAL 10*3/UL

## 2020-03-02 PROCEDURE — 36415 COLL VENOUS BLD VENIPUNCTURE: CPT

## 2020-03-02 PROCEDURE — 93005 ELECTROCARDIOGRAM TRACING: CPT | Performed by: ANESTHESIOLOGY

## 2020-03-02 PROCEDURE — 85025 COMPLETE CBC W/AUTO DIFF WBC: CPT

## 2020-03-02 PROCEDURE — 80048 BASIC METABOLIC PNL TOTAL CA: CPT

## 2020-03-02 RX ORDER — FAMOTIDINE 20 MG
1000 TABLET ORAL DAILY
COMMUNITY
End: 2021-05-13

## 2020-03-02 RX ORDER — ACETAMINOPHEN 500 MG
1000 TABLET ORAL 3 TIMES DAILY PRN
COMMUNITY
End: 2021-05-13

## 2020-03-02 NOTE — H&P (VIEW-ONLY)
SURGERY      revision because cage had backed out and severed a nerve, cage restabilized       FAMILY HISTORY       Family History   Problem Relation Age of Onset    Eczema Mother     COPD Mother     Liver Disease Mother     Heart Disease Mother     Atrial Fibrillation Maternal Grandfather     Heart Attack Paternal Grandfather        SOCIAL HISTORY       Social History     Socioeconomic History    Marital status:      Spouse name: None    Number of children: None    Years of education: None    Highest education level: None   Occupational History    None   Social Needs    Financial resource strain: None    Food insecurity:     Worry: None     Inability: None    Transportation needs:     Medical: None     Non-medical: None   Tobacco Use    Smoking status: Current Every Day Smoker     Packs/day: 0.50     Types: Cigarettes     Last attempt to quit: 2017     Years since quittin.5    Smokeless tobacco: Never Used   Substance and Sexual Activity    Alcohol use: Not Currently     Alcohol/week: 0.0 standard drinks    Drug use: No    Sexual activity: Yes   Lifestyle    Physical activity:     Days per week: None     Minutes per session: None    Stress: None   Relationships    Social connections:     Talks on phone: None     Gets together: None     Attends Tenriism service: None     Active member of club or organization: None     Attends meetings of clubs or organizations: None     Relationship status: None    Intimate partner violence:     Fear of current or ex partner: None     Emotionally abused: None     Physically abused: None     Forced sexual activity: None   Other Topics Concern    None   Social History Narrative    None           REVIEW OF SYSTEMS      Allergies   Allergen Reactions    Dye [Iodides] Shortness Of Breath     Other reaction(s): Unknown    Dye  [Barium-Containing Compounds] Hives     Other reaction(s): Vomiting  IVP dye    Fentanyl      Other reaction(s): Unknown    Morphine Sulfate      Other reaction(s): Unknown    Oxycodone      Other reaction(s): Unknown    Tylenol With Codeine #3 [Acetaminophen-Codeine] Nausea And Vomiting     Can take plain tylenol       Current Outpatient Medications on File Prior to Encounter   Medication Sig Dispense Refill    Vitamin D, Cholecalciferol, 25 MCG (1000 UT) CAPS Take 1,000 Units by mouth daily      acetaminophen (TYLENOL) 500 MG tablet Take 1,000 mg by mouth 3 times daily as needed for Pain      tiZANidine (ZANAFLEX) 4 MG tablet take 1 tablet by mouth every 6 hours if needed for BACK PAIN 120 tablet 1    olmesartan (BENICAR) 40 MG tablet TAKE 1 TABLET BY MOUTH EVERY DAY 90 tablet 0    montelukast (SINGULAIR) 10 MG tablet take 1 tablet by mouth every evening 90 tablet 1    Multiple Vitamins-Minerals (MULTIVITAMIN-MINERALS) TABS tablet take 1 tablet by mouth once daily 90 tablet 1    famotidine (PEPCID) 20 MG tablet Take 1 tablet by mouth nightly as needed (acid reflux) 90 tablet 1    triamterene-hydrochlorothiazide (MAXZIDE-25) 37.5-25 MG per tablet take 1 tablet by mouth once daily 90 tablet 1    meloxicam (MOBIC) 15 MG tablet take 1 tablet by mouth once daily 90 tablet 1    Lactobacillus (PROBIOTIC ACIDOPHILUS) CAPS Take 1 capsule by mouth daily 90 capsule 3    loratadine (CLARITIN) 10 MG tablet take 1 tablet by mouth once daily 90 tablet 1     No current facility-administered medications on file prior to encounter. General health:  Fairly good. No fever or chills. Skin:  No itching, redness or rash. HEENT:  No headache, epistaxis or sore throat. Neck:  No pain, stiffness or masses. Cardiovascular/Respiratory system:  No chest pain, palpitation or shortness of breath. Gastrointestinal tract: No abdominal pain, Dysphagia, nausea, vomiting, diarrhea or constipation.               Genitourinary:  No burning on micturition. No hesitancy, urgency, frequency or discoloration of urine. Locomotor:  See HPI. Neuropsychiatric:  No referable complaints. GENERAL PHYSICAL EXAM:     Vitals: /75   Pulse 77   Temp 98.2 °F (36.8 °C) (Oral)   Resp 18   Ht 5' 2.5\" (1.588 m)   Wt 247 lb (112 kg)   SpO2 98%   BMI 44.46 kg/m²  Body mass index is 44.46 kg/m². GENERAL APPEARANCE:   Marc Acosta is 41 y.o.,  female, moderately obese, nourished, conscious, alert. Does not appear to be distress or pain at this time. SKIN:  Warm, dry, no cyanosis or jaundice. Has mottled skin to her lower back due to persistent  use of heating pad. HEAD:  Normocephalic, atraumatic, no swelling or tenderness. EYES:  Pupils equal, reactive to light. EARS:  No discharge, no marked hearing loss. NOSE:  No rhinorrhea, epistaxis or septal deformity. THROAT:  Not congested. No ulceration bleeding or discharge. NECK:  No stiffness, trachea central.                  CHEST:  Symmetrical and equal on expansion. HEART:  RRR S1 > S2. No audible murmurs or gallops. LUNGS:  Equal on expansion, normal breath sounds. No adventitious sounds. ABDOMEN:  Obese. Soft on palpation. No localized tenderness. No guarding or rigidity. No palpable hepatosplenomegaly. LYMPHATICS:  No palpable cervical lymphadenopathy. LOCOMOTOR, BACK AND SPINE:  No tenderness or deformities. EXTREMITIES:  Symmetrical, no pretibial edema. Armaans sign negative or no calf tenderness. No discoloration or ulcerations. Tenderness on palpation of the right Knee joint space worse on the medial aspect. Pt wearing knee brace to the right knee.      NEUROLOGIC:  The patient is conscious, alert, oriented,Cranial nerve II-XII intact, taste and smell were

## 2020-03-02 NOTE — H&P
HISTORY and [id number]       NAME:  Therese Cohen  MRN: [id number]   YOB: 1975   Date: 3/2/2020   Age: 41 y.o. Gender: female       COMPLAINT AND PRESENT HISTORY:   Therese Cohen is 41 y.o.,  female, undergoing preadmission testing for right Knee Meniscus Tear. Patient will be having KNEE ARTHROSCOPY PARITAL MEDIAL MENISECTOMY-Right. Patient C/O of pain swelling, stiffness, popping and cracking in the right Knee. Pt describes the pain as 7/10 in intensity at times. Symptoms started 5 months ago. Patient's states that she has had some injury after vacuuming she had a twist and felt a crack and was revealed of having a meniscus tear. The knee does  buckle or give way under the patient. And had some locking up. patiently currently is wearing a knee brace but prior to that using crutches. Patient states that walking or weightbearing causes more aggravation. Patient is taking Tylenol to help with assist with pain. No recent falls or trauma. Patient has a    No fever or chills.      PAST MEDICAL HISTORY     Past Medical History:   Diagnosis Date    Anxiety 10/18/2018    Chronic midline low back pain without sciatica 2017    Depression     Endometriosis     had hyst for this    Essential hypertension 3/8/2016    Gastroesophageal reflux disease without esophagitis 3/8/2016    H/O menorrhagia     had hyst    History of stress incontinence     had surgery    Hydronephrosis of right kidney 2017    Obesity 4/10/2017    Osteophyte, vertebrae     thoracic    Seasonal allergies 3/8/2016         SURGICAL HISTORY       Past Surgical History:   Procedure Laterality Date    ABDOMEN SURGERY      seroma removed     SECTION      ELBOW JOINT FUSION      HERNIA REPAIR      umbilical    HYSTERECTOMY VAGINAL      ovaries remain    INCONTINENCE SURGERY      SPINE SURGERY      rods & cage inserted L5-S1, fusion & decompression    SPINE SURGERY      revision because cage had backed out and severed a nerve, cage restabilized       FAMILY HISTORY       Family History   Problem Relation Age of Onset    Eczema Mother     COPD Mother     Liver Disease Mother     Heart Disease Mother     Atrial Fibrillation Maternal Grandfather     Heart Attack Paternal Grandfather        SOCIAL HISTORY       Social History     Socioeconomic History    Marital status:      Spouse name: None    Number of children: None    Years of education: None    Highest education level: None   Occupational History    None   Social Needs    Financial resource strain: None    Food insecurity:     Worry: None     Inability: None    Transportation needs:     Medical: None     Non-medical: None   Tobacco Use    Smoking status: Current Every Day Smoker     Packs/day: 0.50     Types: Cigarettes     Last attempt to quit: 2017     Years since quittin.5    Smokeless tobacco: Never Used   Substance and Sexual Activity    Alcohol use: Not Currently     Alcohol/week: 0.0 standard drinks    Drug use: No    Sexual activity: Yes   Lifestyle    Physical activity:     Days per week: None     Minutes per session: None    Stress: None   Relationships    Social connections:     Talks on phone: None     Gets together: None     Attends Pentecostalism service: None     Active member of club or organization: None     Attends meetings of clubs or organizations: None     Relationship status: None    Intimate partner violence:     Fear of current or ex partner: None     Emotionally abused: None     Physically abused: None     Forced sexual activity: None   Other Topics Concern    None   Social History Narrative    None           REVIEW OF SYSTEMS      Allergies   Allergen Reactions    Dye [Iodides] Shortness Of Breath     Other reaction(s): Unknown    Dye  [Barium-Containing Compounds] Hives     Other reaction(s): Vomiting  IVP dye    Fentanyl      Other reaction(s): Unknown    Morphine Sulfate      Other reaction(s): Unknown    Oxycodone      Other reaction(s): Unknown    Tylenol With Codeine #3 [Acetaminophen-Codeine] Nausea And Vomiting     Can take plain tylenol       Current Outpatient Medications on File Prior to Encounter   Medication Sig Dispense Refill    Vitamin D, Cholecalciferol, 25 MCG (1000 UT) CAPS Take 1,000 Units by mouth daily      acetaminophen (TYLENOL) 500 MG tablet Take 1,000 mg by mouth 3 times daily as needed for Pain      tiZANidine (ZANAFLEX) 4 MG tablet take 1 tablet by mouth every 6 hours if needed for BACK PAIN 120 tablet 1    olmesartan (BENICAR) 40 MG tablet TAKE 1 TABLET BY MOUTH EVERY DAY 90 tablet 0    montelukast (SINGULAIR) 10 MG tablet take 1 tablet by mouth every evening 90 tablet 1    Multiple Vitamins-Minerals (MULTIVITAMIN-MINERALS) TABS tablet take 1 tablet by mouth once daily 90 tablet 1    famotidine (PEPCID) 20 MG tablet Take 1 tablet by mouth nightly as needed (acid reflux) 90 tablet 1    triamterene-hydrochlorothiazide (MAXZIDE-25) 37.5-25 MG per tablet take 1 tablet by mouth once daily 90 tablet 1    meloxicam (MOBIC) 15 MG tablet take 1 tablet by mouth once daily 90 tablet 1    Lactobacillus (PROBIOTIC ACIDOPHILUS) CAPS Take 1 capsule by mouth daily 90 capsule 3    loratadine (CLARITIN) 10 MG tablet take 1 tablet by mouth once daily 90 tablet 1     No current facility-administered medications on file prior to encounter. General health:  Fairly good. No fever or chills. Skin:  No itching, redness or rash. HEENT:  No headache, epistaxis or sore throat. Neck:  No pain, stiffness or masses. Cardiovascular/Respiratory system:  No chest pain, palpitation or shortness of breath. Gastrointestinal tract: No abdominal pain, Dysphagia, nausea, vomiting, diarrhea or constipation.               Genitourinary:  No burning on micturition. No hesitancy, urgency, frequency or discoloration of urine. Locomotor:  See HPI. Neuropsychiatric:  No referable complaints. GENERAL PHYSICAL EXAM:     Vitals: /75   Pulse 77   Temp 98.2 °F (36.8 °C) (Oral)   Resp 18   Ht 5' 2.5\" (1.588 m)   Wt 247 lb (112 kg)   SpO2 98%   BMI 44.46 kg/m²  Body mass index is 44.46 kg/m². GENERAL APPEARANCE:   Ricardo Sierra is 41 y.o.,  female, moderately obese, nourished, conscious, alert. Does not appear to be distress or pain at this time. SKIN:  Warm, dry, no cyanosis or jaundice. Has mottled skin to her lower back due to persistent  use of heating pad. HEAD:  Normocephalic, atraumatic, no swelling or tenderness. EYES:  Pupils equal, reactive to light. EARS:  No discharge, no marked hearing loss. NOSE:  No rhinorrhea, epistaxis or septal deformity. THROAT:  Not congested. No ulceration bleeding or discharge. NECK:  No stiffness, trachea central.                  CHEST:  Symmetrical and equal on expansion. HEART:  RRR S1 > S2. No audible murmurs or gallops. LUNGS:  Equal on expansion, normal breath sounds. No adventitious sounds. ABDOMEN:  Obese. Soft on palpation. No localized tenderness. No guarding or rigidity. No palpable hepatosplenomegaly. LYMPHATICS:  No palpable cervical lymphadenopathy. LOCOMOTOR, BACK AND SPINE:  No tenderness or deformities. EXTREMITIES:  Symmetrical, no pretibial edema. Armaans sign negative or no calf tenderness. No discoloration or ulcerations. Tenderness on palpation of the right Knee joint space worse on the medial aspect. Pt wearing knee brace to the right knee.      NEUROLOGIC:  The patient is conscious, alert, oriented,Cranial nerve II-XII intact, taste and smell were

## 2020-03-03 LAB
EKG ATRIAL RATE: 69 BPM
EKG P AXIS: 54 DEGREES
EKG P-R INTERVAL: 144 MS
EKG Q-T INTERVAL: 434 MS
EKG QRS DURATION: 90 MS
EKG QTC CALCULATION (BAZETT): 465 MS
EKG R AXIS: 56 DEGREES
EKG T AXIS: 54 DEGREES
EKG VENTRICULAR RATE: 69 BPM

## 2020-03-03 PROCEDURE — 93010 ELECTROCARDIOGRAM REPORT: CPT | Performed by: INTERNAL MEDICINE

## 2020-03-03 RX ORDER — SODIUM CHLORIDE 0.9 % (FLUSH) 0.9 %
10 SYRINGE (ML) INJECTION EVERY 12 HOURS SCHEDULED
Status: CANCELLED | OUTPATIENT
Start: 2020-03-03

## 2020-03-03 RX ORDER — SODIUM CHLORIDE 0.9 % (FLUSH) 0.9 %
10 SYRINGE (ML) INJECTION PRN
Status: CANCELLED | OUTPATIENT
Start: 2020-03-03

## 2020-03-03 RX ORDER — LIDOCAINE HYDROCHLORIDE 10 MG/ML
1 INJECTION, SOLUTION EPIDURAL; INFILTRATION; INTRACAUDAL; PERINEURAL
Status: CANCELLED | OUTPATIENT
Start: 2020-03-03 | End: 2020-03-03

## 2020-03-03 RX ORDER — SODIUM CHLORIDE, SODIUM LACTATE, POTASSIUM CHLORIDE, CALCIUM CHLORIDE 600; 310; 30; 20 MG/100ML; MG/100ML; MG/100ML; MG/100ML
INJECTION, SOLUTION INTRAVENOUS CONTINUOUS
Status: CANCELLED | OUTPATIENT
Start: 2020-03-03

## 2020-03-10 ENCOUNTER — HOSPITAL ENCOUNTER (OUTPATIENT)
Age: 45
Setting detail: OUTPATIENT SURGERY
Discharge: HOME | End: 2020-03-10
Attending: ORTHOPAEDIC SURGERY | Admitting: ORTHOPAEDIC SURGERY
Payer: MEDICARE

## 2020-03-10 ENCOUNTER — ANESTHESIA (OUTPATIENT)
Dept: OPERATING ROOM | Age: 45
End: 2020-03-10
Payer: MEDICARE

## 2020-03-10 VITALS — DIASTOLIC BLOOD PRESSURE: 58 MMHG | SYSTOLIC BLOOD PRESSURE: 104 MMHG | TEMPERATURE: 97.3 F | OXYGEN SATURATION: 100 %

## 2020-03-10 VITALS
TEMPERATURE: 98.6 F | DIASTOLIC BLOOD PRESSURE: 70 MMHG | OXYGEN SATURATION: 96 % | SYSTOLIC BLOOD PRESSURE: 138 MMHG | HEART RATE: 68 BPM | WEIGHT: 247 LBS | BODY MASS INDEX: 45.45 KG/M2 | RESPIRATION RATE: 18 BRPM | HEIGHT: 62 IN

## 2020-03-10 PROCEDURE — 3600000013 HC SURGERY LEVEL 3 ADDTL 15MIN: Performed by: ORTHOPAEDIC SURGERY

## 2020-03-10 PROCEDURE — 3700000001 HC ADD 15 MINUTES (ANESTHESIA): Performed by: ORTHOPAEDIC SURGERY

## 2020-03-10 PROCEDURE — 6360000002 HC RX W HCPCS: Performed by: ORTHOPAEDIC SURGERY

## 2020-03-10 PROCEDURE — 6370000000 HC RX 637 (ALT 250 FOR IP): Performed by: ANESTHESIOLOGY

## 2020-03-10 PROCEDURE — 6360000002 HC RX W HCPCS: Performed by: ANESTHESIOLOGY

## 2020-03-10 PROCEDURE — 7100000031 HC ASPR PHASE II RECOVERY - ADDTL 15 MIN: Performed by: ORTHOPAEDIC SURGERY

## 2020-03-10 PROCEDURE — 7100000001 HC PACU RECOVERY - ADDTL 15 MIN: Performed by: ORTHOPAEDIC SURGERY

## 2020-03-10 PROCEDURE — 3600000003 HC SURGERY LEVEL 3 BASE: Performed by: ORTHOPAEDIC SURGERY

## 2020-03-10 PROCEDURE — 3700000000 HC ANESTHESIA ATTENDED CARE: Performed by: ORTHOPAEDIC SURGERY

## 2020-03-10 PROCEDURE — 29881 ARTHRS KNE SRG MNISECTMY M/L: CPT | Performed by: ORTHOPAEDIC SURGERY

## 2020-03-10 PROCEDURE — 2709999900 HC NON-CHARGEABLE SUPPLY: Performed by: ORTHOPAEDIC SURGERY

## 2020-03-10 PROCEDURE — 6360000002 HC RX W HCPCS: Performed by: NURSE ANESTHETIST, CERTIFIED REGISTERED

## 2020-03-10 PROCEDURE — 7100000030 HC ASPR PHASE II RECOVERY - FIRST 15 MIN: Performed by: ORTHOPAEDIC SURGERY

## 2020-03-10 PROCEDURE — 2580000003 HC RX 258: Performed by: ANESTHESIOLOGY

## 2020-03-10 PROCEDURE — 7100000000 HC PACU RECOVERY - FIRST 15 MIN: Performed by: ORTHOPAEDIC SURGERY

## 2020-03-10 PROCEDURE — 2500000003 HC RX 250 WO HCPCS: Performed by: NURSE ANESTHETIST, CERTIFIED REGISTERED

## 2020-03-10 RX ORDER — HYDRALAZINE HYDROCHLORIDE 20 MG/ML
5 INJECTION INTRAMUSCULAR; INTRAVENOUS EVERY 10 MIN PRN
Status: DISCONTINUED | OUTPATIENT
Start: 2020-03-10 | End: 2020-03-10 | Stop reason: HOSPADM

## 2020-03-10 RX ORDER — HYDROCODONE BITARTRATE AND ACETAMINOPHEN 5; 325 MG/1; MG/1
1 TABLET ORAL PRN
Status: COMPLETED | OUTPATIENT
Start: 2020-03-10 | End: 2020-03-10

## 2020-03-10 RX ORDER — SODIUM CHLORIDE 0.9 % (FLUSH) 0.9 %
10 SYRINGE (ML) INJECTION EVERY 12 HOURS SCHEDULED
Status: DISCONTINUED | OUTPATIENT
Start: 2020-03-10 | End: 2020-03-10 | Stop reason: HOSPADM

## 2020-03-10 RX ORDER — DIPHENHYDRAMINE HYDROCHLORIDE 50 MG/ML
12.5 INJECTION INTRAMUSCULAR; INTRAVENOUS
Status: DISCONTINUED | OUTPATIENT
Start: 2020-03-10 | End: 2020-03-10 | Stop reason: HOSPADM

## 2020-03-10 RX ORDER — PROPOFOL 10 MG/ML
INJECTION, EMULSION INTRAVENOUS PRN
Status: DISCONTINUED | OUTPATIENT
Start: 2020-03-10 | End: 2020-03-10 | Stop reason: SDUPTHER

## 2020-03-10 RX ORDER — MORPHINE SULFATE 2 MG/ML
2 INJECTION, SOLUTION INTRAMUSCULAR; INTRAVENOUS EVERY 5 MIN PRN
Status: DISCONTINUED | OUTPATIENT
Start: 2020-03-10 | End: 2020-03-10 | Stop reason: HOSPADM

## 2020-03-10 RX ORDER — KETOROLAC TROMETHAMINE 30 MG/ML
INJECTION, SOLUTION INTRAMUSCULAR; INTRAVENOUS PRN
Status: DISCONTINUED | OUTPATIENT
Start: 2020-03-10 | End: 2020-03-10 | Stop reason: SDUPTHER

## 2020-03-10 RX ORDER — LABETALOL 20 MG/4 ML (5 MG/ML) INTRAVENOUS SYRINGE
5 EVERY 10 MIN PRN
Status: DISCONTINUED | OUTPATIENT
Start: 2020-03-10 | End: 2020-03-10 | Stop reason: HOSPADM

## 2020-03-10 RX ORDER — SODIUM CHLORIDE, SODIUM LACTATE, POTASSIUM CHLORIDE, CALCIUM CHLORIDE 600; 310; 30; 20 MG/100ML; MG/100ML; MG/100ML; MG/100ML
INJECTION, SOLUTION INTRAVENOUS CONTINUOUS
Status: DISCONTINUED | OUTPATIENT
Start: 2020-03-10 | End: 2020-03-10 | Stop reason: HOSPADM

## 2020-03-10 RX ORDER — FENTANYL CITRATE 50 UG/ML
INJECTION, SOLUTION INTRAMUSCULAR; INTRAVENOUS PRN
Status: DISCONTINUED | OUTPATIENT
Start: 2020-03-10 | End: 2020-03-10 | Stop reason: SDUPTHER

## 2020-03-10 RX ORDER — ONDANSETRON 2 MG/ML
INJECTION INTRAMUSCULAR; INTRAVENOUS PRN
Status: DISCONTINUED | OUTPATIENT
Start: 2020-03-10 | End: 2020-03-10 | Stop reason: SDUPTHER

## 2020-03-10 RX ORDER — HYDROCODONE BITARTRATE AND ACETAMINOPHEN 5; 325 MG/1; MG/1
1 TABLET ORAL EVERY 6 HOURS PRN
Qty: 20 TABLET | Refills: 0 | Status: SHIPPED | OUTPATIENT
Start: 2020-03-10 | End: 2020-03-15

## 2020-03-10 RX ORDER — SODIUM CHLORIDE 0.9 % (FLUSH) 0.9 %
10 SYRINGE (ML) INJECTION PRN
Status: DISCONTINUED | OUTPATIENT
Start: 2020-03-10 | End: 2020-03-10 | Stop reason: HOSPADM

## 2020-03-10 RX ORDER — ROPIVACAINE HYDROCHLORIDE 5 MG/ML
INJECTION, SOLUTION EPIDURAL; INFILTRATION; PERINEURAL PRN
Status: DISCONTINUED | OUTPATIENT
Start: 2020-03-10 | End: 2020-03-10 | Stop reason: ALTCHOICE

## 2020-03-10 RX ORDER — METOCLOPRAMIDE HYDROCHLORIDE 5 MG/ML
10 INJECTION INTRAMUSCULAR; INTRAVENOUS
Status: DISCONTINUED | OUTPATIENT
Start: 2020-03-10 | End: 2020-03-10 | Stop reason: HOSPADM

## 2020-03-10 RX ORDER — FENTANYL CITRATE 50 UG/ML
25 INJECTION, SOLUTION INTRAMUSCULAR; INTRAVENOUS EVERY 5 MIN PRN
Status: DISCONTINUED | OUTPATIENT
Start: 2020-03-10 | End: 2020-03-10 | Stop reason: HOSPADM

## 2020-03-10 RX ORDER — LIDOCAINE HYDROCHLORIDE 10 MG/ML
INJECTION, SOLUTION EPIDURAL; INFILTRATION; INTRACAUDAL; PERINEURAL PRN
Status: DISCONTINUED | OUTPATIENT
Start: 2020-03-10 | End: 2020-03-10 | Stop reason: SDUPTHER

## 2020-03-10 RX ORDER — MIDAZOLAM HYDROCHLORIDE 1 MG/ML
INJECTION INTRAMUSCULAR; INTRAVENOUS PRN
Status: DISCONTINUED | OUTPATIENT
Start: 2020-03-10 | End: 2020-03-10 | Stop reason: SDUPTHER

## 2020-03-10 RX ORDER — LIDOCAINE HYDROCHLORIDE 10 MG/ML
1 INJECTION, SOLUTION EPIDURAL; INFILTRATION; INTRACAUDAL; PERINEURAL
Status: DISCONTINUED | OUTPATIENT
Start: 2020-03-10 | End: 2020-03-10 | Stop reason: HOSPADM

## 2020-03-10 RX ORDER — MEPERIDINE HYDROCHLORIDE 25 MG/ML
12.5 INJECTION INTRAMUSCULAR; INTRAVENOUS; SUBCUTANEOUS EVERY 5 MIN PRN
Status: DISCONTINUED | OUTPATIENT
Start: 2020-03-10 | End: 2020-03-10 | Stop reason: HOSPADM

## 2020-03-10 RX ORDER — HYDROCODONE BITARTRATE AND ACETAMINOPHEN 5; 325 MG/1; MG/1
2 TABLET ORAL PRN
Status: COMPLETED | OUTPATIENT
Start: 2020-03-10 | End: 2020-03-10

## 2020-03-10 RX ORDER — ONDANSETRON 2 MG/ML
4 INJECTION INTRAMUSCULAR; INTRAVENOUS
Status: DISCONTINUED | OUTPATIENT
Start: 2020-03-10 | End: 2020-03-10 | Stop reason: HOSPADM

## 2020-03-10 RX ORDER — DEXAMETHASONE SODIUM PHOSPHATE 4 MG/ML
INJECTION, SOLUTION INTRA-ARTICULAR; INTRALESIONAL; INTRAMUSCULAR; INTRAVENOUS; SOFT TISSUE PRN
Status: DISCONTINUED | OUTPATIENT
Start: 2020-03-10 | End: 2020-03-10 | Stop reason: SDUPTHER

## 2020-03-10 RX ORDER — KETAMINE HYDROCHLORIDE 10 MG/ML
INJECTION, SOLUTION INTRAMUSCULAR; INTRAVENOUS PRN
Status: DISCONTINUED | OUTPATIENT
Start: 2020-03-10 | End: 2020-03-10 | Stop reason: SDUPTHER

## 2020-03-10 RX ADMIN — MIDAZOLAM 2 MG: 1 INJECTION INTRAMUSCULAR; INTRAVENOUS at 12:25

## 2020-03-10 RX ADMIN — PROPOFOL 300 MG: 10 INJECTION, EMULSION INTRAVENOUS at 12:32

## 2020-03-10 RX ADMIN — LIDOCAINE HYDROCHLORIDE 50 MG: 10 INJECTION, SOLUTION EPIDURAL; INFILTRATION; INTRACAUDAL; PERINEURAL at 12:32

## 2020-03-10 RX ADMIN — ONDANSETRON 4 MG: 2 INJECTION INTRAMUSCULAR; INTRAVENOUS at 13:03

## 2020-03-10 RX ADMIN — PROPOFOL 50 MG: 10 INJECTION, EMULSION INTRAVENOUS at 13:06

## 2020-03-10 RX ADMIN — KETAMINE HYDROCHLORIDE 20 MG: 10 INJECTION, SOLUTION INTRAMUSCULAR; INTRAVENOUS at 12:52

## 2020-03-10 RX ADMIN — HYDROMORPHONE HYDROCHLORIDE 0.5 MG: 1 INJECTION, SOLUTION INTRAMUSCULAR; INTRAVENOUS; SUBCUTANEOUS at 13:40

## 2020-03-10 RX ADMIN — HYDROCODONE BITARTRATE AND ACETAMINOPHEN 2 TABLET: 5; 325 TABLET ORAL at 14:14

## 2020-03-10 RX ADMIN — HYDROMORPHONE HYDROCHLORIDE 0.5 MG: 1 INJECTION, SOLUTION INTRAMUSCULAR; INTRAVENOUS; SUBCUTANEOUS at 13:30

## 2020-03-10 RX ADMIN — FENTANYL CITRATE 100 MCG: 50 INJECTION, SOLUTION INTRAMUSCULAR; INTRAVENOUS at 12:32

## 2020-03-10 RX ADMIN — SODIUM CHLORIDE, POTASSIUM CHLORIDE, SODIUM LACTATE AND CALCIUM CHLORIDE: 600; 310; 30; 20 INJECTION, SOLUTION INTRAVENOUS at 12:20

## 2020-03-10 RX ADMIN — KETOROLAC TROMETHAMINE 30 MG: 30 INJECTION, SOLUTION INTRAMUSCULAR; INTRAVENOUS at 12:37

## 2020-03-10 RX ADMIN — DEXAMETHASONE SODIUM PHOSPHATE 10 MG: 4 INJECTION, SOLUTION INTRAMUSCULAR; INTRAVENOUS at 12:37

## 2020-03-10 ASSESSMENT — PAIN DESCRIPTION - LOCATION
LOCATION: KNEE

## 2020-03-10 ASSESSMENT — PAIN SCALES - GENERAL
PAINLEVEL_OUTOF10: 6
PAINLEVEL_OUTOF10: 8
PAINLEVEL_OUTOF10: 8
PAINLEVEL_OUTOF10: 5
PAINLEVEL_OUTOF10: 7
PAINLEVEL_OUTOF10: 8

## 2020-03-10 ASSESSMENT — PULMONARY FUNCTION TESTS
PIF_VALUE: 4
PIF_VALUE: 0
PIF_VALUE: 20
PIF_VALUE: 16
PIF_VALUE: 20
PIF_VALUE: 12
PIF_VALUE: 16
PIF_VALUE: 3
PIF_VALUE: 18
PIF_VALUE: 20
PIF_VALUE: 0
PIF_VALUE: 20
PIF_VALUE: 20
PIF_VALUE: 2
PIF_VALUE: 20
PIF_VALUE: 15
PIF_VALUE: 20
PIF_VALUE: 15
PIF_VALUE: 13
PIF_VALUE: 3
PIF_VALUE: 1
PIF_VALUE: 20
PIF_VALUE: 20
PIF_VALUE: 12
PIF_VALUE: 20
PIF_VALUE: 2
PIF_VALUE: 20
PIF_VALUE: 5
PIF_VALUE: 20
PIF_VALUE: 18
PIF_VALUE: 16
PIF_VALUE: 2
PIF_VALUE: 20
PIF_VALUE: 20
PIF_VALUE: 3
PIF_VALUE: 3
PIF_VALUE: 20
PIF_VALUE: 16
PIF_VALUE: 1
PIF_VALUE: 20
PIF_VALUE: 3
PIF_VALUE: 12
PIF_VALUE: 12
PIF_VALUE: 4
PIF_VALUE: 21

## 2020-03-10 ASSESSMENT — ENCOUNTER SYMPTOMS: STRIDOR: 0

## 2020-03-10 ASSESSMENT — PAIN DESCRIPTION - PAIN TYPE
TYPE: SURGICAL PAIN

## 2020-03-10 ASSESSMENT — PAIN DESCRIPTION - DESCRIPTORS
DESCRIPTORS: THROBBING
DESCRIPTORS: ACHING;THROBBING

## 2020-03-10 ASSESSMENT — PAIN DESCRIPTION - ORIENTATION
ORIENTATION: RIGHT

## 2020-03-10 ASSESSMENT — PAIN - FUNCTIONAL ASSESSMENT: PAIN_FUNCTIONAL_ASSESSMENT: 0-10

## 2020-03-10 NOTE — OP NOTE
Pre-operative diagnosis- Medial Meniscus Tear right knee  Post-operative diagnosis- Medial Meniscus Tear right knee, large bucket-handle type tear  Procedure- Arthroscopy of Partial Medial Menisectomy right knee  Surgeon- Inderjit Mayers  Anesthesia- General   EBL- None      Patient is a 41y.o. year old female who presents with a history of pain, locking, and giving away sensations of their right knee. Physical examination was positive for Vandana's examination. MRI was indicative of large tear of the posterior horn of the medial meniscus. Having failed conservative treatment, it was advised that arthroscopic examination and treatment of their knee would be beneficial and consent was obtained with risk and benefits explained to the patient. The patient was taken tot he operative room and general anesthesia was administered. A tourniquet was placed to the operatives lower extremity and then placed in the leg martinez. The leg was exsanguinated and the tourniquet inflated to the 300mmHg. The leg was the prepped and draped in the usual sterile fashion. Time-out was called to verify laterality. Medial and lateral portals were made and the scope placed in the lateral portal. The patella femoral joint was examined and noted unremarkable. The scope was then passes down the medial gutter into the medial compartment. A large bucket-handle tear of the medial meniscus Identified with the portion extruded out of the joint. This was reduced and the lateral aspect of this was cut to allow for formation of a free flap which was then re-reduced and removed by the shaver. The remaining stumps were then reduced with this basket forceps and then the shaver as well. . Repeat probing of the meniscus found it to be stable. There was no significant cartilage damage or wear. The arthroscope was then passed into the notch of the knee. The ACL was found not to have any significant damage or laxity.      The scope was then

## 2020-03-10 NOTE — INTERVAL H&P NOTE
H&P Update    Patient's History and Physical from March 2, 2020 was reviewed. Patient examined. I concur with findings. There has been no change. Here today for right knee arthroscopy partial medial menisectomy. Took benicar this am with sip of water. NPO since before midnight. No blood thinners in last 7 days. Denies any current chest pain/pressure, palpitations, SOB, recent URI, nausea, vomiting, diarrhea, constipation, fever or chills. Vitals reviewed. Pt hypertensive, afebrile.      Electronically signed by HAYDEE Turpin CNP on 3/10/20 at 11:29 AM EDT

## 2020-03-10 NOTE — ANESTHESIA PRE PROCEDURE
Department of Anesthesiology  Preprocedure Note       Name:  Sergio Minor   Age:  41 y.o.  :  1975                                          MRN:  [id number]         Date:  3/10/2020      Surgeon: Johanne Solorzano):  Linda Naylor MD    Procedure: KNEE ARTHROSCOPY PARITAL MEDIAL MENISECTOMY (Right Knee)    Medications prior to admission:   Prior to Admission medications    Medication Sig Start Date End Date Taking? Authorizing Provider   HYDROcodone-acetaminophen (NORCO) 5-325 MG per tablet Take 1 tablet by mouth every 6 hours as needed for Pain for up to 7 days. Intended supply: 5 days.  Take lowest dose possible to manage pain 3/10/20 3/17/20 Yes Linda Naylor MD   Vitamin D, Cholecalciferol, 25 MCG (1000 UT) CAPS Take 1,000 Units by mouth daily   Yes Historical Provider, MD   acetaminophen (TYLENOL) 500 MG tablet Take 1,000 mg by mouth 3 times daily as needed for Pain   Yes Historical Provider, MD   tiZANidine (ZANAFLEX) 4 MG tablet take 1 tablet by mouth every 6 hours if needed for BACK PAIN 20  Yes HAYDEE Amos - CNP   olmesartan (BENICAR) 40 MG tablet TAKE 1 TABLET BY MOUTH EVERY DAY 20  Yes HAYDEE Hess CNP   montelukast (SINGULAIR) 10 MG tablet take 1 tablet by mouth every evening 20  Yes HAYDEE Hess - CNP   Multiple Vitamins-Minerals (MULTIVITAMIN-MINERALS) TABS tablet take 1 tablet by mouth once daily 19  Yes HAYDEE Hess - NATTY   triamterene-hydrochlorothiazide (MAXZIDE-25) 37.5-25 MG per tablet take 1 tablet by mouth once daily 19  Yes HAYDEE Hess CNP   Lactobacillus (PROBIOTIC ACIDOPHILUS) CAPS Take 1 capsule by mouth daily 8/12/19 8/10/20 Yes HAYDEE Hess - CNP   loratadine (CLARITIN) 10 MG tablet take 1 tablet by mouth once daily 19  Yes HAYDEE Hess - CNP   famotidine (PEPCID) 20 MG tablet Take 1 tablet by mouth nightly as needed (acid reflux) 12/2/19 3/2/20  Emili Braxton, APRN - CNP   meloxicam (MOBIC) 15 MG tablet take 1 tablet by mouth once daily 9/4/19   Megan Braxton, APRN - CNP       Current medications:    Current Facility-Administered Medications   Medication Dose Route Frequency Provider Last Rate Last Dose    lactated ringers infusion   Intravenous Continuous Mike Chavarria MD        lidocaine PF 1 % injection 1 mL  1 mL Intradermal Once PRN Alex Del Rio MD        sodium chloride flush 0.9 % injection 10 mL  10 mL Intravenous 2 times per day Alex Del Rio MD        sodium chloride flush 0.9 % injection 10 mL  10 mL Intravenous PRN Alex Del Rio MD           Allergies:     Allergies   Allergen Reactions    Dye [Iodides] Shortness Of Breath     Other reaction(s): Unknown    Dye  [Barium-Containing Compounds] Hives     Other reaction(s): Vomiting  IVP dye    Fentanyl      Other reaction(s): Unknown    Morphine Sulfate      Other reaction(s): Unknown    Oxycodone      Other reaction(s): Unknown    Tylenol With Codeine #3 [Acetaminophen-Codeine] Nausea And Vomiting     Can take plain tylenol       Problem List:    Patient Active Problem List   Diagnosis Code    Essential hypertension I10    Gastroesophageal reflux disease without esophagitis K21.9    Seasonal allergies J30.2    Smoker F17.200    Alcohol use disorder, mild, abuse F10.10    Obesity, morbid, BMI 40.0-49.9 (Former[facility]) E66.01    Failed back syndrome, lumbar M96.1    Chronic midline low back pain without sciatica M54.5, G89.29    Bronchitis J40    Neural foraminal stenosis of lumbar spine M99.83    Pyelonephritis N12    Hydronephrosis of right kidney N13.30    Psychophysiological insomnia F51.04    Anxiety F41.9    Depression F32.9    History of partial hysterectomy Z90.711    Hot flashes R23.2    Snores R06.83    History of sleep apnea Z86.69    Other fatigue R53.83    Goiter E04.9       Past Medical History:        Diagnosis Date    Anxiety 10/18/2018    Chronic midline low back pain without sciatica 8/29/2017   

## 2020-03-11 RX ORDER — MELOXICAM 15 MG/1
TABLET ORAL
Qty: 90 TABLET | Refills: 1 | Status: SHIPPED | OUTPATIENT
Start: 2020-03-11 | End: 2020-09-08

## 2020-03-11 NOTE — TELEPHONE ENCOUNTER
Last visit: 10/3/19  Last Med refill: 9/4/19  Does patient have enough medication for 72 hours: No:     Next Visit Date:  Future Appointments   Date Time Provider Nena Garrison   3/23/2020  1:55 PM Bella Kamara  [facility] Maintenance   Topic Date Due    Flu vaccine (1) 10/03/2020 (Originally 9/1/2019)    A1C test (Diabetic or Prediabetic)  10/01/2020    Potassium monitoring  03/02/2021    Creatinine monitoring  03/02/2021    Lipid screen  08/12/2024    DTaP/Tdap/Td vaccine (2 - Td) 04/10/2025    Shingles Vaccine (1 of 2) 01/18/2026    Pneumococcal 0-64 years Vaccine  Completed    HIV screen  Completed    Hepatitis A vaccine  Aged Out    Hepatitis B vaccine  Aged Out    Hib vaccine  Aged Out    Meningococcal (ACWY) vaccine  Aged Out       Hemoglobin A1C (%)   Date Value   10/01/2019 5.7   07/22/2016 4.9             ( goal A1C is < 7)   Microalb/Crt.  Ratio (mcg/mg creat)   Date Value   10/01/2019 7     LDL Cholesterol (mg/dL)   Date Value   08/12/2019 91   04/10/2017 130       (goal LDL is <100)   AST (U/L)   Date Value   08/12/2019 14     ALT (U/L)   Date Value   08/12/2019 15     BUN (mg/dL)   Date Value   03/02/2020 12     BP Readings from Last 3 Encounters:   03/10/20 138/70   03/10/20 (!) 104/58   03/02/20 121/75          (goal 120/80)    All Future Testing planned in [facility]  Lab Frequency Next Occurrence   Baseline Diagnostic Sleep Study Once 05/30/2019               Patient Active Problem List:     Essential hypertension     Gastroesophageal reflux disease without esophagitis     Seasonal allergies     Smoker     Alcohol use disorder, mild, abuse     Obesity, morbid, BMI 40.0-49.9 (HCC)     Failed back syndrome, lumbar     Chronic midline low back pain without sciatica     Bronchitis     Neural foraminal stenosis of lumbar spine     Pyelonephritis     Hydronephrosis of right kidney     Psychophysiological insomnia     Anxiety     Depression     History of partial

## 2020-03-15 ENCOUNTER — HOSPITAL ENCOUNTER (EMERGENCY)
Age: 45
Discharge: HOME | End: 2020-03-15
Attending: EMERGENCY MEDICINE
Payer: MEDICARE

## 2020-03-15 VITALS
HEIGHT: 63 IN | TEMPERATURE: 97.9 F | BODY MASS INDEX: 45.36 KG/M2 | DIASTOLIC BLOOD PRESSURE: 84 MMHG | RESPIRATION RATE: 14 BRPM | SYSTOLIC BLOOD PRESSURE: 188 MMHG | HEART RATE: 72 BPM | OXYGEN SATURATION: 98 % | WEIGHT: 256 LBS

## 2020-03-15 PROCEDURE — 99282 EMERGENCY DEPT VISIT SF MDM: CPT

## 2020-03-15 PROCEDURE — 6370000000 HC RX 637 (ALT 250 FOR IP): Performed by: NURSE PRACTITIONER

## 2020-03-15 RX ORDER — HYDROCODONE BITARTRATE AND ACETAMINOPHEN 5; 325 MG/1; MG/1
1 TABLET ORAL ONCE
Status: COMPLETED | OUTPATIENT
Start: 2020-03-15 | End: 2020-03-15

## 2020-03-15 RX ORDER — HYDROCODONE BITARTRATE AND ACETAMINOPHEN 5; 325 MG/1; MG/1
1 TABLET ORAL EVERY 6 HOURS PRN
Qty: 16 TABLET | Refills: 0 | Status: SHIPPED | OUTPATIENT
Start: 2020-03-15 | End: 2020-03-19

## 2020-03-15 RX ADMIN — HYDROCODONE BITARTRATE AND ACETAMINOPHEN 1 TABLET: 5; 325 TABLET ORAL at 14:52

## 2020-03-15 ASSESSMENT — PAIN SCALES - GENERAL
PAINLEVEL_OUTOF10: 10
PAINLEVEL_OUTOF10: 10

## 2020-03-15 ASSESSMENT — ENCOUNTER SYMPTOMS
SORE THROAT: 0
SHORTNESS OF BREATH: 0
SINUS PRESSURE: 0
ABDOMINAL PAIN: 0
COLOR CHANGE: 0
VOMITING: 0
DIARRHEA: 0
WHEEZING: 0
RHINORRHEA: 0
CONSTIPATION: 0
COUGH: 0
NAUSEA: 0

## 2020-03-15 ASSESSMENT — PAIN DESCRIPTION - PAIN TYPE: TYPE: ACUTE PAIN

## 2020-03-15 NOTE — ED PROVIDER NOTES
[address] [place] [address] ED  eMERGENCY dEPARTMENT eNCOUnter      Pt Name: Valentina Marin  MRN: [id number]  [place] 1975  Date of evaluation: 3/15/2020  Provider: Yana Ledbetter NP, HAYDEE - [address]       Chief Complaint   Patient presents with    Knee Pain         HISTORY OF PRESENT ILLNESS  (Location/Symptom, Timing/Onset, Context/Setting, Quality, Duration, Modifying Factors, Severity.)   Valentina Marin is a 41 y.o. female who presents to the emergency department at vehicle for evaluation of right knee pain. Patient had a meniscal [facility] that [facility] She was sent home with [place] for pain. She states that she is been taking them 1 every 6 hours and she ran out about 7:00 last night. She is having increasing pain to her right knee since she went on the medication. She is experiencing any fevers or chills. She denies any drainage from the incisions. Denies any new fall or trauma. Nursing Notes were reviewed. ALLERGIES     Dye [iodides]; Dye  [barium-containing compounds]; Fentanyl; Morphine sulfate;  Oxycodone; and Tylenol with codeine #3 [acetaminophen-codeine]    CURRENT MEDICATIONS       Previous Medications    ACETAMINOPHEN (TYLENOL) 500 MG TABLET    Take 1,000 mg by mouth 3 times daily as needed for Pain    FAMOTIDINE (PEPCID) 20 MG TABLET    Take 1 tablet by mouth nightly as needed (acid reflux)    LACTOBACILLUS (PROBIOTIC ACIDOPHILUS) CAPS    Take 1 capsule by mouth daily    LORATADINE (CLARITIN) 10 MG TABLET    take 1 tablet by mouth once daily    MELOXICAM (MOBIC) 15 MG TABLET    take 1 tablet by mouth once daily    MONTELUKAST (SINGULAIR) 10 MG TABLET    take 1 tablet by mouth every evening    MULTIPLE VITAMINS-MINERALS (MULTIVITAMIN-MINERALS) TABS TABLET    take 1 tablet by mouth once daily    OLMESARTAN (BENICAR) 40 MG TABLET    TAKE 1 TABLET BY MOUTH EVERY DAY    TIZANIDINE (ZANAFLEX) 4 MG TABLET    take 1 tablet by mouth every 6 She is alert and oriented to person, place, and time. LABS:  Labs Reviewed - No data to display    All other labs were within normal range or not returned as of this dictation. EMERGENCY DEPARTMENT COURSE and DIFFERENTIAL DIAGNOSIS/MDM:   Vitals:    Vitals:    03/15/20 1436 03/15/20 1437   BP: (!) 188/84    Pulse: 72    Resp: 14    Temp: 97.9 °F (36.6 °C)    SpO2: 98%    Weight:  256 lb (116.1 kg)   Height:  5' 3\" (1.6 m)       Medical Decision Making:the patient will be given limited supply of norco for pain,. Call dr Jez Garcia office in the morning      FINAL IMPRESSION      1. Acute pain of right knee          DISPOSITION/PLAN   DISPOSITION Decision To Discharge 03/15/2020 02:46:53 PM      PATIENT REFERRED TO:   Moira Kelly MD  [address] [place] [address]  [phone]            DISCHARGE MEDICATIONS:     New Prescriptions    HYDROCODONE-ACETAMINOPHEN (NORCO) 5-325 MG PER TABLET    Take 1 tablet by mouth every 6 hours as needed for Pain for up to 4 days.            (Please note that portions of this note were completed with a voice recognition program.  Efforts were made to edit the dictations but occasionally words are mis-transcribed.)    [address] [facility] JOIE, HAYDEE - CNP  Certified Nurse Practitioner          HAYDEE Theodore CNP  03/15/20 9760

## 2020-03-15 NOTE — ED PROVIDER NOTES
eMERGENCY dEPARTMENT eNCOUnter   Independent Attestation     Pt Name: Therese Cohen  MRN: [id number]  [place] 1975  Date of evaluation: 3/15/20     Therese Cohen is a 41 y.o. female with CC: Knee Pain      Based on the medical record the care appears appropriate. I was personally available for consultation in the Emergency Department.     Mike Howard MD  Attending Emergency Physician                   Eliu Dawn MD  03/15/20 8634

## 2020-03-23 ENCOUNTER — OFFICE VISIT (OUTPATIENT)
Dept: ORTHOPEDIC SURGERY | Age: 45
End: 2020-03-23

## 2020-03-23 PROCEDURE — 99024 POSTOP FOLLOW-UP VISIT: CPT | Performed by: ORTHOPAEDIC SURGERY

## 2020-03-23 RX ORDER — HYDROCODONE BITARTRATE AND ACETAMINOPHEN 5; 325 MG/1; MG/1
1 TABLET ORAL EVERY 6 HOURS PRN
Qty: 28 TABLET | Refills: 0 | Status: SHIPPED | OUTPATIENT
Start: 2020-03-23 | End: 2020-03-30

## 2020-04-14 RX ORDER — TIZANIDINE 4 MG/1
TABLET ORAL
Qty: 120 TABLET | Refills: 1 | Status: SHIPPED | OUTPATIENT
Start: 2020-04-14 | End: 2020-06-11

## 2020-04-15 RX ORDER — LORATADINE 10 MG/1
TABLET ORAL
Qty: 90 TABLET | Refills: 1 | Status: SHIPPED | OUTPATIENT
Start: 2020-04-15 | End: 2020-08-03 | Stop reason: SDUPTHER

## 2020-04-30 ENCOUNTER — TELEMEDICINE (OUTPATIENT)
Dept: FAMILY MEDICINE CLINIC | Age: 45
End: 2020-04-30
Payer: MEDICARE

## 2020-04-30 VITALS — BODY MASS INDEX: 42.7 KG/M2 | HEIGHT: 63 IN | TEMPERATURE: 98 F | WEIGHT: 241 LBS

## 2020-04-30 PROCEDURE — 4004F PT TOBACCO SCREEN RCVD TLK: CPT | Performed by: NURSE PRACTITIONER

## 2020-04-30 PROCEDURE — G8427 DOCREV CUR MEDS BY ELIG CLIN: HCPCS | Performed by: NURSE PRACTITIONER

## 2020-04-30 PROCEDURE — G8417 CALC BMI ABV UP PARAM F/U: HCPCS | Performed by: NURSE PRACTITIONER

## 2020-04-30 PROCEDURE — 99213 OFFICE O/P EST LOW 20 MIN: CPT | Performed by: NURSE PRACTITIONER

## 2020-04-30 RX ORDER — OLMESARTAN MEDOXOMIL 40 MG/1
TABLET ORAL
Qty: 90 TABLET | Refills: 1 | Status: SHIPPED | OUTPATIENT
Start: 2020-04-30 | End: 2020-08-08 | Stop reason: SDUPTHER

## 2020-04-30 RX ORDER — TRIAMTERENE AND HYDROCHLOROTHIAZIDE 37.5; 25 MG/1; MG/1
TABLET ORAL
Qty: 90 TABLET | Refills: 1 | Status: SHIPPED | OUTPATIENT
Start: 2020-04-30 | End: 2020-08-03

## 2020-04-30 ASSESSMENT — ENCOUNTER SYMPTOMS
RESPIRATORY NEGATIVE: 1
BACK PAIN: 1
COUGH: 0

## 2020-04-30 NOTE — PROGRESS NOTES
Turski, APRN - CNP   Lactobacillus (PROBIOTIC ACIDOPHILUS) CAPS Take 1 capsule by mouth daily Yes Alethea Garcia HAYDEE Braxton CNP       Social History     Tobacco Use    Smoking status: Current Every Day Smoker     Packs/day: 0.50     Types: Cigarettes    Smokeless tobacco: Never Used   Substance Use Topics    Alcohol use: Not Currently     Alcohol/week: 0.0 standard drinks    Drug use: No        Allergies   Allergen Reactions    Dye [Iodides] Shortness Of Breath     Other reaction(s): Unknown    Dye  [Barium-Containing Compounds] Hives     Other reaction(s): Vomiting  IVP dye    Fentanyl      Other reaction(s): Unknown    Morphine Sulfate      Other reaction(s): Unknown    Oxycodone      Other reaction(s): Unknown    Tylenol With Codeine #3 [Acetaminophen-Codeine] Nausea And Vomiting     Can take plain tylenol   ,   Past Medical History:   Diagnosis Date    Anxiety 10/18/2018    Chronic midline low back pain without sciatica 8/29/2017    Depression     Endometriosis     had hyst for this    Essential hypertension 3/8/2016    Gastroesophageal reflux disease without esophagitis 3/8/2016    H/O menorrhagia     had hyst    History of stress incontinence     had surgery    Hydronephrosis of right kidney 12/27/2017    Obesity 4/10/2017    Osteophyte, vertebrae     thoracic    Seasonal allergies 3/8/2016       PHYSICAL EXAMINATION:    Vital Signs: (As obtained by patient/caregiver or practitioner observation)    Blood pressure-  Heart rate-    Respiratory rate-    Temperature-  Pulse oximetry-     Constitutional: [x] Appears well-developed and well-nourished [x] No apparent distress      [] Abnormal-   Mental status  [x] Alert and awake  [x] Oriented to person/place/time []Able to follow commands      Eyes:  EOM    [x]  Normal  [] Abnormal-  Sclera  [x]  Normal  [] Abnormal -         Discharge []  None visible  [] Abnormal -    HENT:   [x] Normocephalic, atraumatic.   [] Abnormal   [] Mouth/Throat: Mucous been advised to contact this office for worsening conditions or problems, and seek emergency medical treatment and/or call 911 if deemed necessary. Patient identification was verified at the start of the visit: Yes      Services were provided through a video synchronous discussion virtually to substitute for in-person clinic visit. Patient and provider were located at their individual homes. --Jerie Epley, APRN - CNP on 4/30/2020 at 4:00 PM    An electronic signature was used to authenticate this note.

## 2020-05-20 ENCOUNTER — HOSPITAL ENCOUNTER (EMERGENCY)
Age: 45
Discharge: HOME | End: 2020-05-20
Attending: EMERGENCY MEDICINE
Payer: MEDICARE

## 2020-05-20 VITALS
RESPIRATION RATE: 18 BRPM | DIASTOLIC BLOOD PRESSURE: 79 MMHG | SYSTOLIC BLOOD PRESSURE: 119 MMHG | BODY MASS INDEX: 44.16 KG/M2 | WEIGHT: 240 LBS | HEART RATE: 95 BPM | TEMPERATURE: 98.5 F | OXYGEN SATURATION: 97 % | HEIGHT: 62 IN

## 2020-05-20 LAB
ABSOLUTE EOS #: 0 K/UL (ref 0–0.4)
ABSOLUTE IMMATURE GRANULOCYTE: 0.15 K/UL (ref 0–0.3)
ABSOLUTE LYMPH #: 4.41 K/UL (ref 1–4.8)
ABSOLUTE MONO #: 0.29 K/UL (ref 0.2–0.8)
ALBUMIN SERPL-MCNC: 3.7 G/DL (ref 3.5–5.2)
ALBUMIN/GLOBULIN RATIO: ABNORMAL (ref 1–2.5)
ALP BLD-CCNC: 76 U/L (ref 35–104)
ALT SERPL-CCNC: 14 U/L (ref 5–33)
ANION GAP SERPL CALCULATED.3IONS-SCNC: 10 MMOL/L (ref 9–17)
AST SERPL-CCNC: 13 U/L
ATYPICAL LYMPHOCYTE ABSOLUTE COUNT: 0.88 K/UL
ATYPICAL LYMPHOCYTES: 6 %
BASOPHILS # BLD: 0 %
BASOPHILS ABSOLUTE: 0 K/UL (ref 0–0.2)
BILIRUB SERPL-MCNC: 0.23 MG/DL (ref 0.3–1.2)
BILIRUBIN URINE: NEGATIVE
BUN BLDV-MCNC: 8 MG/DL (ref 6–20)
BUN/CREAT BLD: 15 (ref 9–20)
CALCIUM SERPL-MCNC: 8.8 MG/DL (ref 8.6–10.4)
CHLORIDE BLD-SCNC: 103 MMOL/L (ref 98–107)
CO2: 25 MMOL/L (ref 20–31)
COLOR: YELLOW
COMMENT UA: NORMAL
CREAT SERPL-MCNC: 0.53 MG/DL (ref 0.5–0.9)
DIFFERENTIAL TYPE: ABNORMAL
EOSINOPHILS RELATIVE PERCENT: 0 % (ref 1–4)
GFR AFRICAN AMERICAN: >60 ML/MIN
GFR NON-AFRICAN AMERICAN: >60 ML/MIN
GFR SERPL CREATININE-BSD FRML MDRD: ABNORMAL ML/MIN/{1.73_M2}
GFR SERPL CREATININE-BSD FRML MDRD: ABNORMAL ML/MIN/{1.73_M2}
GLUCOSE BLD-MCNC: 98 MG/DL (ref 70–99)
GLUCOSE URINE: NEGATIVE
HCT VFR BLD CALC: 36.3 % (ref 36.3–47.1)
HEMOGLOBIN: 11.9 G/DL (ref 11.9–15.1)
IMMATURE GRANULOCYTES: 1 %
KETONES, URINE: NEGATIVE
LEUKOCYTE ESTERASE, URINE: NEGATIVE
LYMPHOCYTES # BLD: 30 % (ref 24–44)
MCH RBC QN AUTO: 32.2 PG (ref 25.2–33.5)
MCHC RBC AUTO-ENTMCNC: 32.8 G/DL (ref 28.4–34.8)
MCV RBC AUTO: 98.1 FL (ref 82.6–102.9)
MONOCYTES # BLD: 2 % (ref 1–7)
NITRITE, URINE: NEGATIVE
NRBC AUTOMATED: 0 PER 100 WBC
PDW BLD-RTO: 13.9 % (ref 11.8–14.4)
PH UA: 7 (ref 5–8)
PLATELET # BLD: 296 K/UL (ref 138–453)
PLATELET ESTIMATE: ABNORMAL
PMV BLD AUTO: 9.3 FL (ref 8.1–13.5)
POTASSIUM SERPL-SCNC: 3.7 MMOL/L (ref 3.7–5.3)
PROTEIN UA: NEGATIVE
RBC # BLD: 3.7 M/UL (ref 3.95–5.11)
RBC # BLD: ABNORMAL 10*6/UL
SEG NEUTROPHILS: 61 % (ref 36–66)
SEGMENTED NEUTROPHILS ABSOLUTE COUNT: 8.97 K/UL (ref 1.8–7.7)
SODIUM BLD-SCNC: 138 MMOL/L (ref 135–144)
SPECIFIC GRAVITY UA: 1.02 (ref 1–1.03)
TOTAL PROTEIN: 6.4 G/DL (ref 6.4–8.3)
TURBIDITY: CLEAR
URINE HGB: NEGATIVE
UROBILINOGEN, URINE: NORMAL
WBC # BLD: 14.7 K/UL (ref 3.5–11.3)
WBC # BLD: ABNORMAL 10*3/UL

## 2020-05-20 PROCEDURE — 85025 COMPLETE CBC W/AUTO DIFF WBC: CPT

## 2020-05-20 PROCEDURE — 81003 URINALYSIS AUTO W/O SCOPE: CPT

## 2020-05-20 PROCEDURE — 6370000000 HC RX 637 (ALT 250 FOR IP): Performed by: EMERGENCY MEDICINE

## 2020-05-20 PROCEDURE — 80053 COMPREHEN METABOLIC PANEL: CPT

## 2020-05-20 PROCEDURE — 99284 EMERGENCY DEPT VISIT MOD MDM: CPT

## 2020-05-20 PROCEDURE — 81025 URINE PREGNANCY TEST: CPT

## 2020-05-20 RX ORDER — ONDANSETRON 4 MG/1
4 TABLET, ORALLY DISINTEGRATING ORAL ONCE
Status: COMPLETED | OUTPATIENT
Start: 2020-05-20 | End: 2020-05-20

## 2020-05-20 RX ORDER — HYDROCODONE BITARTRATE AND ACETAMINOPHEN 5; 325 MG/1; MG/1
1 TABLET ORAL ONCE
Status: COMPLETED | OUTPATIENT
Start: 2020-05-20 | End: 2020-05-20

## 2020-05-20 RX ADMIN — HYDROCODONE BITARTRATE AND ACETAMINOPHEN 1 TABLET: 5; 325 TABLET ORAL at 16:25

## 2020-05-20 RX ADMIN — ONDANSETRON 4 MG: 4 TABLET, ORALLY DISINTEGRATING ORAL at 16:26

## 2020-05-20 ASSESSMENT — PAIN SCALES - GENERAL
PAINLEVEL_OUTOF10: 10
PAINLEVEL_OUTOF10: 0

## 2020-05-20 ASSESSMENT — PAIN DESCRIPTION - PROGRESSION: CLINICAL_PROGRESSION: RESOLVED

## 2020-05-20 NOTE — ED PROVIDER NOTES
times daily as needed for Pain    FAMOTIDINE (PEPCID) 20 MG TABLET    Take 1 tablet by mouth nightly as needed (acid reflux)    LACTOBACILLUS (PROBIOTIC ACIDOPHILUS) CAPS    Take 1 capsule by mouth daily    LORATADINE (CLARITIN) 10 MG TABLET    take 1 tablet by mouth once daily    MELOXICAM (MOBIC) 15 MG TABLET    take 1 tablet by mouth once daily    MONTELUKAST (SINGULAIR) 10 MG TABLET    take 1 tablet by mouth every evening    MULTIPLE VITAMINS-MINERALS (MULTIVITAMIN-MINERALS) TABS TABLET    take 1 tablet by mouth once daily    OLMESARTAN (BENICAR) 40 MG TABLET    TAKE 1 TABLET BY MOUTH EVERY DAY    TIZANIDINE (ZANAFLEX) 4 MG TABLET    take 1 tablet by mouth every 6 hours if needed for BACK PAIN    TRIAMTERENE-HYDROCHLOROTHIAZIDE (MAXZIDE-25) 37.5-25 MG PER TABLET    1 TABLET BY MOUTH DAILY    VITAMIN D, CHOLECALCIFEROL, 25 MCG (1000 UT) CAPS    Take 1,000 Units by mouth daily     ALLERGIES     is allergic to dye [iodides]; dye  [barium-containing compounds]; fentanyl; morphine sulfate; oxycodone; and tylenol with codeine #3 [acetaminophen-codeine]. FAMILY HISTORY     She indicated that her mother is alive. She indicated that her father is alive. She indicated that the status of her maternal grandfather is unknown. She indicated that the status of her paternal grandfather is unknown. SOCIAL HISTORY       Social History     Tobacco Use    Smoking status: Current Every Day Smoker     Packs/day: 0.50     Types: Cigarettes    Smokeless tobacco: Never Used   Substance Use Topics    Alcohol use: Not Currently     Alcohol/week: 0.0 standard drinks    Drug use: No     PHYSICAL EXAM     INITIAL VITALS: /79   Pulse 95   Temp 98.5 °F (36.9 °C) (Oral)   Resp 18   Ht 5' 2\" (1.575 m)   Wt 240 lb (108.9 kg)   SpO2 97%   BMI 43.90 kg/m²    Physical Exam  HENT:      Head: Normocephalic.       Right Ear: External ear normal.      Left Ear: External ear normal.      Nose: Nose normal.   Eyes: URINE RT REFLEX TO CULTURE       EMERGENCY DEPARTMENTCOURSE:         Vitals:    Vitals:    05/20/20 1538   BP: 119/79   Pulse: 95   Resp: 18   Temp: 98.5 °F (36.9 °C)   TempSrc: Oral   SpO2: 97%   Weight: 240 lb (108.9 kg)   Height: 5' 2\" (1.575 m)       The patient was given the following medications while in the emergency department:  Orders Placed This Encounter   Medications    ondansetron (ZOFRAN-ODT) disintegrating tablet 4 mg    HYDROcodone-acetaminophen (NORCO) 5-325 MG per tablet 1 tablet     CONSULTS:  None    FINAL IMPRESSION      1. Fatigue, unspecified type    2.  Chronic midline low back pain without sciatica          DISPOSITION/PLAN   DISPOSITION        PATIENT REFERRED TO:  HAYDEE Wilson - CNP [address] [address]  [phone]    In 2 days      DISCHARGE MEDICATIONS:  New Prescriptions    No medications on file     Jazz Valenzuela MD  Attending Emergency Physician                    Leon Drake MD  05/20/20 [address] Milton Auguste MD  05/20/20 1040

## 2020-05-20 NOTE — ED NOTES
Pt ambulatory to room 7 with c/o right sided flank pain with N/V onset 4 days ago, and ongoing fever x 2 days. Pt reports taking PO Tylenol PRN. Afebrile in triage. Pt states N/V was \"Twice yesterday\" but no further episodes of emesis, just continued nausea. Pt reports she has been having increased urinary frequency with foul odor noted to urine. Pt reports hs of UTI's, states \"My one kidney is bad too\". Abd soft, rounded, BS active x 4 quads. Respirations even and non labored. Skin PWD, MMM. NAD noted.       Luca Pappas, RN  05/20/20 1963

## 2020-05-21 ENCOUNTER — TELEPHONE (OUTPATIENT)
Dept: FAMILY MEDICINE CLINIC | Age: 45
End: 2020-05-21

## 2020-05-21 ENCOUNTER — HOSPITAL ENCOUNTER (OUTPATIENT)
Age: 45
Setting detail: SPECIMEN
Discharge: HOME | End: 2020-05-21
Payer: MEDICARE

## 2020-05-21 ENCOUNTER — CARE COORDINATION (OUTPATIENT)
Dept: CARE COORDINATION | Age: 45
End: 2020-05-21

## 2020-05-21 ENCOUNTER — OFFICE VISIT (OUTPATIENT)
Dept: PRIMARY CARE CLINIC | Age: 45
End: 2020-05-21
Payer: MEDICARE

## 2020-05-21 VITALS — HEART RATE: 69 BPM | OXYGEN SATURATION: 94 % | TEMPERATURE: 98.5 F

## 2020-05-21 PROCEDURE — G8417 CALC BMI ABV UP PARAM F/U: HCPCS | Performed by: NURSE PRACTITIONER

## 2020-05-21 PROCEDURE — 99213 OFFICE O/P EST LOW 20 MIN: CPT | Performed by: NURSE PRACTITIONER

## 2020-05-21 PROCEDURE — G8427 DOCREV CUR MEDS BY ELIG CLIN: HCPCS | Performed by: NURSE PRACTITIONER

## 2020-05-21 PROCEDURE — 4004F PT TOBACCO SCREEN RCVD TLK: CPT | Performed by: NURSE PRACTITIONER

## 2020-05-21 ASSESSMENT — ENCOUNTER SYMPTOMS
COUGH: 1
NAUSEA: 1
VOMITING: 0
SHORTNESS OF BREATH: 0
BACK PAIN: 1
DIARRHEA: 0

## 2020-05-21 NOTE — TELEPHONE ENCOUNTER
----- Message from Binu Mcfarland RN sent at 5/21/2020  1:44 PM EDT -----  Regarding: enrollment  Patient referred for LOOP Program:   Patient's preferred e-mail:  [email or web address]  Patient's preferred phone number: .[phone]  Care Plan:  Active symptoms/exposure  LOOP Provider: Arreaga/Lima  (BERNADINE ONLY) Recommended Follow Up: Patient was instructed to go to a [facility] Flu clinic

## 2020-05-21 NOTE — PATIENT INSTRUCTIONS
You were tested for COVID today. We will call you with results when they are available. If you have not heard from us in 7 days, please call our office. Increase fluids- stay hydrated  Good handwashing  Supportive care as discussed    If having symptoms- you need to be fever free for 72 hours, other symptoms resolved and at least 7 days passed since first symptom appeared before discontinuing  quarantine- CDC guidelines  tylenol as directed as needed over the counter if able to take  go to the ER for chest pain, short of breath, hard time breathing, persistent vomiting, feeling weaker or dehydrated, any worsening, change or concern  Follow up with primary care for re evaluation  [place] covid 19 call center - [phone] (1-)  Another good resource for information is coronavirus. [place].gov  Patient was evaluated [place] today during this pandemic covid 19 situation. The COVID-19 test that was done today can take 1-6 days for results. Until then you should assume you have this disease and adhere to home isolation as described below. When we get the test results back, one of the following readings will be obtained. 1. A positive test means you have the virus. 2.  An inconclusive test means it wasn't sure if you have the virus or not. An inconclusive test result is treated as a positive result and recommendations  are the same as a positive test result. We may ask you to repeat this test in this circumstance. 3.  A negative test means you probably do not have the virus.       Prevention steps for People with positive or inconclusive test results or suspected  COVID-19 (including persons under investigation) who do not need to be hospitalized  and   People with confirmed COVID-19 who were hospitalized and determined to be medically stable to go home    Contacts who are NOT healthcare providers or first responders and are asymptomatic (no fever,  cough, shortness of breath, or difficulty breathing) should help the healthcare providers office take steps to keep other people from getting infected or exposed. Wear a facemask  You should wear a facemask when you are around other people (e.g., sharing a room or vehicle) or pets and before you enter a healthcare providers office. If you are not able to wear a facemask (for example, because it causes trouble breathing), then people who live with you should not stay in the same room with you; they should also wear a facemask if they enter your room. Cover your coughs and sneezes  Cover your mouth and nose with a tissue when you cough or sneeze. Throw used tissues in a lined trash can. Immediately wash your hands with soap and water for at least 20 seconds or, if soap and water are not available, clean your hands with an alcohol-based hand  that contains at least 60% alcohol. Clean your hands often  Wash your hands often with soap and water for at least 20 seconds, especially after blowing your nose, coughing, or sneezing; going to the bathroom; and before eating or preparing food. If soap and water are not readily available, use an alcohol-based hand  with at least 60% alcohol, covering all surfaces of your hands and rubbing them together until they feel dry. Soap and water are the best option if hands are visibly dirty. Avoid touching your eyes, nose, and mouth with unwashed hands. Avoid sharing personal household items  You should not share dishes, drinking glasses, cups, eating utensils, towels, or bedding with other people or pets in your home. After using these items, they should be washed thoroughly with soap and water. Clean all high-touch surfaces everyday  High touch surfaces include counters, tabletops, doorknobs, bathroom fixtures, toilets, phones, keyboards, tablets, and bedside tables. Also, clean any surfaces that may have blood, stool, or body fluids on them.  Use a household cleaning spray or wipe, according to the label instructions. Labels contain instructions for safe and effective use of the cleaning product including precautions you should take when applying the product, such as wearing gloves and making sure you have good ventilation during use of the product. Monitor your symptoms  Seek prompt medical attention if your illness is worsening (e.g., difficulty breathing). Before seeking care, call your healthcare provider and tell them that you have, or are being evaluated for, COVID-19. Put on a facemask before you enter the facility. These steps will help the healthcare providers office to keep other people in the office or waiting room from getting infected or exposed. Persons who are placed under active monitoring or facilitated self-monitoring should follow instructions provided by their local health department or occupational health professionals, as appropriate. When working with your local health department check their available hours. If you have a medical emergency and need to call 911, notify the dispatch personnel that you have, or are being evaluated for COVID-19. If possible, put on a facemask before emergency medical services arrive. Discontinuing home isolation  Patients with confirmed COVID-19 should remain under home isolation precautions until the risk of secondary transmission to others is thought to be low. The decision to discontinue home isolation precautions should be made on a case-by-case basis, in consultation with your physician and the health department. Please do NOT make this decision on your own. If your results of the COVID-19 test is NEGATIVE -     The patient may stop isolation, in consultation with your health care provider, typically when: Your healthcare provider has determined that the cause of the illness is NOT COVID-19 and approves your return to work.   OR  Ten (10) days have passed since onset of symptoms AND three days (72 hours) have passed with no fever without taking

## 2020-05-21 NOTE — PROGRESS NOTES
 montelukast (SINGULAIR) 10 MG tablet take 1 tablet by mouth every evening 90 tablet 1    Multiple Vitamins-Minerals (MULTIVITAMIN-MINERALS) TABS tablet take 1 tablet by mouth once daily 90 tablet 1    Lactobacillus (PROBIOTIC ACIDOPHILUS) CAPS Take 1 capsule by mouth daily 90 capsule 3    famotidine (PEPCID) 20 MG tablet Take 1 tablet by mouth nightly as needed (acid reflux) 90 tablet 1     No current facility-administered medications for this visit. Allergies   Allergen Reactions    Dye [Iodides] Shortness Of Breath     Other reaction(s): Unknown    Dye  [Barium-Containing Compounds] Hives     Other reaction(s): Vomiting  IVP dye    Fentanyl      Other reaction(s): Unknown    Morphine Sulfate      Other reaction(s): Unknown    Oxycodone      Other reaction(s): Unknown    Tylenol With Codeine #3 [Acetaminophen-Codeine] Nausea And Vomiting     Can take plain tylenol       Subjective:     Review of Systems   Constitutional: Positive for fatigue and fever. Respiratory: Positive for cough. Negative for shortness of breath. Cardiovascular: Negative for chest pain, palpitations and leg swelling. Gastrointestinal: Positive for nausea. Negative for diarrhea and vomiting. Genitourinary: Negative for difficulty urinating, dysuria, flank pain, frequency and urgency. Musculoskeletal: Positive for back pain. Neurological: Positive for dizziness and headaches. All other systems reviewed and are negative. Objective:      Physical Exam  Vitals signs and nursing note reviewed. Constitutional:       General: She is not in acute distress. Appearance: Normal appearance. She is well-developed. She is not ill-appearing, toxic-appearing or diaphoretic. HENT:      Head: Normocephalic and atraumatic. Nose: Nose normal.      Mouth/Throat:      Mouth: Mucous membranes are moist.   Eyes:      General: No scleral icterus. Right eye: No discharge. Left eye: No discharge. covid 19 situation. Patient given educational materials - see patientinstructions. Discussed use, benefit, and side effects of prescribed medications. All patient questions answered. Pt voiced understanding.     Electronically signed by HAYDEE Villalobos 5/21/2020 at 3:45 PM

## 2020-05-21 NOTE — CARE COORDINATION
Patient contacted regarding Hollie Gibbons. Care Transition Nurse/ Ambulatory Care Manager contacted the patient by telephone to perform post discharge assessment. Verified name and  with patient as identifiers. Provided introduction to self, and explanation of the CTN/ACM role, and reason for call due to risk factors for infection and/or exposure to COVID-19. Symptoms reviewed with patient who verbalized the following symptoms: fever, fatigue, cough and headache. Due to current symptoms, patient was advised to go to [facility], [address] [place] [address] for further evaluation. Patient has following risk factors of: BMI > 40. CTN/ACM reviewed discharge instructions, medical action plan and red flags such as increased shortness of breath, increasing fever and signs of decompensation with parent who verbalized understanding. Discussed exposure protocols and quarantine with CDC Guidelines What to do if you are sick with coronavirus disease .  Patient was given an opportunity for questions and concerns. The patient agrees to contact the [facility] exposure line [phone], local [facility] department [place] Department [facility]: ([phone]) and PCP office for questions related to their healthcare. CTN/ACM provided contact information for future needs. Reviewed and educated patient on any new and changed medications related to discharge diagnosis     Patient/family/caregiver given information for GetWell Loop and agrees to enroll yes  Patient's preferred e-mail: [email or web address]   Patient's preferred phone number: [phone]  Based on Loop alert triggers, patient will be contacted by nurse care manager for worsening symptoms. Pt will be further monitored by COVID Loop Team based on severity of symptoms and risk factors.     Advance Care Planning  People with COVID-19 may have no symptoms, mild symptoms, such as fever, cough, and shortness of breath or they may have more severe illness, developing severe and fatal pneumonia. As a result, Advance Care Planning with attention to naming a health care decision maker (someone you trust to make healthcare decisions for you if you could not speak for yourself) and sharing other health care preferences is important BEFORE a possible health crisis. Please contact your Primary Care Provider to discuss Advance Care Planning. Preventing the Spread of Coronavirus Disease 2019 in Homes and Residential Communities  For the most recent information go to [email or web address]    Prevention steps for People with confirmed or suspected COVID-19 (including persons under investigation) who do not need to be hospitalized  and   People with confirmed COVID-19 who were hospitalized and determined to be medically stable to go home    Your healthcare provider and public health staff will evaluate whether you can be cared for at home. If it is determined that you do not need to be hospitalized and can be isolated at home, you will be monitored by staff from your local or state health department. You should follow the prevention steps below until a healthcare provider or local or state health department says you can return to your normal activities. Stay home except to get medical care  People who are mildly ill with COVID-19 are able to isolate at home during their illness. You should restrict activities outside your home, except for getting medical care. Do not go to work, school, or public areas. Avoid using public transportation, ride-sharing, or taxis. Separate yourself from other people and animals in your home  People: As much as possible, you should stay in a specific room and away from other people in your home. Also, you should use a separate bathroom, if available. Animals:  You should restrict contact with pets and other animals while you are sick with COVID-19, just like you would around other people. Although there have not been reports of pets or other animals becoming sick with COVID-19, it is still recommended that people sick with COVID-19 limit contact with animals until more information is known about the virus. When possible, have another member of your household care for your animals while you are sick. If you are sick with COVID-19, avoid contact with your pet, including petting, snuggling, being kissed or licked, and sharing food. If you must care for your pet or be around animals while you are sick, wash your hands before and after you interact with pets and wear a facemask. Call ahead before visiting your doctor  If you have a medical appointment, call the healthcare provider and tell them that you have or may have COVID-19. This will help the healthcare providers office take steps to keep other people from getting infected or exposed. Wear a facemask  You should wear a facemask when you are around other people (e.g., sharing a room or vehicle) or pets and before you enter a healthcare providers office. If you are not able to wear a facemask (for example, because it causes trouble breathing), then people who live with you should not stay in the same room with you, or they should wear a facemask if they enter your room. Cover your coughs and sneezes  Cover your mouth and nose with a tissue when you cough or sneeze. Throw used tissues in a lined trash can. Immediately wash your hands with soap and water for at least 20 seconds or, if soap and water are not available, clean your hands with an alcohol-based hand  that contains at least 60% alcohol. Clean your hands often  Wash your hands often with soap and water for at least 20 seconds, especially after blowing your nose, coughing, or sneezing; going to the bathroom; and before eating or preparing food.  If soap and water are not readily available, use an alcohol-based hand  with at least 60% alcohol, covering all

## 2020-05-22 LAB — HCG, PREGNANCY URINE (POC): NEGATIVE

## 2020-05-23 LAB — SARS-COV-2, NAA: NOT DETECTED

## 2020-05-24 ENCOUNTER — TELEPHONE (OUTPATIENT)
Dept: PRIMARY CARE CLINIC | Age: 45
End: 2020-05-24

## 2020-06-11 RX ORDER — TIZANIDINE 4 MG/1
TABLET ORAL
Qty: 120 TABLET | Refills: 1 | Status: SHIPPED | OUTPATIENT
Start: 2020-06-11 | End: 2020-08-03

## 2020-07-31 RX ORDER — MONTELUKAST SODIUM 10 MG/1
TABLET ORAL
Qty: 90 TABLET | Refills: 1 | Status: SHIPPED | OUTPATIENT
Start: 2020-07-31 | End: 2021-02-03

## 2020-08-03 RX ORDER — TRIAMTERENE AND HYDROCHLOROTHIAZIDE 37.5; 25 MG/1; MG/1
TABLET ORAL
Qty: 90 TABLET | Refills: 1 | Status: SHIPPED | OUTPATIENT
Start: 2020-08-03 | End: 2020-08-04 | Stop reason: SDUPTHER

## 2020-08-03 RX ORDER — TIZANIDINE 4 MG/1
TABLET ORAL
Qty: 120 TABLET | Refills: 1 | Status: SHIPPED | OUTPATIENT
Start: 2020-08-03 | End: 2020-10-06

## 2020-08-04 RX ORDER — LORATADINE 10 MG/1
TABLET ORAL
Qty: 90 TABLET | Refills: 1 | Status: SHIPPED | OUTPATIENT
Start: 2020-08-04 | End: 2021-03-06 | Stop reason: SDUPTHER

## 2020-08-04 RX ORDER — TRIAMTERENE AND HYDROCHLOROTHIAZIDE 37.5; 25 MG/1; MG/1
TABLET ORAL
Qty: 90 TABLET | Refills: 1 | Status: SHIPPED | OUTPATIENT
Start: 2020-08-04 | End: 2021-03-06 | Stop reason: SDUPTHER

## 2020-08-07 ENCOUNTER — TELEPHONE (OUTPATIENT)
Dept: FAMILY MEDICINE CLINIC | Age: 45
End: 2020-08-07

## 2020-08-07 NOTE — TELEPHONE ENCOUNTER
Please Approve or Refuse. Send to Pharmacy per Pt's Request: can you please resend for this medication so we can do a new PA, also patient states she can't take Losartan. Please advise.       Next Visit Date:    Last Visit Date: 04/30/20    Hemoglobin A1C (%)   Date Value   10/01/2019 5.7   07/22/2016 4.9             ( goal A1C is < 7)   BP Readings from Last 3 Encounters:   05/20/20 119/79   03/15/20 (!) 188/84   03/10/20 138/70          (goal 120/80)  BUN   Date Value Ref Range Status   05/20/2020 8 6 - 20 mg/dL Final     CREATININE   Date Value Ref Range Status   05/20/2020 0.53 0.50 - 0.90 mg/dL Final     Potassium   Date Value Ref Range Status   05/20/2020 3.7 3.7 - 5.3 mmol/L Final

## 2020-08-08 RX ORDER — OLMESARTAN MEDOXOMIL 40 MG/1
TABLET ORAL
Qty: 90 TABLET | Refills: 1 | Status: SHIPPED | OUTPATIENT
Start: 2020-08-08 | End: 2021-02-09 | Stop reason: SDUPTHER

## 2020-08-10 NOTE — TELEPHONE ENCOUNTER
Patient would like call back, she didn't take any BP medications for 2 days , she wants to know if she can get something else since Benicar is not approved by her insurance please Advice

## 2020-08-11 RX ORDER — VALSARTAN 80 MG/1
80 TABLET ORAL DAILY
Qty: 90 TABLET | Refills: 1 | Status: SHIPPED | OUTPATIENT
Start: 2020-08-11 | End: 2020-09-11

## 2020-08-13 NOTE — TELEPHONE ENCOUNTER
Rochelle from [employer] called to let us know that medication was approved for a year  Approval # [id number] 8/13/20 to 8/13/21

## 2020-08-21 RX ORDER — BACITRACIN ZINC AND POLYMYXIN B SULFATE 500; 10000 [USP'U]/G; [USP'U]/G
OINTMENT TOPICAL
Qty: 90 CAPSULE | Refills: 3 | Status: SHIPPED | OUTPATIENT
Start: 2020-08-21 | End: 2021-05-13

## 2020-09-08 RX ORDER — ASPIRIN 81 MG
TABLET, DELAYED RELEASE (ENTERIC COATED) ORAL
Qty: 90 TABLET | Refills: 1 | Status: SHIPPED | OUTPATIENT
Start: 2020-09-08 | End: 2021-05-13 | Stop reason: SDUPTHER

## 2020-09-08 RX ORDER — MELOXICAM 15 MG/1
TABLET ORAL
Qty: 90 TABLET | Refills: 1 | Status: SHIPPED | OUTPATIENT
Start: 2020-09-08 | End: 2021-03-05 | Stop reason: SDUPTHER

## 2020-09-08 NOTE — TELEPHONE ENCOUNTER
Last seen 4/30/20    Next Visit Date:  No future appointments. Health Maintenance   Topic Date Due    Flu vaccine (1) 09/01/2020    A1C test (Diabetic or Prediabetic)  10/01/2020    Potassium monitoring  05/20/2021    Creatinine monitoring  05/20/2021    Lipid screen  08/12/2024    DTaP/Tdap/Td vaccine (2 - Td) 04/10/2025    Pneumococcal 0-64 years Vaccine  Completed    HIV screen  Completed    Hepatitis A vaccine  Aged Out    Hepatitis B vaccine  Aged Out    Hib vaccine  Aged Out    Meningococcal (ACWY) vaccine  Aged Out       Hemoglobin A1C (%)   Date Value   10/01/2019 5.7   07/22/2016 4.9             ( goal A1C is < 7)   Microalb/Crt.  Ratio (mcg/mg creat)   Date Value   10/01/2019 7     LDL Cholesterol (mg/dL)   Date Value   08/12/2019 91       (goal LDL is <100)   AST (U/L)   Date Value   05/20/2020 13     ALT (U/L)   Date Value   05/20/2020 14     BUN (mg/dL)   Date Value   05/20/2020 8     BP Readings from Last 3 Encounters:   05/20/20 119/79   03/15/20 (!) 188/84   03/10/20 138/70          (goal 120/80)    All Future Testing planned in [facility]  Lab Frequency Next Occurrence               Patient Active Problem List:     Essential hypertension     Gastroesophageal reflux disease without esophagitis     Seasonal allergies     Smoker     Alcohol use disorder, mild, abuse     Obesity, morbid, BMI 40.0-49.9 (HCC)     Failed back syndrome, lumbar     Chronic midline low back pain without sciatica     Bronchitis     Neural foraminal stenosis of lumbar spine     Pyelonephritis     Hydronephrosis of right kidney     Psychophysiological insomnia     Anxiety     Depression     History of partial hysterectomy     Hot flashes     Snores     History of sleep apnea     Other fatigue     Goiter

## 2020-09-11 ENCOUNTER — HOSPITAL ENCOUNTER (EMERGENCY)
Age: 45
Discharge: HOME | End: 2020-09-11
Attending: EMERGENCY MEDICINE
Payer: MEDICARE

## 2020-09-11 ENCOUNTER — APPOINTMENT (OUTPATIENT)
Dept: GENERAL RADIOLOGY | Age: 45
End: 2020-09-11
Payer: MEDICARE

## 2020-09-11 VITALS
HEIGHT: 63 IN | RESPIRATION RATE: 16 BRPM | SYSTOLIC BLOOD PRESSURE: 165 MMHG | TEMPERATURE: 99 F | DIASTOLIC BLOOD PRESSURE: 66 MMHG | WEIGHT: 225 LBS | OXYGEN SATURATION: 98 % | BODY MASS INDEX: 39.87 KG/M2 | HEART RATE: 84 BPM

## 2020-09-11 PROCEDURE — 72100 X-RAY EXAM L-S SPINE 2/3 VWS: CPT

## 2020-09-11 PROCEDURE — 99284 EMERGENCY DEPT VISIT MOD MDM: CPT

## 2020-09-11 PROCEDURE — 71101 X-RAY EXAM UNILAT RIBS/CHEST: CPT

## 2020-09-11 PROCEDURE — 6360000002 HC RX W HCPCS: Performed by: EMERGENCY MEDICINE

## 2020-09-11 PROCEDURE — 6370000000 HC RX 637 (ALT 250 FOR IP): Performed by: EMERGENCY MEDICINE

## 2020-09-11 RX ORDER — KETOROLAC TROMETHAMINE 30 MG/ML
60 INJECTION, SOLUTION INTRAMUSCULAR; INTRAVENOUS ONCE
Status: COMPLETED | OUTPATIENT
Start: 2020-09-11 | End: 2020-09-11

## 2020-09-11 RX ORDER — ONDANSETRON 4 MG/1
4 TABLET, ORALLY DISINTEGRATING ORAL EVERY 6 HOURS PRN
Qty: 12 TABLET | Refills: 0 | Status: SHIPPED | OUTPATIENT
Start: 2020-09-11 | End: 2021-09-01

## 2020-09-11 RX ORDER — ONDANSETRON 4 MG/1
4 TABLET, ORALLY DISINTEGRATING ORAL ONCE
Status: COMPLETED | OUTPATIENT
Start: 2020-09-11 | End: 2020-09-11

## 2020-09-11 RX ORDER — HYDROCODONE BITARTRATE AND ACETAMINOPHEN 5; 325 MG/1; MG/1
1 TABLET ORAL EVERY 6 HOURS PRN
Qty: 20 TABLET | Refills: 0 | Status: SHIPPED | OUTPATIENT
Start: 2020-09-11 | End: 2020-09-15 | Stop reason: SDUPTHER

## 2020-09-11 RX ADMIN — KETOROLAC TROMETHAMINE 60 MG: 30 INJECTION, SOLUTION INTRAMUSCULAR at 11:58

## 2020-09-11 RX ADMIN — ONDANSETRON 4 MG: 4 TABLET, ORALLY DISINTEGRATING ORAL at 11:12

## 2020-09-11 ASSESSMENT — PAIN SCALES - GENERAL
PAINLEVEL_OUTOF10: 8
PAINLEVEL_OUTOF10: 8

## 2020-09-11 ASSESSMENT — ENCOUNTER SYMPTOMS
COUGH: 0
SHORTNESS OF BREATH: 0
COLOR CHANGE: 0
EYE REDNESS: 0
CONSTIPATION: 0
FACIAL SWELLING: 0
DIARRHEA: 0
EYE DISCHARGE: 0
VOMITING: 0
ABDOMINAL PAIN: 0

## 2020-09-14 ENCOUNTER — TELEPHONE (OUTPATIENT)
Dept: FAMILY MEDICINE CLINIC | Age: 45
End: 2020-09-14

## 2020-09-14 NOTE — TELEPHONE ENCOUNTER
[facility] ([facility]) ED Follow up Call    Reason for ED visit:               FU appts/Provider:    No future appointments. VOICEMAIL DOCUMENTATION - ERASE IF NOT USED  Hi, this message is for  Dalton maier  This is Roxie from [address] [place] [address] office. Just calling to see how you are doing after your recent visit to the Emergency Room. [address] [place] [address] wants to make sure you were able to fill any prescriptions and that you understand your discharge instructions. Please return our call if you need to make a follow up appointment with your provider or have any further needs. Our phone number is [phone]. Have a great day.

## 2020-09-15 ENCOUNTER — TELEMEDICINE (OUTPATIENT)
Dept: FAMILY MEDICINE CLINIC | Age: 45
End: 2020-09-15
Payer: MEDICARE

## 2020-09-15 PROCEDURE — G8427 DOCREV CUR MEDS BY ELIG CLIN: HCPCS | Performed by: NURSE PRACTITIONER

## 2020-09-15 PROCEDURE — 99213 OFFICE O/P EST LOW 20 MIN: CPT | Performed by: NURSE PRACTITIONER

## 2020-09-15 RX ORDER — HYDROCODONE BITARTRATE AND ACETAMINOPHEN 5; 325 MG/1; MG/1
1 TABLET ORAL EVERY 6 HOURS PRN
Qty: 20 TABLET | Refills: 0 | Status: SHIPPED | OUTPATIENT
Start: 2020-09-15 | End: 2020-09-20

## 2020-09-15 ASSESSMENT — ENCOUNTER SYMPTOMS
COUGH: 0
RESPIRATORY NEGATIVE: 1
BACK PAIN: 1

## 2020-09-15 NOTE — PROGRESS NOTES
9/15/2020    TELEHEALTH EVALUATION -- Audio/Visual (During TFZXT-21 public health emergency)    HPI:    Carmen Montiel (:  1975) has requested an audio/video evaluation for the following concern(s):    VV with Nic Julian for follow up on fall in tub. She was seen in the ER> imaging of her lumbar spine and left ribs were taken. She did not have any fracture. Does have bruising and muscle soreness on her left side. She is using norco and this is giving her relief. She is having pain in her left knee. Review of Systems   Constitutional: Negative. HENT: Negative. Respiratory: Negative. Negative for cough. Cardiovascular: Negative. Negative for chest pain and palpitations. Musculoskeletal: Positive for arthralgias, back pain and gait problem. Skin: Negative. Psychiatric/Behavioral: Negative. Prior to Visit Medications    Medication Sig Taking? Authorizing Provider   HYDROcodone-acetaminophen (NORCO) 5-325 MG per tablet Take 1 tablet by mouth every 6 hours as needed for Pain for up to 5 days.  Yes HAYDEE Monsivais - CNP   Multiple Vitamins-Minerals (MULTIVITAMIN-MINERALS) TABS tablet take 1 tablet by mouth once daily Yes HAYDEE Hess - CNP   meloxicam (MOBIC) 15 MG tablet take 1 tablet by mouth once daily Yes HAYDEE Hess - CNP   olmesartan (BENICAR) 40 MG tablet TAKE 1 TABLET BY MOUTH EVERY DAY Yes HAYDEE Hess - CNP   loratadine (CLARITIN) 10 MG tablet take 1 tablet by mouth once daily Yes HAYDEE Hess CNP   triamterene-hydroCHLOROthiazide (MAXZIDE-25) 37.5-25 MG per tablet take 1 tablet by mouth once daily Yes HAYDEE Hess - CNP   tiZANidine (ZANAFLEX) 4 MG tablet take 1 tablet by mouth every 6 hours if needed for BACK PAIN Yes HAYDEE Hess - CNP   montelukast (SINGULAIR) 10 MG tablet take 1 tablet by mouth every evening Yes HAYDEE Hess - CNP   Vitamin D, Cholecalciferol, 25 MCG (1000 UT) CAPS Take 1,000 Units by mouth daily Yes Historical Provider, MD   acetaminophen (TYLENOL) 500 MG tablet Take 1,000 mg by mouth 3 times daily as needed for Pain Yes Historical Provider, MD   famotidine (PEPCID) 20 MG tablet Take 1 tablet by mouth nightly as needed (acid reflux) Yes HAYDEE Hess - CNP   ondansetron (ZOFRAN ODT) 4 MG disintegrating tablet Take 1 tablet by mouth every 6 hours as needed for Nausea or Vomiting  Patient not taking: Reported on 9/15/2020  Flo Santiago MD   Bacillus Coagulans-Inulin (PROBIOTIC FORMULA) 1-250 BILLION-MG CAPS take 1 capsule by mouth once daily  HAYDEE Shrestha - CNP       Social History     Tobacco Use    Smoking status: Current Every Day Smoker     Packs/day: 0.50     Types: Cigarettes    Smokeless tobacco: Never Used   Substance Use Topics    Alcohol use: Not Currently     Alcohol/week: 0.0 standard drinks    Drug use: No        Allergies   Allergen Reactions    Dye [Iodides] Shortness Of Breath     Other reaction(s): Unknown    Dye  [Barium-Containing Compounds] Hives     Other reaction(s): Vomiting  IVP dye    Fentanyl      Pt never wants this again as it caused her hair and teeth to fall out.       Oxycodone Other (See Comments)     Does not like how they make her feel    Tylenol With Codeine #3 [Acetaminophen-Codeine] Nausea And Vomiting     Can take plain tylenol   ,   Past Medical History:   Diagnosis Date    Anxiety 10/18/2018    Chronic midline low back pain without sciatica 8/29/2017    Depression     Endometriosis     had hyst for this    Essential hypertension 3/8/2016    Gastroesophageal reflux disease without esophagitis 3/8/2016    H/O menorrhagia     had hyst    History of stress incontinence     had surgery    Hydronephrosis of right kidney 12/27/2017    Obesity 4/10/2017    Osteophyte, vertebrae     thoracic    Seasonal allergies 3/8/2016   ,   Past Surgical History:   Procedure Laterality Date    ABDOMEN SURGERY      seroma removed     SECTION      ELBOW JOINT FUSION Right     HERNIA REPAIR      umbilical    HYSTERECTOMY VAGINAL      ovaries remain    INCONTINENCE SURGERY      KNEE ARTHROSCOPY Right 3/10/2020    KNEE ARTHROSCOPY PARITAL MEDIAL MENISECTOMY performed by Gómez Singh MD at [address]      rods & cage inserted L5-S1, fusion & decompression    SPINE SURGERY      revision because cage had backed out and severed a nerve, cage restabilized   ,   Social History     Tobacco Use    Smoking status: Current Every Day Smoker     Packs/day: 0.50     Types: Cigarettes    Smokeless tobacco: Never Used   Substance Use Topics    Alcohol use: Not Currently     Alcohol/week: 0.0 standard drinks    Drug use: No   ,   Family History   Problem Relation Age of Onset    Eczema Mother     COPD Mother     Liver Disease Mother     Heart Disease Mother     Atrial Fibrillation Maternal Grandfather     Heart Attack Paternal Grandfather        PHYSICAL EXAMINATION:     Patient-Reported Vitals 9/15/2020   Patient-Reported Weight 225   Patient-Reported Height 5'3   Patient-Reported Temperature 99          Constitutional: [x] Appears well-developed and well-nourished [x] No apparent distress      [] Abnormal-   Mental status  [x] Alert and awake  [x] Oriented to person/place/time []Able to follow commands      Eyes:  EOM    [x]  Normal  [] Abnormal-  Sclera  [x]  Normal  [] Abnormal -         Discharge []  None visible  [] Abnormal -    HENT:   [x] Normocephalic, atraumatic.   [] Abnormal   [] Mouth/Throat: Mucous membranes are moist.     External Ears [] Normal  [] Abnormal-     Neck: [x] No visualized mass     Pulmonary/Chest: [x] Respiratory effort normal.  [x] No visualized signs of difficulty breathing or respiratory distress        [] Abnormal-      Musculoskeletal:   [] Normal gait with no signs of ataxia         [x] Normal range of motion of neck        [] Abnormal-       Neurological:        [x] No Facial Asymmetry (Cranial nerve 7 motor function) (limited exam to video visit)          [] No gaze palsy        [] Abnormal-         Skin:        [x] No significant exanthematous lesions or discoloration noted on facial skin         [] Abnormal-            Psychiatric:       [x] Normal Affect [] No Hallucinations        [] Abnormal-     Other pertinent observable physical exam findings-     ASSESSMENT/PLAN:  1. Rib contusion, left, initial encounter    - HYDROcodone-acetaminophen (NORCO) 5-325 MG per tablet; Take 1 tablet by mouth every 6 hours as needed for Pain for up to 5 days. Dispense: 20 tablet; Refill: 0    2. Morbid obesity ([facility] [address]      3. Fall, initial encounter    - XR KNEE LEFT (1-2 VIEWS); Future    4. Acute pain of left knee    - XR KNEE LEFT (1-2 VIEWS); Future      Return in about 6 months (around 3/15/2021), or if symptoms worsen or fail to improve. Yung Stout is a 41 y.o. female being evaluated by a Virtual Visit (video visit) encounter to address concerns as mentioned above. A caregiver was present when appropriate. Due to this being a TeleHealth encounter (During MOXGX-22 public health emergency), evaluation of the following organ systems was limited: Vitals/Constitutional/EENT/Resp/CV/GI//MS/Neuro/Skin/Heme-Lymph-Imm. Pursuant to the emergency declaration under the [facility], [address] [place] [address] authority and the [employer] Act, this Virtual Visit was conducted with patient's (and/or legal guardian's) consent, to reduce the patient's risk of exposure to COVID-19 and provide necessary medical care. The patient (and/or legal guardian) has also been advised to contact this office for worsening conditions or problems, and seek emergency medical treatment and/or call 911 if deemed necessary.      Patient identification was verified at the start of the visit: Yes    Total time spent on this encounter: Not billed by time    Services were provided through a video synchronous discussion virtually to substitute for in-person clinic visit. Patient and provider were located at their individual homes. --Deane Simmonds, APRN - CNP on 9/15/2020 at 8:27 PM    An electronic signature was used to authenticate this note.

## 2020-09-26 RX ORDER — FAMOTIDINE 20 MG/1
TABLET, FILM COATED ORAL
Qty: 90 TABLET | Refills: 1 | Status: SHIPPED | OUTPATIENT
Start: 2020-09-26 | End: 2021-05-13 | Stop reason: SDUPTHER

## 2020-09-30 ENCOUNTER — PATIENT MESSAGE (OUTPATIENT)
Dept: FAMILY MEDICINE CLINIC | Age: 45
End: 2020-09-30

## 2020-10-01 NOTE — TELEPHONE ENCOUNTER
From: Jyoti Reilly  To: Florencia Vailnusrat, APRN - CNP  Sent: 9/30/2020 3:07 PM EDT  Subject: Non-Urgent Filomena Perham Dr. Jasbir Downey. I have alot of pain still from falling I wanted to know if I could have more pain medication. I have also called my back Dr so he can see if the fall did anything to my back. I haven't been able to sleep last two nights I can't get pain to be quite so I can get comfortable to sleep. Thank you. Eloisa Garg [phone].

## 2020-10-06 RX ORDER — TIZANIDINE 4 MG/1
TABLET ORAL
Qty: 120 TABLET | Refills: 1 | Status: SHIPPED | OUTPATIENT
Start: 2020-10-06 | End: 2020-12-09

## 2020-10-06 NOTE — TELEPHONE ENCOUNTER
Last visit: 4/30/2020  Last Med refill: 9/3/2020  Does patient have enough medication for 72 hours: Yes    Next Visit Date:  No future appointments. Health Maintenance   Topic Date Due    A1C test (Diabetic or Prediabetic)  10/01/2020    Potassium monitoring  05/20/2021    Creatinine monitoring  05/20/2021    Lipid screen  08/12/2024    DTaP/Tdap/Td vaccine (2 - Td) 04/10/2025    Flu vaccine  Completed    Pneumococcal 0-64 years Vaccine  Completed    HIV screen  Completed    Hepatitis A vaccine  Aged Out    Hepatitis B vaccine  Aged Out    Hib vaccine  Aged Out    Meningococcal (ACWY) vaccine  Aged Out       Hemoglobin A1C (%)   Date Value   10/01/2019 5.7   07/22/2016 4.9             ( goal A1C is < 7)   Microalb/Crt.  Ratio (mcg/mg creat)   Date Value   10/01/2019 7     LDL Cholesterol (mg/dL)   Date Value   08/12/2019 91   04/10/2017 130       (goal LDL is <100)   AST (U/L)   Date Value   05/20/2020 13     ALT (U/L)   Date Value   05/20/2020 14     BUN (mg/dL)   Date Value   05/20/2020 8     BP Readings from Last 3 Encounters:   09/11/20 (!) 165/66   05/20/20 119/79   03/15/20 (!) 188/84          (goal 120/80)    All Future Testing planned in [facility]  Lab Frequency Next Occurrence   XR KNEE LEFT (1-2 VIEWS) Once 09/15/2021               Patient Active Problem List:     Essential hypertension     Gastroesophageal reflux disease without esophagitis     Seasonal allergies     Smoker     Alcohol use disorder, mild, abuse     Obesity, morbid, BMI 40.0-49.9 ([facility])     Failed back syndrome, lumbar     Chronic midline low back pain without sciatica     Bronchitis     Neural foraminal stenosis of lumbar spine     Pyelonephritis     Hydronephrosis of right kidney     Psychophysiological insomnia     Anxiety     Depression     History of partial hysterectomy     Hot flashes     Snores     History of sleep apnea     Other fatigue     Goiter

## 2020-12-09 RX ORDER — TIZANIDINE 4 MG/1
TABLET ORAL
Qty: 120 TABLET | Refills: 1 | Status: SHIPPED | OUTPATIENT
Start: 2020-12-09 | End: 2021-02-09 | Stop reason: SDUPTHER

## 2021-02-02 DIAGNOSIS — J30.2 SEASONAL ALLERGIES: ICD-10-CM

## 2021-02-02 NOTE — TELEPHONE ENCOUNTER
Please Approve or Refuse.   Send to Pharmacy per Pt's Request:      Next Visit Date:  Visit date not found   Last Visit Date: 9/15/2020    Hemoglobin A1C (%)   Date Value   10/01/2019 5.7   07/22/2016 4.9             ( goal A1C is < 7)   BP Readings from Last 3 Encounters:   09/11/20 (!) 165/66   05/20/20 119/79   03/15/20 (!) 188/84          (goal 120/80)  BUN   Date Value Ref Range Status   05/20/2020 8 6 - 20 mg/dL Final     CREATININE   Date Value Ref Range Status   05/20/2020 0.53 0.50 - 0.90 mg/dL Final     Potassium   Date Value Ref Range Status   05/20/2020 3.7 3.7 - 5.3 mmol/L Final

## 2021-02-03 RX ORDER — MONTELUKAST SODIUM 10 MG/1
TABLET ORAL
Qty: 90 TABLET | Refills: 1 | Status: SHIPPED | OUTPATIENT
Start: 2021-02-03 | End: 2021-03-05 | Stop reason: SDUPTHER

## 2021-02-04 DIAGNOSIS — I10 ESSENTIAL HYPERTENSION: ICD-10-CM

## 2021-02-04 RX ORDER — OLMESARTAN MEDOXOMIL 40 MG/1
TABLET ORAL
Qty: 90 TABLET | Refills: 1 | OUTPATIENT
Start: 2021-02-04

## 2021-02-09 DIAGNOSIS — I10 ESSENTIAL HYPERTENSION: ICD-10-CM

## 2021-02-09 NOTE — TELEPHONE ENCOUNTER
Please Approve or Refuse.   Send to Pharmacy per Pt's Request:      Next Visit Date:  02/15/2021   Last Visit Date: 9/15/2020    Hemoglobin A1C (%)   Date Value   10/01/2019 5.7   07/22/2016 4.9             ( goal A1C is < 7)   BP Readings from Last 3 Encounters:   09/11/20 (!) 165/66   05/20/20 119/79   03/15/20 (!) 188/84          (goal 120/80)  BUN   Date Value Ref Range Status   05/20/2020 8 6 - 20 mg/dL Final     CREATININE   Date Value Ref Range Status   05/20/2020 0.53 0.50 - 0.90 mg/dL Final     Potassium   Date Value Ref Range Status   05/20/2020 3.7 3.7 - 5.3 mmol/L Final

## 2021-02-10 RX ORDER — TIZANIDINE 4 MG/1
TABLET ORAL
Qty: 120 TABLET | Refills: 0 | Status: SHIPPED | OUTPATIENT
Start: 2021-02-10 | End: 2021-03-06 | Stop reason: SDUPTHER

## 2021-02-10 RX ORDER — OLMESARTAN MEDOXOMIL 40 MG/1
TABLET ORAL
Qty: 30 TABLET | Refills: 0 | Status: SHIPPED | OUTPATIENT
Start: 2021-02-10 | End: 2021-03-06

## 2021-03-05 DIAGNOSIS — M54.50 CHRONIC MIDLINE LOW BACK PAIN WITHOUT SCIATICA: ICD-10-CM

## 2021-03-05 DIAGNOSIS — G89.29 CHRONIC MIDLINE LOW BACK PAIN WITHOUT SCIATICA: ICD-10-CM

## 2021-03-05 DIAGNOSIS — I10 ESSENTIAL HYPERTENSION: ICD-10-CM

## 2021-03-05 DIAGNOSIS — J30.2 SEASONAL ALLERGIC RHINITIS: ICD-10-CM

## 2021-03-05 DIAGNOSIS — J30.2 SEASONAL ALLERGIES: ICD-10-CM

## 2021-03-05 NOTE — TELEPHONE ENCOUNTER
Please Approve or Refuse.   Send to Pharmacy per Pt's Request:      Next Visit Date:  Visit date not found   Last Visit Date: 2/15/2021    Hemoglobin A1C (%)   Date Value   10/01/2019 5.7   07/22/2016 4.9             ( goal A1C is < 7)   BP Readings from Last 3 Encounters:   09/11/20 (!) 165/66   05/20/20 119/79   03/15/20 (!) 188/84          (goal 120/80)  BUN   Date Value Ref Range Status   05/20/2020 8 6 - 20 mg/dL Final     CREATININE   Date Value Ref Range Status   05/20/2020 0.53 0.50 - 0.90 mg/dL Final     Potassium   Date Value Ref Range Status   05/20/2020 3.7 3.7 - 5.3 mmol/L Final

## 2021-03-06 RX ORDER — LORATADINE 10 MG/1
TABLET ORAL
Qty: 90 TABLET | Refills: 1 | Status: SHIPPED | OUTPATIENT
Start: 2021-03-06 | End: 2021-05-13 | Stop reason: SDUPTHER

## 2021-03-06 RX ORDER — MELOXICAM 15 MG/1
TABLET ORAL
Qty: 90 TABLET | Refills: 1 | Status: SHIPPED | OUTPATIENT
Start: 2021-03-06 | End: 2021-05-13 | Stop reason: SDUPTHER

## 2021-03-06 RX ORDER — TRIAMTERENE AND HYDROCHLOROTHIAZIDE 37.5; 25 MG/1; MG/1
TABLET ORAL
Qty: 90 TABLET | Refills: 1 | Status: SHIPPED | OUTPATIENT
Start: 2021-03-06 | End: 2021-05-13 | Stop reason: SDUPTHER

## 2021-03-06 RX ORDER — MONTELUKAST SODIUM 10 MG/1
TABLET ORAL
Qty: 90 TABLET | Refills: 1 | Status: SHIPPED | OUTPATIENT
Start: 2021-03-06 | End: 2021-05-13 | Stop reason: SDUPTHER

## 2021-03-06 RX ORDER — OLMESARTAN MEDOXOMIL 40 MG/1
TABLET ORAL
Qty: 90 TABLET | Refills: 1 | Status: SHIPPED | OUTPATIENT
Start: 2021-03-06 | End: 2021-05-13 | Stop reason: SDUPTHER

## 2021-03-06 RX ORDER — TIZANIDINE 4 MG/1
TABLET ORAL
Qty: 120 TABLET | Refills: 0 | Status: SHIPPED | OUTPATIENT
Start: 2021-03-06 | End: 2021-04-05

## 2021-03-09 ENCOUNTER — PATIENT MESSAGE (OUTPATIENT)
Dept: FAMILY MEDICINE CLINIC | Age: 46
End: 2021-03-09

## 2021-03-09 NOTE — TELEPHONE ENCOUNTER
From: Agnes Echols  To: HAYDEE Rodriges - CNP  Sent: 3/9/2021 4:40 PM EST  Subject: Prescription Question    Hi I have to go out of town on Friday the 12th but i need both of my back medicine refilled I really need to take them with me could you please refill them? I will definitely schedule an E visit with you tomorrow but i can't go without those medications. Thank you.

## 2021-04-05 RX ORDER — TIZANIDINE 4 MG/1
TABLET ORAL
Qty: 120 TABLET | Refills: 0 | Status: SHIPPED | OUTPATIENT
Start: 2021-04-05 | End: 2021-05-13 | Stop reason: SDUPTHER

## 2021-04-08 ENCOUNTER — TELEPHONE (OUTPATIENT)
Dept: ADMINISTRATIVE | Age: 46
End: 2021-04-08

## 2021-05-06 RX ORDER — ASPIRIN 81 MG
TABLET, DELAYED RELEASE (ENTERIC COATED) ORAL
Qty: 90 TABLET | Refills: 1 | OUTPATIENT
Start: 2021-05-06

## 2021-05-06 NOTE — TELEPHONE ENCOUNTER
Please Approve or Refuse.   Send to Pharmacy per Pt's Request:      Next Visit Date:  5/6/2021   Last Visit Date: 9/15/2020    Hemoglobin A1C (%)   Date Value   10/01/2019 5.7   07/22/2016 4.9             ( goal A1C is < 7)   BP Readings from Last 3 Encounters:   09/11/20 (!) 165/66   05/20/20 119/79   03/15/20 (!) 188/84          (goal 120/80)  BUN   Date Value Ref Range Status   05/20/2020 8 6 - 20 mg/dL Final     CREATININE   Date Value Ref Range Status   05/20/2020 0.53 0.50 - 0.90 mg/dL Final     Potassium   Date Value Ref Range Status   05/20/2020 3.7 3.7 - 5.3 mmol/L Final

## 2021-05-13 ENCOUNTER — VIRTUAL VISIT (OUTPATIENT)
Dept: FAMILY MEDICINE CLINIC | Age: 46
End: 2021-05-13
Payer: MEDICARE

## 2021-05-13 DIAGNOSIS — E66.01 MORBID OBESITY (HCC): ICD-10-CM

## 2021-05-13 DIAGNOSIS — M96.1 FAILED BACK SYNDROME, LUMBAR: ICD-10-CM

## 2021-05-13 DIAGNOSIS — G89.29 CHRONIC MIDLINE LOW BACK PAIN WITHOUT SCIATICA: ICD-10-CM

## 2021-05-13 DIAGNOSIS — J30.2 SEASONAL ALLERGIES: ICD-10-CM

## 2021-05-13 DIAGNOSIS — M54.50 CHRONIC MIDLINE LOW BACK PAIN WITHOUT SCIATICA: ICD-10-CM

## 2021-05-13 DIAGNOSIS — K21.9 GASTROESOPHAGEAL REFLUX DISEASE WITHOUT ESOPHAGITIS: ICD-10-CM

## 2021-05-13 DIAGNOSIS — J30.2 SEASONAL ALLERGIC RHINITIS, UNSPECIFIED TRIGGER: ICD-10-CM

## 2021-05-13 DIAGNOSIS — I10 ESSENTIAL HYPERTENSION: Primary | ICD-10-CM

## 2021-05-13 PROBLEM — F10.10 ALCOHOL USE DISORDER, MILD, ABUSE: Status: RESOLVED | Noted: 2017-04-10 | Resolved: 2021-05-13

## 2021-05-13 PROCEDURE — 99213 OFFICE O/P EST LOW 20 MIN: CPT | Performed by: NURSE PRACTITIONER

## 2021-05-13 PROCEDURE — G8427 DOCREV CUR MEDS BY ELIG CLIN: HCPCS | Performed by: NURSE PRACTITIONER

## 2021-05-13 RX ORDER — MONTELUKAST SODIUM 10 MG/1
TABLET ORAL
Qty: 90 TABLET | Refills: 1 | Status: SHIPPED | OUTPATIENT
Start: 2021-05-13 | End: 2022-02-14 | Stop reason: SDUPTHER

## 2021-05-13 RX ORDER — TRIAMTERENE AND HYDROCHLOROTHIAZIDE 37.5; 25 MG/1; MG/1
TABLET ORAL
Qty: 90 TABLET | Refills: 1 | Status: SHIPPED | OUTPATIENT
Start: 2021-05-13 | End: 2021-08-05 | Stop reason: ALTCHOICE

## 2021-05-13 RX ORDER — LORATADINE 10 MG/1
TABLET ORAL
Qty: 90 TABLET | Refills: 1 | Status: SHIPPED | OUTPATIENT
Start: 2021-05-13 | End: 2021-08-17 | Stop reason: ALTCHOICE

## 2021-05-13 RX ORDER — ONDANSETRON 4 MG/1
4 TABLET, ORALLY DISINTEGRATING ORAL EVERY 6 HOURS PRN
Qty: 12 TABLET | Refills: 0 | Status: CANCELLED | OUTPATIENT
Start: 2021-05-13

## 2021-05-13 RX ORDER — OLMESARTAN MEDOXOMIL 40 MG/1
TABLET ORAL
Qty: 90 TABLET | Refills: 1 | Status: SHIPPED | OUTPATIENT
Start: 2021-05-13 | End: 2021-08-05 | Stop reason: SDUPTHER

## 2021-05-13 RX ORDER — ASPIRIN 81 MG
TABLET, DELAYED RELEASE (ENTERIC COATED) ORAL
Qty: 90 TABLET | Refills: 1 | Status: SHIPPED | OUTPATIENT
Start: 2021-05-13 | End: 2021-12-02 | Stop reason: SDUPTHER

## 2021-05-13 RX ORDER — MELOXICAM 15 MG/1
TABLET ORAL
Qty: 90 TABLET | Refills: 1 | Status: SHIPPED | OUTPATIENT
Start: 2021-05-13 | End: 2022-03-14 | Stop reason: SDUPTHER

## 2021-05-13 RX ORDER — FAMOTIDINE 20 MG/1
TABLET, FILM COATED ORAL
Qty: 90 TABLET | Refills: 1 | Status: SHIPPED | OUTPATIENT
Start: 2021-05-13 | End: 2021-11-19 | Stop reason: SDUPTHER

## 2021-05-13 RX ORDER — TIZANIDINE 4 MG/1
TABLET ORAL
Qty: 120 TABLET | Refills: 1 | Status: SHIPPED | OUTPATIENT
Start: 2021-05-13 | End: 2021-07-07

## 2021-05-13 RX ORDER — B-COMPLEX WITH VITAMIN C
1 TABLET ORAL DAILY
Qty: 30 TABLET | Refills: 5 | Status: SHIPPED | OUTPATIENT
Start: 2021-05-13 | End: 2021-12-02 | Stop reason: SDUPTHER

## 2021-05-13 ASSESSMENT — ENCOUNTER SYMPTOMS
BACK PAIN: 1
RESPIRATORY NEGATIVE: 1
COUGH: 0

## 2021-05-13 ASSESSMENT — COLUMBIA-SUICIDE SEVERITY RATING SCALE - C-SSRS: 2. HAVE YOU ACTUALLY HAD ANY THOUGHTS OF KILLING YOURSELF?: NO

## 2021-05-13 ASSESSMENT — PATIENT HEALTH QUESTIONNAIRE - PHQ9
7. TROUBLE CONCENTRATING ON THINGS, SUCH AS READING THE NEWSPAPER OR WATCHING TELEVISION: 3
3. TROUBLE FALLING OR STAYING ASLEEP: 0
SUM OF ALL RESPONSES TO PHQ QUESTIONS 1-9: 11
6. FEELING BAD ABOUT YOURSELF - OR THAT YOU ARE A FAILURE OR HAVE LET YOURSELF OR YOUR FAMILY DOWN: 0
2. FEELING DOWN, DEPRESSED OR HOPELESS: 3
10. IF YOU CHECKED OFF ANY PROBLEMS, HOW DIFFICULT HAVE THESE PROBLEMS MADE IT FOR YOU TO DO YOUR WORK, TAKE CARE OF THINGS AT HOME, OR GET ALONG WITH OTHER PEOPLE: 2
8. MOVING OR SPEAKING SO SLOWLY THAT OTHER PEOPLE COULD HAVE NOTICED. OR THE OPPOSITE, BEING SO FIGETY OR RESTLESS THAT YOU HAVE BEEN MOVING AROUND A LOT MORE THAN USUAL: 0

## 2021-05-13 NOTE — PROGRESS NOTES
TELEHEALTH EVALUATION -- Audio/Visual (During PRTSA-30 public health emergency)      SUBJECTIVE/OBJECTIVE:  Peyton Jackman (:  1975) has requested an audio/video evaluation for the following concern(s):Medication Refill      VV with Paddy Beckett for HTN. HTN- BP is well controlled today. Compliant with taking medications. Denies chest pain, dyspnea, edema, or HA. Was at gyn office today and had BP checked. Lumbar pain-using mobic, acetaminophen, and tizanidine for her low back pain. Pain is slightly controlled. Knows she will live with pain for the rest of her life. Does affect her mood but she does not feel she needs anything for her mood. GERD-using pepcid with good relief. PHQ Scores 2021 2019 10/18/2018 2018 4/10/2017   PHQ2 Score 5 6 6 4 4   PHQ9 Score 11 22 25 19 25     Interpretation of Total Score Depression Severity: 1-4 = Minimal depression, 5-9 = Mild depression, 10-14 = Moderate depression, 15-19 = Moderately severe depression, 20-27 = Severe depression    Ht Readings from Last 3 Encounters:   20 5' 3\" (1.6 m)   20 5' 2\" (1.575 m)   20 5' 3\" (1.6 m)     Wt Readings from Last 3 Encounters:   20 225 lb (102.1 kg)   20 240 lb (108.9 kg)   20 241 lb (109.3 kg)         Review of Systems   Constitutional: Negative. HENT: Negative. Respiratory: Negative. Negative for cough. Cardiovascular: Negative. Negative for chest pain and palpitations. Musculoskeletal: Positive for arthralgias, back pain and gait problem. Skin: Negative. Psychiatric/Behavioral: Negative. Prior to Visit Medications    Medication Sig Taking?  Authorizing Provider   famotidine (PEPCID) 20 MG tablet take 1 tablet by mouth nightly if needed Yes HAYDEE Hess CNP   loratadine (CLARITIN) 10 MG tablet take 1 tablet by mouth once daily Yes HAYDEE Hess CNP   meloxicam (MOBIC) 15 MG tablet take 1 tablet by mouth once daily Yes HAYDEE Hess - CNP   montelukast (SINGULAIR) 10 MG tablet take 1 tablet by mouth every evening Yes HAYDEE Hess CNP   Multiple Vitamins-Minerals (MULTIVITAMIN-MINERALS) TABS tablet take 1 tablet by mouth once daily Yes HAYDEE Hess - CNP   olmesartan (BENICAR) 40 MG tablet take 1 tablet by mouth once daily Yes HAYDEE Hess CNP   tiZANidine (ZANAFLEX) 4 MG tablet Take one by mouth every 6 hours if needed for back pain. Yes HAYDEE Hess CNP   triamterene-hydroCHLOROthiazide (MAXZIDE-25) 37.5-25 MG per tablet take 1 tablet by mouth once daily Yes HAYDEE Hess CNP   Calcium Carbonate-Vitamin D (OYSTER SHELL CALCIUM/D) 500-200 MG-UNIT TABS Take 1 tablet by mouth daily Yes HAYDEE Hess CNP   ondansetron (ZOFRAN ODT) 4 MG disintegrating tablet Take 1 tablet by mouth every 6 hours as needed for Nausea or Vomiting Yes Sherif Watson MD       Social History     Tobacco Use    Smoking status: Current Every Day Smoker     Packs/day: 0.50     Types: Cigarettes    Smokeless tobacco: Never Used   Substance Use Topics    Alcohol use: Not Currently     Alcohol/week: 0.0 standard drinks    Drug use: No          PHYSICAL EXAMINATION:    Vital Signs: (As obtained by patient/caregiver or practitioner observation)    Patient-Reported Vitals 5/13/2021   Patient-Reported Weight -   Patient-Reported Height -   Patient-Reported Systolic 654   Patient-Reported Diastolic 88   Patient-Reported Temperature -                 Constitutional: [x] Appears well-developed and well-nourished [x] No apparent distress      [] Abnormal-       Mental status  [x] Alert and awake  [x] Oriented to person/place/time [x]Able to follow commands      Eyes:  EOM    [x]  Normal  [] Abnormal-  Sclera  [x]  Normal  [] Abnormal -         Discharge [x]  None visible  [] Abnormal -    HENT:   [x] Normocephalic, atraumatic.   [] Abnormal   [x] Mouth/Throat: Mucous membranes are moist. External Ears [x] Normal  [] Abnormal-     Neck: [x] No visualized mass     Pulmonary/Chest: [x] Respiratory effort normal.  [x] No visualized signs of difficulty breathing or respiratory distress        [] Abnormal        Musculoskeletal:   [] Normal gait with no signs of ataxia         [x] Normal range of motion of neck        [] Abnormal-       Neurological:        [x] No Facial Asymmetry (Cranial nerve 7 motor function) (limited exam to video visit)          [x] No gaze palsy        [] Abnormal-            Skin:        [x] No significant exanthematous lesions or discoloration noted on facial skin         [] Abnormal-            Psychiatric:      [x] No Hallucinations       [x]Mood is normal      [x]Behavior is normal      [x]Judgment is normal      [x]Thought content is normal       [] Abnormal-     Other pertinent observable physical exam findings-    Due to this being a TeleHealth encounter, evaluation of the following organ systems is limited: Vitals/Constitutional/EENT/Resp/CV/GI//MS/Neuro/Skin/Heme-Lymph-Imm.       Lab Results   Component Value Date    WBC 14.7 (H) 05/20/2020    HGB 11.9 05/20/2020    HCT 36.3 05/20/2020    MCV 98.1 05/20/2020     05/20/2020       Lab Results   Component Value Date     05/20/2020    K 3.7 05/20/2020     05/20/2020    CO2 25 05/20/2020    BUN 8 05/20/2020    CREATININE 0.53 05/20/2020    GLUCOSE 98 05/20/2020    CALCIUM 8.8 05/20/2020        Lab Results   Component Value Date    ALT 14 05/20/2020    AST 13 05/20/2020    ALKPHOS 76 05/20/2020    BILITOT 0.23 (L) 05/20/2020       Lab Results   Component Value Date    TSH 0.84 10/01/2019       Lab Results   Component Value Date    CHOL 159 08/12/2019    CHOL 202 (H) 04/10/2017    CHOL 193 03/08/2016     Lab Results   Component Value Date    TRIG 183 (H) 08/12/2019    TRIG 85 04/10/2017    TRIG 82 03/08/2016     Lab Results   Component Value Date    HDL 31 (L) 08/12/2019    HDL 55 04/10/2017    HDL 47 03/08/2016     Lab Results   Component Value Date    LDLCHOLESTEROL 91 08/12/2019    LDLCHOLESTEROL 130 04/10/2017    LDLCHOLESTEROL 130 03/08/2016       Lab Results   Component Value Date    CHOLHDLRATIO 5.1 (H) 08/12/2019    CHOLHDLRATIO 3.7 04/10/2017    CHOLHDLRATIO 4.1 03/08/2016       Lab Results   Component Value Date    LABA1C 5.7 10/01/2019    LABA1C 4.9 07/22/2016         Lab Results   Component Value Date    NENALGCV96 581 10/01/2019       Lab Results   Component Value Date    VITD25 33.4 10/01/2019       Orders Placed This Encounter   Medications    famotidine (PEPCID) 20 MG tablet     Sig: take 1 tablet by mouth nightly if needed     Dispense:  90 tablet     Refill:  1    loratadine (CLARITIN) 10 MG tablet     Sig: take 1 tablet by mouth once daily     Dispense:  90 tablet     Refill:  1    meloxicam (MOBIC) 15 MG tablet     Sig: take 1 tablet by mouth once daily     Dispense:  90 tablet     Refill:  1    montelukast (SINGULAIR) 10 MG tablet     Sig: take 1 tablet by mouth every evening     Dispense:  90 tablet     Refill:  1    Multiple Vitamins-Minerals (MULTIVITAMIN-MINERALS) TABS tablet     Sig: take 1 tablet by mouth once daily     Dispense:  90 tablet     Refill:  1    olmesartan (BENICAR) 40 MG tablet     Sig: take 1 tablet by mouth once daily     Dispense:  90 tablet     Refill:  1    tiZANidine (ZANAFLEX) 4 MG tablet     Sig: Take one by mouth every 6 hours if needed for back pain.      Dispense:  120 tablet     Refill:  1    triamterene-hydroCHLOROthiazide (MAXZIDE-25) 37.5-25 MG per tablet     Sig: take 1 tablet by mouth once daily     Dispense:  90 tablet     Refill:  1    Calcium Carbonate-Vitamin D (OYSTER SHELL CALCIUM/D) 500-200 MG-UNIT TABS     Sig: Take 1 tablet by mouth daily     Dispense:  30 tablet     Refill:  5       Orders Placed This Encounter   Procedures    Basic Metabolic Panel     Standing Status:   Future     Standing Expiration Date:   5/13/2022    CBC

## 2021-05-13 NOTE — PROGRESS NOTES
Visit Information    Have you changed or started any medications since your last visit including any over-the-counter medicines, vitamins, or herbal medicines? no   Are you having any side effects from any of your medications? -  no  Have you stopped taking any of your medications? Is so, why? -  no    Have you seen any other physician or provider since your last visit? Yes - Records Obtained  Have you had any other diagnostic tests since your last visit? No  Have you been seen in the emergency room and/or had an admission to a hospital since we last saw you? No  Have you had your routine dental cleaning in the past 6 months? yes -    Have you activated your [employer] account? If not, what are your barriers?  Yes     Patient Care Team:  HAYDEE Marte CNP as PCP - General (Certified Nurse Practitioner)  HAYDEE Marte CNP as PCP - [facility]  Zain Brar MD as Obstetrician (Obstetrics & Gynecology)  Brian Schmidt MD as Consulting Physician (Neurosurgery)  HAYDEE Barrow CNP as Nurse Practitioner (Obstetrics & Gynecology)    Medical History Review  Past Medical, Family, and Social History reviewed and does contribute to the patient presenting condition    Health Maintenance   Topic Date Due    Hepatitis C screen  Never done    COVID-19 Vaccine (1) Never done    A1C test (Diabetic or Prediabetic)  10/01/2020    Potassium monitoring  05/20/2021    Creatinine monitoring  05/20/2021    Lipid screen  08/12/2024    DTaP/Tdap/Td vaccine (2 - Td) 04/10/2025    Flu vaccine  Completed    Pneumococcal 0-64 years Vaccine  Completed    HIV screen  Completed    Hepatitis A vaccine  Aged Out    Hepatitis B vaccine  Aged Out    Hib vaccine  Aged Out    Meningococcal (ACWY) vaccine  Aged Out     Chief Complaint   Patient presents with    Medication Refill

## 2021-05-19 ENCOUNTER — HOSPITAL ENCOUNTER (OUTPATIENT)
Age: 46
Discharge: HOME | End: 2021-05-19
Payer: MEDICARE

## 2021-05-19 DIAGNOSIS — I10 ESSENTIAL HYPERTENSION: ICD-10-CM

## 2021-05-19 DIAGNOSIS — K21.9 GASTROESOPHAGEAL REFLUX DISEASE WITHOUT ESOPHAGITIS: ICD-10-CM

## 2021-05-19 DIAGNOSIS — E66.01 MORBID OBESITY (HCC): ICD-10-CM

## 2021-05-19 LAB
ANION GAP SERPL CALCULATED.3IONS-SCNC: 16 MMOL/L (ref 9–17)
BUN BLDV-MCNC: 13 MG/DL (ref 6–20)
BUN/CREAT BLD: ABNORMAL (ref 9–20)
CALCIUM SERPL-MCNC: 9.5 MG/DL (ref 8.6–10.4)
CHLORIDE BLD-SCNC: 101 MMOL/L (ref 98–107)
CHOLESTEROL, FASTING: 204 MG/DL
CHOLESTEROL/HDL RATIO: 4.3
CO2: 21 MMOL/L (ref 20–31)
CREAT SERPL-MCNC: 0.46 MG/DL (ref 0.5–0.9)
ESTRADIOL LEVEL: 97 PG/ML (ref 27–314)
FOLLICLE STIMULATING HORMONE: 6.6 U/L (ref 1.7–21.5)
GFR AFRICAN AMERICAN: >60 ML/MIN
GFR NON-AFRICAN AMERICAN: >60 ML/MIN
GFR SERPL CREATININE-BSD FRML MDRD: ABNORMAL ML/MIN/{1.73_M2}
GFR SERPL CREATININE-BSD FRML MDRD: ABNORMAL ML/MIN/{1.73_M2}
GLUCOSE BLD-MCNC: 110 MG/DL (ref 70–99)
HCT VFR BLD CALC: 39.5 % (ref 36.3–47.1)
HDLC SERPL-MCNC: 48 MG/DL
HEMOGLOBIN: 13 G/DL (ref 11.9–15.1)
LDL CHOLESTEROL: 142 MG/DL (ref 0–130)
MCH RBC QN AUTO: 32.1 PG (ref 25.2–33.5)
MCHC RBC AUTO-ENTMCNC: 32.9 G/DL (ref 28.4–34.8)
MCV RBC AUTO: 97.5 FL (ref 82.6–102.9)
NRBC AUTOMATED: 0 PER 100 WBC
PDW BLD-RTO: 12.7 % (ref 11.8–14.4)
PLATELET # BLD: 387 K/UL (ref 138–453)
PMV BLD AUTO: 9.3 FL (ref 8.1–13.5)
POTASSIUM SERPL-SCNC: 3.8 MMOL/L (ref 3.7–5.3)
RBC # BLD: 4.05 M/UL (ref 3.95–5.11)
SODIUM BLD-SCNC: 138 MMOL/L (ref 135–144)
TRIGLYCERIDE, FASTING: 70 MG/DL
VITAMIN D 25-HYDROXY: 47.3 NG/ML (ref 30–100)
VLDLC SERPL CALC-MCNC: ABNORMAL MG/DL (ref 1–30)
WBC # BLD: 18.5 K/UL (ref 3.5–11.3)

## 2021-05-19 PROCEDURE — 83001 ASSAY OF GONADOTROPIN (FSH): CPT

## 2021-05-19 PROCEDURE — 82670 ASSAY OF TOTAL ESTRADIOL: CPT

## 2021-05-19 PROCEDURE — 82306 VITAMIN D 25 HYDROXY: CPT

## 2021-05-19 PROCEDURE — 80048 BASIC METABOLIC PNL TOTAL CA: CPT

## 2021-05-19 PROCEDURE — 85027 COMPLETE CBC AUTOMATED: CPT

## 2021-05-19 PROCEDURE — 80061 LIPID PANEL: CPT

## 2021-05-19 PROCEDURE — 36415 COLL VENOUS BLD VENIPUNCTURE: CPT

## 2021-05-20 ENCOUNTER — TELEPHONE (OUTPATIENT)
Dept: FAMILY MEDICINE CLINIC | Age: 46
End: 2021-05-20

## 2021-05-20 NOTE — TELEPHONE ENCOUNTER
Pt called in regarding labs. I informed her we would call her with the results as soon as you resulted. She said the reason why she's calling is because WBC is high and it has been high for a couple years and she wants to know whats wrong with her.

## 2021-05-27 DIAGNOSIS — D72.829 LEUKOCYTOSIS, UNSPECIFIED TYPE: Primary | ICD-10-CM

## 2021-07-06 DIAGNOSIS — M96.1 FAILED BACK SYNDROME, LUMBAR: ICD-10-CM

## 2021-07-07 RX ORDER — TIZANIDINE 4 MG/1
TABLET ORAL
Qty: 120 TABLET | Refills: 1 | Status: SHIPPED | OUTPATIENT
Start: 2021-07-07 | End: 2021-09-13 | Stop reason: SDUPTHER

## 2021-07-25 ENCOUNTER — APPOINTMENT (OUTPATIENT)
Dept: CT IMAGING | Age: 46
End: 2021-07-25
Payer: MEDICARE

## 2021-07-25 ENCOUNTER — APPOINTMENT (OUTPATIENT)
Dept: GENERAL RADIOLOGY | Age: 46
End: 2021-07-25
Payer: MEDICARE

## 2021-07-25 ENCOUNTER — HOSPITAL ENCOUNTER (OUTPATIENT)
Age: 46
Setting detail: OBSERVATION
Discharge: HOME | End: 2021-07-26
Attending: EMERGENCY MEDICINE | Admitting: INTERNAL MEDICINE
Payer: MEDICARE

## 2021-07-25 DIAGNOSIS — R20.2 PARESTHESIA: ICD-10-CM

## 2021-07-25 DIAGNOSIS — R51.9 ACUTE NONINTRACTABLE HEADACHE, UNSPECIFIED HEADACHE TYPE: ICD-10-CM

## 2021-07-25 DIAGNOSIS — I16.0 HYPERTENSIVE URGENCY: Primary | ICD-10-CM

## 2021-07-25 PROBLEM — I16.1 HYPERTENSIVE EMERGENCY: Status: ACTIVE | Noted: 2021-07-25

## 2021-07-25 LAB
-: ABNORMAL
ABSOLUTE EOS #: 0.39 K/UL (ref 0–0.4)
ABSOLUTE IMMATURE GRANULOCYTE: 0 K/UL (ref 0–0.3)
ABSOLUTE LYMPH #: 4.68 K/UL (ref 1–4.8)
ABSOLUTE MONO #: 0.65 K/UL (ref 0.2–0.8)
ALBUMIN SERPL-MCNC: 4.1 G/DL (ref 3.5–5.2)
ALBUMIN/GLOBULIN RATIO: ABNORMAL (ref 1–2.5)
ALP BLD-CCNC: 93 U/L (ref 35–104)
ALT SERPL-CCNC: 15 U/L (ref 5–33)
AMORPHOUS: ABNORMAL
AMPHETAMINE SCREEN URINE: NEGATIVE
ANION GAP SERPL CALCULATED.3IONS-SCNC: 13 MMOL/L (ref 9–17)
AST SERPL-CCNC: 14 U/L
ATYPICAL LYMPHOCYTE ABSOLUTE COUNT: 0.39 K/UL
ATYPICAL LYMPHOCYTES: 3 %
BACTERIA: ABNORMAL
BARBITURATE SCREEN URINE: NEGATIVE
BASOPHILS # BLD: 1 %
BASOPHILS ABSOLUTE: 0.13 K/UL (ref 0–0.2)
BENZODIAZEPINE SCREEN, URINE: NEGATIVE
BILIRUB SERPL-MCNC: 0.24 MG/DL (ref 0.3–1.2)
BILIRUBIN URINE: NEGATIVE
BNP INTERPRETATION: NORMAL
BUN BLDV-MCNC: 8 MG/DL (ref 6–20)
BUN/CREAT BLD: 19 (ref 9–20)
BUPRENORPHINE URINE: ABNORMAL
CALCIUM SERPL-MCNC: 9.6 MG/DL (ref 8.6–10.4)
CANNABINOID SCREEN URINE: POSITIVE
CASTS UA: ABNORMAL /LPF
CHLORIDE BLD-SCNC: 101 MMOL/L (ref 98–107)
CO2: 24 MMOL/L (ref 20–31)
COCAINE METABOLITE, URINE: NEGATIVE
COLOR: YELLOW
COMMENT UA: ABNORMAL
CREAT SERPL-MCNC: 0.42 MG/DL (ref 0.5–0.9)
CRYSTALS, UA: ABNORMAL /HPF
DIFFERENTIAL TYPE: ABNORMAL
EOSINOPHILS RELATIVE PERCENT: 3 % (ref 1–4)
EPITHELIAL CELLS UA: ABNORMAL /HPF (ref 0–5)
GFR AFRICAN AMERICAN: >60 ML/MIN
GFR NON-AFRICAN AMERICAN: >60 ML/MIN
GFR SERPL CREATININE-BSD FRML MDRD: ABNORMAL ML/MIN/{1.73_M2}
GFR SERPL CREATININE-BSD FRML MDRD: ABNORMAL ML/MIN/{1.73_M2}
GLUCOSE BLD-MCNC: 101 MG/DL (ref 65–105)
GLUCOSE BLD-MCNC: 97 MG/DL (ref 70–99)
GLUCOSE URINE: NEGATIVE
HCT VFR BLD CALC: 38 % (ref 36.3–47.1)
HEMOGLOBIN: 12.6 G/DL (ref 11.9–15.1)
IMMATURE GRANULOCYTES: 0 %
KETONES, URINE: NEGATIVE
LEUKOCYTE ESTERASE, URINE: NEGATIVE
LYMPHOCYTES # BLD: 36 % (ref 24–44)
MCH RBC QN AUTO: 32.1 PG (ref 25.2–33.5)
MCHC RBC AUTO-ENTMCNC: 33.2 G/DL (ref 28.4–34.8)
MCV RBC AUTO: 96.7 FL (ref 82.6–102.9)
MDMA URINE: ABNORMAL
METHADONE SCREEN, URINE: NEGATIVE
METHAMPHETAMINE, URINE: ABNORMAL
MONOCYTES # BLD: 5 % (ref 1–7)
MUCUS: ABNORMAL
NITRITE, URINE: NEGATIVE
NRBC AUTOMATED: 0 PER 100 WBC
OPIATES, URINE: NEGATIVE
OTHER OBSERVATIONS UA: ABNORMAL
OXYCODONE SCREEN URINE: NEGATIVE
PDW BLD-RTO: 13.1 % (ref 11.8–14.4)
PH UA: 8.5 (ref 5–8)
PHENCYCLIDINE, URINE: NEGATIVE
PLATELET # BLD: 321 K/UL (ref 138–453)
PLATELET ESTIMATE: ABNORMAL
PMV BLD AUTO: 9.2 FL (ref 8.1–13.5)
POTASSIUM SERPL-SCNC: 3.8 MMOL/L (ref 3.7–5.3)
PRO-BNP: 62 PG/ML
PROPOXYPHENE, URINE: ABNORMAL
PROTEIN UA: NEGATIVE
RBC # BLD: 3.93 M/UL (ref 3.95–5.11)
RBC # BLD: ABNORMAL 10*6/UL
RBC UA: ABNORMAL /HPF (ref 0–2)
RENAL EPITHELIAL, UA: ABNORMAL /HPF
SEG NEUTROPHILS: 52 % (ref 36–66)
SEGMENTED NEUTROPHILS ABSOLUTE COUNT: 6.76 K/UL (ref 1.8–7.7)
SODIUM BLD-SCNC: 138 MMOL/L (ref 135–144)
SPECIFIC GRAVITY UA: 1.02 (ref 1–1.03)
TEST INFORMATION: ABNORMAL
THYROXINE, FREE: 0.95 NG/DL (ref 0.93–1.7)
TOTAL PROTEIN: 6.9 G/DL (ref 6.4–8.3)
TRICHOMONAS: ABNORMAL
TRICYCLIC ANTIDEPRESSANTS, UR: ABNORMAL
TROPONIN INTERP: NORMAL
TROPONIN INTERP: NORMAL
TROPONIN T: NORMAL NG/ML
TROPONIN T: NORMAL NG/ML
TROPONIN, HIGH SENSITIVITY: <6 NG/L (ref 0–14)
TROPONIN, HIGH SENSITIVITY: <6 NG/L (ref 0–14)
TSH SERPL DL<=0.05 MIU/L-ACNC: 0.18 MIU/L (ref 0.3–5)
TURBIDITY: ABNORMAL
URINE HGB: NEGATIVE
UROBILINOGEN, URINE: NORMAL
WBC # BLD: 13 K/UL (ref 3.5–11.3)
WBC # BLD: ABNORMAL 10*3/UL
WBC UA: ABNORMAL /HPF (ref 0–5)
YEAST: ABNORMAL

## 2021-07-25 PROCEDURE — 70450 CT HEAD/BRAIN W/O DYE: CPT

## 2021-07-25 PROCEDURE — 85025 COMPLETE CBC W/AUTO DIFF WBC: CPT

## 2021-07-25 PROCEDURE — G0378 HOSPITAL OBSERVATION PER HR: HCPCS

## 2021-07-25 PROCEDURE — 99283 EMERGENCY DEPT VISIT LOW MDM: CPT

## 2021-07-25 PROCEDURE — 99222 1ST HOSP IP/OBS MODERATE 55: CPT | Performed by: NURSE PRACTITIONER

## 2021-07-25 PROCEDURE — 82947 ASSAY GLUCOSE BLOOD QUANT: CPT

## 2021-07-25 PROCEDURE — 96376 TX/PRO/DX INJ SAME DRUG ADON: CPT

## 2021-07-25 PROCEDURE — 80307 DRUG TEST PRSMV CHEM ANLYZR: CPT

## 2021-07-25 PROCEDURE — 84484 ASSAY OF TROPONIN QUANT: CPT

## 2021-07-25 PROCEDURE — 2060000000 HC ICU INTERMEDIATE R&B

## 2021-07-25 PROCEDURE — 96375 TX/PRO/DX INJ NEW DRUG ADDON: CPT

## 2021-07-25 PROCEDURE — 6370000000 HC RX 637 (ALT 250 FOR IP): Performed by: NURSE PRACTITIONER

## 2021-07-25 PROCEDURE — 36415 COLL VENOUS BLD VENIPUNCTURE: CPT

## 2021-07-25 PROCEDURE — 2580000003 HC RX 258: Performed by: EMERGENCY MEDICINE

## 2021-07-25 PROCEDURE — 71046 X-RAY EXAM CHEST 2 VIEWS: CPT

## 2021-07-25 PROCEDURE — 80053 COMPREHEN METABOLIC PANEL: CPT

## 2021-07-25 PROCEDURE — 96374 THER/PROPH/DIAG INJ IV PUSH: CPT

## 2021-07-25 PROCEDURE — 81001 URINALYSIS AUTO W/SCOPE: CPT

## 2021-07-25 PROCEDURE — 6360000002 HC RX W HCPCS: Performed by: EMERGENCY MEDICINE

## 2021-07-25 PROCEDURE — 84439 ASSAY OF FREE THYROXINE: CPT

## 2021-07-25 PROCEDURE — 84443 ASSAY THYROID STIM HORMONE: CPT

## 2021-07-25 PROCEDURE — 93005 ELECTROCARDIOGRAM TRACING: CPT | Performed by: EMERGENCY MEDICINE

## 2021-07-25 PROCEDURE — 2500000003 HC RX 250 WO HCPCS: Performed by: NURSE PRACTITIONER

## 2021-07-25 PROCEDURE — 83880 ASSAY OF NATRIURETIC PEPTIDE: CPT

## 2021-07-25 RX ORDER — SODIUM CHLORIDE 0.9 % (FLUSH) 0.9 %
5-40 SYRINGE (ML) INJECTION EVERY 12 HOURS SCHEDULED
Status: DISCONTINUED | OUTPATIENT
Start: 2021-07-25 | End: 2021-07-26 | Stop reason: HOSPADM

## 2021-07-25 RX ORDER — TIZANIDINE 4 MG/1
4 TABLET ORAL EVERY 6 HOURS PRN
Status: DISCONTINUED | OUTPATIENT
Start: 2021-07-25 | End: 2021-07-26 | Stop reason: HOSPADM

## 2021-07-25 RX ORDER — KETOROLAC TROMETHAMINE 15 MG/ML
15 INJECTION, SOLUTION INTRAMUSCULAR; INTRAVENOUS ONCE
Status: COMPLETED | OUTPATIENT
Start: 2021-07-25 | End: 2021-07-25

## 2021-07-25 RX ORDER — HYDRALAZINE HYDROCHLORIDE 20 MG/ML
10 INJECTION INTRAMUSCULAR; INTRAVENOUS ONCE
Status: COMPLETED | OUTPATIENT
Start: 2021-07-25 | End: 2021-07-25

## 2021-07-25 RX ORDER — ACETAMINOPHEN 650 MG/1
650 SUPPOSITORY RECTAL EVERY 6 HOURS PRN
Status: DISCONTINUED | OUTPATIENT
Start: 2021-07-25 | End: 2021-07-26 | Stop reason: HOSPADM

## 2021-07-25 RX ORDER — HYDRALAZINE HYDROCHLORIDE 20 MG/ML
20 INJECTION INTRAMUSCULAR; INTRAVENOUS EVERY 6 HOURS PRN
Status: DISCONTINUED | OUTPATIENT
Start: 2021-07-25 | End: 2021-07-26 | Stop reason: HOSPADM

## 2021-07-25 RX ORDER — FAMOTIDINE 20 MG/1
20 TABLET, FILM COATED ORAL NIGHTLY
Status: DISCONTINUED | OUTPATIENT
Start: 2021-07-25 | End: 2021-07-26 | Stop reason: HOSPADM

## 2021-07-25 RX ORDER — PROCHLORPERAZINE EDISYLATE 5 MG/ML
10 INJECTION INTRAMUSCULAR; INTRAVENOUS ONCE
Status: COMPLETED | OUTPATIENT
Start: 2021-07-25 | End: 2021-07-25

## 2021-07-25 RX ORDER — MELOXICAM 7.5 MG/1
15 TABLET ORAL DAILY
Status: DISCONTINUED | OUTPATIENT
Start: 2021-07-26 | End: 2021-07-26 | Stop reason: HOSPADM

## 2021-07-25 RX ORDER — 0.9 % SODIUM CHLORIDE 0.9 %
1000 INTRAVENOUS SOLUTION INTRAVENOUS ONCE
Status: COMPLETED | OUTPATIENT
Start: 2021-07-25 | End: 2021-07-25

## 2021-07-25 RX ORDER — ONDANSETRON 4 MG/1
4 TABLET, ORALLY DISINTEGRATING ORAL EVERY 6 HOURS PRN
Status: DISCONTINUED | OUTPATIENT
Start: 2021-07-25 | End: 2021-07-26 | Stop reason: HOSPADM

## 2021-07-25 RX ORDER — ACETAMINOPHEN 325 MG/1
650 TABLET ORAL EVERY 6 HOURS PRN
Status: DISCONTINUED | OUTPATIENT
Start: 2021-07-25 | End: 2021-07-26 | Stop reason: HOSPADM

## 2021-07-25 RX ORDER — CETIRIZINE HYDROCHLORIDE 10 MG/1
10 TABLET ORAL DAILY
Status: DISCONTINUED | OUTPATIENT
Start: 2021-07-26 | End: 2021-07-26 | Stop reason: HOSPADM

## 2021-07-25 RX ORDER — CALCIUM CARBONATE/VITAMIN D3 250-3.125
2 TABLET ORAL DAILY
Status: DISCONTINUED | OUTPATIENT
Start: 2021-07-26 | End: 2021-07-26 | Stop reason: HOSPADM

## 2021-07-25 RX ORDER — METOPROLOL TARTRATE 5 MG/5ML
5 INJECTION INTRAVENOUS EVERY 6 HOURS PRN
Status: DISCONTINUED | OUTPATIENT
Start: 2021-07-25 | End: 2021-07-26 | Stop reason: HOSPADM

## 2021-07-25 RX ORDER — MONTELUKAST SODIUM 10 MG/1
10 TABLET ORAL NIGHTLY
Status: DISCONTINUED | OUTPATIENT
Start: 2021-07-25 | End: 2021-07-26 | Stop reason: HOSPADM

## 2021-07-25 RX ORDER — NIFEDIPINE 30 MG/1
60 TABLET, EXTENDED RELEASE ORAL DAILY
Status: DISCONTINUED | OUTPATIENT
Start: 2021-07-25 | End: 2021-07-26 | Stop reason: HOSPADM

## 2021-07-25 RX ORDER — DIPHENHYDRAMINE HYDROCHLORIDE 50 MG/ML
25 INJECTION INTRAMUSCULAR; INTRAVENOUS ONCE
Status: COMPLETED | OUTPATIENT
Start: 2021-07-25 | End: 2021-07-25

## 2021-07-25 RX ORDER — B-COMPLEX WITH VITAMIN C
1 TABLET ORAL DAILY
Status: DISCONTINUED | OUTPATIENT
Start: 2021-07-26 | End: 2021-07-25 | Stop reason: CLARIF

## 2021-07-25 RX ORDER — TRIAMTERENE AND HYDROCHLOROTHIAZIDE 37.5; 25 MG/1; MG/1
1 TABLET ORAL DAILY
Status: DISCONTINUED | OUTPATIENT
Start: 2021-07-26 | End: 2021-07-26 | Stop reason: HOSPADM

## 2021-07-25 RX ORDER — LOSARTAN POTASSIUM 100 MG/1
100 TABLET ORAL DAILY
Status: DISCONTINUED | OUTPATIENT
Start: 2021-07-26 | End: 2021-07-26 | Stop reason: HOSPADM

## 2021-07-25 RX ORDER — SODIUM CHLORIDE 0.9 % (FLUSH) 0.9 %
5-40 SYRINGE (ML) INJECTION PRN
Status: DISCONTINUED | OUTPATIENT
Start: 2021-07-25 | End: 2021-07-26 | Stop reason: HOSPADM

## 2021-07-25 RX ORDER — SODIUM CHLORIDE 9 MG/ML
25 INJECTION, SOLUTION INTRAVENOUS PRN
Status: DISCONTINUED | OUTPATIENT
Start: 2021-07-25 | End: 2021-07-26 | Stop reason: HOSPADM

## 2021-07-25 RX ORDER — M-VIT,TX,IRON,MINS/CALC/FOLIC 27MG-0.4MG
1 TABLET ORAL DAILY
Status: DISCONTINUED | OUTPATIENT
Start: 2021-07-26 | End: 2021-07-26 | Stop reason: HOSPADM

## 2021-07-25 RX ORDER — METOPROLOL TARTRATE 5 MG/5ML
5 INJECTION INTRAVENOUS ONCE
Status: COMPLETED | OUTPATIENT
Start: 2021-07-25 | End: 2021-07-25

## 2021-07-25 RX ADMIN — HYDRALAZINE HYDROCHLORIDE 10 MG: 20 INJECTION INTRAMUSCULAR; INTRAVENOUS at 19:44

## 2021-07-25 RX ADMIN — METOPROLOL TARTRATE 5 MG: 5 INJECTION INTRAVENOUS at 23:32

## 2021-07-25 RX ADMIN — PROCHLORPERAZINE EDISYLATE 10 MG: 5 INJECTION INTRAMUSCULAR; INTRAVENOUS at 21:45

## 2021-07-25 RX ADMIN — HYDRALAZINE HYDROCHLORIDE 10 MG: 20 INJECTION INTRAMUSCULAR; INTRAVENOUS at 21:45

## 2021-07-25 RX ADMIN — SODIUM CHLORIDE 1000 ML: 9 INJECTION, SOLUTION INTRAVENOUS at 21:45

## 2021-07-25 RX ADMIN — KETOROLAC TROMETHAMINE 15 MG: 15 INJECTION, SOLUTION INTRAMUSCULAR; INTRAVENOUS at 21:45

## 2021-07-25 RX ADMIN — KETOROLAC TROMETHAMINE 15 MG: 15 INJECTION, SOLUTION INTRAMUSCULAR; INTRAVENOUS at 20:37

## 2021-07-25 RX ADMIN — DIPHENHYDRAMINE HYDROCHLORIDE 25 MG: 50 INJECTION, SOLUTION INTRAMUSCULAR; INTRAVENOUS at 21:45

## 2021-07-25 RX ADMIN — NIFEDIPINE 60 MG: 30 TABLET, FILM COATED, EXTENDED RELEASE ORAL at 23:33

## 2021-07-25 RX ADMIN — HYDRALAZINE HYDROCHLORIDE 10 MG: 20 INJECTION INTRAMUSCULAR; INTRAVENOUS at 20:18

## 2021-07-25 ASSESSMENT — PAIN SCALES - GENERAL
PAINLEVEL_OUTOF10: 9
PAINLEVEL_OUTOF10: 9
PAINLEVEL_OUTOF10: 0
PAINLEVEL_OUTOF10: 0
PAINLEVEL_OUTOF10: 9

## 2021-07-25 ASSESSMENT — ENCOUNTER SYMPTOMS
ABDOMINAL PAIN: 0
BACK PAIN: 0
SHORTNESS OF BREATH: 0
NAUSEA: 1
VOMITING: 0

## 2021-07-25 NOTE — ED PROVIDER NOTES
[address] [facility]  Emergency Department Encounter     Pt Name: Traci Seymour  MRN: [id number]  [place] 1975  Date of evaluation: 21  PCP:  HAYDEE Osman CNP    CHIEF COMPLAINT       Chief Complaint   Patient presents with    Hypertension     started a couple days ago    Numbness     left arm       HISTORY OF PRESENT ILLNESS  (Location/Symptom, Timing/Onset, Context/Setting, Quality, Duration, Modifying Factors, Severity.)    Traci Seymour is a 40 y.o. female who presents with 3 to 4 days of headache, blurry vision, left arm and leg pins and needle sensation, nausea. Patient states this is never happened her before. She is on antihypertensive medication but states that when she first started the medication her blood pressures were running normal and so for her blood pressure to be as high as it is today is extremely abnormal for her. She is not fallen or hit in her head. No ringing in her ears, no chest pain, no shortness of breath, no palpitations, no back pain, no weakness. PAST MEDICAL / SURGICAL / SOCIAL / FAMILY HISTORY    has a past medical history of Anxiety, Chronic midline low back pain without sciatica, Depression, Endometriosis, Essential hypertension, Gastroesophageal reflux disease without esophagitis, H/O menorrhagia, History of stress incontinence, Hydronephrosis of right kidney, Obesity, Osteophyte, vertebrae, and Seasonal allergies. has a past surgical history that includes  section; Spine surgery; Elbow joint fusion (Right); Spine surgery; HYSTERECTOMY VAGINAL; hernia repair; Abdomen surgery; Incontinence surgery; and Knee arthroscopy (Right, 3/10/2020).     Social History     Socioeconomic History    Marital status:      Spouse name: Not on file    Number of children: Not on file    Years of education: Not on file    Highest education level: Not on file   Occupational History    Not on file   Tobacco Use  Smoking status: Current Every Day Smoker     Packs/day: 0.50     Types: Cigarettes    Smokeless tobacco: Never Used   Vaping Use    Vaping Use: Never used   Substance and Sexual Activity    Alcohol use: Not Currently     Alcohol/week: 0.0 standard drinks    Drug use: Yes     Types: Marijuana    Sexual activity: Yes   Other Topics Concern    Not on file   Social History Narrative    Not on file     Social Determinants of Health     Financial Resource Strain:     Difficulty of Paying Living Expenses:    Food Insecurity:     Worried About Running Out of Food in the Last Year:     Ran Out of Food in the Last Year:    Transportation Needs:     Lack of Transportation (Medical):  Lack of Transportation (Non-Medical):    Physical Activity:     Days of Exercise per Week:     Minutes of Exercise per Session:    Stress:     Feeling of Stress :    Social Connections:     Frequency of Communication with Friends and Family:     Frequency of Social Gatherings with Friends and Family:     Attends Taoism Services:     Active Member of Clubs or Organizations:     Attends Club or Organization Meetings:     Marital Status:    Intimate Partner Violence:     Fear of Current or Ex-Partner:     Emotionally Abused:     Physically Abused:     Sexually Abused:        Family History   Problem Relation Age of Onset    Eczema Mother     COPD Mother     Liver Disease Mother     Heart Disease Mother     Atrial Fibrillation Maternal Grandfather     Heart Attack Paternal Grandfather        Allergies:    Dye [iodides], Dye  [barium-containing compounds], Fentanyl, Oxycodone, and Tylenol with codeine #3 [acetaminophen-codeine]    Home Medications:  Prior to Admission medications    Medication Sig Start Date End Date Taking?  Authorizing Provider   tiZANidine (ZANAFLEX) 4 MG tablet take 1 tablet by mouth every 6 hours if needed for back pain 7/7/21   HAYDEE Bolivar - CNP   famotidine (PEPCID) 20 MG tablet take 1 tablet by mouth nightly if needed 5/13/21   HAYDEE Melendez CNP   loratadine (CLARITIN) 10 MG tablet take 1 tablet by mouth once daily 5/13/21   HAYDEE Marcano CNP   meloxicam (MOBIC) 15 MG tablet take 1 tablet by mouth once daily 5/13/21   HAYDEE Marcano CNP   montelukast (SINGULAIR) 10 MG tablet take 1 tablet by mouth every evening 5/13/21   HAYDEE Melendez CNP   Multiple Vitamins-Minerals (MULTIVITAMIN-MINERALS) TABS tablet take 1 tablet by mouth once daily 5/13/21   HAYDEE Marcano CNP   olmesartan (BENICAR) 40 MG tablet take 1 tablet by mouth once daily 5/13/21   HAYDEE Marcano CNP   triamterene-hydroCHLOROthiazide (MAXZIDE-25) 37.5-25 MG per tablet take 1 tablet by mouth once daily 5/13/21   HAYDEE Marcano CNP   Calcium Carbonate-Vitamin D (OYSTER SHELL CALCIUM/D) 500-200 MG-UNIT TABS Take 1 tablet by mouth daily 5/13/21 5/13/22  HAYDEE Marcano CNP   ondansetron (ZOFRAN ODT) 4 MG disintegrating tablet Take 1 tablet by mouth every 6 hours as needed for Nausea or Vomiting 9/11/20   Cecille Deluca MD       REVIEW OF SYSTEMS    (2-9 systems for level 4, 10 or more for level 5)    Review of Systems   Constitutional: Negative for diaphoresis. HENT: Negative for tinnitus. Eyes: Positive for visual disturbance. Respiratory: Negative for shortness of breath. Cardiovascular: Negative for chest pain. Gastrointestinal: Positive for nausea. Negative for abdominal pain and vomiting. Genitourinary: Negative for flank pain. Musculoskeletal: Negative for back pain and neck pain. Neurological: Positive for numbness and headaches. Negative for dizziness, tremors, seizures, syncope, facial asymmetry, speech difficulty, weakness and light-headedness. Psychiatric/Behavioral: Negative for confusion.        PHYSICAL EXAM   (up to 7 for level 4, 8 or more for level 5)    VITALS:   Vitals:    07/25/21 2037 07/25/21 2045 07/25/21 2100 07/25/21 2135   BP: (!) 150/67 (!) 158/55 (!) 159/65 (!) 175/78   Pulse: 73 72 70 75   Resp: 17 18 22 18   Temp:       TempSrc:       SpO2: 98% 98% 97% 98%   Weight:       Height:           Physical Exam  Vitals and nursing note reviewed. Constitutional:       General: She is not in acute distress. Appearance: She is well-developed. She is obese. She is not diaphoretic. HENT:      Head: Normocephalic and atraumatic. Right Ear: External ear normal.      Left Ear: External ear normal.      Nose: Nose normal.      Mouth/Throat:      Mouth: Mucous membranes are moist.   Eyes:      Extraocular Movements: Extraocular movements intact. Conjunctiva/sclera: Conjunctivae normal.      Pupils: Pupils are equal, round, and reactive to light. Cardiovascular:      Rate and Rhythm: Normal rate and regular rhythm. Pulses: Normal pulses. Heart sounds: Normal heart sounds. No murmur heard. Pulmonary:      Effort: Pulmonary effort is normal. No respiratory distress. Breath sounds: Normal breath sounds. No wheezing, rhonchi or rales. Abdominal:      General: There is no distension. Palpations: Abdomen is soft. Tenderness: There is no abdominal tenderness. There is no guarding or rebound. Musculoskeletal:         General: Normal range of motion. Cervical back: Normal range of motion. Right lower leg: No edema. Left lower leg: No edema. Skin:     General: Skin is warm and dry. Coloration: Skin is not pale. Findings: No rash. Neurological:      Mental Status: She is alert and oriented to person, place, and time. GCS: GCS eye subscore is 4. GCS verbal subscore is 5. GCS motor subscore is 6. Cranial Nerves: Cranial nerves are intact. Sensory: Sensory deficit present. Motor: Motor function is intact. Coordination: Coordination is intact. Gait: Gait is intact. Deep Tendon Reflexes: Babinski sign absent on the right side.  Babinski sign absent on the left side.       Comments: Subjective decreased sensation to light touch to LUE and RLLE   Psychiatric:         Behavior: Behavior normal.         DIFFERENTIAL  DIAGNOSIS   PLAN (LABS / IMAGING / EKG):  Orders Placed This Encounter   Procedures    CT HEAD WO CONTRAST    XR CHEST (2 VW)    CBC with DIFF    Troponin    Comprehensive Metabolic Panel w/ Reflex to MG    TSH w/reflex to FT4    Urinalysis with Microscopic    Urine Drug Screen    T4, Free    Telemetry monitoring - continuous duration    POC Glucose Fingerstick    EKG 12 Lead    Insert peripheral IV    PATIENT STATUS (FROM ED OR OR/PROCEDURAL) Inpatient       MEDICATIONS ORDERED:  Orders Placed This Encounter   Medications    hydrALAZINE (APRESOLINE) injection 10 mg    hydrALAZINE (APRESOLINE) injection 10 mg    ketorolac (TORADOL) injection 15 mg    hydrALAZINE (APRESOLINE) injection 10 mg    ketorolac (TORADOL) injection 15 mg    prochlorperazine (COMPAZINE) injection 10 mg    diphenhydrAMINE (BENADRYL) injection 25 mg    0.9 % sodium chloride bolus       DIAGNOSTIC RESULTS / EMERGENCYDEPARTMENT COURSE / MDM   LABS:  Labs Reviewed   CBC WITH AUTO DIFFERENTIAL - Abnormal; Notable for the following components:       Result Value    WBC 13.0 (*)     RBC 3.93 (*)     All other components within normal limits   COMPREHENSIVE METABOLIC PANEL W/ REFLEX TO MG FOR LOW K - Abnormal; Notable for the following components:    CREATININE 0.42 (*)     Total Bilirubin 0.24 (*)     All other components within normal limits   TSH WITH REFLEX - Abnormal; Notable for the following components:    TSH 0.18 (*)     All other components within normal limits   URINALYSIS WITH MICROSCOPIC - Abnormal; Notable for the following components:    Turbidity UA SLIGHTLY CLOUDY (*)     pH, UA 8.5 (*)     Amorphous, UA 1+ (*)     Other Observations UA NOT REPORTED NOT REPORTED (*)     All other components within normal limits   URINE DRUG SCREEN - Abnormal; Notable for the following components:    Cannabinoid Scrn, Ur POSITIVE (*)     All other components within normal limits   TROPONIN   T4, FREE   POC GLUCOSE FINGERSTICK       RADIOLOGY:  XR CHEST (2 VW)    Result Date: 7/25/2021  EXAMINATION: TWO XRAY VIEWS OF THE CHEST 7/25/2021 7:31 pm COMPARISON: 07/05/2021 radiograph HISTORY: ORDERING SYSTEM PROVIDED HISTORY: dizziness, HTN TECHNOLOGIST PROVIDED HISTORY: dizziness, HTN Reason for Exam: dizziness, HTN Acuity: Unknown Type of Exam: Unknown Relevant Medical/Surgical History: dizziness, HTN FINDINGS: The heart, mediastinum and pulmonary vascularity are normal.  Lungs are well-expanded and clear. No skeletal abnormalities are present in the chest.     No significant findings in the chest.     CT HEAD WO CONTRAST    Result Date: 7/25/2021  EXAMINATION: CT OF THE HEAD WITHOUT CONTRAST  7/25/2021 8:23 pm TECHNIQUE: CT of the head was performed without the administration of intravenous contrast. Dose modulation, iterative reconstruction, and/or weight based adjustment of the mA/kV was utilized to reduce the radiation dose to as low as reasonably achievable. COMPARISON: None. HISTORY: ORDERING SYSTEM PROVIDED HISTORY: HTN paresthesias weakness TECHNOLOGIST PROVIDED HISTORY: HTN paresthesias weakness Decision Support Exception - unselect if not a suspected or confirmed emergency medical condition->Emergency Medical Condition (MA) Is the patient pregnant?->No Reason for Exam: Pt states she takes medication for HTN and it is still elevated for the past week causing her to have a headache. Pt c/o left arm numbness started 2 days ago. Acuity: Acute Type of Exam: Initial FINDINGS: BRAIN/VENTRICLES: There is no acute intracranial hemorrhage, mass effect or midline shift. No abnormal extra-axial fluid collection. The gray-white differentiation is maintained without evidence of an acute infarct. There is no evidence of hydrocephalus.  ORBITS: The visualized portion of the orbits something. Toradol order placed     [AO]   2051 Urinalysis with Microscopic(!):    Color, UA YELLOW   Turbidity UA SLIGHTLY CLOUDY(!)   Glucose, UA NEGATIVE   Bilirubin, Urine NEGATIVE   Ketones, Urine NEGATIVE   Specific Gravity, UA 1.020   Urine Hgb NEGATIVE   pH, UA 8.5(!)   Protein, UA NEGATIVE   Urobilinogen, Urine Normal   Nitrite, Urine NEGATIVE   Leukocyte Esterase, Urine NEGATIVE   Urinalysis Comments NOT REPORTED   -        WBC, UA 0 TO 2   RBC, UA None   Casts UA NOT REPORTED   Crystals, UA NOT REPORTED   Epithelial Cells, UA 0 TO 2   Renal Epithelial,... [AO]   2051 Thyroxine, Free: 0.95 [AO]   2058 Patient accepted by [facility]    [AO]   2100 CT HEAD WO CONTRAST [AO]      ED Course User Index  [AO] DO STEVEN Cruz  Number of Diagnoses or Management Options  Acute nonintractable headache, unspecified headache type  Hypertensive urgency  Paresthesia  Diagnosis management comments: 50-year-old female presents emergency department 3 days of left-sided paresthesias, headache, blurry vision. No history of hypertension however states under good control. Checked with friends moderate home systolic in the 822K. Initial evaluation she is hypertensive with systolic blood pressure in the 180s. Other vital signs stable. Heart regular rhythm, lungs clear, abdomen soft nontender nondistended, no peripheral edema. Subjective loss to soft touch to left side. Strength intact. Cranial nerves intact. CT head negative. Cardiac work-up obtained. Troponin negative. EKG unremarkable. Chest x-ray clear. TSH abnormal, however reflex T4 normal.  Urinalysis revealing marijuana usage but no other illicit drugs. No evidence of endorgan damage on labs. Patient requiring multiple doses of IV antihypertensives to decrease her blood pressure. Still symptomatic.   Believe that she would benefit from a cardiology evaluation to address her blood pressure medication as well as neurology evaluation to rule out any potential stroke causing her symptoms. Hemodynamically stable and appropriate for admission to the floor. Amount and/or Complexity of Data Reviewed  Clinical lab tests: reviewed and ordered  Tests in the radiology section of CPT®: ordered and reviewed  Review and summarize past medical records: yes  Independent visualization of images, tracings, or specimens: yes    Patient Progress  Patient progress: stable      PROCEDURES:  Procedures     CONSULTS:  IP CONSULT TO CARDIOLOGY    CRITICAL CARE:  NONE    FINAL IMPRESSION     1. Hypertensive urgency    2. Paresthesia    3. Acute nonintractable headache, unspecified headache type       [facility] / [id number] [facility] Admitted 07/25/2021 09:22:39 PM    Lizzie Henry DO  Emergency Medicine Physician    (Please note that portions of this note were completed with a voice recognition program.  Efforts were made to edit the dictations but occasionally words are mis-transcribed.)        Erica Cortes 1721,   07/25/21 2103       Erica Cortes 1721, DO  07/25/21 2139

## 2021-07-26 VITALS
SYSTOLIC BLOOD PRESSURE: 142 MMHG | HEART RATE: 75 BPM | OXYGEN SATURATION: 99 % | HEIGHT: 62 IN | DIASTOLIC BLOOD PRESSURE: 81 MMHG | WEIGHT: 247.2 LBS | TEMPERATURE: 98.1 F | RESPIRATION RATE: 14 BRPM | BODY MASS INDEX: 45.49 KG/M2

## 2021-07-26 PROBLEM — R20.2 ARM PARESTHESIA, LEFT: Status: ACTIVE | Noted: 2021-07-26

## 2021-07-26 PROBLEM — I16.0 HYPERTENSIVE URGENCY: Status: ACTIVE | Noted: 2021-07-26

## 2021-07-26 LAB
ALBUMIN SERPL-MCNC: 3.7 G/DL (ref 3.5–5.2)
ALBUMIN/GLOBULIN RATIO: ABNORMAL (ref 1–2.5)
ALP BLD-CCNC: 77 U/L (ref 35–104)
ALT SERPL-CCNC: 14 U/L (ref 5–33)
ANION GAP SERPL CALCULATED.3IONS-SCNC: 13 MMOL/L (ref 9–17)
AST SERPL-CCNC: 13 U/L
BILIRUB SERPL-MCNC: 0.19 MG/DL (ref 0.3–1.2)
BUN BLDV-MCNC: 7 MG/DL (ref 6–20)
BUN/CREAT BLD: 17 (ref 9–20)
CALCIUM SERPL-MCNC: 9 MG/DL (ref 8.6–10.4)
CHLORIDE BLD-SCNC: 104 MMOL/L (ref 98–107)
CHOLESTEROL/HDL RATIO: 5.5
CHOLESTEROL: 192 MG/DL
CO2: 22 MMOL/L (ref 20–31)
CREAT SERPL-MCNC: 0.41 MG/DL (ref 0.5–0.9)
EKG ATRIAL RATE: 70 BPM
EKG P AXIS: 46 DEGREES
EKG P-R INTERVAL: 152 MS
EKG Q-T INTERVAL: 414 MS
EKG QRS DURATION: 90 MS
EKG QTC CALCULATION (BAZETT): 447 MS
EKG R AXIS: 50 DEGREES
EKG T AXIS: 51 DEGREES
EKG VENTRICULAR RATE: 70 BPM
GFR AFRICAN AMERICAN: >60 ML/MIN
GFR NON-AFRICAN AMERICAN: >60 ML/MIN
GFR SERPL CREATININE-BSD FRML MDRD: ABNORMAL ML/MIN/{1.73_M2}
GFR SERPL CREATININE-BSD FRML MDRD: ABNORMAL ML/MIN/{1.73_M2}
GLUCOSE BLD-MCNC: 96 MG/DL (ref 70–99)
HCT VFR BLD CALC: 36.7 % (ref 36.3–47.1)
HDLC SERPL-MCNC: 35 MG/DL
HEMOGLOBIN: 12.3 G/DL (ref 11.9–15.1)
LDL CHOLESTEROL: 132 MG/DL (ref 0–130)
LV EF: 60 %
LVEF MODALITY: NORMAL
MCH RBC QN AUTO: 32.7 PG (ref 25.2–33.5)
MCHC RBC AUTO-ENTMCNC: 33.5 G/DL (ref 28.4–34.8)
MCV RBC AUTO: 97.6 FL (ref 82.6–102.9)
NRBC AUTOMATED: 0 PER 100 WBC
PDW BLD-RTO: 13.2 % (ref 11.8–14.4)
PLATELET # BLD: 323 K/UL (ref 138–453)
PMV BLD AUTO: 9.5 FL (ref 8.1–13.5)
POTASSIUM SERPL-SCNC: 3.7 MMOL/L (ref 3.7–5.3)
RBC # BLD: 3.76 M/UL (ref 3.95–5.11)
SODIUM BLD-SCNC: 139 MMOL/L (ref 135–144)
TOTAL PROTEIN: 6.3 G/DL (ref 6.4–8.3)
TRIGL SERPL-MCNC: 125 MG/DL
TROPONIN INTERP: NORMAL
TROPONIN T: NORMAL NG/ML
TROPONIN, HIGH SENSITIVITY: <6 NG/L (ref 0–14)
VLDLC SERPL CALC-MCNC: ABNORMAL MG/DL (ref 1–30)
WBC # BLD: 16.7 K/UL (ref 3.5–11.3)

## 2021-07-26 PROCEDURE — G0378 HOSPITAL OBSERVATION PER HR: HCPCS

## 2021-07-26 PROCEDURE — 6360000002 HC RX W HCPCS: Performed by: NURSE PRACTITIONER

## 2021-07-26 PROCEDURE — 99232 SBSQ HOSP IP/OBS MODERATE 35: CPT | Performed by: INTERNAL MEDICINE

## 2021-07-26 PROCEDURE — 6370000000 HC RX 637 (ALT 250 FOR IP): Performed by: NURSE PRACTITIONER

## 2021-07-26 PROCEDURE — 36415 COLL VENOUS BLD VENIPUNCTURE: CPT

## 2021-07-26 PROCEDURE — 84484 ASSAY OF TROPONIN QUANT: CPT

## 2021-07-26 PROCEDURE — 93005 ELECTROCARDIOGRAM TRACING: CPT | Performed by: NURSE PRACTITIONER

## 2021-07-26 PROCEDURE — 93306 TTE W/DOPPLER COMPLETE: CPT

## 2021-07-26 PROCEDURE — 93010 ELECTROCARDIOGRAM REPORT: CPT | Performed by: INTERNAL MEDICINE

## 2021-07-26 PROCEDURE — 80061 LIPID PANEL: CPT

## 2021-07-26 PROCEDURE — 2580000003 HC RX 258: Performed by: NURSE PRACTITIONER

## 2021-07-26 PROCEDURE — 80053 COMPREHEN METABOLIC PANEL: CPT

## 2021-07-26 PROCEDURE — 85027 COMPLETE CBC AUTOMATED: CPT

## 2021-07-26 PROCEDURE — 96372 THER/PROPH/DIAG INJ SC/IM: CPT

## 2021-07-26 RX ORDER — ACETAMINOPHEN 500 MG
1000 TABLET ORAL EVERY 6 HOURS PRN
COMMUNITY

## 2021-07-26 RX ORDER — NIFEDIPINE 60 MG/1
60 TABLET, FILM COATED, EXTENDED RELEASE ORAL DAILY
Qty: 30 TABLET | Refills: 3 | Status: SHIPPED | OUTPATIENT
Start: 2021-07-27 | End: 2021-08-05 | Stop reason: ALTCHOICE

## 2021-07-26 RX ADMIN — MELOXICAM 15 MG: 7.5 TABLET ORAL at 09:26

## 2021-07-26 RX ADMIN — SODIUM CHLORIDE, PRESERVATIVE FREE 10 ML: 5 INJECTION INTRAVENOUS at 09:32

## 2021-07-26 RX ADMIN — Medication 1 TABLET: at 09:26

## 2021-07-26 RX ADMIN — ACETAMINOPHEN 650 MG: 325 TABLET ORAL at 15:29

## 2021-07-26 RX ADMIN — LOSARTAN POTASSIUM 100 MG: 100 TABLET, FILM COATED ORAL at 09:27

## 2021-07-26 RX ADMIN — CETIRIZINE HYDROCHLORIDE 10 MG: 10 TABLET, FILM COATED ORAL at 09:27

## 2021-07-26 RX ADMIN — TRIAMTERENE AND HYDROCHLOROTHIAZIDE 1 TABLET: 37.5; 25 TABLET ORAL at 09:26

## 2021-07-26 RX ADMIN — NIFEDIPINE 60 MG: 30 TABLET, FILM COATED, EXTENDED RELEASE ORAL at 09:26

## 2021-07-26 RX ADMIN — ENOXAPARIN SODIUM 30 MG: 30 INJECTION SUBCUTANEOUS at 09:27

## 2021-07-26 RX ADMIN — ACETAMINOPHEN 650 MG: 325 TABLET ORAL at 09:32

## 2021-07-26 RX ADMIN — Medication 500 MG: at 09:27

## 2021-07-26 ASSESSMENT — PAIN SCALES - GENERAL
PAINLEVEL_OUTOF10: 0
PAINLEVEL_OUTOF10: 8
PAINLEVEL_OUTOF10: 8
PAINLEVEL_OUTOF10: 0

## 2021-07-26 ASSESSMENT — ENCOUNTER SYMPTOMS
SHORTNESS OF BREATH: 0
COUGH: 0
RHINORRHEA: 0
ABDOMINAL PAIN: 0
SORE THROAT: 0
VOMITING: 0
SINUS PRESSURE: 1
DIARRHEA: 0
COLOR CHANGE: 0
PHOTOPHOBIA: 0
CONSTIPATION: 0
NAUSEA: 0

## 2021-07-26 NOTE — PROGRESS NOTES
[facility]  Office: [address], DO, Barbour Shaquillesar, DO, Parker Narrow, DO, Satish Portillo Blood, DO, Mary Hassan MD, Joshua Stockton MD, Rsuh Willis MD, Shu Gutierrez MD, Darrell Kenny MD, Mode Miner MD, Libby Ascencio MD, Lesa Corley, DO, Kaushik Garcia MD, Kevin Hudson, DO, Azeem Canas MD,  Whitney Lopez, DO, Sary Schroeder MD, Nathalia Stevens MD, Gregg Montanez MD, Thais Peng MD, Travon Obrien MD, Missy Avendaño MD, Carlos Enrique Guzman, [facility], [facility], CNP, Lety Alfred, CNP, Laura Councilman, CNS, Torie Asif, CNP, Caden Cid, CNP, Courtney Hein, CNP, Kelsy Calderon, CNP, Flor Garcia, CNP, Jason Alejo PA-C, Bhanu Isaac, [facility], Carter Trivedi, CNP, Kailey Child, CNP, Martin Starch, CNP, Nivia Nereida, CNP, Christi Johnson, CNP, Shelly Copper, CNP, Pamella Veronica, CNP, Rick Borrego, [facility]    Progress Note    7/26/2021    1:58 PM    Name:   Leann Ferrari  MRN:     [id number]     Acct:      [de-identified]   Room:   [address] [place] [address] Day:  1  Admit Date:  7/25/2021  6:46 PM    PCP:   HAYDEE Perez CNP  Code Status:  Full Code    Subjective:     C/C:   Chief Complaint   Patient presents with    Hypertension     started a couple days ago    Numbness     left arm     Interval History Status: improved. Patient's blood pressure has improved and left arm numbness has resolved. She denies any chest pain, shortness of breath, nausea or vomiting, fevers or chills or acute complaints. Brief History: This is a 58-year-old female who presents with numbness of the left arm and short after she was started she checked her blood pressure and had elevated blood pressure readings. She has had a headache for the last few days along with her symptoms.   Upon evaluation she is found to have hypertensive emergency and was started on additional antihypertensives with improvement and resolution of her symptoms. Review of Systems:     Constitutional:  negative for chills, fevers, sweats  Respiratory:  negative for cough, dyspnea on exertion, shortness of breath, wheezing  Cardiovascular:  negative for chest pain, chest pressure/discomfort, lower extremity edema, palpitations  Gastrointestinal:  negative for abdominal pain, constipation, diarrhea, nausea, vomiting  Neurological:  negative for dizziness, headache    Medications: Allergies: Allergies   Allergen Reactions    Dye [Iodides] Shortness Of Breath     Other reaction(s): Unknown    Dye  [Barium-Containing Compounds] Hives     Other reaction(s): Vomiting  IVP dye    Fentanyl      Pt never wants this again as it caused her hair and teeth to fall out.       Oxycodone Other (See Comments)     Does not like how they make her feel    Tylenol With Codeine #3 [Acetaminophen-Codeine] Nausea And Vomiting     Can take plain tylenol       Current Meds:   Scheduled Meds:    famotidine  20 mg Oral Nightly    cetirizine  10 mg Oral Daily    meloxicam  15 mg Oral Daily    montelukast  10 mg Oral Nightly    therapeutic multivitamin-minerals  1 tablet Oral Daily    losartan  100 mg Oral Daily    triamterene-hydroCHLOROthiazide  1 tablet Oral Daily    sodium chloride flush  5-40 mL Intravenous 2 times per day    NIFEdipine  60 mg Oral Daily    enoxaparin  30 mg Subcutaneous BID    oyster shell calcium/vitamin D  2 tablet Oral Daily     Continuous Infusions:    sodium chloride       PRN Meds: ondansetron, tiZANidine, sodium chloride flush, sodium chloride, acetaminophen **OR** acetaminophen, metoprolol, hydrALAZINE    Data:     Past Medical History:   has a past medical history of Anxiety, Chronic midline low back pain without sciatica, Depression, Endometriosis, Essential hypertension, Gastroesophageal reflux disease without esophagitis, H/O menorrhagia, History of stress incontinence, Hydronephrosis of right kidney, Obesity, Osteophyte, vertebrae, and Seasonal allergies. Social History:   reports that she has been smoking cigarettes. She has been smoking about 0.50 packs per day. She has never used smokeless tobacco. She reports previous alcohol use. She reports current drug use. Drug: Marijuana. Family History:   Family History   Problem Relation Age of Onset    Eczema Mother     COPD Mother     Liver Disease Mother     Heart Disease Mother     Heart Disease Maternal Grandmother     Atrial Fibrillation Maternal Grandfather     Heart Attack Paternal Grandfather        Vitals:  /81   Pulse 70   Temp 98.6 °F (37 °C) (Oral)   Resp 16   Ht 5' 2\" (1.575 m)   Wt 247 lb 3.2 oz (112.1 kg)   SpO2 98%   BMI 45.21 kg/m²   Temp (24hrs), Av.1 °F (36.7 °C), Min:97.5 °F (36.4 °C), Max:98.6 °F (37 °C)    Recent Labs     21   POCGLU 101       I/O (24Hr):   No intake or output data in the 24 hours ending 21 1358    Labs:  Hematology:  Recent Labs     21   WBC 13.0* 16.7*   RBC 3.93* 3.76*   HGB 12.6 12.3   HCT 38.0 36.7   MCV 96.7 97.6   MCH 32.1 32.7   MCHC 33.2 33.5   RDW 13.1 13.2    323   MPV 9.2 9.5     Chemistry:  Recent Labs     21     --  139   K 3.8  --  3.7     --  104   CO2 24  --  22   GLUCOSE 97  --  96   BUN 8  --  7   CREATININE 0.42*  --  0.41*   ANIONGAP 13  --  13   LABGLOM >60  --  >60   GFRAA >60  --  >60   CALCIUM 9.6  --  9.0   PROBNP  --  62  --    TROPHS <6 <6 <6     Recent Labs     21   PROT  --  6.9 6.3*   LABALBU  --  4.1 3.7   TSH  --  0.18*  --    AST  --  14 13   ALT  --  15 14   ALKPHOS  --  93 77   BILITOT  --  0.24* 0.19*   CHOL  --   --  192   HDL  --   --  35*   LDLCHOLESTEROL  --   --  132*   CHOLHDLRATIO  --   --  5.5*   TRIG  --   --  125   VLDL  --   --  NOT REPORTED   POCGLU 101  --   --      ABG:No results found for: POCPH, PHART, PH, POCPCO2, YPL3YBP, PCO2, POCPO2, PO2ART, PO2, POCHCO3, HQC5OKJ, HCO3, NBEA, PBEA, BEART, BE, THGBART, THB, HJD0GEY, MLVA4QJP, H1HTHIFX, O2SAT, FIO2  Lab Results   Component Value Date/Time    SPECIAL NOT REPORTED 08/12/2019 12:18 PM     Lab Results   Component Value Date/Time    CULTURE ESCHERICHIA COLI >[id number] CFU/ML (A) 08/12/2019 12:18 PM       Radiology:  XR CHEST (2 VW)    Result Date: 7/25/2021  No significant findings in the chest.     CT HEAD WO CONTRAST    Result Date: 7/25/2021  No acute intracranial abnormality. Physical Examination:        General appearance:  alert, cooperative and no distress  Mental Status:  oriented to person, place and time and normal affect  Lungs:  clear to auscultation bilaterally, normal effort  Heart:  regular rate and rhythm, no murmur  Abdomen:  soft, nontender, nondistended, normal bowel sounds, no masses, hepatomegaly, splenomegaly  Extremities:  no edema, redness, tenderness in the calves  Skin:  no gross lesions, rashes, induration    Assessment:        Hospital Problems         Last Modified POA    * (Principal) Hypertensive emergency 7/26/2021 Yes    Essential hypertension 7/26/2021 Yes    Gastroesophageal reflux disease without esophagitis 7/26/2021 Yes    Seasonal allergies 7/26/2021 Yes    Tobacco dependence 7/26/2021 Yes    Obesity, morbid, BMI 40.0-49.9 ([facility] [address] 7/26/2021 Yes    Arm paresthesia, left 7/26/2021 Yes          Plan:        1. Titrate antihypertensives optimize control  2. Await echocardiogram  3. Tobacco cessation  4. Discussed weight loss, has gained 20 to 30 pounds throughout the Covid pandemic. Previously treated for sleep apnea but has lost approximate 100 pounds with improvement and resolution. 5. Activity as tolerated  6. GI and DVT prophylaxis as indicated  7.  Discharge plan this afternoon if blood pressure remains controlled and echo is benign    Maria Luz Joyce DO  7/26/2021  1:58 PM

## 2021-07-26 NOTE — H&P
[facility]  Office: [address], DO, [facility] Manoj, DO, Mynor De La Rosa, DO, Sylvia Ferrell, DO, Beatriz Serrano MD, Jemima Rehman MD, Yanira Rice MD, Estuardo Edwards MD, Kenny Rose MD, Fortino Arnold MD, Nando Graf MD, Abdulaziz Messina, DO, Mariam Babb MD, Queenie Richards DO, Micki Lares MD,  Helen Funez DO, Meghana Wesley MD, Terrance Landin MD, Moira Clark MD, Carl Harry MD, Fabby Camilo MD, Aguila Magdaleno MD, Francisco Javier Sosa, [facility], [facility], CNP, Parminder Carty, CNP, Betsy Reynoso, CNS, Moon Pang, CNP, Cleveland Mckeon, CNP, David Lal, CNP, Estela Delgadillo, CNP, Mynor Gamble, CNP, Mary Shaw PA-C, Moira Felix, [facility], Perico Qureshi, CNP, Edilson Boykin, CNP, Chaya Holden, CNP, Luis A Miranda, CNP, Estevan Loving, CNP, Beth Argueta, CNP, Jenny Jung, CNP, Brooke Atkinson, [facility]    HISTORY AND PHYSICAL EXAMINATION            Date:   7/26/2021  Patient name:  Letha Henao  Date of admission:  7/25/2021  6:46 PM  MRN:   [id number]  Account:  [de-identified]  YOB: 1975  PCP:    HAYDEE Hope CNP  Room:   1017/1017-02  Code Status:    Full Code    Chief Complaint:     Chief Complaint   Patient presents with    Hypertension     started a couple days ago    Numbness     left arm       History Obtained From:     patient, electronic medical record    History of Present Illness:     Letha Henao is a 40 y.o.  /  female who presents with Hypertension (started a couple days ago) and Numbness (left arm)   and is admitted to the hospital for the management of Hypertensive emergency. 40 y.o. female presented to the ED with paresthesias. Headache started 3 days ago and was initially felt in her sinuses. She thought that the discomfort was due to cleaning product she had used. Headache is been constant for the past 3 days.   Today she also developed and severed a nerve, cage restabilized        Medications Prior to Admission:     Prior to Admission medications    Medication Sig Start Date End Date Taking? Authorizing Provider   tiZANidine (ZANAFLEX) 4 MG tablet take 1 tablet by mouth every 6 hours if needed for back pain 7/7/21   HAYDEE Card CNP   famotidine (PEPCID) 20 MG tablet take 1 tablet by mouth nightly if needed 5/13/21   HAYDEE Card CNP   loratadine (CLARITIN) 10 MG tablet take 1 tablet by mouth once daily 5/13/21   HAYDEE Card CNP   meloxicam (MOBIC) 15 MG tablet take 1 tablet by mouth once daily 5/13/21   HAYDEE Card CNP   montelukast (SINGULAIR) 10 MG tablet take 1 tablet by mouth every evening 5/13/21   HAYDEE Card CNP   Multiple Vitamins-Minerals (MULTIVITAMIN-MINERALS) TABS tablet take 1 tablet by mouth once daily 5/13/21   HAYDEE Card CNP   olmesartan (BENICAR) 40 MG tablet take 1 tablet by mouth once daily 5/13/21   HAYDEE Card CNP   triamterene-hydroCHLOROthiazide (MAXZIDE-25) 37.5-25 MG per tablet take 1 tablet by mouth once daily 5/13/21   HAYDEE Card CNP   Calcium Carbonate-Vitamin D (OYSTER SHELL CALCIUM/D) 500-200 MG-UNIT TABS Take 1 tablet by mouth daily 5/13/21 5/13/22  HAYDEE Card CNP   ondansetron (ZOFRAN ODT) 4 MG disintegrating tablet Take 1 tablet by mouth every 6 hours as needed for Nausea or Vomiting 9/11/20   Anne Kwan MD        Allergies:     Dye [iodides], Dye  [barium-containing compounds], Fentanyl, Oxycodone, and Tylenol with codeine #3 [acetaminophen-codeine]    Social History:     Tobacco:    reports that she has been smoking cigarettes. She has been smoking about 0.50 packs per day. She has never used smokeless tobacco.  Alcohol:      reports previous alcohol use. Drug Use:  reports current drug use. Drug: Marijuana.     Family History:     Family History   Problem Relation Age of Onset    Eczema Mother    [place] Self COPD Mother     Liver Disease Mother     Heart Disease Mother     Heart Disease Maternal Grandmother     Atrial Fibrillation Maternal Grandfather     Heart Attack Paternal Grandfather        Review of Systems:     Positive and Negative as described in HPI. Review of Systems   Constitutional: Negative for activity change, fatigue and fever. HENT: Positive for sinus pressure. Negative for congestion, rhinorrhea and sore throat. Eyes: Negative for photophobia and visual disturbance. Respiratory: Negative for cough and shortness of breath. Cardiovascular: Negative for chest pain and palpitations. Gastrointestinal: Negative for abdominal pain, constipation, diarrhea, nausea and vomiting. Genitourinary: Negative for dysuria and frequency. Musculoskeletal: Negative for neck pain and neck stiffness. Skin: Negative for color change and rash. Neurological: Negative for syncope and speech difficulty. Resolved headache and resolved paresthesia to the left arm   Psychiatric/Behavioral: Negative for confusion and decreased concentration. Physical Exam:   BP (!) 154/90   Pulse 72   Temp 97.5 °F (36.4 °C) (Oral)   Resp 18   Ht 5' 2\" (1.575 m)   Wt 247 lb 3.2 oz (112.1 kg)   SpO2 97%   BMI 45.21 kg/m²   Temp (24hrs), Av.9 °F (36.6 °C), Min:97.5 °F (36.4 °C), Max:98.4 °F (36.9 °C)    Recent Labs     21  1846   POCGLU 101     No intake or output data in the 24 hours ending 21 0151    Physical Exam  Constitutional:       Appearance: Normal appearance. She is well-developed. HENT:      Head: Normocephalic and atraumatic. Right Ear: External ear normal.      Left Ear: External ear normal.      Nose: Nose normal. No congestion. Eyes:      Extraocular Movements: Extraocular movements intact. Pupils: Pupils are equal, round, and reactive to light. Cardiovascular:      Rate and Rhythm: Normal rate and regular rhythm.    Pulmonary:      Effort: Pulmonary effort is normal. No respiratory distress. Breath sounds: Normal breath sounds. Abdominal:      General: Bowel sounds are normal. There is no distension. Palpations: Abdomen is soft. Tenderness: There is no abdominal tenderness. Musculoskeletal:         General: No tenderness. Cervical back: Normal range of motion. No rigidity. Right lower leg: No edema. Left lower leg: No edema. Skin:     General: Skin is warm and dry. Neurological:      General: No focal deficit present. Mental Status: She is alert and oriented to person, place, and time. Cranial Nerves: No cranial nerve deficit. Psychiatric:         Mood and Affect: Mood normal.         Thought Content:  Thought content normal.         Investigations:      Laboratory Testing:  Recent Results (from the past 24 hour(s))   POC Glucose Fingerstick    Collection Time: 07/25/21  6:46 PM   Result Value Ref Range    POC Glucose 101 65 - 105 mg/dL   CBC with DIFF    Collection Time: 07/25/21  7:36 PM   Result Value Ref Range    WBC 13.0 (H) 3.5 - 11.3 k/uL    RBC 3.93 (L) 3.95 - 5.11 m/uL    Hemoglobin 12.6 11.9 - 15.1 g/dL    Hematocrit 38.0 36.3 - 47.1 %    MCV 96.7 82.6 - 102.9 fL    MCH 32.1 25.2 - 33.5 pg    MCHC 33.2 28.4 - 34.8 g/dL    RDW 13.1 11.8 - 14.4 %    Platelets 137 274 - 713 k/uL    MPV 9.2 8.1 - 13.5 fL    NRBC Automated 0.0 0.0 per 100 WBC    Differential Type NOT REPORTED     WBC Morphology NOT REPORTED     RBC Morphology NOT REPORTED     Platelet Estimate NOT REPORTED     Seg Neutrophils 52 36 - 66 %    Lymphocytes 36 24 - 44 %    Atypical Lymphocytes 3 %    Monocytes 5 1 - 7 %    Eosinophils % 3 1 - 4 %    Basophils 1 %    Immature Granulocytes 0 0 %    Segs Absolute 6.76 1.8 - 7.7 k/uL    Absolute Lymph # 4.68 1.0 - 4.8 k/uL    Atypical Lymphocytes Absolute 0.39 k/uL    Absolute Mono # 0.65 0.2 - 0.8 k/uL    Absolute Eos # 0.39 0.0 - 0.4 k/uL    Basophils Absolute 0.13 0.0 - 0.2 k/uL    Absolute Immature Granulocyte 0.00 0.00 - 0.30 k/uL   Troponin    Collection Time: 07/25/21  7:36 PM   Result Value Ref Range    Troponin, High Sensitivity <6 0 - 14 ng/L    Troponin T NOT REPORTED <0.03 ng/mL    Troponin Interp NOT REPORTED    Comprehensive Metabolic Panel w/ Reflex to MG    Collection Time: 07/25/21  7:36 PM   Result Value Ref Range    Glucose 97 70 - 99 mg/dL    BUN 8 6 - 20 mg/dL    CREATININE 0.42 (L) 0.50 - 0.90 mg/dL    Bun/Cre Ratio 19 9 - 20    Calcium 9.6 8.6 - 10.4 mg/dL    Sodium 138 135 - 144 mmol/L    Potassium 3.8 3.7 - 5.3 mmol/L    Chloride 101 98 - 107 mmol/L    CO2 24 20 - 31 mmol/L    Anion Gap 13 9 - 17 mmol/L    Alkaline Phosphatase 93 35 - 104 U/L    ALT 15 5 - 33 U/L    AST 14 <32 U/L    Total Bilirubin 0.24 (L) 0.3 - 1.2 mg/dL    Total Protein 6.9 6.4 - 8.3 g/dL    Albumin 4.1 3.5 - 5.2 g/dL    Albumin/Globulin Ratio NOT REPORTED 1.0 - 2.5    GFR Non-African American >60 >60 mL/min    GFR African American >60 >60 mL/min    GFR Comment          GFR Staging NOT REPORTED    TSH w/reflex to FT4    Collection Time: 07/25/21  7:36 PM   Result Value Ref Range    TSH 0.18 (L) 0.30 - 5.00 mIU/L   T4, Free    Collection Time: 07/25/21  7:36 PM   Result Value Ref Range    Thyroxine, Free 0.95 0.93 - 1.70 ng/dL   Urinalysis with Microscopic    Collection Time: 07/25/21  7:41 PM   Result Value Ref Range    Color, UA YELLOW YELLOW    Turbidity UA SLIGHTLY CLOUDY (A) CLEAR    Glucose, Ur NEGATIVE NEGATIVE    Bilirubin Urine NEGATIVE NEGATIVE    Ketones, Urine NEGATIVE NEGATIVE    Specific Gravity, UA 1.020 1.005 - 1.030    Urine Hgb NEGATIVE NEGATIVE    pH, UA 8.5 (H) 5.0 - 8.0    Protein, UA NEGATIVE NEGATIVE    Urobilinogen, Urine Normal Normal    Nitrite, Urine NEGATIVE NEGATIVE    Leukocyte Esterase, Urine NEGATIVE NEGATIVE    Urinalysis Comments NOT REPORTED     -          WBC, UA 0 TO 2 0 - 5 /HPF    RBC, UA None 0 - 2 /HPF    Casts UA NOT REPORTED /LPF    Crystals, UA NOT REPORTED None /HPF Epithelial Cells UA 0 TO 2 0 - 5 /HPF    Renal Epithelial, UA NOT REPORTED 0 /HPF    Bacteria, UA NOT REPORTED None    Mucus, UA NOT REPORTED None    Trichomonas, UA NOT REPORTED None    Amorphous, UA 1+ (A) None    Other Observations UA NOT REPORTED NOT REPORTED (A) NOT REQ. Yeast, UA NOT REPORTED None   Urine Drug Screen    Collection Time: 07/25/21  7:41 PM   Result Value Ref Range    Amphetamine Screen, Ur NEGATIVE NEGATIVE    Barbiturate Screen, Ur NEGATIVE NEGATIVE    Benzodiazepine Screen, Urine NEGATIVE NEGATIVE    Cocaine Metabolite, Urine NEGATIVE NEGATIVE    Methadone Screen, Urine NEGATIVE NEGATIVE    Opiates, Urine NEGATIVE NEGATIVE    Phencyclidine, Urine NEGATIVE NEGATIVE    Propoxyphene, Urine NOT REPORTED NEGATIVE    Cannabinoid Scrn, Ur POSITIVE (A) NEGATIVE    Oxycodone Screen, Ur NEGATIVE NEGATIVE    Methamphetamine, Urine NOT REPORTED NEGATIVE    Tricyclic Antidepressants, Urine NOT REPORTED NEGATIVE    MDMA, Urine NOT REPORTED NEGATIVE    Buprenorphine Urine NOT REPORTED NEGATIVE    Test Information       Assay provides medical screening only. The absence of expected drug(s) and/or metabolite(s) may indicate diluted or adulterated urine, limitations of testing or timing of collection. Troponin    Collection Time: 07/25/21 10:53 PM   Result Value Ref Range    Troponin, High Sensitivity <6 0 - 14 ng/L    Troponin T NOT REPORTED <0.03 ng/mL    Troponin Interp NOT REPORTED    Brain natriuretic peptide    Collection Time: 07/25/21 10:53 PM   Result Value Ref Range    Pro-BNP 62 <300 pg/mL    BNP Interpretation Pro-BNP Reference Range:        Imaging/Diagnostics:  XR CHEST (2 VW)    Result Date: 7/25/2021  No significant findings in the chest.     CT HEAD WO CONTRAST    Result Date: 7/25/2021  No acute intracranial abnormality.        Assessment :      Hospital Problems         Last Modified POA    * (Principal) Hypertensive emergency 7/26/2021 Yes    Gastroesophageal reflux disease without esophagitis 7/26/2021 Yes    Seasonal allergies 7/26/2021 Yes    Tobacco dependence 7/26/2021 Yes    Obesity, morbid, BMI 40.0-49.9 ([address] 7/26/2021 Yes    Arm paresthesia, left 7/26/2021 Yes          Plan:     Patient status inpatient in the  Progressive Unit/Step down    1. Hypertensive urgency   Telemetry   Monitor and treat blood pressure   Continue home medications   As needed IV antihypertensives   Cardiology consult  2. Paresthesia   Neurochecks  3. GERD   Pepcid  4. Allergies   Zyrtec  5. Tobacco dependence   Smoking cessation education   Consider Nicotine patch if needed  6. Obesity   Lifestyle modificationS  7. DVT prophylaxis   Lovenox    Consultations:   IP CONSULT TO CARDIOLOGY    Patient is admitted as inpatient status because of co-morbidities listed above, severity of signs and symptoms as outlined, requirement for current medical therapies and most importantly because of direct risk to patient if care not provided in a hospital setting. Expected length of stay > 48 hours.     HAYDEE HERNANDEZ CNP  7/26/2021  1:51 AM    Copy sent to HAYDEE Fierro CNP

## 2021-07-26 NOTE — CONSULTS
Section of Cardiology   Consult Note      Reason for Consult: Hypertensive urgency  Requesting Physician: Maddy Ag DO    CHIEF COMPLAINT: Hospitalist    History Obtained From:  patient, electronic medical record, patient's nurse    HISTORY OF PRESENT ILLNESS:      The patient is a 40 y.o. female with significant past medical history of hypertension who presents with headache for 3 days and found to have elevated blood pressure at the range of 200 mmHg. The patient claims to be compliant with the medication however after further questioning she does not follow low-salt diet and she smokes 1 pack/day. The patient stated that she had some floaters in front of her eyes and black spots. Did not lose consciousness. Denies any chest pain or shortness of breath to the level of her activities. She takes care of her grandkids and according to her that is the extent of her physical activities but no PND orthopnea. Denies any edema or claudication. No flank pain. Denies any bleeding from any orifice. She admits that she gained weight lately. Denies any chest pain. No palpitation. No individual weakness or numbness in any arm or leg. Previous diagnostic testing for coronary artery disease includes: none. Previous history of cardiac disease includes: hypertension. Coronary artery disease risk factors include: hypertension, obesity (BMI >= 30 kg/m2) and sedentary lifestyle.     Past Medical History:    Past Medical History:   Diagnosis Date    Anxiety 10/18/2018    Chronic midline low back pain without sciatica 8/29/2017    Depression     Endometriosis     had hyst for this    Essential hypertension 3/8/2016    Gastroesophageal reflux disease without esophagitis 3/8/2016    H/O menorrhagia     had hyst    History of stress incontinence     had surgery    Hydronephrosis of right kidney 12/27/2017    Obesity 4/10/2017    Osteophyte, vertebrae     thoracic    Seasonal allergies 3/8/2016 Past Surgical History:    Past Surgical History:   Procedure Laterality Date    ABDOMEN SURGERY      seroma removed     SECTION      ELBOW JOINT FUSION Right     HERNIA REPAIR      umbilical    HYSTERECTOMY VAGINAL      ovaries remain    INCONTINENCE SURGERY      KNEE ARTHROSCOPY Right 3/10/2020    KNEE ARTHROSCOPY PARITAL MEDIAL MENISECTOMY performed by Luly Manrique MD at [address]      rods & cage inserted L5-S1, fusion & decompression    SPINE SURGERY      revision because cage had backed out and severed a nerve, cage restabilized     Home Medications:  Prior to Admission medications    Medication Sig Start Date End Date Taking?  Authorizing Provider   tiZANidine (ZANAFLEX) 4 MG tablet take 1 tablet by mouth every 6 hours if needed for back pain 21   HAYDEE Portillo CNP   famotidine (PEPCID) 20 MG tablet take 1 tablet by mouth nightly if needed 21   HAYDEE Portillo CNP   loratadine (CLARITIN) 10 MG tablet take 1 tablet by mouth once daily 21   HAYDEE Portillo CNP   meloxicam (MOBIC) 15 MG tablet take 1 tablet by mouth once daily 21   HAYDEE Portillo CNP   montelukast (SINGULAIR) 10 MG tablet take 1 tablet by mouth every evening 21   HAYDEE Portillo CNP   Multiple Vitamins-Minerals (MULTIVITAMIN-MINERALS) TABS tablet take 1 tablet by mouth once daily 21   HAYDEE Portillo CNP   olmesartan (BENICAR) 40 MG tablet take 1 tablet by mouth once daily 21   HAYDEE Portillo CNP   triamterene-hydroCHLOROthiazide (MAXZIDE-25) 37.5-25 MG per tablet take 1 tablet by mouth once daily 21   HAYDEE Portillo CNP   Calcium Carbonate-Vitamin D (OYSTER SHELL CALCIUM/D) 500-200 MG-UNIT TABS Take 1 tablet by mouth daily 21  HAYDEE Portillo CNP   ondansetron (ZOFRAN ODT) 4 MG disintegrating tablet Take 1 tablet by mouth every 6 hours as needed for Nausea or Vomiting 20 Jarvis Robles MD     Current Medications:    Current Facility-Administered Medications   Medication Dose Route Frequency Provider Last Rate Last Admin    famotidine (PEPCID) tablet 20 mg  20 mg Oral Nightly Redge Eber, APRN - CNP        cetirizine (ZYRTEC) tablet 10 mg  10 mg Oral Daily Redge Eber, APRN - CNP   10 mg at 07/26/21 3057    meloxicam (MOBIC) tablet 15 mg  15 mg Oral Daily Redge Eber, APRN - CNP   15 mg at 07/26/21 0926    montelukast (SINGULAIR) tablet 10 mg  10 mg Oral Nightly Redge Eber, APRN - CNP        therapeutic multivitamin-minerals 1 tablet  1 tablet Oral Daily Redge Eber, APRN - CNP   1 tablet at 07/26/21 2977    losartan (COZAAR) tablet 100 mg  100 mg Oral Daily Redge Eber, APRN - CNP   100 mg at 07/26/21 6569    ondansetron (ZOFRAN-ODT) disintegrating tablet 4 mg  4 mg Oral Q6H PRN Redge Eber, APRN - CNP        tiZANidine (ZANAFLEX) tablet 4 mg  4 mg Oral Q6H PRN Redge Eber, APRN - CNP        triamterene-hydroCHLOROthiazide (MAXZIDE-25) 37.5-25 MG per tablet 1 tablet  1 tablet Oral Daily Redge Eber, APRN - CNP   1 tablet at 07/26/21 4407    sodium chloride flush 0.9 % injection 5-40 mL  5-40 mL Intravenous 2 times per day Redge Eber, APRN - CNP        sodium chloride flush 0.9 % injection 5-40 mL  5-40 mL Intravenous PRN Redge Eber, APRN - CNP        0.9 % sodium chloride infusion  25 mL Intravenous PRN Redge Eber, APRN - CNP        acetaminophen (TYLENOL) tablet 650 mg  650 mg Oral Q6H PRN Redge Eber, APRN - CNP        Or    acetaminophen (TYLENOL) suppository 650 mg  650 mg Rectal Q6H PRN Redge Eber, APRN - CNP        metoprolol (LOPRESSOR) injection 5 mg  5 mg Intravenous Q6H PRN Redge Eber, APRN - CNP        hydrALAZINE (APRESOLINE) injection 20 mg  20 mg Intravenous Q6H PRN HAYDEE Gerber - NATTY        NIFEdipine (PROCARDIA XL) extended release tablet 60 mg  60 mg Oral Daily HAYDEE Gerber CNP   60 mg at 07/26/21 1571    enoxaparin (LOVENOX) injection 30 mg  30 mg Subcutaneous BID HAYDEE Aguilar CNP   30 mg at 07/26/21 8715    oyster shell calcium/vitamin D 250-125 MG-UNIT per tablet 500 mg  2 tablet Oral Daily HAYDEE Aguilar - CNP   500 mg at 07/26/21 1280     Allergies:  Dye [iodides], Dye  [barium-containing compounds], Fentanyl, Oxycodone, and Tylenol with codeine #3 [acetaminophen-codeine]    Social History:    Social History     Socioeconomic History    Marital status:      Spouse name: Not on file    Number of children: Not on file    Years of education: Not on file    Highest education level: Not on file   Occupational History    Not on file   Tobacco Use    Smoking status: Current Every Day Smoker     Packs/day: 0.50     Types: Cigarettes    Smokeless tobacco: Never Used   Vaping Use    Vaping Use: Never used   Substance and Sexual Activity    Alcohol use: Not Currently     Alcohol/week: 0.0 standard drinks    Drug use: Yes     Types: Marijuana    Sexual activity: Yes   Other Topics Concern    Not on file   Social History Narrative    Not on file     Social Determinants of Health     Financial Resource Strain:     Difficulty of Paying Living Expenses:    Food Insecurity:     Worried About Running Out of Food in the Last Year:     [address] in the Last Year:    Transportation Needs:     Lack of Transportation (Medical):      Lack of Transportation (Non-Medical):    Physical Activity:     Days of Exercise per Week:     Minutes of Exercise per Session:    Stress:     Feeling of Stress :    Social Connections:     Frequency of Communication with Friends and Family:     Frequency of Social Gatherings with Friends and Family:     Attends [employer] Services:     Active Member of Clubs or Organizations:     Attends Club or Organization Meetings:     Marital Status:    Intimate Partner Violence:     Fear of Current or Ex-Partner:     Emotionally Abused:     Physically Abused: PT/INR:No results found for: PROTIME, INR, WARFARIN  CBC:  Lab Results   Component Value Date    WBC 16.7 07/26/2021    RBC 3.76 07/26/2021    HGB 12.3 07/26/2021    HCT 36.7 07/26/2021    MCV 97.6 07/26/2021    MCH 32.7 07/26/2021    MCHC 33.5 07/26/2021    RDW 13.2 07/26/2021     07/26/2021    MPV 9.5 07/26/2021     CMP:  Lab Results   Component Value Date     07/26/2021    K 3.7 07/26/2021     07/26/2021    CO2 22 07/26/2021    BUN 7 07/26/2021    CREATININE 0.41 07/26/2021    GFRAA >60 07/26/2021    LABGLOM >60 07/26/2021    GLUCOSE 96 07/26/2021    CALCIUM 9.0 07/26/2021     Magnesium:    Lab Results   Component Value Date    MG 1.8 12/30/2017     PTT:  No results found for: APTT, PTT  TSH:    Lab Results   Component Value Date    TSH 0.18 07/25/2021     BMP:  Lab Results   Component Value Date     07/26/2021    K 3.7 07/26/2021     07/26/2021    CO2 22 07/26/2021    BUN 7 07/26/2021    LABALBU 3.7 07/26/2021    CREATININE 0.41 07/26/2021    CALCIUM 9.0 07/26/2021    GFRAA >60 07/26/2021    LABGLOM >60 07/26/2021    GLUCOSE 96 07/26/2021     LIVER PROFILE:  Recent Labs     07/25/21  1936 07/26/21  0218   AST 14 13   ALT 15 14   LABALBU 4.1 3.7   ALKPHOS 93 77   BILITOT 0.24* 0.19*   PROT 6.9 6.3*   ALBUMIN NOT REPORTED NOT REPORTED     FLP:    Lab Results   Component Value Date    CHOL 192 07/26/2021    TRIG 125 07/26/2021    HDL 35 07/26/2021    LDLCHOLESTEROL 132 07/26/2021         IMPRESSION  1. Hypertensive urgency  2. morbid obesity  3.   Nicotine addiction  Patient Active Problem List   Diagnosis    Essential hypertension    Gastroesophageal reflux disease without esophagitis    Seasonal allergies    Tobacco dependence    Obesity, morbid, BMI 40.0-49.9 ([facility])    Failed back syndrome, lumbar    Chronic midline low back pain without sciatica    Bronchitis    Neural foraminal stenosis of lumbar spine    Pyelonephritis    Hydronephrosis of right kidney    Psychophysiological insomnia    Anxiety    Depression    History of partial hysterectomy    Hot flashes    Snores    History of sleep apnea    Other fatigue    Goiter    Leukocytosis    Hypertensive emergency    Arm paresthesia, left           RECOMMENDATIONS:     Continue current medications  Management plan was discussed with patient     Weight loss  Strict low-salt diet  Agree with the current management using Procardia, ARB and diuretic  Obtain an echocardiogram  The patient will benefit from home monitoring for blood pressure  The goal to keep her pressure below 120/80 but for now will accept 130 mmHg if possible. Definitely the patient will need long-term monitoring and adjustment of her medications accordingly. Case discussed with the patient and her nurse  Thank you for the consultation.       Electronically signed by Colt Gifford MD on 7/26/2021 at 9:29 AM     CC: HAYDEE Quiroz - CNP

## 2021-07-26 NOTE — PROGRESS NOTES
Patient's vitals taken every 4 hours and as needed. BP lower after medications. Patient advised of BP monitoring, weight loss, exercise and low sodium diet. Will continue to monitor closely.

## 2021-07-26 NOTE — ACP (ADVANCE CARE PLANNING)
Advance Care Planning     Advance Care Planning Activator (Inpatient)  Conversation Note      Date of ACP Conversation: 7/26/2021     Conversation Conducted with: Patient with Decision Making Capacity    ACP Activator: Juany Leblanc RN        Health Care Decision Maker:     Current Designated Health Care Decision Maker:     Click here to complete Healthcare Decision Makers including section of the Healthcare Decision Maker Relationship (ie \"Primary\")  Today we documented Decision Maker(s) consistent with Legal Next of Kin hierarchy. Care Preferences    Ventilation: \"If you were in your present state of health and suddenly became very ill and were unable to breathe on your own, what would your preference be about the use of a ventilator (breathing machine) if it were available to you? \"      Would the patient desire the use of ventilator (breathing machine)?: yes    \"If your health worsens and it becomes clear that your chance of recovery is unlikely, what would your preference be about the use of a ventilator (breathing machine) if it were available to you? \"     Would the patient desire the use of ventilator (breathing machine)?: Yes      Resuscitation  \"CPR works best to restart the heart when there is a sudden event, like a heart attack, in someone who is otherwise healthy. Unfortunately, CPR does not typically restart the heart for people who have serious health conditions or who are very sick. \"    \"In the event your heart stopped as a result of an underlying serious health condition, would you want attempts to be made to restart your heart (answer \"yes\" for attempt to resuscitate) or would you prefer a natural death (answer \"no\" for do not attempt to resuscitate)? \" yes       [x] Yes   [] No   Educated Patient / Nilda Mathis regarding differences between Advance Directives and portable DNR orders.     Length of ACP Conversation in minutes:      Conversation Outcomes:  [x] ACP discussion completed  []

## 2021-07-26 NOTE — DISCHARGE INSTR - DIET

## 2021-07-26 NOTE — PROGRESS NOTES
Patient weight is between 101-149kg. For prophylaxis with Enoxaparin, Pharmacy adjusted the dose to account for the patient's increased body weight in accordance with hospital approved protocol. The dose has been changed to 30mg BID. Please contact pharmacy with any concerns @ [phone]. Thank you.    Troy Bose, [facility]  7/25/2021 11:40 PM

## 2021-07-26 NOTE — PROGRESS NOTES
Patient arrives from the ER into 1017. Patient awake and alert. See assessment/vitals. Ongoing monitoring intaited. NP notified of BP.

## 2021-07-26 NOTE — CARE COORDINATION
Case Management Initial Discharge Plan  Karina Paulino,         Readmission Risk              Risk of Unplanned Readmission:  7             Met with:patient to discuss discharge plans. Information verified: address, contacts, phone number, , insurance Yes  PCP: HAYDEE Louis CNP  Date of last visit:     Insurance Provider: [employer]     Discharge Planning  Current Residence:  Private home   Living Arrangements:  Spouse/Significant Other       Home has 2 stories/7  stairs to climb to enter the home. Bed and bath are downstairs. Support Systems:  Spouse/Significant Other       Current Services PTA:  None   Agency: none      Patient able to perform ADL's:Independent  DME in home:  None   DME used to aid ambulation prior to admission:   None   DME used during admission:  None     Potential Assistance Needed:  N/A    Pharmacy: [facility]    Potential Assistance Purchasing Medications:  No  Does patient want to participate in local refill/ meds to beds program?  No    Patient agreeable to home care: No  [place] of choice provided:  n/a      Type of Home Care Services:  None  Patient expects to be discharged to:    home     Prior SNF/Rehab Placement and Facility: none   Agreeable to SNF/Rehab: No  [place] of choice provided: n/a   Evaluation: n/a    Expected Discharge date:  21  Follow Up Appointment: Best Day/ Time: Monday AM    Transportation provider: per fiance   Transportation arrangements needed for discharge: No    Discharge Plan:   Met with patient to discuss a plan of care. Patient lives with son who is 25. Patient has ariannejewell Rudy Kaminski ( [phone]) in room along with her best friend Alfonso. Patient is very independent and can drive but does not have a car. Patient states she has people drive her as needed. Patient has no DME. She has had home care with [facility] in past after her back surgery.      Patient had a PCP Dr Jasbir Downey but she has left the practice. Call to [facility] in [place]. ( [phone]). She will need to be set up with new pcp. May have to do virtual. Office prefers making appointment day of discharge order.        Electronically signed by Tomi Kocher, RN on 7/26/21 at 12:08 PM EDT

## 2021-07-26 NOTE — DISCHARGE SUMMARY
[facility]  Office: [address] [facility], DO, Nico Gonzalezraiza, DO, Zahra Mosley, DO, Bandar Helms Blood, DO, Harrison Love MD, Mynor Hutchison MD, Ran Caba MD, Nathan Figueroa MD, Celena Candelario MD, Akosua Munoz MD, Keke Aburto MD, Manpreet Oliveira, DO, Betty Godinez MD, Florentin Sanders, DO, Tam Pritchett MD,  Jean Paul Jacobsen, DO, Leatha Davidson MD, Tyson Lopez MD, Genie Dakin, MD, Nafisa Nation MD, Gisella Ramos MD, Juventino Carrasco MD, Master Vilchis, [facility], [facility], CNP, Romi Sarah, CNP, Alexis Ann, CNS, Elisha Gill, CNP, Adam Tabares, CNP, Renetta Blake, CNP, Guero Wellington, CNP, Ashvin Melendrez, CNP, Yobani Hill PA-C, Fermín Sanches, [facility], Walter Riley, CNP, Amber Pope, CNP, Tressa White, CNP, Ophelia Campos, CNP, Anshul Cuellar, CNP, Nohemi Stern, CNP, Effie Hodge, [facility], [facility]    Discharge Summary     Patient ID: Andrea Sanchez  :  1975   MRN: [id number]     ACCOUNT:  [de-identified]   Patient's PCP: HAYDEE Trammell CNP  Admit Date: 2021   Discharge Date: 2021     Length of Stay: 1  Code Status:  Full Code  Admitting Physician: Larisa Leija DO  Discharge Physician: Larisa Leija DO     Active Discharge Diagnoses:     Hospital Problem Lists:  Principal Problem:    Hypertensive emergency  Active Problems:    Essential hypertension    Gastroesophageal reflux disease without esophagitis    Seasonal allergies    Tobacco dependence    Obesity, morbid, BMI 40.0-49.9 (HCC)    Arm paresthesia, left  Resolved Problems:    * No resolved hospital problems.  *      Admission Condition:  fair     Discharged Condition: stable    Hospital Stay:     Hospital Course:  Andrea Sanchez is a 40 y.o. female who was admitted for the management of  Hypertensive emergency , presented to ER with Hypertension (started a couple days ago) and Numbness (left arm)    This is a 43-year-old female with a history of hypertension that presents with left arm numbness and shortly after checked her blood pressure which she found to be elevated with blood pressure greater than 180. She has had a headache for a few days and with her symptoms she presented to the emergency room was found to have hypertensive urgency/emergency. She is admitted to the hospital and was given IV hydralazine and metoprolol and started on oral Procardia with improvement in blood pressure. She underwent echocardiogram which showed preserved ejection fraction at 60% with mild LVH on preliminary report, official report pending. As her blood pressure and symptoms have improved she was discharged home with instructions to follow-up with her PCP and cardiology in the next 1 to 2 weeks for further management. Significant therapeutic interventions: As above    Significant Diagnostic Studies:   Labs / Micro:  CBC:   Lab Results   Component Value Date    WBC 16.7 07/26/2021    RBC 3.76 07/26/2021    HGB 12.3 07/26/2021    HCT 36.7 07/26/2021    MCV 97.6 07/26/2021    MCH 32.7 07/26/2021    MCHC 33.5 07/26/2021    RDW 13.2 07/26/2021     07/26/2021     BMP:    Lab Results   Component Value Date    GLUCOSE 96 07/26/2021     07/26/2021    K 3.7 07/26/2021     07/26/2021    CO2 22 07/26/2021    ANIONGAP 13 07/26/2021    BUN 7 07/26/2021    CREATININE 0.41 07/26/2021    BUNCRER 17 07/26/2021    CALCIUM 9.0 07/26/2021    LABGLOM >60 07/26/2021    GFRAA >60 07/26/2021    GFR      07/26/2021    GFR NOT REPORTED 07/26/2021        Radiology:  XR CHEST (2 VW)    Result Date: 7/25/2021  No significant findings in the chest.     CT HEAD WO CONTRAST    Result Date: 7/25/2021  No acute intracranial abnormality.        Consultations:    Consults:     Final Specialist Recommendations/Findings:   IP CONSULT TO CARDIOLOGY      The patient was seen and examined on day of discharge and this discharge summary is in conjunction with any daily progress note from day of discharge. Discharge plan:     Disposition: Home    Physician Follow Up:   [facility]AY[facility]E Harrison CNP  [facility] [address] [facility] [address]  [address] [facility] [address]  [phone]    On 8/5/2021  hospital follow up appt 1 pm... thru your my chart phil. HAYDEE Lyon CNP  [address] [place] [address]  [phone]    On 8/17/2021  hospital follow up appt [phone]. follow up with cardiology NP        Requiring Further Evaluation/Follow Up POST HOSPITALIZATION/Incidental Findings: Further outpatient testing is necessary    Diet: cardiac diet    Activity: As tolerated    Instructions to Patient: Take medication as prescribed. Follow low-salt diet. Change eating habits to correct her recent weight gain.     Discharge Medications:      Medication List      START taking these medications    NIFEdipine 60 MG extended release tablet  Commonly known as: ADALAT CC  Take 1 tablet by mouth daily  Start taking on: July 27, 2021        Jay Cerdas taking these medications    acetaminophen 500 MG tablet  Commonly known as: TYLENOL     famotidine 20 MG tablet  Commonly known as: PEPCID  take 1 tablet by mouth nightly if needed     loratadine 10 MG tablet  Commonly known as: CLARITIN  take 1 tablet by mouth once daily     meloxicam 15 MG tablet  Commonly known as: MOBIC  take 1 tablet by mouth once daily     montelukast 10 MG tablet  Commonly known as: SINGULAIR  take 1 tablet by mouth every evening     multivitamin-minerals Tabs tablet  take 1 tablet by mouth once daily     olmesartan 40 MG tablet  Commonly known as: BENICAR  take 1 tablet by mouth once daily     ondansetron 4 MG disintegrating tablet  Commonly known as: Zofran ODT  Take 1 tablet by mouth every 6 hours as needed for Nausea or Vomiting     Oyster Shell Calcium/D 500-200 MG-UNIT Tabs  Take 1 tablet by mouth daily     tiZANidine 4 MG tablet  Commonly known as: ZANAFLEX  take 1 tablet by mouth every 6 hours if needed for back pain     triamterene-hydroCHLOROthiazide 37.5-25 MG per tablet  Commonly known as: MAXZIDE-25  take 1 tablet by mouth once daily           Where to Get Your Medications      These medications were sent to [address] [place] [id number]    Phone: [phone]   · NIFEdipine 60 MG extended release tablet         No discharge procedures on file. Time Spent on discharge is  27 minutes in patient examination, evaluation, counseling as well as medication reconciliation, prescriptions for required medications, discharge plan and follow up. Electronically signed by   Mitchell Gonzalez DO  7/26/2021  4:10 PM      Thank you Dr. Keysha Kincaid, APRN - NATTY for the opportunity to be involved in this patient's care.

## 2021-07-26 NOTE — DISCHARGE INSTR - COC
Continuity of Care Form    Patient Name: Marcel Baeza   :  1975  MRN:  [id number]    Admit date:  2021  Discharge date:  ***    Code Status Order: Full Code   Advance Directives:     Admitting Physician:  Bailee Villar DO  PCP: HAYDEE Arias CNP    Discharging Nurse: [facility] Care Unit/Room#: 1017/1017-02  Discharging Unit Phone Number: ***    Emergency Contact:   Extended Emergency Contact Information  Primary Emergency Contact: Anatoly Mathis   [address] Phone: [phone]  Relation: Other    Past Surgical History:  Past Surgical History:   Procedure Laterality Date    ABDOMEN SURGERY      seroma removed     SECTION      ELBOW JOINT FUSION Right     HERNIA REPAIR      umbilical    HYSTERECTOMY VAGINAL      ovaries remain    INCONTINENCE SURGERY      KNEE ARTHROSCOPY Right 3/10/2020    KNEE ARTHROSCOPY PARITAL MEDIAL MENISECTOMY performed by Sarina Rubi MD at [email or web address]      rods & cage inserted L5-S1, fusion & decompression    SPINE SURGERY      revision because cage had backed out and severed a nerve, cage restabilized       Immunization History:   Immunization History   Administered Date(s) Administered    Influenza Virus Vaccine 10/17/2016, 10/18/2018, 2020    Influenza, Indira Half, IM, (6 mo and older Fluzone, Flulaval, Fluarix and 3 yrs and older Afluria) 10/17/2016, 10/18/2018    Pneumococcal Polysaccharide ([id number]) 2016    Tdap (Boostrix, Adacel) 04/10/2015       Active Problems:  Patient Active Problem List   Diagnosis Code    Essential hypertension I10    Gastroesophageal reflux disease without esophagitis K21.9    Seasonal allergies J30.2    Tobacco dependence F17.200    Obesity, morbid, BMI 40.0-49.9 ([facility]) E66.01    Failed back syndrome, lumbar M96.1    Chronic midline low back pain without sciatica M54.5, G89.29    Bronchitis J40    Neural foraminal stenosis of lumbar spine M48.061  Pyelonephritis N12    Hydronephrosis of right kidney N13.30    Psychophysiological insomnia F51.04    Anxiety F41.9    Depression F32.9    History of partial hysterectomy Z90.711    Hot flashes R23.2    Snores R06.83    History of sleep apnea Z86.69    Other fatigue R53.83    Goiter E04.9    Leukocytosis D72.829    Hypertensive emergency I16.1    Arm paresthesia, left R20.2    Hypertensive urgency I16.0       Isolation/Infection:   Isolation          No Isolation        Patient Infection Status     Infection Onset Added Last Indicated Last Indicated By Review Planned Expiration Resolved Resolved By    None active    Resolved    COVID-19 Rule Out 05/21/20 05/21/20 05/21/20 COVID-19 Ambulatory (Ordered)   05/23/20 Rule-Out Test Resulted          Nurse Assessment:  Last Vital Signs: BP (!) 142/81   Pulse 75   Temp 98.1 °F (36.7 °C) (Oral)   Resp 14   Ht 5' 2\" (1.575 m)   Wt 247 lb 3.2 oz (112.1 kg)   SpO2 99%   BMI 45.21 kg/m²     Last documented pain score (0-10 scale): Pain Level: 8  Last Weight:   Wt Readings from Last 1 Encounters:   07/25/21 247 lb 3.2 oz (112.1 kg)     Mental Status:  {IP PT MENTAL STATUS:19846}    IV Access:  { JEISON IV ACCESS:711989335}    Nursing Mobility/ADLs:  Walking   {P DME VOJX:272573265}  Transfer  {P DME BJML:029752809}  Bathing  {[facility] DME NXCD:574308283}  Dressing  {P DME ITCD:702621892}  Toileting  {P DME MEJR:756943368}  Feeding  {[facility] DME GCCP:686368484}  Med Admin  {[facility] DME BXEN:657670265}  Med Delivery   { JEISON MED Delivery:166140952}    Wound Care Documentation and Therapy:        Elimination:  Continence:   · Bowel: {YES / RT:86080}  · Bladder: {YES / WZ:37395}  Urinary Catheter: {Urinary Catheter:103446884}   Colostomy/Ileostomy/Ileal Conduit: {YES / PM:90506}       Date of Last BM: ***  No intake or output data in the 24 hours ending 07/26/21 1701  No intake/output data recorded.     Safety Concerns:     [facility] Safety Concerns:203478226}    Impairments/Disabilities:      [address] [facility] Impairments/Disabilities:392516979}    Nutrition Therapy:  Current Nutrition Therapy:   [address] [facility] Diet List:007340380}    Routes of Feeding: {CHP DME Other Feedings:666232316}  Liquids: {Slp liquid thickness:88789}  Daily Fluid Restriction: {CHP DME Yes amt example:616644458}  Last Modified Barium Swallow with Video (Video Swallowing Test): {Done Not Done ZER}    Treatments at the Time of Hospital Discharge:   Respiratory Treatments: ***  Oxygen Therapy:  {Therapy; copd oxygen:76248}  Ventilator:    {[facility] Vent VZKP:255657609}    Rehab Therapies: {THERAPEUTIC INTERVENTION:9969618355}  Weight Bearing Status/Restrictions: {[facility] Weight Bearin}  Other Medical Equipment (for information only, NOT a DME order):  {EQUIPMENT:036025649}  Other Treatments: ***    Patient's personal belongings (please select all that are sent with patient):  {[facility] DME Belongings:995736516}    RN SIGNATURE:  {Esignature:096573973}    CASE MANAGEMENT/SOCIAL WORK SECTION    Inpatient Status Date: ***    Readmission Risk Assessment Score:  Readmission Risk              Risk of Unplanned Readmission:  7           Discharging to Facility/ Agency   · Name:   · Address:  · Phone:  · Fax:    Dialysis Facility (if applicable)   · Name:  · Address:  · Dialysis Schedule:  · Phone:  · Fax:    / signature: {Esignature:033091127}    PHYSICIAN SECTION    Prognosis: {Prognosis:6152847836}    Condition at Discharge: [address] Patient Condition:457054331}    Rehab Potential (if transferring to Rehab): {Prognosis:5730992567}    Recommended Labs or Other Treatments After Discharge: ***    Physician Certification: I certify the above information and transfer of Rad Hinton  is necessary for the continuing treatment of the diagnosis listed and that she requires {Admit to Appropriate Level of Care:56171} for {GREATER/LESS:647329836} 30 days.      Update Admission H&P: {CHP DME Changes in CPFKZ:430902581}    PHYSICIAN SIGNATURE:  {Esignature:701095578}

## 2021-07-26 NOTE — CARE COORDINATION
Discharge planning    Potential for discharge home today. . patient is in need of BP cuff. rx ordered under cardiology and placed in patient chart .      Follow up with pcp made and placed in discharge instructions

## 2021-07-26 NOTE — PROGRESS NOTES
Pt ambulatory during discharged. Patient discharged via private auto with family member (Friend). Discharge paperwork and instructions given to patient. Patient  acknowledges understanding. Scripts given to Patient (e-scripted to [facility] pharmacy [facility] [address]) for Nifedipine. New medications and side effects explained. Follow up appointments reviewed (directions to make follow up appointment given)  Discharge checklist completed     Any questions answered.      Telemetry monitor returned

## 2021-07-27 ENCOUNTER — TELEPHONE (OUTPATIENT)
Dept: FAMILY MEDICINE CLINIC | Age: 46
End: 2021-07-27

## 2021-07-28 LAB
EKG ATRIAL RATE: 67 BPM
EKG P AXIS: 55 DEGREES
EKG P-R INTERVAL: 162 MS
EKG Q-T INTERVAL: 428 MS
EKG QRS DURATION: 94 MS
EKG QTC CALCULATION (BAZETT): 452 MS
EKG R AXIS: 69 DEGREES
EKG T AXIS: 48 DEGREES
EKG VENTRICULAR RATE: 67 BPM

## 2021-07-28 PROCEDURE — 93010 ELECTROCARDIOGRAM REPORT: CPT | Performed by: INTERNAL MEDICINE

## 2021-08-04 SDOH — ECONOMIC STABILITY: FOOD INSECURITY: WITHIN THE PAST 12 MONTHS, THE FOOD YOU BOUGHT JUST DIDN'T LAST AND YOU DIDN'T HAVE MONEY TO GET MORE.: SOMETIMES TRUE

## 2021-08-04 SDOH — ECONOMIC STABILITY: FOOD INSECURITY: WITHIN THE PAST 12 MONTHS, YOU WORRIED THAT YOUR FOOD WOULD RUN OUT BEFORE YOU GOT MONEY TO BUY MORE.: SOMETIMES TRUE

## 2021-08-04 ASSESSMENT — SOCIAL DETERMINANTS OF HEALTH (SDOH): HOW HARD IS IT FOR YOU TO PAY FOR THE VERY BASICS LIKE FOOD, HOUSING, MEDICAL CARE, AND HEATING?: SOMEWHAT HARD

## 2021-08-04 NOTE — PROGRESS NOTES
Visit Information    Have you changed or started any medications since your last visit including any over-the-counter medicines, vitamins, or herbal medicines? no   Are you having any side effects from any of your medications? -  no  Have you stopped taking any of your medications? Is so, why? -  no    Have you seen any other physician or provider since your last visit? NO  Have you had any other diagnostic tests since your last visit? Yes - Records Obtained  Have you been seen in the emergency room and/or had an admission to a hospital since we last saw you? Yes - Records Obtained  Have you had your routine dental cleaning in the past 6 months? yes     Have you activated your [facility] account? If not, what are your barriers?  Yes     Patient Care Team:  HAYDEE Farmer CNP as PCP - General (Certified Nurse Practitioner)  HAYDEE Farmer CNP as [facility]  Silvana Garcia MD as Obstetrician (Obstetrics & Gynecology)  Miah Cummins MD as Consulting Physician (Neurosurgery)  HAYDEE Ortega CNP as Nurse Practitioner (Obstetrics & Gynecology)    Medical History Review  Past Medical, Family, and Social History reviewed and does not contribute to the patient presenting condition    Health Maintenance   Topic Date Due    Hepatitis C screen  Never done    COVID-19 Vaccine (1) Never done    A1C test (Diabetic or Prediabetic)  10/01/2020    Colon cancer screen colonoscopy  Never done    Flu vaccine (1) 09/01/2021    Potassium monitoring  07/26/2022    Creatinine monitoring  07/26/2022    DTaP/Tdap/Td vaccine (2 - Td or Tdap) 04/10/2025    Lipid screen  07/26/2026    Pneumococcal 0-64 years Vaccine (2 of 2 - PPSV23) 01/18/2041    HIV screen  Completed    Hepatitis A vaccine  Aged Out    Hepatitis B vaccine  Aged Out    Hib vaccine  Aged Out    Meningococcal (ACWY) vaccine  Aged Out

## 2021-08-05 ENCOUNTER — TELEMEDICINE (OUTPATIENT)
Dept: FAMILY MEDICINE CLINIC | Age: 46
End: 2021-08-05
Payer: MEDICARE

## 2021-08-05 DIAGNOSIS — G47.33 OSA ON CPAP: ICD-10-CM

## 2021-08-05 DIAGNOSIS — E66.01 MORBID OBESITY WITH BMI OF 45.0-49.9, ADULT (HCC): ICD-10-CM

## 2021-08-05 DIAGNOSIS — I10 ESSENTIAL HYPERTENSION: ICD-10-CM

## 2021-08-05 DIAGNOSIS — J34.9 PARANASAL SINUS DISEASE: ICD-10-CM

## 2021-08-05 DIAGNOSIS — T50.2X5A DIURETIC-INDUCED HYPOKALEMIA: ICD-10-CM

## 2021-08-05 DIAGNOSIS — I16.1 HYPERTENSIVE EMERGENCY: Primary | ICD-10-CM

## 2021-08-05 DIAGNOSIS — E78.5 DYSLIPIDEMIA: ICD-10-CM

## 2021-08-05 DIAGNOSIS — J30.2 SEASONAL ALLERGIC RHINITIS, UNSPECIFIED TRIGGER: ICD-10-CM

## 2021-08-05 DIAGNOSIS — Z11.59 SCREENING FOR VIRAL DISEASE: ICD-10-CM

## 2021-08-05 DIAGNOSIS — E87.6 DIURETIC-INDUCED HYPOKALEMIA: ICD-10-CM

## 2021-08-05 DIAGNOSIS — Z99.89 OSA ON CPAP: ICD-10-CM

## 2021-08-05 PROCEDURE — 1111F DSCHRG MED/CURRENT MED MERGE: CPT | Performed by: FAMILY MEDICINE

## 2021-08-05 PROCEDURE — G8427 DOCREV CUR MEDS BY ELIG CLIN: HCPCS | Performed by: FAMILY MEDICINE

## 2021-08-05 PROCEDURE — 99214 OFFICE O/P EST MOD 30 MIN: CPT | Performed by: FAMILY MEDICINE

## 2021-08-05 RX ORDER — FLUTICASONE PROPIONATE 50 MCG
1 SPRAY, SUSPENSION (ML) NASAL DAILY
Qty: 1 BOTTLE | Refills: 1 | Status: SHIPPED | OUTPATIENT
Start: 2021-08-05 | End: 2021-11-01

## 2021-08-05 RX ORDER — TRIAMTERENE AND HYDROCHLOROTHIAZIDE 75; 50 MG/1; MG/1
1 TABLET ORAL DAILY
Qty: 90 TABLET | Refills: 1 | Status: SHIPPED | OUTPATIENT
Start: 2021-08-05 | End: 2021-12-10

## 2021-08-05 RX ORDER — OLMESARTAN MEDOXOMIL 40 MG/1
TABLET ORAL
Qty: 90 TABLET | Refills: 1 | Status: SHIPPED | OUTPATIENT
Start: 2021-08-05 | End: 2021-11-17 | Stop reason: SDUPTHER

## 2021-08-05 RX ORDER — BLOOD PRESSURE TEST KIT
KIT MISCELLANEOUS
Qty: 1 KIT | Refills: 1 | Status: SHIPPED | OUTPATIENT
Start: 2021-08-05

## 2021-08-05 RX ORDER — POTASSIUM CHLORIDE 750 MG/1
10 TABLET, EXTENDED RELEASE ORAL 2 TIMES DAILY
Qty: 60 TABLET | Refills: 0 | Status: SHIPPED | OUTPATIENT
Start: 2021-08-05 | End: 2021-09-02

## 2021-08-05 RX ORDER — METOPROLOL SUCCINATE 25 MG/1
25 TABLET, EXTENDED RELEASE ORAL DAILY
Qty: 90 TABLET | Refills: 1 | Status: SHIPPED | OUTPATIENT
Start: 2021-08-05 | End: 2021-09-21 | Stop reason: SDUPTHER

## 2021-08-05 ASSESSMENT — ENCOUNTER SYMPTOMS
ABDOMINAL PAIN: 0
SHORTNESS OF BREATH: 0
BACK PAIN: 1

## 2021-08-05 NOTE — PROGRESS NOTES
Post-Discharge Transitional Care Management Services or Hospital Follow Up      Nik Owens   YOB: 1975    Date of Office Visit:  8/5/2021  Date of Hospital Admission: 7/25/21  Date of Hospital Discharge: 7/26/21  Readmission Risk Score(high >=14%. Medium >=10%):Readmission Risk Score: 7      Care management risk score Rising risk (score 2-5) and Complex Care (Scores >=6): 4     Non face to face  following discharge, date last encounter closed (first attempt may have been earlier): 7/27/2021 10:01 AM 7/27/2021 10:01 AM    Call initiated 2 business days of discharge: Yes     Patient Active Problem List   Diagnosis    Essential hypertension    Gastroesophageal reflux disease without esophagitis    Seasonal allergic rhinitis    Tobacco dependence    Obesity, morbid, BMI 40.0-49.9 ([address]    Failed back syndrome, lumbar    Chronic midline low back pain without sciatica    Bronchitis    Neural foraminal stenosis of lumbar spine    Pyelonephritis    Hydronephrosis of right kidney    Psychophysiological insomnia    Anxiety    Depression    History of partial hysterectomy    Hot flashes    Snores    History of sleep apnea    Other fatigue    Goiter    Leukocytosis    Hypertensive emergency    Arm paresthesia, left    Hypertensive urgency    Dyslipidemia    SHANKAR on CPAP    Paranasal sinus disease    Diuretic-induced hypokalemia       Allergies   Allergen Reactions    Dye [Iodides] Shortness Of Breath     Other reaction(s): Unknown    Dye  [Barium-Containing Compounds] Hives     Other reaction(s): Vomiting  IVP dye    Fentanyl      Pt never wants this again as it caused her hair and teeth to fall out.       Oxycodone Other (See Comments)     Does not like how they make her feel    Tylenol With Codeine #3 [Acetaminophen-Codeine] Nausea And Vomiting     Can take plain tylenol       Medications listed as ordered at the time of discharge from hospital   [facility], [address] [place] [address] Medication Instructions ORION:    Printed on:08/05/21 6662   Medication Information                      acetaminophen (TYLENOL) 500 MG tablet  Take 1,000 mg by mouth every 6 hours as needed for Pain Take 2 tablets (=1000mg) every 6 hours as needed for pain             Blood Pressure Monitor KIT  Use as directed.              Calcium Carbonate-Vitamin D (OYSTER SHELL CALCIUM/D) 500-200 MG-UNIT TABS  Take 1 tablet by mouth daily             famotidine (PEPCID) 20 MG tablet  take 1 tablet by mouth nightly if needed             fluticasone (FLONASE) 50 MCG/ACT nasal spray  1 spray by Each Nostril route daily             loratadine (CLARITIN) 10 MG tablet  take 1 tablet by mouth once daily             meloxicam (MOBIC) 15 MG tablet  take 1 tablet by mouth once daily             metoprolol succinate (TOPROL XL) 25 MG extended release tablet  Take 1 tablet by mouth daily             montelukast (SINGULAIR) 10 MG tablet  take 1 tablet by mouth every evening             Multiple Vitamins-Minerals (MULTIVITAMIN-MINERALS) TABS tablet  take 1 tablet by mouth once daily             olmesartan (BENICAR) 40 MG tablet  take 1 tablet by mouth once daily             ondansetron (ZOFRAN ODT) 4 MG disintegrating tablet  Take 1 tablet by mouth every 6 hours as needed for Nausea or Vomiting             potassium chloride (KLOR-CON M) 10 MEQ extended release tablet  Take 1 tablet by mouth 2 times daily             tiZANidine (ZANAFLEX) 4 MG tablet  take 1 tablet by mouth every 6 hours if needed for back pain             triamterene-hydroCHLOROthiazide (MAXZIDE) 75-50 MG per tablet  Take 1 tablet by mouth daily                   Medications marked \"taking\" at this time  Outpatient Medications Marked as Taking for the 8/5/21 encounter (Telemedicine) with HAYDEE Sarmiento - CNP   Medication Sig Dispense Refill    olmesartan (BENICAR) 40 MG tablet take 1 tablet by mouth once daily 90 tablet 1    triamterene-hydroCHLOROthiazide in the hospital.  Patient has been on olmesartan and triamterene for a while but have noticed some increasing blood pressure in the past 2 months. Patient denies any chest pains or shortness of breath but feels somewhat fatigued and continues to have low back pain. SHANKAR  Patient also states that he was being treated for sleep apnea in the past.  However, since she was over 400 pounds and lost weight they stopped treating her with his CPAP. However, her weight had started to come up again and while in the hospital staff noticed that she might be having some occasional apneic episodes. Patient have not been or followed up with the pulmonologist for over 8 years. We will go ahead and order a split CPAP trial and also send her to a pulmonologist for further management. Patient is aware that this might be one of the reasons why it has been a challenge to control her blood pressure and could be the reason for her other health conditions. HYPERTENSION  Rad Hinton has a well controlled hypertension. she is currently on Olmesartan, Nifedipine-stopped,Triamterene-increased, will start metoprolol, . Patient's most recent BP in the office was stable. she reports stable BP readings at home. Patient denies any adverse reaction to this therapy. she denies any CP, SOB, HA, or palpitations. Patient admits to exercising and adheres to low salt diet. No history of organ damage due to condition noted. Lab Results   Component Value Date/Time    CREATININE 0.41 (L) 07/26/2021 02:18 AM     Lab Results   Component Value Date/Time    K 3.7 07/26/2021 02:18 AM    K 3.8 07/25/2021 07:36 PM    MG 1.8 12/30/2017 05:30 AM    MG 1.9 12/29/2017 06:24 AM       DYSLIPIDEMIA  Rad Hinton has a known history of dyslipidemia. Patient is on OTC Fish Oil to help improve dyslipidemia. Patient denies adverse reaction to this therapy. The patient is not known to have coexisting coronary artery disease. ASCVD Score below. The 10-year CVD risk score (Jamila et al., 2008) is: 13.3%    Values used to calculate the score:      Age: 40 years      Sex: Female      Diabetic: No      Tobacco smoker: Yes      Systolic Blood Pressure: 932 mmHg      Is BP treated: Yes      HDL Cholesterol: 35 mg/dL      Total Cholesterol: 192 mg/dL  Lab Results   Component Value Date/Time    CHOLFAST 204 (H) 05/19/2021 03:06 PM    CHOL 192 07/26/2021 02:18 AM    HDL 35 (L) 07/26/2021 02:18 AM    LDLCHOLESTEROL 132 (H) 07/26/2021 02:18 AM    TRIGLYCFAST 70 05/19/2021 03:06 PM    CHOLHDLRATIO 5.5 (H) 07/26/2021 02:18 AM    TRIG 125 07/26/2021 02:18 AM    VLDL NOT REPORTED 07/26/2021 02:18 AM     ALLERGIC RHINITIS/PARANASAL SINUS DISEASE ON CT  Cielo Castillo is a 40 y.o. female patient  has a known history of Common allergies. Symptoms include: clear rhinorrhea and postnasal drip and present in a nonseasonal pattern. Patient reports precipitants which includes: Pollen, dust, smoke, etc. Treatment currently includes Claritin, Singulair,-we will start Flonase. OBESITY  Patient's BMI is There is no height or weight on file to calculate BMI.  kg/m2. BMI is increasing. Patient understands that this condition increases the patient's risk for chronic conditions. Patient advised to practice healthy eating habits. Diet should include plenty of fruits, vegetables, whole grains, lean protein, and low-fat dairy. Increase water consumption. Reduce consumption of dietary sugar and sugar-sweetened beverages. Increasing physical exercises at least 3-4 times a week. We will monitor progression of condition. Review of Systems   Constitutional: Positive for activity change and unexpected weight change. Negative for chills and fever. Respiratory: Negative for shortness of breath. Cardiovascular: Negative for chest pain. Elevated blood pressure   Gastrointestinal: Negative for abdominal pain. Genitourinary: Negative for dysuria.    Musculoskeletal: Positive for Lab Results   Component Value Date    LDLCHOLESTEROL 132 (H) 07/26/2021    LDLCHOLESTEROL 142 (H) 05/19/2021    LDLCHOLESTEROL 91 08/12/2019     Lab Results   Component Value Date    VLDL NOT REPORTED 07/26/2021    VLDL NOT REPORTED 05/19/2021    VLDL NOT REPORTED (H) 08/12/2019     Lab Results   Component Value Date    CHOLHDLRATIO 5.5 (H) 07/26/2021    CHOLHDLRATIO 4.3 05/19/2021    CHOLHDLRATIO 5.1 (H) 08/12/2019     Lab Results   Component Value Date    LABA1C 5.7 10/01/2019     Lab Results   Component Value Date    KEWKRJKF54 580 10/01/2019     No results found for: FOLATE  No results found for: IRON, TIBC, FERRITIN  Lab Results   Component Value Date    VITD25 47.3 05/19/2021     Echocardiogram w Sector w Doppler Adult  [facility]    Transthoracic Echocardiography Report (TTE)     Patient Name Chucky Carlson     Date of Study                 07/26/2021                ADRIANO MUNOZ      Date of      1975  Gender                        Female   Birth      Age          40 year(s)  Race                                Room Number  1017        Height:                       62 inch, 157.48 cm      Corporate ID [id number]    Weight:                       247 pounds, 112 kg   #      Patient Acct [de-identified]   BSA:           2.09 m^2       BMI:      45.18   #                                                                kg/m^2      MR #         [id number]     Sonographer                   Sudha Kiran      Accession #  [id number]  Interpreting Physician        Nathalie Yo      Fellow                   Referring Nurse Practitioner      Interpreting             Referring Physician           Lio Aparicio     Type of Study      TTE procedure:2D Echocardiogram, M-Mode. Procedure Date  Date: 07/26/2021 Start: 10:36 AM    Study Location: [facility]  Technical Quality: Adequate visualization    Indications:Hypertension. History / Tech.  Comments:  Procedure explained to patient. Patient Status: Inpatient    Height: 62 inches Weight: 247.01 pounds BSA: 2.09 m^2 BMI: 45.18 kg/m^2    Rhythm: Within normal limits HR: 67 bpm    CONCLUSIONS    Summary  Left ventricle is normal in size. Mild left ventricular hypertrophy. Global left ventricular systolic function is normal with an estimated  ejection fraction of 60 % . No obvious wall motion abnormality seen. Left atrium is mildly dilated. No significant valvular regurgitation or stenosis seen. No significant pericardial effusion is seen. Signature  ----------------------------------------------------------------------------   Electronically signed by Sudha Kiran(Sonographer) on 07/26/2021 11:03   AM  ----------------------------------------------------------------------------    ----------------------------------------------------------------------------   Electronically signed by Kasie Moreno(Interpreting physician) on   07/26/2021 10:23 PM  ----------------------------------------------------------------------------  FINDINGS  Left Atrium  Left atrium is mildly dilated. Left Ventricle  Left ventricle is normal in size. Mild left ventricular hypertrophy. Global left ventricular systolic function is normal with an estimated  ejection fraction of 60 % . No obvious wall motion abnormality seen. Right Atrium  Right atrium is normal in size. Right Ventricle  Normal right ventricular size and function. Mitral Valve  Normal mitral valve structure. Trivial mitral regurgitation. Aortic Valve  Normal aortic valve structure and function without stenosis or  regurgitation. Tricuspid Valve  Normal tricuspid valve leaflets. Trivial tricuspid regurgitation. Pulmonic Valve  The pulmonic valve is normal in structure. Pericardial Effusion  No significant pericardial effusion is seen. Pleural Effusion  No pleural effusion seen. Miscellaneous  Normal aortic root dimension.     M-mode / 2D Measurements & Calculations: LVIDd:5.5 cm(3.7 - 5.6 cm)       Diastolic RSICVK:973 ml   JSPTN:6.1 cm(2.2 - 4.0 cm)       Systolic TMWLKW:07.1 ml   IVSd:1.1 cm(0.6 - 1.1 cm)        Aortic Root:2.8 cm(2.0 - 3.7 cm)   LVPWd:1.1 cm(0.6 - 1.1 cm)       LA Dimension: 3.9 cm(1.9 - 4.0 cm)   Fractional Shortenin.91 %    LA volume/Index: 67 ml /32m^2   Calculated LVEF (%): 66.93 %      Mitral:                                    Aortic      Valve Area (P1/2-Time): 2.29 cm^2          Peak Velocity: 1.62 m/s   Peak E-Wave: 0.90 m/s                      Mean Velocity: 1.01 m/s   Peak A-Wave: 0.98 m/s                      Peak Gradient: 10.5 mmHg   E/A Ratio: 0.93                            Mean Gradient: 5 mmHg   Peak Gradient: 3.25 mmHg   Deceleration Time: 215 msec   P1/2t: 96 msec                             AV VTI: 31.2 cm     Septal Wall E' velocity:0.07 m/s  Lateral Wall E' velocity:0.11 m/s          Assessment/Plan:  1. Hypertensive emergency  Resolved  Continue to monitor    - ID DISCHARGE MEDS RECONCILED W/ CURRENT OUTPATIENT MED LIST  - Blood Pressure Monitor KIT; Use as directed. Dispense: 1 kit; Refill: 1    2. Essential hypertension  Failure to Improve  Start nifedipine  Continue Benicar  Increased Maxzide  Added metoprolol  DISCUSSED AND ADVISED TO:  Cut down on your salt intake. Cut down on caffeinated drinks, sports drinks. Instructed to check BP at home regularly. Report for any chest pains, shortness of breath, headaches, and lightheadedness. Call the office if your blood pressure continue to be higher than 140/90 or 90/50.      - ID DISCHARGE MEDS RECONCILED W/ CURRENT OUTPATIENT MED LIST  - olmesartan (BENICAR) 40 MG tablet; take 1 tablet by mouth once daily  Dispense: 90 tablet; Refill: 1  - triamterene-hydroCHLOROthiazide (MAXZIDE) 75-50 MG per tablet; Take 1 tablet by mouth daily  Dispense: 90 tablet; Refill: 1  - potassium chloride (KLOR-CON M) 10 MEQ extended release tablet;  Take 1 tablet by mouth 2 times daily  Dispense: 60 tablet; Refill: 0  - metoprolol succinate (TOPROL XL) 25 MG extended release tablet; Take 1 tablet by mouth daily  Dispense: 90 tablet; Refill: 1  - Blood Pressure Monitor KIT; Use as directed. Dispense: 1 kit; Refill: 1    3. SHANKAR on CPAP  Failure to Improve  Repeat sleep study and CPAP trial  Consulted pulmonology  - Sleep Study with PAP Titration; Future  - Chelsea Escamilla MD, Pulmonology, [place]    4. Dyslipidemia  Stable  Continue therapy. Advised to decrease the consumption of red meats, fried foods, trans fats, sweets, sugary beverages. Advised to increase fish, vegetables, and fruits consumption. Advised to add fiber or OTC supplements in diet. Discussed weight loss which will result in improvement of lipids levels. Advised to increase daily physical activities and add regular exercises. - IN DISCHARGE MEDS RECONCILED W/ CURRENT OUTPATIENT MED LIST    5. Seasonal allergic rhinitis, unspecified trigger  Failure to Improve  Continue with the same therapy   ADVISED TO:  Avoid known allergens/irritants. Stop smoking or avoid second hand smoke. Stay hydrated. Report for worsening symptoms    - IN DISCHARGE MEDS RECONCILED W/ CURRENT OUTPATIENT MED LIST    6. Paranasal sinus disease  Failure to Improve  Start Flonase  Continue to evaluate    - fluticasone (FLONASE) 50 MCG/ACT nasal spray; 1 spray by Each Nostril route daily  Dispense: 1 Bottle; Refill: 1    7. Diuretic-induced hypokalemia  Stable  Start potassium since Maxide increased    - potassium chloride (KLOR-CON M) 10 MEQ extended release tablet; Take 1 tablet by mouth 2 times daily  Dispense: 60 tablet; Refill: 0    8. Morbid obesity with BMI of 45.0-49.9, adult (HCC)  Failure to Improve  BMI increasing  DISCUSSED AND ADVISED TO:  Eat a low-fat and low carbohydrates diet. Avoid fried foods especially fast food. Choose healthier options for snacks. Have 5-6 servings of fruits and vegetables per day.   Cut down on eating processed food.  Add 30 minutes to 1 hour aerobic exercise for 3-4 days a week. Medical Decision Making: high complexity      This note was completed by using the assistance of a speech-recognition program. However, inadvertent computerized transcription errors may be present. Although every effort was made to ensure accuracy, no guarantees can be provided that every mistake has been identified and corrected by editing.   Electronically signed by HAYDEE Diaz CNP on 8/5/21 at 1:27 PM EDT

## 2021-08-05 NOTE — PROGRESS NOTES
[facility] at [facility] [address] [place] [address]   O: [phone]  O:[phone]    Jason Haddad is a 40 y.o. female evaluated via telephone on 8/5/2021. CONSENT:  She and/or health care decision maker is aware that that she may receive a bill for this telephone service, depending on her insurance coverage, and has provided verbal consent to proceed: Yes    DOCUMENTATION:  Patient scheduled this appointment today due to Follow-Up from Hospital (HYPERTENSIVE) and Health Maintenance ([address]  . HYPERTENSION  Jason Haddad has a well controlled hypertension. she is currently on Olmesartan, Nifedipine,Triamterene, . Patient's most recent BP in the office was stable. she reports stable BP readings at home. Patient denies any adverse reaction to this therapy. she denies any CP, SOB, HA, or palpitations. Patient admits to exercising and adheres to low salt diet. No history of organ damage due to condition noted. Lab Results   Component Value Date/Time    CREATININE 0.41 (L) 07/26/2021 02:18 AM     DYSLIPIDEMIA  Jason Haddad has a known history of dyslipidemia. Patient is on {RP HYPERLIPIDEMIA MEDS:09716} to help improve dyslipidemia. Patient denies adverse reaction to this therapy. The patient is not known to have coexisting coronary artery disease. ASCVD Score below. The 10-year CVD risk score (D'Agostino, et al., 2008) is: 13.3%    Values used to calculate the score:      Age: 40 years      Sex: Female      Diabetic: No      Tobacco smoker: Yes      Systolic Blood Pressure: 958 mmHg      Is BP treated: Yes      HDL Cholesterol: 35 mg/dL      Total Cholesterol: 192 mg/dL  Lab Results   Component Value Date/Time    CHOLFAST 204 (H) 05/19/2021 03:06 PM    CHOL 192 07/26/2021 02:18 AM    HDL 35 (L) 07/26/2021 02:18 AM    LDLCHOLESTEROL 132 (H) 07/26/2021 02:18 AM    TRIGLYCFAST 70 05/19/2021 03:06 PM    CHOLHDLRATIO 5.5 (H) 07/26/2021 02:18 AM    TRIG 125 07/26/2021 02:18 AM    VLDL NOT REPORTED 07/26/2021 02:18 AM       ALLERGIC RHINITIS  Carlton Benitez is a 40 y.o. female patient  has a known history of Common allergies. Symptoms include: {allergy symptoms:99143} and present in a ***seasonal pattern. Patient reports precipitants which includes: Pollen, dust, smoke, ***etc. Treatment currently includes Claritin, Singulair,. Patient reports *** relief of symptoms. Residual symptoms ***. GERD  Carlton Benitez  admits to GERD symptoms of prolonged duration. Reported symptoms include {GERD sx:08174}. The patient denies dysphagia, vomiting. Patient is aware of some food triggers and habits that causes exacerbation of symptoms. Risk factors present for GERD include {GERD risk factors:15232}. Current therapy Pepcid. Therapy results in fair relief. I communicated with the patient and/or health care decision maker about: PLAN     Review of Systems  Details of this discussion including any medical advice provided: Under assessment. PHYSICAL EXAM[INSTRUCTIONS:  \"[x]\" Indicates a positive item  \"[]\" Indicates a negative item  -- DELETE ALL ITEMS NOT EXAMINED]  Patient-Reported Vitals 8/4/2021   Patient-Reported Weight 250LB   Patient-Reported Height 5'3   Patient-Reported Systolic 605   Patient-Reported Diastolic 91   Patient-Reported Temperature 98.1     Constitutional: [x] No apparent distress      [] Abnormal -   Mental status: [x] Alert and awake  [x] Oriented to person/place/time[] Abnormal -   Pulmonary/Chest: [x] Respiratory effort normal         [] Abnormal -          Psychiatric:       [x] Normal Affect [] Abnormal -        [x] No Hallucinations  Other pertinent observable physical exam findings:-***    ASSESSMENT  1.  Hypertensive emergency  ***    2. Essential hypertension  ***      I affirm this is a Patient Initiated Episode with a Patient who has not had a related appointment within my department in the past 7 days or scheduled within Supplemental Appropriations Act, this Virtual Visit was conducted with patient's (and/or legal guardian's) consent, to reduce the patient's risk of exposure to COVID-19 and provide necessary medical care. The patient (and/or legal guardian) has also been advised to contact this office for worsening conditions or problems, and seek emergency medical treatment and/or call 911 if deemed necessary. This note was completed by using the assistance of a speech-recognition program. However, inadvertent computerized transcription errors may be present. Although every effort was made to ensure accuracy, no guarantees can be provided that every mistake has been identified and corrected by editing.   Electronically signed by HAYDEE Hobbs CNP on 8/4/21 at 8:23 PM EDT

## 2021-08-05 NOTE — PATIENT INSTRUCTIONS
[facility] COVID-19 Vaccination Hotline: [phone]    COVID-19 vaccine appointments are not available through our practice. As you're eligible to receive the COVID-19 vaccine, as determined by your state's department of health, you will be able to schedule an appointment through [address] or by calling [phone]. Appointments are required to receive the COVID-19 vaccine. As vaccine supply continues to be limited, we anticipate open appointments to fill up quickly and appreciate your patience as we work through the process of providing vaccines to those in our communities. Schedule a Vaccine  When you qualify to receive the vaccine, call the [facility]) COVID-19 Vaccination Hotline to schedule your appointment or to get additional information about the [facility] locations which are offering the COVID-19 vaccine. To be 94% effective, it's important that you receive two doses of one of the COVID-19 vaccines. -If you are receiving the Guillen Peter vaccine, your second shot will be scheduled as close to 21 days after the first shot as possible. -If you are receiving the Moderna vaccine, your second shot will be scheduled as close to 28 days after the first shot as possible. Links to [facility]) website and [facility] website:    NathanielVersionOne/[facility]-monitoring-coronavirus-covid-19/covid-19-vaccine/[place]-vaccine    https://Fitzeal[email or web address]/[email or web address]    [place]  https://sites. google. com/view/wchd[place]-coronavirus/home/vaccines/get-vaccinated  LocalElectrolysis.fi. com/r/[place]OVID_Vaccine_On-Call_List      https://MilePoint/shared/[email or web address]?:toolbar=n&:display_count=n&:origin=viz_share_link&:embed=y    PHARMACY LOCATIONS  Https://vaccine. coronavirus. [place].gov/    [facility]  Https://MilePoint/shared/[email or web address]?:toolbar=n&:display_count=n&:origin=viz_share_link&:embed=josh LEVIN [facility]  Https://MedicAnimal.com/shared/[email or web address]?:toolbar=n&:display_count=n&:origin=cloudControl_share_link&:embed=y    [address]  Https://MedicAnimal.com/shared/[email or web address]?:toolbar=n&:display_count=n&:origin=[employer]_link&:embed=y    [address]  https://MedicAnimal.com/shared/[email or web address]?:toolbar=n&:display_count=n&:origin=cloudControl_Lessonwriter_link&:embed=y

## 2021-08-17 ENCOUNTER — TELEPHONE (OUTPATIENT)
Dept: FAMILY MEDICINE CLINIC | Age: 46
End: 2021-08-17

## 2021-08-17 DIAGNOSIS — J34.9 PARANASAL SINUS DISEASE: Primary | ICD-10-CM

## 2021-08-17 RX ORDER — METHYLPREDNISOLONE 4 MG/1
TABLET ORAL
Qty: 1 KIT | Refills: 0 | Status: SHIPPED | OUTPATIENT
Start: 2021-08-17 | End: 2021-09-21

## 2021-08-17 RX ORDER — CETIRIZINE HYDROCHLORIDE 10 MG/1
10 TABLET ORAL DAILY
Qty: 30 TABLET | Refills: 5 | Status: SHIPPED | OUTPATIENT
Start: 2021-08-17 | End: 2021-09-21

## 2021-08-17 NOTE — TELEPHONE ENCOUNTER
Patient called back to finish the conversation and states she feels like she is constantly swallowing \"boogers\". She states Amanda referred her to a blood doctor and would like a referral to someone else as she \"does not like\" him or \"get along\".

## 2021-08-17 NOTE — TELEPHONE ENCOUNTER
Stop the Claritin. Start Zyrtec, and also start the Medrol pack. Schedule follow-up appointment with her.

## 2021-08-17 NOTE — TELEPHONE ENCOUNTER
Patient states she saw [facility] virtually and was prescribed Flonase for her sinus issues. She states she feels as if it is not helping and now has a dry cough she cannot get rid of. While typing this encounter, we were disconnected and was not able to finish the conversation. Please advise.

## 2021-09-01 ENCOUNTER — OFFICE VISIT (OUTPATIENT)
Dept: PULMONOLOGY | Age: 46
End: 2021-09-01
Payer: MEDICARE

## 2021-09-01 VITALS
DIASTOLIC BLOOD PRESSURE: 78 MMHG | RESPIRATION RATE: 17 BRPM | OXYGEN SATURATION: 97 % | TEMPERATURE: 97.4 F | SYSTOLIC BLOOD PRESSURE: 139 MMHG | BODY MASS INDEX: 45.45 KG/M2 | HEART RATE: 70 BPM | HEIGHT: 62 IN | WEIGHT: 247 LBS

## 2021-09-01 DIAGNOSIS — G47.33 OBSTRUCTIVE SLEEP APNEA SYNDROME: Primary | ICD-10-CM

## 2021-09-01 DIAGNOSIS — F17.200 TOBACCO DEPENDENCE: ICD-10-CM

## 2021-09-01 DIAGNOSIS — E66.01 OBESITY, MORBID, BMI 40.0-49.9 (HCC): ICD-10-CM

## 2021-09-01 DIAGNOSIS — I10 ESSENTIAL HYPERTENSION: ICD-10-CM

## 2021-09-01 DIAGNOSIS — K21.9 GASTROESOPHAGEAL REFLUX DISEASE WITHOUT ESOPHAGITIS: ICD-10-CM

## 2021-09-01 PROCEDURE — G8427 DOCREV CUR MEDS BY ELIG CLIN: HCPCS | Performed by: INTERNAL MEDICINE

## 2021-09-01 PROCEDURE — G8417 CALC BMI ABV UP PARAM F/U: HCPCS | Performed by: INTERNAL MEDICINE

## 2021-09-01 PROCEDURE — 4004F PT TOBACCO SCREEN RCVD TLK: CPT | Performed by: INTERNAL MEDICINE

## 2021-09-01 PROCEDURE — 99204 OFFICE O/P NEW MOD 45 MIN: CPT | Performed by: INTERNAL MEDICINE

## 2021-09-01 ASSESSMENT — SLEEP AND FATIGUE QUESTIONNAIRES
HOW LIKELY ARE YOU TO NOD OFF OR FALL ASLEEP WHILE WATCHING TV: 1
ESS TOTAL SCORE: 6
HOW LIKELY ARE YOU TO NOD OFF OR FALL ASLEEP WHILE SITTING INACTIVE IN A PUBLIC PLACE: 0
HOW LIKELY ARE YOU TO NOD OFF OR FALL ASLEEP IN A CAR, WHILE STOPPED FOR A FEW MINUTES IN TRAFFIC: 0
HOW LIKELY ARE YOU TO NOD OFF OR FALL ASLEEP WHILE SITTING AND READING: 1
HOW LIKELY ARE YOU TO NOD OFF OR FALL ASLEEP WHILE SITTING AND TALKING TO SOMEONE: 1
HOW LIKELY ARE YOU TO NOD OFF OR FALL ASLEEP WHILE SITTING QUIETLY AFTER LUNCH WITHOUT ALCOHOL: 1
HOW LIKELY ARE YOU TO NOD OFF OR FALL ASLEEP WHILE LYING DOWN TO REST IN THE AFTERNOON WHEN CIRCUMSTANCES PERMIT: 1
HOW LIKELY ARE YOU TO NOD OFF OR FALL ASLEEP WHEN YOU ARE A PASSENGER IN A CAR FOR AN HOUR WITHOUT A BREAK: 1

## 2021-09-01 NOTE — PATIENT INSTRUCTIONS
The patient will have hr sleep study at [facility]. I gave her the order with the phone number. She will call us for a follow up once the sleep studies are complete.

## 2021-09-01 NOTE — PROGRESS NOTES
REASON FOR THE CONSULTATION:  Obstructive sleep apnea syndrome  HISTORY OF PRESENT ILLNESS:    Traci Seymour is a 40y.o. year old female here for evaluation of sleep apnea syndrome. This patient was diagnosed to have obstructive sleep apnea syndrome almost 20 years back and was treated with CPAP at that time. She was also told at that time she may have narcolepsy however no treatment was given for narcolepsy. She does not have any excessive daytime sleepiness. There are no symptoms of cataplexy sleep paralysis or any hallucinations. No periodic leg movements. He does not dream most of the time. Patient is sleepy tired fatigue during the daytime. She is not a sleepy  however. He can fall asleep otherwise watching TV and relaxing. She is now involved in any accidents. She does complain of hypertension which is under good control with her medication. He also have oxygen reflux disease which is stable. He is not known to have any diabetes coronary artery disease stroke involved. He has occasionally noted some pedal edema. No thromboembolic process. He does complain of postnasal discharge. He been a chronic smoker for approximately 20 pack years no alcohol abuse no drug abuse. He has minimal degree of cough no sputum production no hemoptysis no chest pain    He is morbidly obese and has gained about 50 pounds for the last few months. He used to be morbidly obese and did was able to lose almost 200 pounds before and that did help the apneas. Is not known to have any thyroid dysfunction. No angina. He is taking a muscle relaxant and Mobic because of back pain. He not have had Covid vaccine and is not interested in doing that as well. LUNG CANCER SCREENING     1. CRITERIA MET    []     CT ORDERED  []      2. CRITERIA NOT MET   [x]      3. REFUSED                    []        REASON CRITERIA NOT MET     1. SMOKING LESS THAN 30 PY  []      2.  AGE LESS THAN 55 or GREATER 77 YEARS []      3. QUIT SMOKING 15 YEARS OR GREATER   []      4. RECENT CT WITH IN 11 MONTHS    []      5. LIFE EXPECTANCY < 5 YEARS   []      6. SIGNS  AND SYMPTOMS OF LUNG CANCER   []         Immunization   Immunization History   Administered Date(s) Administered    Influenza Virus Vaccine 10/17/2016, 10/18/2018, 2020    Influenza, Quadv, IM, (6 mo and older Fluzone, Flulaval, Fluarix and 3 yrs and older Afluria) 10/17/2016, 10/18/2018    Pneumococcal Polysaccharide ([id number]) 2016    Tdap (Boostrix, Adacel) 04/10/2015        Pneumococcal Vaccine     [x] Up to date    [] Indicated   [] Refused  [] Contraindicated       Influenza Vaccine   [x] Up to date    [] Indicated   [] Refused  [] Contraindicated      Does not want a Covid vaccine.   PAST MEDICAL HISTORY:       Diagnosis Date    Anxiety 10/18/2018    Chronic midline low back pain without sciatica 2017    Depression     Endometriosis     had hyst for this    Essential hypertension 3/8/2016    Gastroesophageal reflux disease without esophagitis 3/8/2016    H/O menorrhagia     had hyst    History of stress incontinence     had surgery    Hydronephrosis of right kidney 2017    Obesity 4/10/2017    Osteophyte, vertebrae     thoracic    Seasonal allergies 3/8/2016         Family History:       Problem Relation Age of Onset    Eczema Mother     COPD Mother     Liver Disease Mother     Heart Disease Mother     Heart Disease Maternal Grandmother     Atrial Fibrillation Maternal Grandfather     Heart Attack Paternal Grandfather        SURGICAL HISTORY:   Past Surgical History:   Procedure Laterality Date    ABDOMEN SURGERY      seroma removed     SECTION      ELBOW JOINT FUSION Right     HERNIA REPAIR      umbilical    HYSTERECTOMY VAGINAL      ovaries remain    INCONTINENCE SURGERY      KNEE ARTHROSCOPY Right 3/10/2020    KNEE ARTHROSCOPY PARITAL MEDIAL MENISECTOMY performed by Luana Mccartney MD at [facility] SPINE SURGERY      rods & cage inserted L5-S1, fusion & decompression    SPINE SURGERY      revision because cage had backed out and severed a nerve, cage restabilized           SOCIAL AND OCCUPATIONAL HEALTH:      There there is no history of TB or TB exposure. There there is no asbestos or silica dust exposure. The patient reports there is no coal, foundry, quarry or [employer] exposure. Travel history reveals negative. There there is no history of recreational or IV drug use. There there is no hot tube exposure. Pets absent    Occupational history no significant occupational exposure  TOBACCO:   reports that she has been smoking cigarettes. She has been smoking about 0.50 packs per day. She has never used smokeless tobacco.  ETOH:   reports previous alcohol use. ALLERGIES:      Allergies   Allergen Reactions    Dye [Iodides] Shortness Of Breath     Other reaction(s): Unknown    Dye  [Barium-Containing Compounds] Hives     Other reaction(s): Vomiting  IVP dye    Fentanyl      Pt never wants this again as it caused her hair and teeth to fall out.  Oxycodone Other (See Comments)     Does not like how they make her feel    Tylenol With Codeine #3 [Acetaminophen-Codeine] Nausea And Vomiting     Can take plain tylenol         Home Meds:   Prior to Admission medications    Medication Sig Start Date End Date Taking? Authorizing Provider   cetirizine (ZYRTEC) 10 MG tablet Take 1 tablet by mouth daily 8/17/21  Yes HAYDEE Sarmiento CNP   methylPREDNISolone (MEDROL DOSEPACK) 4 MG tablet Take by mouth.  8/17/21  Yes HAYDEE Sarmiento CNP   olmesartan (BENICAR) 40 MG tablet take 1 tablet by mouth once daily 8/5/21  Yes HAYDEE Sarmiento CNP   triamterene-hydroCHLOROthiazide (MAXZIDE) 75-50 MG per tablet Take 1 tablet by mouth daily 8/5/21  Yes HAYDEE Sarmiento CNP   potassium chloride (KLOR-CON M) 10 MEQ extended release tablet Take 1 tablet by mouth 2 times daily 8/5/21  Yes HAYDEE Sarmiento CNP   metoprolol succinate (TOPROL XL) 25 MG extended release tablet Take 1 tablet by mouth daily 8/5/21  Yes HAYDEE Sarmiento CNP   Blood Pressure Monitor KIT Use as directed. 8/5/21  Yes HAYDEE Sarmiento CNP   fluticasone (FLONASE) 50 MCG/ACT nasal spray 1 spray by Each Nostril route daily 8/5/21 9/4/21 Yes HAYDEE Sarmiento CNP   acetaminophen (TYLENOL) 500 MG tablet Take 1,000 mg by mouth every 6 hours as needed for Pain Take 2 tablets (=1000mg) every 6 hours as needed for pain   Yes Historical Provider, MD   tiZANidine (ZANAFLEX) 4 MG tablet take 1 tablet by mouth every 6 hours if needed for back pain 7/7/21  Yes HAYDEE Hess CNP   famotidine (PEPCID) 20 MG tablet take 1 tablet by mouth nightly if needed 5/13/21  Yes HAYDEE Hess CNP   meloxicam (MOBIC) 15 MG tablet take 1 tablet by mouth once daily 5/13/21  Yes HAYDEE Hess CNP   montelukast (SINGULAIR) 10 MG tablet take 1 tablet by mouth every evening 5/13/21  Yes HAYDEE Hess CNP   Multiple Vitamins-Minerals (MULTIVITAMIN-MINERALS) TABS tablet take 1 tablet by mouth once daily 5/13/21  Yes HAYDEE Hess CNP   Calcium Carbonate-Vitamin D (OYSTER SHELL CALCIUM/D) 500-200 MG-UNIT TABS Take 1 tablet by mouth daily 5/13/21 5/13/22 Yes HAYDEE Hess CNP              REVIEW OF SYSTEMS: Morbid obesity other 10  Systems reviewed and no additional information on other than hypertension.     CONSTITUTIONAL:  negative for  fevers, chills, sweats, fatigue, malaise, anorexia and weight loss no respiratory distress  EYES:  negative for  double vision, blurred vision, dry eyes, eye discharge and redness  HEENT:  negative for  hearing loss, tinnitus, ear drainage, earaches and nasal congestion  RESPIRATORY:  See hpi minimal dyspnea no wheezing minimal cough does complain of postnasal discharge does have a esophageal reflux  CARDIOVASCULAR: drainage        or sinus tenderness   Throat:   Lips, mucosa, and tongue normal; teeth and gums normal normal throat examination   Neck:   Supple, symmetrical, trachea midline, no adenopathy;     thyroid:  no enlargement/tenderness/nodules; no carotid    bruit , JVD not elevated neck is short in fact   Back:     Symmetric, no curvature, ROM normal, no CVA tenderness   Lungs:    AP diameter is not increased percussion note is normally resonant breathing vesicular expiration not prolonged no rails rhonchi are audible no pleural friction rub is audible   Chest Wall:    No tenderness or deformity      Heart:    Regular rate and rhythm, S1 and S2 normal, no murmur, rub        or gallop no rvh                           Abdomen:                                                 Pulses:                              Skin:                  Lymph nodes:                    Neurologic:                  Soft, non-tender, bowel sounds active all four quadrants,     no masses, no organomegaly         2+ and symmetric all extremities     Skin color, texture, turgor normal, no rashes or lesions       Cervical, supraclavicular not enlarged or matted or tender      CNII-XII intact, normal strength 5/5 . Sensation grossly normal  and reflexes normal 2+  throughout     Clubbing No  Lower ext edema absent  Upper ext edema absent         Musculoskeletal no synovitis. No joint swelling or tenderness        CBC: No results for input(s): WBC, HGB, PLT in the last 72 hours. BMP:  No results for input(s): NA, K, CL, CO2, BUN, CREATININE, GLUCOSE in the last 72 hours. Hepatic: No results for input(s): AST, ALT, ALB, BILITOT, ALKPHOS in the last 72 hours. Amylase: No results found for: AMYLASE  Lipase:   Lab Results   Component Value Date    LIPASE 19 03/06/2018     Troponin: No results for input(s): TROPONINI in the last 72 hours. BNP: No results for input(s): BNP in the last 72 hours.   Lipids: No results for input(s): CHOL, HDL in the last 72 hours. Invalid input(s): LDLCALCU  ABGs: No results found for: PHART, PO2ART, OJY1STB  INR: No results for input(s): INR in the last 72 hours. Thyroid:   Lab Results   Component Value Date    TSH 0.18 07/25/2021     Urinalysis: No results for input(s): BACTERIA, BLOODU, CLARITYU, COLORU, PHUR, PROTEINU, RBCUA, SPECGRAV, BILIRUBINUR, NITRU, WBCUA, LEUKOCYTESUR, GLUCOSEU in the last 72 hours. Cultures:-  None    CXR  No recent chest x-ray      CT Scans  No recent CAT scan    Echo    Recent echo            IMPRESSION:    Visit Diagnoses       Codes    Obstructive sleep apnea syndrome    -  Primary G47.33      Hypertension  : Morbid obesity              History of smoking\  Depression  PLAN:      The patient is morbidly obese, has history history consistent with sleep apnea syndrome. She is excessively sleepy during the daytime. Her apnea most likely obstructive in nature. I do not find any evidence to suggest narcolepsy. I discussed the situation with the patient and advised her to undergo a sleep study for sleep study does confirm the diagnosis of apnea we plan to treat her with nasal CPAP or BiPAP. After treatment with CPAP if the patient continued to have daytime sleepiness and if there is still a question of possible narcolepsy which I doubt that we will consider doing a multiple multiple sleep latency testing. I advised the patient to continue her effort to lose weight    She will continue treatment of hypertension as before    I advised her to stop smoking. I advised her to take the Covid vaccine. I plan to see her follow-up after the sleep study performed.       Requested Prescriptions      No prescriptions requested or ordered in this encounter       Medications Discontinued During This Encounter   Medication Reason    ondansetron (ZOFRAN ODT) 4 MG disintegrating tablet        Greg Patel received counseling on the following healthy behaviors: nutrition, exercise and medication adherence    Patient given educational materials : see patient instruction       Discussed use, benefit, and side effects of prescribed medications. Barriers to medication compliance addressed. All patient questions answered. Pt voiced understanding. I hope this updates you on my evaluation and clinical thinking. Thank you for allowing me to participate in his care. Sincerely,      Electronically signed by Lord Thompson MD on   9/1/21 at 1:49 PM EDT       Please note that this chart was generated using voice recognition Dragon dictation software. Although every effort was made to ensure the accuracy of this automated transcription, some errors in transcription may have occurred.

## 2021-09-02 DIAGNOSIS — E87.6 DIURETIC-INDUCED HYPOKALEMIA: ICD-10-CM

## 2021-09-02 DIAGNOSIS — I10 ESSENTIAL HYPERTENSION: ICD-10-CM

## 2021-09-02 DIAGNOSIS — T50.2X5A DIURETIC-INDUCED HYPOKALEMIA: ICD-10-CM

## 2021-09-02 RX ORDER — POTASSIUM CHLORIDE 750 MG/1
TABLET, EXTENDED RELEASE ORAL
Qty: 60 TABLET | Refills: 0 | Status: SHIPPED | OUTPATIENT
Start: 2021-09-02 | End: 2021-11-11

## 2021-09-02 NOTE — TELEPHONE ENCOUNTER
Pt called back with Infectious disease dr she'd like to be referred to     Dr. Chakraborty Bones  Ph. [phone]

## 2021-09-13 DIAGNOSIS — M96.1 FAILED BACK SYNDROME, LUMBAR: ICD-10-CM

## 2021-09-13 DIAGNOSIS — I10 ESSENTIAL HYPERTENSION: ICD-10-CM

## 2021-09-13 RX ORDER — TIZANIDINE 4 MG/1
TABLET ORAL
Qty: 90 TABLET | Refills: 1 | Status: SHIPPED | OUTPATIENT
Start: 2021-09-13 | End: 2021-11-11

## 2021-09-13 NOTE — TELEPHONE ENCOUNTER
Please Approve or Refuse.   Send to Pharmacy per Pt's Request:      Next Visit Date:  9/21/2021   Last Visit Date: 8/5/2021    Hemoglobin A1C (%)   Date Value   10/01/2019 5.7   07/22/2016 4.9             ( goal A1C is < 7)   BP Readings from Last 3 Encounters:   09/01/21 139/78   07/26/21 (!) 142/81   09/11/20 (!) 165/66          (goal 120/80)  BUN   Date Value Ref Range Status   07/26/2021 7 6 - 20 mg/dL Final     CREATININE   Date Value Ref Range Status   07/26/2021 0.41 (L) 0.50 - 0.90 mg/dL Final     Potassium   Date Value Ref Range Status   07/26/2021 3.7 3.7 - 5.3 mmol/L Final

## 2021-09-18 RX ORDER — NIFEDIPINE 60 MG/1
1 TABLET, FILM COATED, EXTENDED RELEASE ORAL DAILY
COMMUNITY
Start: 2021-09-02 | End: 2021-09-21

## 2021-09-18 RX ORDER — LORATADINE 10 MG/1
1 TABLET ORAL DAILY
COMMUNITY
Start: 2021-09-02 | End: 2021-10-26 | Stop reason: ALTCHOICE

## 2021-09-19 NOTE — PROGRESS NOTES
[address]  [address]  [address] [address]  Dept: [address] (:  1975) is a 40 y.o. female. Patient is here for evaluation of the following chief complaint(s):  Chief Complaint   Patient presents with    Hypertension    Depression    Health Maintenance     Pt declined covid and flu  vaccine. SUBJECTIVE/OBJECTIVE:  HPI  Rachid Canseco is a 40 y.o. female patient. [facility] . Patient has a known history of hypertension, dyslipidemia, sleep apnea, chronic low back pain, paranasal sinus disease, chronic rhinitis, and morbid obesity. Vargas Franks HYPERTENSION   Johana Suarez has a known history of hypertension. she is currently on Olmesartan,Triamterene-and  Metoprolol-we will increase, . Patient's most recent BP in the office was stable. she reports some increased BP readings at home. Patient denies any adverse reaction to this therapy. she denies any CP, SOB, HA, or palpitations. Patient admits to exercising and adheres to low salt diet. No history of organ damage due to condition noted. Lab Results   Component Value Date    CREATININE 0.41 (L) 2021      DYSLIPIDEMIA  Rachid Canseco has a known history of dyslipidemia. Patient is on pravastatin -initial prescription l to help improve dyslipidemia. Patient denies adverse reaction to this therapy. The patient is not known to have coexisting coronary artery disease. ASCVD Score below. The 10-year CVD risk score (D'Agostino, et al., 2008) is: 8.5%    Values used to calculate the score:      Age: 40 years      Sex: Female      Diabetic: No      Tobacco smoker: Yes      Systolic Blood Pressure: 106 mmHg      Is BP treated: Yes      HDL Cholesterol: 35 mg/dL      Total Cholesterol: 192 mg/dL  Lab Results   Component Value Date/Time    CHOLFAST 204 (H) 2021 03:06 PM    CHOL 192 2021 02:18 AM    HDL 35 (L) 07/26/2021 02:18 AM    LDLCHOLESTEROL 132 (H) 07/26/2021 02:18 AM    TRIGLYCFAST 70 05/19/2021 03:06 PM    CHOLHDLRATIO 5.5 (H) 07/26/2021 02:18 AM    TRIG 125 07/26/2021 02:18 AM    VLDL NOT REPORTED 07/26/2021 02:18 AM     SHANKAR  Patient also states that he was being treated for sleep apnea in the past.  However, since she was over 400 pounds and lost weight they stopped treating her with his CPAP. However, her weight had started to come up again and while in the hospital staff noticed that she might be having some occasional apneic episodes. Patient have not been or followed up with the pulmonologist for over 8 years. split CPAP trial was ordered. Patient is trying to schedule this test.Patient is aware that this might be one of the reasons why it has been a challenge to control her blood pressure and could be the reason for her other health conditions. LOW BACK PAIN/FAILED BACK SYNDROME  Patient reported some significant low back pain that she has had for a while. Patient had a few surgeries in the past by . Patient continues to have chronic pains that she describes as achy pressure and stiffness at times. Pain is constant from when she wakes up until she goes to sleep. Pain is and has always been exacerbated by any type of movement especially with bending pushing and pulling. Patient is currently on meloxicam and tizanidine. Inasmuch as this medication is helping it is not enough to control the pain. Patient has not been able to do a lot of exercise and activities due to this. Patient has had some issues with other type of medications including gabapentin which caused her to have some abdominal discomfort. We had a discussion about the possibility of starting her on Lyrica. Patient would like to know a lot more about this medication. She is aware of all the other conditions that she has to do to be able to receive this medication such as a drug screen on a yearly basis.   Patient has followed up since and has another MRI ordered. She is encouraged to follow-up with the neurosurgeon to discuss other options. Patient have also had a spinal stimulator in the past which did not work very well. Patient is unable to get to any type of pain management due to her current insurance. Patient have had other prescriptions in the past including opiates. However, due to her liver disorder she decided not to continue to receive this type of medicine including fentanyl. L5 s1- cage bone spurs T 11- T 12. Disc bulging. ALLERGIC RHINITIS/PARANASAL SINUS DISEASE ON CT  Surendra Wilson has a known history of Common allergies. Symptoms include: clear rhinorrhea and postnasal drip and present in a nonseasonal pattern. Patient reports precipitants which includes: Pollen, dust, smoke, etc. Treatment currently includes Claritin, Singulair,Flonase. Patient continues to have the same symptoms despite starting Flonase. She is still continues to have postnasal drip and waking up with increased phlegm. She denies any fever or chills but does have some pressure on her left maxillary sinuses. She was sent to an ENT but her insurance company will not allow her to see the specific ENT we will try to send her to another one today. HERNIA  Patient had a history of umbilical hernia repair with a mesh. Patient had noticed some bulge in the middle of her abdomen she has had this for several months it does not cause her any pain. She appears to have some diastases recti    OBESITY  Patient's BMI is Body mass index is 44.99 kg/m². kg/m2. BMI is increasing. Patient started to discuss about obesity and the need to lose the weight. Patient is not denying that she might need other options such as bariatric surgery. However due to her chronic low back pain we decided that it is better that she gets treated for the pain and is able to do do a lot more activities before we try to do any weight management.   Patient is agreeable range of motion. Lumbar back: Tenderness present. Decreased range of motion. Skin:     General: Skin is warm and dry. Neurological:      Mental Status: She is alert and oriented to person, place, and time. Psychiatric:         Mood and Affect: Mood is anxious. Speech: Speech is rapid and pressured. Behavior: Behavior normal.         Thought Content: Thought content does not include homicidal or suicidal ideation. MEDICAL HISTORY      Diagnosis Date    Anxiety 10/18/2018    Chronic midline low back pain without sciatica 8/29/2017    Depression     Endometriosis     had hyst for this    Essential hypertension 3/8/2016    Gastroesophageal reflux disease without esophagitis 3/8/2016    H/O menorrhagia     had hyst    History of stress incontinence     had surgery    Hydronephrosis of right kidney 12/27/2017    Obesity 4/10/2017    Osteophyte, vertebrae     thoracic    Seasonal allergies 3/8/2016      MEDICATIONS  Prior to Visit Medications    Medication Sig Taking?  Authorizing Provider   metoprolol succinate (TOPROL XL) 50 MG extended release tablet Take 1 tablet by mouth daily Yes HAYDEE Sarmiento CNP   diphenhydrAMINE (BENADRYL) 25 MG tablet Take 1 tablet by mouth nightly Yes HAYDEE Sarmiento CNP   pravastatin (PRAVACHOL) 20 MG tablet Take 1 tablet by mouth daily Yes HAYDEE Sarmiento CNP   loratadine (CLARITIN) 10 MG tablet Take 1 tablet by mouth daily Yes Historical Provider, MD   tiZANidine (ZANAFLEX) 4 MG tablet take 1 tablet by mouth every 8 hours if needed for back pain Yes HAYDEE Sarmiento CNP   potassium chloride (KLOR-CON M) 10 MEQ extended release tablet take 1 tablet by mouth twice a day Yes HAYDEE Sarimento CNP   olmesartan (BENICAR) 40 MG tablet take 1 tablet by mouth once daily Yes HAYDEE Sarmiento CNP   triamterene-hydroCHLOROthiazide (MAXZIDE) 75-50 MG per tablet Take 1 tablet by mouth daily Yes Miguel sweets, sugary beverages. Advised to increase fish, vegetables, and fruits consumption. Advised to add fiber or OTC supplements in diet. Discussed weight loss which will result in improvement of lipids levels. Advised to increase daily physical activities and add regular exercises. - pravastatin (PRAVACHOL) 20 MG tablet; Take 1 tablet by mouth daily  Dispense: 90 tablet; Refill: 1    3. SHANKAR on CPAP  Stable  DISCUSSED AND ADVISED TO:  Weight loss with diet exercise,   decrease caffeine intake. Especially in the evening. Healthy sleep hygiene measures,  Effective positional therapy,  Avoid sleep deprivation  Continue CPAP use nightly as discussed. 4. Failed back syndrome, lumbar  Failure to Improve  Continue current therapy. DISCUSSED AND ADVISED TO:  Use heat packs 15 to 20 mins every 2-3 hours. Do some back stretches as tolerated. Refer to hand out for instructions. Call for worsening, numbness, weakness. - Amb External Referral To Physical Therapy  - DRUG SCREEN, PAIN; Future    5. Paranasal sinus disease  Failure to Improve  DISCUSSED AND ADVISED TO:  Rest.  Advised to increase fluid intake. Eat more fruits and vegetables. Take medications as discussed. Report for worsening symptoms.    - Anup Gaviria MD, Otolaryngology, [place]  - diphenhydrAMINE (BENADRYL) 25 MG tablet; Take 1 tablet by mouth nightly  Dispense: 90 tablet; Refill: 0    6. Chronic rhinitis  Failure to Improve  Continue with the same therapy   ADVISED TO:  Avoid known allergens/irritants. Stop smoking or avoid second hand smoke. Stay hydrated. Report for worsening symptoms    - Anup Gaviria MD, Otolaryngology, [place]  - diphenhydrAMINE (BENADRYL) 25 MG tablet; Take 1 tablet by mouth nightly  Dispense: 90 tablet; Refill: 0    7. Diastasis recti  Stable  Continue to monitor  Consider surgical referral    8. Hyperglycemia  Recommended    - POCT glycosylated hemoglobin (Hb A1C);  Future  - POCT glycosylated hemoglobin (Hb A1C)    9. Morbid obesity with BMI of 45.0-49.9, adult (HCC)  BMI increasing  DISCUSSED AND ADVISED TO:  Eat a low-fat and low carbohydrates diet. Avoid fried foods especially fast food. Choose healthier options for snacks. Have 5-6 servings of fruits and vegetables per day. Cut down on eating processed food. Add 30 minutes to 1 hour aerobic exercise for 3-4 days a week. 10. Colon cancer screening  Recommended    - Cologuard; Future    11. Screening for viral disease  Recommended    - Hepatitis C Antibody; Future     On this date 9/21/2021 I have spent 40 minutes reviewing previous notes, test results and face to face with the patient discussing the diagnosis and importance of compliance with the treatment plan as well as documenting on the day of the visit. Return in 3 months (on 12/21/2021) for Chronic conditions, 30mins. This note was completed by using the assistance of a speech-recognition program. However, inadvertent computerized transcription errors may be present. Although every effort was made to ensure accuracy, no guarantees can be provided that every mistake has been identified and corrected by editing.   Electronically signed by HAYDEE Hammonds CNP on 9/18/21 at 10:08 PM EDT     --HAYDEE Hammonds CNP

## 2021-09-21 ENCOUNTER — OFFICE VISIT (OUTPATIENT)
Dept: FAMILY MEDICINE CLINIC | Age: 46
End: 2021-09-21
Payer: MEDICARE

## 2021-09-21 VITALS
TEMPERATURE: 97.8 F | WEIGHT: 254 LBS | BODY MASS INDEX: 45 KG/M2 | HEART RATE: 57 BPM | SYSTOLIC BLOOD PRESSURE: 120 MMHG | HEIGHT: 63 IN | DIASTOLIC BLOOD PRESSURE: 80 MMHG | OXYGEN SATURATION: 96 %

## 2021-09-21 DIAGNOSIS — Z11.59 SCREENING FOR VIRAL DISEASE: ICD-10-CM

## 2021-09-21 DIAGNOSIS — R73.9 HYPERGLYCEMIA: ICD-10-CM

## 2021-09-21 DIAGNOSIS — Z99.89 OSA ON CPAP: ICD-10-CM

## 2021-09-21 DIAGNOSIS — I10 ESSENTIAL HYPERTENSION: Primary | ICD-10-CM

## 2021-09-21 DIAGNOSIS — G47.33 OSA ON CPAP: ICD-10-CM

## 2021-09-21 DIAGNOSIS — M62.08 DIASTASIS RECTI: ICD-10-CM

## 2021-09-21 DIAGNOSIS — Z12.11 COLON CANCER SCREENING: ICD-10-CM

## 2021-09-21 DIAGNOSIS — J34.9 PARANASAL SINUS DISEASE: ICD-10-CM

## 2021-09-21 DIAGNOSIS — E66.01 MORBID OBESITY WITH BMI OF 45.0-49.9, ADULT (HCC): ICD-10-CM

## 2021-09-21 DIAGNOSIS — M96.1 FAILED BACK SYNDROME, LUMBAR: ICD-10-CM

## 2021-09-21 DIAGNOSIS — E78.5 DYSLIPIDEMIA: ICD-10-CM

## 2021-09-21 DIAGNOSIS — J31.0 CHRONIC RHINITIS: ICD-10-CM

## 2021-09-21 LAB — HBA1C MFR BLD: 5.7 %

## 2021-09-21 PROCEDURE — 83036 HEMOGLOBIN GLYCOSYLATED A1C: CPT | Performed by: FAMILY MEDICINE

## 2021-09-21 PROCEDURE — 4004F PT TOBACCO SCREEN RCVD TLK: CPT | Performed by: FAMILY MEDICINE

## 2021-09-21 PROCEDURE — G8417 CALC BMI ABV UP PARAM F/U: HCPCS | Performed by: FAMILY MEDICINE

## 2021-09-21 PROCEDURE — 99214 OFFICE O/P EST MOD 30 MIN: CPT | Performed by: FAMILY MEDICINE

## 2021-09-21 PROCEDURE — G8427 DOCREV CUR MEDS BY ELIG CLIN: HCPCS | Performed by: FAMILY MEDICINE

## 2021-09-21 RX ORDER — PRAVASTATIN SODIUM 20 MG
20 TABLET ORAL DAILY
Qty: 90 TABLET | Refills: 1 | Status: SHIPPED | OUTPATIENT
Start: 2021-09-21 | End: 2022-03-28 | Stop reason: SDUPTHER

## 2021-09-21 RX ORDER — METOPROLOL SUCCINATE 50 MG/1
50 TABLET, EXTENDED RELEASE ORAL DAILY
Qty: 90 TABLET | Refills: 1 | Status: SHIPPED | OUTPATIENT
Start: 2021-09-21 | End: 2022-03-28 | Stop reason: SDUPTHER

## 2021-09-21 RX ORDER — DIPHENHYDRAMINE HCL 25 MG
25 TABLET ORAL NIGHTLY
Qty: 90 TABLET | Refills: 0 | Status: SHIPPED | OUTPATIENT
Start: 2021-09-21 | End: 2021-12-20

## 2021-09-21 ASSESSMENT — PATIENT HEALTH QUESTIONNAIRE - PHQ9
SUM OF ALL RESPONSES TO PHQ QUESTIONS 1-9: 0
SUM OF ALL RESPONSES TO PHQ QUESTIONS 1-9: 0
1. LITTLE INTEREST OR PLEASURE IN DOING THINGS: 0
2. FEELING DOWN, DEPRESSED OR HOPELESS: 0
SUM OF ALL RESPONSES TO PHQ9 QUESTIONS 1 & 2: 0
SUM OF ALL RESPONSES TO PHQ QUESTIONS 1-9: 0

## 2021-09-21 ASSESSMENT — ENCOUNTER SYMPTOMS
SHORTNESS OF BREATH: 0
RESPIRATORY NEGATIVE: 1
WHEEZING: 0
ABDOMINAL PAIN: 0
ABDOMINAL DISTENTION: 1

## 2021-09-21 NOTE — PATIENT INSTRUCTIONS
Patient Education        pregabalin  Pronunciation:  pre HEIDE a giovanna  Brand:  Lyrica, Lyrica CR  What is the most important information I should know about pregabalin? Pregabalin can cause a severe allergic reaction. Stop taking this medicine and seek emergency medical help if you have hives or blisters on your skin, trouble breathing, or swelling in your face, mouth, or throat. Some people have thoughts about suicide while taking pregabalin. Stay alert to changes in your mood or symptoms. Report any new or worsening symptoms to your doctor. If you have diabetes or heart problems, call your doctor if you have weight gain or swelling in your hands or feet while taking pregabalin. Do not stop using pregabalin suddenly, even if you feel fine. Stopping suddenly may cause withdrawal symptoms. What is pregabalin? Pregabalin is an anti-epileptic drug, also called an anticonvulsant. It works by slowing down impulses in the brain that cause seizures. Pregabalin also affects chemicals in the brain that send pain signals across the nervous system. Pregabalin is used to treat pain caused by fibromyalgia, or nerve pain in people with diabetes (diabetic neuropathy), herpes zoster (post-herpetic neuralgia), or spinal cord injury. Pregabalin is also used with other medications to treat partial onset seizures in adults and children who are at least 2 month old. Pregabalin may also be used for purposes not listed in this medication guide. What should I discuss with my healthcare provider before taking pregabalin? You should not use pregabalin if you are allergic to it. Tell your doctor if you have ever had:  · lung disease, such as chronic obstructive pulmonary disease (COPD);   · a mood disorder, depression, or suicidal thoughts;  · heart problems (especially congestive heart failure);  · a bleeding disorder, or low levels of platelets in your blood;  · kidney disease (or if you are on dialysis);  · diabetes (unless you are taking pregabalin to treat diabetic neuropathy);  · drug or alcohol addiction; or  · a severe allergic reaction (angioedema). Do not give this medicine to a child without medical advice. · Pregabalin is not approved for use by anyone younger than 25years old to treat nerve pain caused by fibromyalgia, diabetes, herpes zoster, or spinal cord injury. · Pregabalin is not approved for seizures in anyone younger than 2 month old. Some people have thoughts about suicide while taking pregabalin. Your doctor will need to check your progress at regular visits. Your family or other caregivers should also be alert to changes in your mood or symptoms. Seizure control is very important during pregnancy, and having a seizure could harm both mother and baby. Do not start or stop taking pregabalin without your doctor's advice, and tell your doctor right away if you become pregnant. If you are pregnant, your name may be listed on a pregnancy registry to track the effects of pregabalin on the baby. Pregabalin can decrease sperm count and may affect fertility in men (your ability to have children). In animal studies, pregabalin also caused birth defects in the offspring of males treated with this medicine. However, it is not known whether these effects would occur in humans. Ask your doctor about your risk. You should not breastfeed while using pregabalin. How should I take pregabalin? Follow all directions on your prescription label and read all medication guides or instruction sheets. Your doctor may occasionally change your dose. Use the medicine exactly as directed. Take the medicine at the same time each day, with or without food. Swallow an extended-release tablet whole and do not crush, chew, or break it. Measure liquid medicine carefully. Use the dosing syringe provided, or use a medicine dose-measuring device (not a kitchen spoon). Call your doctor if your symptoms do not improve, or if they get worse.   Do not stop using pregabalin suddenly, even if you feel fine. Stopping suddenly may cause increased seizures or unpleasant withdrawal symptoms. Follow your doctor's instructions about tapering your dose for at least 1 week before stopping completely. In case of emergency, wear or carry medical identification to let others know you take seizure medication. Store at room temperature away from moisture, heat, and light. What happens if I miss a dose? Take the medicine as soon as you can, but skip the missed dose if it is almost time for your next dose. Do not take two doses at one time. What happens if I overdose? Seek emergency medical attention or call the Poison Help line at 1-876.716.2668. What should I avoid while taking pregabalin? Avoid drinking alcohol. It may increase certain side effects of pregabalin. Avoid driving or hazardous activity until you know how this medicine will affect you. Your reactions could be impaired. What are the possible side effects of pregabalin? Pregabalin can cause a severe allergic reaction. Stop taking this medicine and get emergency medical help if you have: hives or blisters on your skin; difficult breathing; swelling of your face, lips, tongue, or throat. Report any new or worsening symptoms to your doctor, such as: mood or behavior changes, depression, anxiety, panic attacks, trouble sleeping, or if you feel impulsive, irritable, agitated, hostile, aggressive, restless, hyperactive (mentally or physically), or have thoughts about suicide or hurting yourself.   Call your doctor at once if you have:  · weak or shallow breathing;  · blue-colored skin, lips, fingers, and toes;  · confusion, extreme drowsiness or weakness;  · vision problems;  · skin sores (if you have diabetes);  · easy bruising, unusual bleeding;  · swelling in your hands or feet, rapid weight gain (especially if you have diabetes or heart problems); or  · unexplained muscle pain, tenderness, or weakness (especially if you also have fever or don't feel well). Pregabalin can cause life-threatening breathing problems. A person caring for you should seek emergency medical attention if you have slow breathing with long pauses, blue colored lips, or if you are hard to wake up. Breathing problems may be more likely in older adults or in people with COPD. If you have diabetes,  tell your doctor right away if you have any new sores or other skin problems. Common side effects may include:  · dizziness, drowsiness;  · swelling in your hands and feet;  · trouble concentrating;  · increased appetite;  · weight gain;  · dry mouth; or  · blurred vision. This is not a complete list of side effects and others may occur. Call your doctor for medical advice about side effects. You may report side effects to FDA at 9-459-FDA-0982. What other drugs will affect pregabalin? Using pregabalin with other drugs that slow your breathing can cause dangerous side effects or death. Ask your doctor before using opioid medication, a sleeping pill, cold or allergy medicine, a muscle relaxer, or medicine for anxiety or seizures. Tell your doctor about all your other medicines, especially:  · oral diabetes medicine --pioglitazone, rosiglitazone; or  · an ACE inhibitor --benazepril, captopril, enalapril, fosinopril, lisinopril, moexipril, perindopril, quinapril, ramipril, or trandolapril. This list is not complete. Other drugs may affect pregabalin, including prescription and over-the-counter medicines, vitamins, and herbal products. Not all possible drug interactions are listed here. Where can I get more information? Your pharmacist can provide more information about pregabalin. Remember, keep this and all other medicines out of the reach of children, never share your medicines with others, and use this medication only for the indication prescribed.    Every effort has been made to ensure that the information provided by [address] [place] [address] Dr GREEN ('[employer]') is accurate, up-to-date, and complete, but no guarantee is made to that effect. Drug information contained herein may be time sensitive. [employer] information has been compiled for use by healthcare practitioners and consumers in the [place] and therefore [employer] does not warrant that uses outside of the [place] are appropriate, unless specifically indicated otherwise. [employer]'s drug information does not endorse drugs, diagnose patients or recommend therapy. [employer]'s drug information is an informational resource designed to assist licensed healthcare practitioners in caring for their patients and/or to serve consumers viewing this service as a supplement to, and not a substitute for, the expertise, skill, knowledge and judgment of healthcare practitioners. The absence of a warning for a given drug or drug combination in no way should be construed to indicate that the drug or drug combination is safe, effective or appropriate for any given patient. [facility] does not assume any responsibility for any aspect of healthcare administered with the aid of information [employer] provides. The information contained herein is not intended to cover all possible uses, directions, precautions, warnings, drug interactions, allergic reactions, or adverse effects. If you have questions about the drugs you are taking, check with your doctor, nurse or pharmacist.  Copyright 5425-8519 [address]. Version: 9.01. Revision date: 12/26/2019. Care instructions adapted under license by [facility] ([facility]). If you have questions about a medical condition or this instruction, always ask your healthcare professional. [place] [address] any warranty or liability for your use of this information.

## 2021-10-05 ENCOUNTER — HOSPITAL ENCOUNTER (OUTPATIENT)
Dept: SLEEP CENTER | Age: 46
Discharge: HOME | End: 2021-10-07
Payer: MEDICARE

## 2021-10-05 DIAGNOSIS — G47.33 OBSTRUCTIVE SLEEP APNEA SYNDROME: ICD-10-CM

## 2021-10-05 PROCEDURE — 95810 POLYSOM 6/> YRS 4/> PARAM: CPT

## 2021-10-18 ENCOUNTER — TELEPHONE (OUTPATIENT)
Dept: PULMONOLOGY | Age: 46
End: 2021-10-18

## 2021-10-19 LAB — STATUS: NORMAL

## 2021-10-25 ENCOUNTER — E-VISIT (OUTPATIENT)
Dept: OTHER | Facility: CLINIC | Age: 46
End: 2021-10-25
Payer: MEDICARE

## 2021-10-25 DIAGNOSIS — Z72.0 TOBACCO USE: ICD-10-CM

## 2021-10-25 DIAGNOSIS — J31.0 CHRONIC RHINITIS: Primary | ICD-10-CM

## 2021-10-25 PROCEDURE — 99422 OL DIG E/M SVC 11-20 MIN: CPT | Performed by: FAMILY MEDICINE

## 2021-10-25 NOTE — PROGRESS NOTES
[facility] [address]   O: [address] is a 40 y.o. female evaluated via telephone on 10/26/2021. CONSENT:  She and/or health care decision maker is aware that that she may receive a bill for this telephone service, depending on her insurance coverage, and has provided verbal consent to proceed: Yes    DOCUMENTATION:  Patient scheduled this appointment today due to Sinus Problem and Other    Adri Salgado is a 40 y.o. female patient. She is established patient of mine. Patient schedules appointment to discuss some worsening sinus congestion. Patient states that her face into have been more swollen and painful. She does have some worsening postnasal drip, nasal congestion, runny nose, and swollen lymph nodes. Patient denies any fever she does complain of some chills. She does complain of some worsening sleep due to the postnasal drip and is not able to sleep well due to the worsening drippage in the back of her throat that is causing her to gag. Patient does have a known history of chronic rhinitis of which she takes Claritin, Flonase, and Singulair. Patient was also recently diagnosed with sleep apnea and is currently in the process of getting her CPAP machine in a CPAP trial.  Is established with Dr. Alexys Kimball. Also needs a new referral to a new ENT. No data recorded  I communicated with the patient and/or health care decision maker about: PLAN     Review of Systems   Constitutional: Positive for chills. Negative for fatigue and fever. HENT: Positive for congestion, postnasal drip, rhinorrhea, sinus pressure and sinus pain. Negative for sore throat. Eyes: Negative for pain. Respiratory: Negative for cough, chest tightness and wheezing. Cardiovascular: Negative for chest pain and palpitations.    Gastrointestinal: Negative for abdominal pain, constipation, diarrhea, nausea and vomiting. Endocrine: Negative. Genitourinary: Negative. Allergic/Immunologic: Negative for environmental allergies. Neurological: Negative for dizziness and headaches. Hematological: Positive for adenopathy. Psychiatric/Behavioral: Positive for sleep disturbance. Negative for suicidal ideas. The patient is nervous/anxious. Details of this discussion including any medical advice provided: Under assessment. PHYSICAL EXAM[INSTRUCTIONS:  \"[x]\" Indicates a positive item  \"[]\" Indicates a negative item  -- DELETE ALL ITEMS NOT EXAMINED]  Patient-Reported Vitals 10/25/2021   Patient-Reported Weight 237   Patient-Reported Height 5''3   Patient-Reported Systolic 837   Patient-Reported Diastolic 67   Patient-Reported Temperature -     Constitutional: [x] No apparent distress      [] Abnormal -   Mental status: [x] Alert and awake  [x] Oriented to person/place/time[] Abnormal -   Pulmonary/Chest: [x] Respiratory effort normal         [] Abnormal -          Psychiatric:       [x] Normal Affect [] Abnormal -        [x] No Hallucinations  Other pertinent observable physical exam findings:-Facial swelling, maxillary and frontal sinus tenderness  Controlled Substance Monitoring:  Acute and Chronic Pain Monitoring:   RX Monitoring 11/5/2019   Attestation -   Periodic Controlled Substance Monitoring Possible medication side effects, risk of tolerance/dependence & alternative treatments discussed. ;Obtaining appropriate analgesic effect of treatment. ASSESSMENT  1. Acute bacterial sinusitis  Worsening  DISCUSSED AND ADVISED TO:  Rest.  Advised to increase fluid intake. Eat more fruits and vegetables. Take medications as discussed. Report for worsening symptoms. - amoxicillin-clavulanate (AUGMENTIN) 875-125 MG per tablet; Take 1 tablet by mouth 2 times daily for 7 days  Dispense: 14 tablet; Refill: 0  - methylPREDNISolone (MEDROL DOSEPACK) 4 MG tablet; Take by mouth. Dispense: 1 kit; Refill: 0    2. Chronic rhinitis  Failure to Improve  Continue with the same therapy   ADVISED TO:  Avoid known allergens/irritants. Stop smoking or avoid second hand smoke. Stay hydrated. Report for worsening symptoms    - cetirizine (ZYRTEC) 10 MG tablet; Take 1 tablet by mouth daily  Dispense: 30 tablet; Refill: 0  - AFL - Jermaine Germain MD, Otolaryngology, [facility]  - zinc [address]; Take 50 mg by mouth daily  Dispense: 90 capsule; Refill: 2    3. SHANKAR on CPAP  Stable  DISCUSSED AND ADVISED TO:  Weight loss with diet exercise,   decrease caffeine intake. Especially in the evening. Healthy sleep hygiene measures,  Effective positional therapy,  Avoid sleep deprivation  Continue CPAP use nightly as discussed. Total Time: minutes: 11-20 minutes  I affirm this is a Patient Initiated Episode with a Patient who has not had a related appointment within my department in the past 7 days or scheduled within the next 24 hours.   Patient identification was verified at the start of the visit: Yes  Note: not billable if this call serves to triage the patient into an appointment for the relevant concern  Patient-Reported Vitals 10/25/2021   Patient-Reported Weight 237   Patient-Reported Height 5''3   Patient-Reported Systolic 202   Patient-Reported Diastolic 67   Patient-Reported Temperature -     Patient Active Problem List   Diagnosis    Essential hypertension    Gastroesophageal reflux disease without esophagitis    Seasonal allergies    Tobacco dependence    Obesity, morbid, BMI 40.0-49.9 ([address]    Failed back syndrome, lumbar    Chronic midline low back pain without sciatica    Bronchitis    Neural foraminal stenosis of lumbar spine    Pyelonephritis    Hydronephrosis of right kidney    Psychophysiological insomnia    Anxiety    Depression    History of partial hysterectomy    Hot flashes    Snores    History of sleep apnea    Other fatigue    Goiter    Leukocytosis    Hypertensive emergency    Arm paresthesia, left    Hypertensive urgency    Dyslipidemia    SHANKAR on CPAP    Paranasal sinus disease    Diuretic-induced hypokalemia    Diastasis recti     Dimple Greer is a 40 y.o. female patient  being evaluated by a Virtual Visit (video visit) encounter to address concerns as mentioned above. A caregiver was present when appropriate. Due to this being a TeleHealth encounter (During [id number]-71 public health emergency), evaluation of the following organ systems was limited:Vitals/Constitutional/EENT/Resp/CV/GI//MS/Neuro/Skin/Heme-Lymph-Imm. Services were provided through a video synchronous discussion virtually to substitute for in-person clinic visit. This is a telehealth visit that was performed with the originating site at Patient Location: home and provider Location of [place], [place]. Pursuant to the emergency declaration under the [address] [place] [address], [address] [place] [address] authority and the [email or web address] and [employer] Act, this Virtual Visit was conducted with patient's (and/or legal guardian's) consent, to reduce the patient's risk of exposure to COVID-19 and provide necessary medical care. The patient (and/or legal guardian) has also been advised to contact this office for worsening conditions or problems, and seek emergency medical treatment and/or call 911 if deemed necessary. This note was completed by using the assistance of a speech-recognition program. However, inadvertent computerized transcription errors may be present. Although every effort was made to ensure accuracy, no guarantees can be provided that every mistake has been identified and corrected by editing.   Electronically signed by HAYDEE Patton CNP on 10/25/21 at 7:47 PM EDT

## 2021-10-26 ENCOUNTER — TELEMEDICINE (OUTPATIENT)
Dept: FAMILY MEDICINE CLINIC | Age: 46
End: 2021-10-26
Payer: MEDICARE

## 2021-10-26 DIAGNOSIS — J01.90 ACUTE BACTERIAL SINUSITIS: Primary | ICD-10-CM

## 2021-10-26 DIAGNOSIS — Z99.89 OSA ON CPAP: ICD-10-CM

## 2021-10-26 DIAGNOSIS — G47.33 OSA ON CPAP: ICD-10-CM

## 2021-10-26 DIAGNOSIS — B96.89 ACUTE BACTERIAL SINUSITIS: Primary | ICD-10-CM

## 2021-10-26 DIAGNOSIS — J31.0 CHRONIC RHINITIS: ICD-10-CM

## 2021-10-26 PROCEDURE — 99213 OFFICE O/P EST LOW 20 MIN: CPT | Performed by: FAMILY MEDICINE

## 2021-10-26 PROCEDURE — G8427 DOCREV CUR MEDS BY ELIG CLIN: HCPCS | Performed by: FAMILY MEDICINE

## 2021-10-26 RX ORDER — AMOXICILLIN AND CLAVULANATE POTASSIUM 875; 125 MG/1; MG/1
1 TABLET, FILM COATED ORAL 2 TIMES DAILY
Qty: 14 TABLET | Refills: 0 | Status: SHIPPED | OUTPATIENT
Start: 2021-10-26 | End: 2021-11-02

## 2021-10-26 RX ORDER — CETIRIZINE HYDROCHLORIDE 10 MG/1
10 TABLET ORAL DAILY
Qty: 30 TABLET | Refills: 0 | Status: SHIPPED | OUTPATIENT
Start: 2021-10-26 | End: 2021-11-19

## 2021-10-26 RX ORDER — METHYLPREDNISOLONE 4 MG/1
TABLET ORAL
Qty: 1 KIT | Refills: 0 | Status: SHIPPED | OUTPATIENT
Start: 2021-10-26 | End: 2021-12-20 | Stop reason: CLARIF

## 2021-10-26 ASSESSMENT — ENCOUNTER SYMPTOMS
CHEST TIGHTNESS: 0
VOMITING: 0
RHINORRHEA: 1
SORE THROAT: 0
COUGH: 0
SINUS PRESSURE: 1
NAUSEA: 0
WHEEZING: 0
ABDOMINAL PAIN: 0
CONSTIPATION: 0
DIARRHEA: 0
EYE PAIN: 0
SINUS PAIN: 1

## 2021-10-26 NOTE — PATIENT INSTRUCTIONS

## 2021-10-30 DIAGNOSIS — J34.9 PARANASAL SINUS DISEASE: ICD-10-CM

## 2021-11-01 RX ORDER — FLUTICASONE PROPIONATE 50 MCG
SPRAY, SUSPENSION (ML) NASAL
Qty: 16 G | Refills: 2 | Status: SHIPPED | OUTPATIENT
Start: 2021-11-01 | End: 2022-04-29

## 2021-11-05 DIAGNOSIS — B37.31 YEAST VAGINITIS: Primary | ICD-10-CM

## 2021-11-05 RX ORDER — FLUCONAZOLE 150 MG/1
150 TABLET ORAL ONCE
Qty: 2 TABLET | Refills: 0 | Status: SHIPPED | OUTPATIENT
Start: 2021-11-05 | End: 2021-11-05

## 2021-11-11 DIAGNOSIS — I10 ESSENTIAL HYPERTENSION: ICD-10-CM

## 2021-11-11 DIAGNOSIS — M96.1 FAILED BACK SYNDROME, LUMBAR: ICD-10-CM

## 2021-11-11 DIAGNOSIS — E87.6 DIURETIC-INDUCED HYPOKALEMIA: ICD-10-CM

## 2021-11-11 DIAGNOSIS — T50.2X5A DIURETIC-INDUCED HYPOKALEMIA: ICD-10-CM

## 2021-11-11 RX ORDER — TIZANIDINE 4 MG/1
TABLET ORAL
Qty: 90 TABLET | Refills: 0 | Status: SHIPPED | OUTPATIENT
Start: 2021-11-11 | End: 2021-12-10

## 2021-11-11 RX ORDER — POTASSIUM CHLORIDE 750 MG/1
TABLET, EXTENDED RELEASE ORAL
Qty: 60 TABLET | Refills: 0 | Status: SHIPPED | OUTPATIENT
Start: 2021-11-11 | End: 2022-01-11

## 2021-11-11 NOTE — TELEPHONE ENCOUNTER
Please Approve or Refuse.   Send to Pharmacy per Pt's Request:      Next Visit Date:  12/21/2021   Last Visit Date: 10/26/2021    Hemoglobin A1C (%)   Date Value   09/21/2021 5.7   10/01/2019 5.7   07/22/2016 4.9             ( goal A1C is < 7)   BP Readings from Last 3 Encounters:   09/21/21 120/80   09/01/21 139/78   07/26/21 (!) 142/81          (goal 120/80)  BUN   Date Value Ref Range Status   07/26/2021 7 6 - 20 mg/dL Final     CREATININE   Date Value Ref Range Status   07/26/2021 0.41 (L) 0.50 - 0.90 mg/dL Final     Potassium   Date Value Ref Range Status   07/26/2021 3.7 3.7 - 5.3 mmol/L Final

## 2021-11-16 ENCOUNTER — PATIENT MESSAGE (OUTPATIENT)
Dept: FAMILY MEDICINE CLINIC | Age: 46
End: 2021-11-16

## 2021-11-16 DIAGNOSIS — I10 ESSENTIAL HYPERTENSION: ICD-10-CM

## 2021-11-17 RX ORDER — OLMESARTAN MEDOXOMIL 40 MG/1
TABLET ORAL
Qty: 90 TABLET | Refills: 1 | Status: SHIPPED | OUTPATIENT
Start: 2021-11-17 | End: 2021-11-19 | Stop reason: SDUPTHER

## 2021-11-17 NOTE — TELEPHONE ENCOUNTER
From: Toribio Madrigal  To: Miguel Hassan  Sent: 11/16/2021 6:04 PM EST  Subject: Blood pressure medication     [facility] says you got to call them. Guy says they need to get a referral from you. I have been out of my Benicar since Saturday and my blood pressure is showing it now please help.

## 2021-11-19 DIAGNOSIS — K21.9 GASTROESOPHAGEAL REFLUX DISEASE WITHOUT ESOPHAGITIS: ICD-10-CM

## 2021-11-19 DIAGNOSIS — I10 ESSENTIAL HYPERTENSION: ICD-10-CM

## 2021-11-19 RX ORDER — FAMOTIDINE 20 MG/1
TABLET, FILM COATED ORAL
Qty: 90 TABLET | Refills: 1 | Status: SHIPPED | OUTPATIENT
Start: 2021-11-19 | End: 2021-12-02 | Stop reason: SDUPTHER

## 2021-11-19 RX ORDER — OLMESARTAN MEDOXOMIL 40 MG/1
TABLET ORAL
Qty: 90 TABLET | Refills: 1 | Status: SHIPPED | OUTPATIENT
Start: 2021-11-19 | End: 2021-12-10 | Stop reason: SDUPTHER

## 2021-12-02 DIAGNOSIS — E55.9 VITAMIN D DEFICIENCY: Primary | ICD-10-CM

## 2021-12-02 DIAGNOSIS — K21.9 GASTROESOPHAGEAL REFLUX DISEASE WITHOUT ESOPHAGITIS: ICD-10-CM

## 2021-12-02 DIAGNOSIS — M85.89 OSTEOPENIA OF MULTIPLE SITES: ICD-10-CM

## 2021-12-02 RX ORDER — B-COMPLEX WITH VITAMIN C
1 TABLET ORAL DAILY
Qty: 90 TABLET | Refills: 5 | Status: SHIPPED | OUTPATIENT
Start: 2021-12-02 | End: 2021-12-21 | Stop reason: SDUPTHER

## 2021-12-02 RX ORDER — ASPIRIN 81 MG
TABLET, DELAYED RELEASE (ENTERIC COATED) ORAL
Qty: 90 TABLET | Refills: 1 | Status: SHIPPED | OUTPATIENT
Start: 2021-12-02 | End: 2021-12-21 | Stop reason: SDUPTHER

## 2021-12-02 RX ORDER — FAMOTIDINE 20 MG/1
TABLET, FILM COATED ORAL
Qty: 90 TABLET | Refills: 1 | Status: SHIPPED | OUTPATIENT
Start: 2021-12-02 | End: 2022-03-28 | Stop reason: SDUPTHER

## 2021-12-09 DIAGNOSIS — I10 ESSENTIAL HYPERTENSION: ICD-10-CM

## 2021-12-09 DIAGNOSIS — M96.1 FAILED BACK SYNDROME, LUMBAR: ICD-10-CM

## 2021-12-10 ENCOUNTER — TELEPHONE (OUTPATIENT)
Dept: FAMILY MEDICINE CLINIC | Age: 46
End: 2021-12-10

## 2021-12-10 DIAGNOSIS — M96.1 FAILED BACK SYNDROME, LUMBAR: ICD-10-CM

## 2021-12-10 DIAGNOSIS — I10 ESSENTIAL HYPERTENSION: ICD-10-CM

## 2021-12-10 RX ORDER — OLMESARTAN MEDOXOMIL 40 MG/1
TABLET ORAL
Qty: 90 TABLET | Refills: 1 | Status: SHIPPED | OUTPATIENT
Start: 2021-12-10 | End: 2022-03-28 | Stop reason: SDUPTHER

## 2021-12-10 RX ORDER — TRIAMTERENE AND HYDROCHLOROTHIAZIDE 75; 50 MG/1; MG/1
TABLET ORAL
Qty: 90 TABLET | Refills: 1 | Status: SHIPPED | OUTPATIENT
Start: 2021-12-10 | End: 2022-07-12

## 2021-12-10 RX ORDER — TIZANIDINE 4 MG/1
TABLET ORAL
Qty: 90 TABLET | Refills: 0 | Status: SHIPPED | OUTPATIENT
Start: 2021-12-10 | End: 2021-12-10 | Stop reason: SDUPTHER

## 2021-12-10 RX ORDER — TIZANIDINE 4 MG/1
TABLET ORAL
Qty: 180 TABLET | Refills: 2 | Status: SHIPPED | OUTPATIENT
Start: 2021-12-10 | End: 2022-04-15 | Stop reason: SDUPTHER

## 2021-12-10 NOTE — TELEPHONE ENCOUNTER
Please Approve or Refuse.   Send to Pharmacy per Pt's Request:      Next Visit Date:  12/9/2021   Last Visit Date: 10/26/2021    Hemoglobin A1C (%)   Date Value   09/21/2021 5.7   10/01/2019 5.7   07/22/2016 4.9             ( goal A1C is < 7)   BP Readings from Last 3 Encounters:   09/21/21 120/80   09/01/21 139/78   07/26/21 (!) 142/81          (goal 120/80)  BUN   Date Value Ref Range Status   07/26/2021 7 6 - 20 mg/dL Final     CREATININE   Date Value Ref Range Status   07/26/2021 0.41 (L) 0.50 - 0.90 mg/dL Final     Potassium   Date Value Ref Range Status   07/26/2021 3.7 3.7 - 5.3 mmol/L Final

## 2021-12-10 NOTE — TELEPHONE ENCOUNTER
Pt called states she was taking the zanaflex every 4 hours instead of every 6 hours and this is causing her to run out of pills before she should.

## 2021-12-19 NOTE — PROGRESS NOTES
[address]  [address]  [address] [address]  Dept: 218 Vijaya Owens (:  1975) is a 40 y.o. female. Patient is here for evaluation of the following chief complaint(s):  Chief Complaint   Patient presents with    Concern For COVID-19     think she was exposed by daughter     Cough    Generalized Body Aches    Nasal Congestion     can't smell either     Chills     Sweats     Migraine    Nausea        SUBJECTIVE/OBJECTIVE:  ROSENDA Rivera is a 40 y.o. female patient. Patient is an established patient of mine. Scheduled this appointment today due to a direct exposure from her daughter who was recently diagnosed with COVID-19 virus. Patient reported some worsening symptoms of nonproductive cough, general body ache, nasal congestion, chills, fever, headache, nausea, anosmia, and postnasal drip. Symptoms started about 3 days ago. Patient has not had any Covid 19 virus testing done yet. She is willing to get this done today. .    Patient denies any chest pains or severe shortness of breath but started to have some mild dyspnea on exertion. Patient has had a history of bronchitis in the past and reports similar type of symptoms. HYPERTENSION -  Patient had had some difficulty getting medications approved by the insurance finally got everything straightened out. Mag Hope has a known history of hypertension. she is currently Olmesartan,Triamterene-and  Metoprolol. Patient's most recent BP in the office was stable. she Recommended. hveports some increased BP readings at home. Patient denies any adverse reaction to this therapy. she denies any CP, SOB, HA, or palpitations. Patient admits to exercising and adheres to low salt diet. No history of organ damage due to condition noted.   Lab Results   Component Value Date    CREATININE 0.41 (L) 2021     Patient also has a known history of osteopenia and chronic rhinitis including vitamin D deficiencies. She is requesting medication refill in all this condition she denies any adverse reaction to any of the medications listed below. VITAMIN D/OSTEOPENIA  Patient has a known Vitamin D Deficiency. she had a course of supplementation for 12 weeks. she is due to get levels check. We continue to discuss ways to manage this condition. We also discussed ways to improve the levels. Such as learning food groups that are high in Vitamin D. We also discuss that sun exposure is beneficial however, too much sun and sun damage can potentially result in skin cancer. Lab Results   Component Value Date/Time    VITD25 47.3 05/19/2021 03:06 PM        DEPRESSION SCREENING: Negative  PHQ Scores 9/21/2021 5/13/2021 2/7/2019 10/18/2018 5/21/2018 4/10/2017   PHQ2 Score 0 5 6 6 4 4   PHQ9 Score 0 11 22 25 19 25     VITAL SIGNS:  There were no vitals filed for this visit. Estimated body mass index is 44.99 kg/m² as calculated from the following:    Height as of 9/21/21: 5' 3\" (1.6 m). Weight as of 9/21/21: 254 lb (115.2 kg). Review of Systems   Constitutional: Positive for activity change, chills, fatigue and fever. HENT: Positive for congestion and postnasal drip. Negative for sinus pressure and sore throat. Loss sense of smell   Eyes: Negative for pain. Respiratory: Positive for cough and shortness of breath. Negative for chest tightness and wheezing. Cardiovascular: Negative for chest pain and palpitations. Gastrointestinal: Negative for abdominal pain, constipation, diarrhea, nausea and vomiting. Endocrine: Negative. Genitourinary: Negative. Musculoskeletal: Positive for myalgias. Allergic/Immunologic: Positive for environmental allergies. Neurological: Positive for headaches. Negative for dizziness. Hematological: Negative for adenopathy. Psychiatric/Behavioral: Positive for sleep disturbance. Negative for suicidal ideas.  The patient is nervous/anxious. Physical Exam  Pulmonary:      Effort: Pulmonary effort is normal.      Comments: Speaks in full sentences, coughing spells  Neurological:      Mental Status: She is alert and oriented to person, place, and time. Psychiatric:         Mood and Affect: Mood is anxious. Speech: Speech is rapid and pressured. MEDICAL HISTORY      Diagnosis Date    Anxiety 10/18/2018    Chronic midline low back pain without sciatica 8/29/2017    Depression     Endometriosis     had hyst for this    Essential hypertension 3/8/2016    Gastroesophageal reflux disease without esophagitis 3/8/2016    H/O menorrhagia     had hyst    History of stress incontinence     had surgery    Hydronephrosis of right kidney 12/27/2017    Obesity 4/10/2017    Osteophyte, vertebrae     thoracic    Seasonal allergies 3/8/2016      MEDICATIONS  Prior to Visit Medications    Medication Sig Taking?  Authorizing Provider   Calcium Carbonate-Vitamin D (OYSTER SHELL CALCIUM/D) 500-200 MG-UNIT TABS Take 1 tablet by mouth daily Yes HAYDEE Sarmiento CNP   Multiple Vitamins-Minerals (MULTIVITAMIN-MINERALS) TABS tablet take 1 tablet by mouth once daily Yes HAYDEE Sarmiento CNP   dexamethasone (DECADRON) 4 MG tablet Take 1 tablet by mouth daily (with breakfast) for 10 days Yes HAYDEE Sarmiento CNP   benzonatate (TESSALON PERLES) 100 MG capsule Take 1 capsule by mouth 3 times daily as needed for Cough (take 1-2 capsule) Yes HAYDEE Sarmiento CNP   albuterol sulfate  (90 Base) MCG/ACT inhaler Inhale 2 puffs into the lungs every 6 hours as needed for Wheezing or Shortness of Breath Yes HAYDEE Sarmiento CNP   ondansetron (ZOFRAN) 4 MG tablet Take 1 tablet by mouth 3 times daily as needed for Nausea or Vomiting Yes HAYDEE Sarmiento CNP   triamterene-hydroCHLOROthiazide (MAXZIDE) 75-50 MG per tablet take 1 tablet by mouth once daily Yes HAYDEE Sarmiento - CNP   olmesartan (BENICAR) 40 MG tablet take 1 tablet by mouth once daily Yes HAYDEE Sarmiento CNP   tiZANidine (ZANAFLEX) 4 MG tablet q4-6 hour Yes HAYDEE Sarmiento CNP   famotidine (PEPCID) 20 MG tablet take 1 tablet by mouth nightly if needed Yes HAYDEE Sarmiento CNP   cetirizine (ZYRTEC) 10 MG tablet take 1 tablet by mouth once daily Yes HAYDEE Sarmiento CNP   potassium chloride (KLOR-CON M) 10 MEQ extended release tablet take 1 tablet by mouth twice a day Yes Madison Hatchet, MD   fluticasone (FLONASE) 50 MCG/ACT nasal spray instill 1 spray into each nostril once daily Yes HAYDEE Sarmiento CNP   zinc 50 MG CAPS Take 50 mg by mouth daily Yes HAYDEE Sarmiento CNP   metoprolol succinate (TOPROL XL) 50 MG extended release tablet Take 1 tablet by mouth daily Yes HAYDEE Sarmiento CNP   pravastatin (PRAVACHOL) 20 MG tablet Take 1 tablet by mouth daily Yes HAYDEE Sarmiento CNP   Blood Pressure Monitor KIT Use as directed. Yes HAYDEE Sarmiento CNP   acetaminophen (TYLENOL) 500 MG tablet Take 1,000 mg by mouth every 6 hours as needed for Pain Take 2 tablets (=1000mg) every 6 hours as needed for pain Yes Historical Provider, MD   meloxicam (MOBIC) 15 MG tablet take 1 tablet by mouth once daily Yes HAYDEE Hess CNP   montelukast (SINGULAIR) 10 MG tablet take 1 tablet by mouth every evening Yes HAYDEE Ramirez CNP     Controlled Substance Monitoring:  Acute and Chronic Pain Monitoring:   RX Monitoring 11/5/2019   Attestation -   Periodic Controlled Substance Monitoring Possible medication side effects, risk of tolerance/dependence & alternative treatments discussed. ;Obtaining appropriate analgesic effect of treatment. ASSESSMENT/PLAN:  1. Contact with or exposure to viral disease  Ongoing  Testing site: [phone]  Continue COVID 19 symptoms monitoring  Continue to safe distance from family and friends.   DISCUSSED and ADVISED TO:  Stay away from people with suspected COVID 19. Wear a mask. Stay away from big crowds. Wash hands frequently. Use alcohol based hand cleansers frequently. Try not to touch face. Try to improve your immune system by eating healthy food, getting enough sleep and trying to avoid excessive stress. Stay in touch with the current news. Report for fever/chills,body aches, shortness of breath, malaise, loss of taste/smell, worsening cough. - COVID-19; Future  - POCT Influenza A/B    2. Bronchitis  Worsening  Treat  Continue current therapy. DISCUSSED and ADVISED TO:  Use prescribed inhalers or medications regularly. Avoid known irritants and exposure to known triggers. Advised to report the need to use your albuterol inhaler more than usual  Stay away from smoking or second hand smoke. Go to the nearest ER for increasing SOB. - dexamethasone (DECADRON) 4 MG tablet; Take 1 tablet by mouth daily (with breakfast) for 10 days  Dispense: 10 tablet; Refill: 0  - benzonatate (TESSALON PERLES) 100 MG capsule; Take 1 capsule by mouth 3 times daily as needed for Cough (take 1-2 capsule)  Dispense: 90 capsule; Refill: 0  - albuterol sulfate  (90 Base) MCG/ACT inhaler; Inhale 2 puffs into the lungs every 6 hours as needed for Wheezing or Shortness of Breath  Dispense: 18 g; Refill: 3    3. Nausea  Failure to Improve  DISCUSSED and ADVISED TO:  Drink plenty of fluids, enough until urine is light yellow or clear like water. Choose water and other caffeine-free clear liquids. If you do not feel like eating or drinking, try taking small sips of water, sports drinks, or other rehydration drinks. Get plenty of rest.  Go to the Emergency Room if you are not able to tolerate any food or water. - ondansetron (ZOFRAN) 4 MG tablet; Take 1 tablet by mouth 3 times daily as needed for Nausea or Vomiting  Dispense: 30 tablet; Refill: 0    4.  Essential hypertension  Stable  Continue current therapy. DISCUSSED AND ADVISED TO:  Cut down on your salt intake. Cut down on caffeinated drinks, sports drinks. Instructed to check BP at home regularly. Report for any chest pains, shortness of breath, headaches, and lightheadedness. Call the office if your blood pressure continue to be higher than 140/90 or 90/50.        5. Chronic rhinitis  Failure to Improve  Continue with the same therapy   ADVISED TO:  Avoid known allergens/irritants. Stop smoking or avoid second hand smoke. Stay hydrated. Report for worsening symptoms      6. Osteopenia of multiple sites  Stable  DISCUSSED AND ADVISED TO:  Avoid smoking  Limit alcohol consumption to 2 drinks a day or less. Will repeat DEXA scan in 2 years. Weight-bearing exercise is important in helping to maintain and even increase bone mass. Take calcium 0792-3405 mg per day. Vitamin D (400-800 IU per day) is necessary to assist in the absorption of calcium. Eat foods that are rich in Calcium. It is important that you take all medications as directed. Bone loss can only be prevented and progression can be avoided with above therapy. - Calcium Carbonate-Vitamin D (OYSTER SHELL CALCIUM/D) 500-200 MG-UNIT TABS; Take 1 tablet by mouth daily  Dispense: 90 tablet; Refill: 5  - Multiple Vitamins-Minerals (MULTIVITAMIN-MINERALS) TABS tablet; take 1 tablet by mouth once daily  Dispense: 90 tablet; Refill: 1    7. Vitamin D deficiency  Continue Vitamin D supplementation  DISCUSSED AND ADVISED TO:  Foods that contain a lot of vitamin D includes [place], tuna, and mackerel. Cheese, egg yolks, and beef liver have small amounts of vit D.  Milk, soy drinks, orange juice, yogurt, margarine, and some kinds of cereal have vitamin D added to them. Continue to use sunblock when out in the sun to prevent skin cancer.    - Calcium Carbonate-Vitamin D (OYSTER SHELL CALCIUM/D) 500-200 MG-UNIT TABS; Take 1 tablet by mouth daily  Dispense: 90 tablet;  Refill: 5  - Multiple

## 2021-12-20 PROBLEM — J31.0 CHRONIC RHINITIS: Status: ACTIVE | Noted: 2021-12-20

## 2021-12-21 ENCOUNTER — TELEMEDICINE (OUTPATIENT)
Dept: FAMILY MEDICINE CLINIC | Age: 46
End: 2021-12-21
Payer: MEDICARE

## 2021-12-21 DIAGNOSIS — J40 BRONCHITIS: ICD-10-CM

## 2021-12-21 DIAGNOSIS — Z20.828 CONTACT WITH OR EXPOSURE TO VIRAL DISEASE: Primary | ICD-10-CM

## 2021-12-21 DIAGNOSIS — R11.0 NAUSEA: ICD-10-CM

## 2021-12-21 DIAGNOSIS — E55.9 VITAMIN D DEFICIENCY: ICD-10-CM

## 2021-12-21 DIAGNOSIS — J31.0 CHRONIC RHINITIS: ICD-10-CM

## 2021-12-21 DIAGNOSIS — M85.89 OSTEOPENIA OF MULTIPLE SITES: ICD-10-CM

## 2021-12-21 DIAGNOSIS — I10 ESSENTIAL HYPERTENSION: ICD-10-CM

## 2021-12-21 PROCEDURE — 87804 INFLUENZA ASSAY W/OPTIC: CPT | Performed by: FAMILY MEDICINE

## 2021-12-21 PROCEDURE — 99214 OFFICE O/P EST MOD 30 MIN: CPT | Performed by: FAMILY MEDICINE

## 2021-12-21 RX ORDER — BENZONATATE 100 MG/1
100 CAPSULE ORAL 3 TIMES DAILY PRN
Qty: 90 CAPSULE | Refills: 0 | Status: SHIPPED | OUTPATIENT
Start: 2021-12-21 | End: 2022-01-24

## 2021-12-21 RX ORDER — ONDANSETRON 4 MG/1
4 TABLET, FILM COATED ORAL 3 TIMES DAILY PRN
Qty: 30 TABLET | Refills: 0 | Status: SHIPPED | OUTPATIENT
Start: 2021-12-21 | End: 2022-01-24

## 2021-12-21 RX ORDER — DEXAMETHASONE 4 MG/1
4 TABLET ORAL
Qty: 10 TABLET | Refills: 0 | Status: SHIPPED | OUTPATIENT
Start: 2021-12-21 | End: 2021-12-31

## 2021-12-21 RX ORDER — ALBUTEROL SULFATE 90 UG/1
2 AEROSOL, METERED RESPIRATORY (INHALATION) EVERY 6 HOURS PRN
Qty: 18 G | Refills: 3 | Status: SHIPPED | OUTPATIENT
Start: 2021-12-21 | End: 2022-05-05 | Stop reason: SDUPTHER

## 2021-12-21 RX ORDER — B-COMPLEX WITH VITAMIN C
1 TABLET ORAL DAILY
Qty: 90 TABLET | Refills: 5 | Status: SHIPPED | OUTPATIENT
Start: 2021-12-21 | End: 2022-06-22

## 2021-12-21 RX ORDER — ASPIRIN 81 MG
TABLET, DELAYED RELEASE (ENTERIC COATED) ORAL
Qty: 90 TABLET | Refills: 1 | Status: SHIPPED | OUTPATIENT
Start: 2021-12-21 | End: 2022-06-27

## 2021-12-21 ASSESSMENT — ENCOUNTER SYMPTOMS
VOMITING: 0
CHEST TIGHTNESS: 0
SINUS PRESSURE: 0
EYE PAIN: 0
WHEEZING: 0
CONSTIPATION: 0
COUGH: 1
SHORTNESS OF BREATH: 1
ABDOMINAL PAIN: 0
DIARRHEA: 0
SORE THROAT: 0
NAUSEA: 0

## 2021-12-21 NOTE — PATIENT INSTRUCTIONS
   Dear Inesartur Richardpuja Patient,     Thank you for entrusting us with your care. Here is how you can expect to hear about your test results:   If your COVID-19 test is positive, you will receive a phone call from a member of our team, and your results will be available in [address], our secure patient portal.  If your COVID-19 test is negative, your results will be available on [employer] our secure patient portal.   If your employer is requiring you to show proof of your negative test result, you'll be able to download and print off the test result record. If you already have a [employer], visit [email or web address] and click Log in to [employer] to view any test results. If you don't have a [employer], you can register online by visiting [employer] and clicking Sign up for [employer] Thank you for choosing [facility] for your care. [facility]     [email or web address] [employer]. [id number]. [email or web address]  Learning About Coronavirus (COVID-19)  Coronavirus (COVID-19): Overview  What is coronavirus (COVID-19)? The coronavirus disease (COVID-19) is caused by a virus. It is an illness that was first found in [place], [place], in December 2019. It has since spread worldwide. The virus can cause fever, cough, and trouble breathing. In severe cases, it can cause pneumonia and make it hard to breathe without help. It can cause death. Coronaviruses are a large group of viruses. They cause the common cold. They also cause more serious illnesses like [place] East respiratory syndrome (MERS) and severe acute respiratory syndrome (SARS). COVID-19 is caused by a novel coronavirus. That means it's a new type that has not been seen in people before. This virus spreads person-to-person through droplets from coughing and sneezing. It can also spread when you are close to someone who is infected.  And it can spread when you touch something that has the virus on it, such as a doorknob or a tabletop. What can you do to protect yourself from coronavirus (COVID-19)? The best way to protect yourself from getting sick is to:  · Avoid areas where there is an outbreak. · Avoid contact with people who may be infected. · Wash your hands often with soap or alcohol-based hand sanitizers. · Avoid crowds and try to stay at least 6 feet away from other people. · Wash your hands often, especially after you cough or sneeze. Use soap and water, and scrub for at least 20 seconds. If soap and water aren't available, use an alcohol-based hand . · Avoid touching your mouth, nose, and eyes. What can you do to avoid spreading the virus to others? To help avoid spreading the virus to others:  · Cover your mouth with a tissue when you cough or sneeze. Then throw the tissue in the trash. · Use a disinfectant to clean things that you touch often. · Stay home if you are sick or have been exposed to the virus. Don't go to school, work, or public areas. And don't use public transportation. · If you are sick:  ? Leave your home only if you need to get medical care. But call the doctor's office first so they know you're coming. And wear a face mask, if you have one.  ? If you have a face mask, wear it whenever you're around other people. It can help stop the spread of the virus when you cough or sneeze. ? Clean and disinfect your home every day. Use household  and disinfectant wipes or sprays. Take special care to clean things that you grab with your hands. These include doorknobs, remote controls, phones, and handles on your refrigerator and microwave. And don't forget countertops, tabletops, bathrooms, and computer keyboards. When to call for help  Call 911 anytime you think you may need emergency care. For example, call if:  · You have severe trouble breathing. (You can't talk at all.)  · You have constant chest pain or pressure.   · You are severely dizzy Disease 2019 in Homes and Residential Communities   For the most recent information go to [employer]    Prevention steps for People with confirmed or suspected COVID-19 (including persons under investigation) who do not need to be hospitalized  and   People with confirmed COVID-19 who were hospitalized and determined to be medically stable to go home    Your healthcare provider and public health staff will evaluate whether you can be cared for at home. If it is determined that you do not need to be hospitalized and can be isolated at home, you will be monitored by staff from your local or state health department. You should follow the prevention steps below until a healthcare provider or local or state health department says you can return to your normal activities. Stay home except to get medical care  People who are mildly ill with COVID-19 are able to isolate at home during their illness. You should restrict activities outside your home, except for getting medical care. Do not go to work, school, or public areas. Avoid using public transportation, ride-sharing, or taxis. Separate yourself from other people and animals in your home  People: As much as possible, you should stay in a specific room and away from other people in your home. Also, you should use a separate bathroom, if available. Animals: You should restrict contact with pets and other animals while you are sick with COVID-19, just like you would around other people. Although there have not been reports of pets or other animals becoming sick with COVID-19, it is still recommended that people sick with COVID-19 limit contact with animals until more information is known about the virus. When possible, have another member of your household care for your animals while you are sick.  If you are sick with COVID-19, avoid contact with your pet, including petting, snuggling, being kissed or licked, and sharing food. If you must care for your pet or be around animals while you are sick, wash your hands before and after you interact with pets and wear a facemask. Call ahead before visiting your doctor  If you have a medical appointment, call the healthcare provider and tell them that you have or may have COVID-19. This will help the healthcare providers office take steps to keep other people from getting infected or exposed. Wear a facemask  You should wear a facemask when you are around other people (e.g., sharing a room or vehicle) or pets and before you enter a healthcare providers office. If you are not able to wear a facemask (for example, because it causes trouble breathing), then people who live with you should not stay in the same room with you, or they should wear a facemask if they enter your room. Cover your coughs and sneezes  Cover your mouth and nose with a tissue when you cough or sneeze. Throw used tissues in a lined trash can. Immediately wash your hands with soap and water for at least 20 seconds or, if soap and water are not available, clean your hands with an alcohol-based hand  that contains at least 60% alcohol. Clean your hands often  Wash your hands often with soap and water for at least 20 seconds, especially after blowing your nose, coughing, or sneezing; going to the bathroom; and before eating or preparing food. If soap and water are not readily available, use an alcohol-based hand  with at least 60% alcohol, covering all surfaces of your hands and rubbing them together until they feel dry. Soap and water are the best option if hands are visibly dirty. Avoid touching your eyes, nose, and mouth with unwashed hands. Avoid sharing personal household items  You should not share dishes, drinking glasses, cups, eating utensils, towels, or bedding with other people or pets in your home.  After using these items, they should be washed thoroughly with soap and water. Clean all high-touch surfaces everyday  High touch surfaces include counters, tabletops, doorknobs, bathroom fixtures, toilets, phones, keyboards, tablets, and bedside tables. Also, clean any surfaces that may have blood, stool, or body fluids on them. Use a household cleaning spray or wipe, according to the label instructions. Labels contain instructions for safe and effective use of the cleaning product including precautions you should take when applying the product, such as wearing gloves and making sure you have good ventilation during use of the product. Monitor your symptoms  Seek prompt medical attention if your illness is worsening (e.g., difficulty breathing). Before seeking care, call your healthcare provider and tell them that you have, or are being evaluated for, COVID-19. Put on a facemask before you enter the facility. These steps will help the healthcare providers office to keep other people in the office or waiting room from getting infected or exposed. Ask your healthcare provider to call the local or state health department. Persons who are placed under active monitoring or facilitated self-monitoring should follow instructions provided by their local health department or occupational health professionals, as appropriate. When working with your local health department check their available hours. If you have a medical emergency and need to call 911, notify the dispatch personnel that you have, or are being evaluated for COVID-19. If possible, put on a facemask before emergency medical services arrive. Discontinuing home isolation  Patients with confirmed COVID-19 should remain under home isolation precautions until the risk of secondary transmission to others is thought to be low. The decision to discontinue home isolation precautions should be made on a case-by-case basis, in consultation with healthcare providers and state and local health departments.     Steps to help prevent the spread of COVID-19 if you are sick  SOURCE - https://piepr-kearns.info/. html     Stay home except to get medical care   ; Stay home: People who are mildly ill with COVID-19 are able to isolate at home during their illness. You should restrict activities outside your home, except for getting medical care.   ; Avoid public areas: Do not go to work, school, or public areas.   ; Avoid public transportation: Avoid using public transportation, ride-sharing, or taxis.  ; Separate yourself from other people and animals in your home   ; Stay away from others: As much as possible, you should stay in a specific room and away from other people in your home. Also, you should use a separate bathroom, if available.   ; Limit contact with pets & animals: You should restrict contact with pets and other animals while you are sick with COVID-19, just like you would around other people. Although there have not been reports of pets or other animals becoming sick with COVID-19, it is still recommended that people sick with COVID-19 limit contact with animals until more information is known about the virus. ; When possible, have another member of your household care for your animals while you are sick. If you are sick with COVID-19, avoid contact with your pet, including petting, snuggling, being kissed or licked, and sharing food. If you must care for your pet or be around animals while you are sick, wash your hands before and after you interact with pets and wear a facemask. See COVID-19 and Animals for more information. Other considerations   The ill person should eat/be fed in their room if possible. Non-disposable  items used should be handled with gloves and washed with hot water or in a . Clean hands after handling used  items.  If possible, dedicate a lined trash can for the ill person.  Use gloves when removing garbage bags, handling, and disposing of trash. Wash hands after handling or disposing of trash.  Consider consulting with your local health department about trash disposal guidance if available. Information for Household Members and Caregivers of Someone who is Sick   Call ahead before visiting your doctor   Call ahead: If you have a medical appointment, call the healthcare provider and tell them that you have or may have COVID-19. This will help the healthcare provider's office take steps to keep other people from getting infected or exposed. Wear a facemask if you are sick   ; If you are sick: You should wear a facemask when you are around other people (e.g., sharing a room or vehicle) or pets and before you enter a healthcare provider's office. ; If you are caring for others: If the person who is sick is not able to wear a facemask (for example, because it causes trouble breathing), then people who live with the person who is sick should not stay in the same room with them, or they should wear a facemask if they enter a room with the person who is sick. Cover your coughs and sneezes   ; Cover: Cover your mouth and nose with a tissue when you cough or sneeze.   ; Dispose: Throw used tissues in a lined trash can.   ; Wash hands: Immediately wash your hands with soap and water for at least 20 seconds or, if soap and water are not available, clean your hands with an alcohol-based hand  that contains at least 60% alcohol. Clean your hands often   ; Wash hands: Wash your hands often with soap and water for at least 20 seconds, especially after blowing your nose, coughing, or sneezing; going to the bathroom; and before eating or preparing food.   ; Hand : If soap and water are not readily available, use an alcohol-based hand  with at least 60% alcohol, covering all surfaces of your hands and rubbing them together until they feel dry.   ; Soap and water: Soap and water are the best option if hands are visibly dirty. ; Avoid touching: Avoid touching your eyes, nose, and mouth with unwashed hands. Handwashing Tips   ; Wet your hands with clean, running water (warm or cold), turn off the tap, and apply soap.  ; Lather your hands by rubbing them together with the soap. Lather the backs of your hands, between your fingers, and under your nails. ; Scrub your hands for at least 20 seconds. Need a timer? Hum [place] from beginning to end twice.  ; Rinse your hands well under clean, running water.  ; Dry your hands using a clean towel or air dry them. Avoid sharing personal household items   ; Do not share: You should not share dishes, drinking glasses, cups, eating utensils, towels, or bedding with other people or pets in your home.   ; Wash thoroughly after use: After using these items, they should be washed thoroughly with soap and water. Clean all high-touch surfaces everyday   ; Clean and disinfect: Practice routine cleaning of high touch surfaces.  ; High touch surfaces include counters, tabletops, doorknobs, bathroom fixtures, toilets, phones, keyboards, tablets, and bedside tables.  ; Disinfect areas with bodily fluids: Also, clean any surfaces that may have blood, stool, or body fluids on them.   ; Household : Use a household cleaning spray or wipe, according to the label instructions. Labels contain instructions for safe and effective use of the cleaning product including precautions you should take when applying the product, such as wearing gloves and making sure you have good ventilation during use of the product. Monitor your symptoms   Seek medical attention: Seek prompt medical attention if your illness is worsening     (e.g., difficulty breathing).   ; Call your doctor: Before seeking care, call your healthcare provider and tell them that you have, or are being evaluated for, COVID-19.   ; Wear a facemask when sick: Put on a facemask before you enter the facility.  These steps will help the healthcare provider's office to keep other people in the office or waiting room from getting infected or exposed. ; Alert health department: Ask your healthcare provider to call the local or state health department. Persons who are placed under active monitoring or facilitated self-monitoring should follow instructions provided by their local health department or occupational health professionals, as appropriate.  ; Call 911 if you have a medical emergency: If you have a medical emergency and need to call 911, notify the dispatch personnel that you have, or are being evaluated for COVID-19. If possible, put on a facemask before emergency medical services arrive.

## 2022-01-10 DIAGNOSIS — D72.820 LYMPHOCYTOSIS: ICD-10-CM

## 2022-01-10 DIAGNOSIS — B35.1 ONYCHOMYCOSIS: Primary | ICD-10-CM

## 2022-01-10 DIAGNOSIS — Z86.16 HISTORY OF COVID-19: ICD-10-CM

## 2022-01-25 ENCOUNTER — VIRTUAL VISIT (OUTPATIENT)
Dept: PULMONOLOGY | Age: 47
End: 2022-01-25
Payer: MEDICARE

## 2022-01-25 DIAGNOSIS — E66.01 OBESITY, MORBID, BMI 40.0-49.9 (HCC): ICD-10-CM

## 2022-01-25 DIAGNOSIS — G47.33 OBSTRUCTIVE SLEEP APNEA SYNDROME: Primary | ICD-10-CM

## 2022-01-25 DIAGNOSIS — F17.200 TOBACCO DEPENDENCE: ICD-10-CM

## 2022-01-25 DIAGNOSIS — R25.8 NOCTURNAL LEG MOVEMENTS: ICD-10-CM

## 2022-01-25 DIAGNOSIS — I10 ESSENTIAL HYPERTENSION: ICD-10-CM

## 2022-01-25 PROCEDURE — G8484 FLU IMMUNIZE NO ADMIN: HCPCS | Performed by: INTERNAL MEDICINE

## 2022-01-25 PROCEDURE — G8427 DOCREV CUR MEDS BY ELIG CLIN: HCPCS | Performed by: INTERNAL MEDICINE

## 2022-01-25 PROCEDURE — 4004F PT TOBACCO SCREEN RCVD TLK: CPT | Performed by: INTERNAL MEDICINE

## 2022-01-25 PROCEDURE — 99214 OFFICE O/P EST MOD 30 MIN: CPT | Performed by: INTERNAL MEDICINE

## 2022-01-25 PROCEDURE — G8417 CALC BMI ABV UP PARAM F/U: HCPCS | Performed by: INTERNAL MEDICINE

## 2022-01-25 NOTE — PROGRESS NOTES
2022    TELEHEALTH EVALUATION -- Audio/Visual (During XPH-43 public health emergency)    Patient and physician are located in their individual locations. This is visit is completed via [employer] application []/ Doxy. me[] / Telephone [x]     HPI:    Princess Singh (:  1975) has requested an audio/video evaluation for the following concern(s):  I reviewed the sleep study. Sleep study does not reveal any significant apnea. There may mild degree of paretic leg movements not enough to explain her daytime symptoms. He is morbidly obese. He has clinical features consistent with sleep apnea syndrome however we did not find any significant apnea. He does not have any clinical features consistent with narcolepsy or any other parasomnias. She is not on any medication to explain daytime sleepiness. He did not do was replacing the testing. He does have a history of depression also has a history of smoking he is morbidly obese has hypertension. He already had Covid Covid vaccine. Review of Systems    Prior to Visit Medications    Medication Sig Taking?  Authorizing Provider   potassium chloride (KLOR-CON M) 10 MEQ extended release tablet take 1 tablet by mouth twice a day Yes Miguel Hassan APRN - CNP   Calcium Carbonate-Vitamin D (OYSTER SHELL CALCIUM/D) 500-200 MG-UNIT TABS Take 1 tablet by mouth daily Yes HAYDEE Sarmiento - CNP   Multiple Vitamins-Minerals (MULTIVITAMIN-MINERALS) TABS tablet take 1 tablet by mouth once daily Yes Miguel Hassan APRN - CNP   triamterene-hydroCHLOROthiazide (MAXZIDE) 75-50 MG per tablet take 1 tablet by mouth once daily Yes Miguel Hassan APRN - CNP   olmesartan (BENICAR) 40 MG tablet take 1 tablet by mouth once daily Yes Miguel Hassan APRN - CNP   tiZANidine (ZANAFLEX) 4 MG tablet q4-6 hour Yes HAYDEE Sarmiento - CNP   famotidine (PEPCID) 20 MG tablet take 1 tablet by mouth nightly if needed Yes Miguel Hassan APRN - CNP cetirizine (ZYRTEC) 10 MG tablet take 1 tablet by mouth once daily Yes HAYDEE Sarmiento CNP   fluticasone (FLONASE) 50 MCG/ACT nasal spray instill 1 spray into each nostril once daily Yes HAYDEE Sarmiento CNP   zinc 50 MG CAPS Take 50 mg by mouth daily Yes HAYDEE Sarmiento CNP   metoprolol succinate (TOPROL XL) 50 MG extended release tablet Take 1 tablet by mouth daily Yes HAYDEE Sarmiento CNP   pravastatin (PRAVACHOL) 20 MG tablet Take 1 tablet by mouth daily Yes HAYDEE Sarmiento CNP   Blood Pressure Monitor KIT Use as directed. Yes HAYDEE Sarmiento CNP   acetaminophen (TYLENOL) 500 MG tablet Take 1,000 mg by mouth every 6 hours as needed for Pain Take 2 tablets (=1000mg) every 6 hours as needed for pain Yes Historical Provider, MD   meloxicam (MOBIC) 15 MG tablet take 1 tablet by mouth once daily Yes HAYDEE Hess CNP   montelukast (SINGULAIR) 10 MG tablet take 1 tablet by mouth every evening Yes HAYDEE Hess CNP   benzonatate (TESSALON) 100 MG capsule take 1 to 2 capsules by mouth three times a day if needed for cough  Patient not taking: Reported on 1/25/2022  HAYDEE Sarmiento CNP   ondansetron (ZOFRAN) 4 MG tablet take 1 tablet by mouth three times a day if needed for nausea and vomiting  Patient not taking: Reported on 1/25/2022  HAYDEE Sarmiento CNP       Social History     Tobacco Use    Smoking status: Current Every Day Smoker     Packs/day: 0.50     Types: Cigarettes    Smokeless tobacco: Never Used   Vaping Use    Vaping Use: Never used   Substance Use Topics    Alcohol use: Not Currently     Alcohol/week: 0.0 standard drinks    Drug use: Yes     Types: Marijuana [place])            RECORD REVIEW: Previous medical records were reviewed at today's visit.     Wt Readings from Last 3 Encounters:   09/21/21 254 lb (115.2 kg)   09/01/21 247 lb (112 kg)   07/25/21 247 lb 3.2 oz (112.1 kg)       Results for orders placed or performed during the hospital encounter of 10/05/21   Baseline Diagnostic Sleep Study   Result Value Ref Range    Status Interpreted        No results found. PHYSICAL EXAMINATION:  Due to this being a TeleHealth encounter, evaluation of the following organ systems is limited: Vitals/Constitutional/EENT/Resp/CV/GI//MS/Neuro/Skin/Heme-Lymph-Imm. Constitutional: [x] Appears well-developed and well-nourished. [] Abnormal  Mental status  [x] Alert and awake  [x] Oriented to person/place/time [x]Able to follow commands    [x] No apparent distress      Eyes:  EOM    [x]  Normal  [] Abnormal-  Sclera  [x]  Normal  [] Abnormal -         Discharge [x]  None visible  [] Abnormal -    HENT:   [x] Normocephalic, atraumatic. [] Abnormal shaped head   [x] Mouth/Throat: Mucous membranes are moist.     Ears [x] Normal  [] Abnormal-    Neck: [x] Normal range of motion [x] Supple [x] No visualized mass. Pulmonary/Chest: [x] Respiratory effort normal.  [x] No visualized signs of difficulty breathing or respiratory distress        [] Abnormal      Musculoskeletal:   [x] Normal range of motion. [x] Normal gait with no signs of ataxia. [x]  No signs of cyanosis of the peripheral portions of extremities. [] Abnormal       Neurological:        [x] Normal cranial nerve (limited exam to video visit) [x] No focal weakness observed       [] Abnormal          Speech       [x] Normal   [] Abnormal     Skin:        [x] No rash on visible skin  [x] Normal  [] Abnormal     Psychiatric:       [x] Normal  [] Abnormal        [x] Normal Mood  [] Anxious appearing        Other Pertinent Exam Findings:       ASSESSMENT:  Sleep study does not reveal any apnea of significance  Hypertension  Depression  Leg movements mild  Obesity  Smoking  Plan:   1. I discussed the results of the sleep study with patient. We have not found any significant apnea paretic leg wounds are relatively mild.   I advised her to undergo a multiple sleep latency testing to rule out narcolepsy. This is being arranged. 2.   3. I encouraged her to continue her effort to lose weight  4.   5. Continue the antidepressants. 6.   7. She was advised to give up smoking. 8.   9. I plan to see her follow-up after that multiple sleep latency testing has been done  10. Dictated by Dr. Mal Dhillon MD dictation over thank you  11.   12. An  electronic signature was used to authenticate this note. --Divya Oconnell MD on 1/25/2022 at 3:52 PM    9}    Pursuant to the emergency declaration under the [address] [place] [address] waiver authority and the [employer] Act, this Virtual  Visit was conducted, with patient's consent, to reduce the patient's risk of exposure to COVID-19 and provide continuity of care for an established patient. Services were provided through a video synchronous discussion virtually to substitute for in-person clinic visit.     _______________________________________________________________________________________________________________________________________________  FOR TELEPHONE VISITS PLEASE COMPLETE THE FOLLOWING      Consent:  She and/or health care decision maker is aware that that she may receive a bill for this telephone service, depending on her insurance coverage, and has provided verbal consent to proceed: Yes      I affirm this is a Patient Initiated Episode with an Established Patient who has not had a related appointment within my department in the past 7 days or scheduled within the next 24 hours.     Total Time: minutes: 21-30 minutes    Note: not billable if this call serves to triage the patient into an appointment for the relevant concern

## 2022-03-14 DIAGNOSIS — G89.29 CHRONIC MIDLINE LOW BACK PAIN WITHOUT SCIATICA: Primary | ICD-10-CM

## 2022-03-14 DIAGNOSIS — M96.1 FAILED BACK SYNDROME, LUMBAR: ICD-10-CM

## 2022-03-14 DIAGNOSIS — J31.0 CHRONIC RHINITIS: ICD-10-CM

## 2022-03-14 DIAGNOSIS — M54.50 CHRONIC MIDLINE LOW BACK PAIN WITHOUT SCIATICA: Primary | ICD-10-CM

## 2022-03-14 RX ORDER — LORATADINE 10 MG/1
10 TABLET ORAL DAILY
Qty: 90 TABLET | Refills: 2 | Status: SHIPPED | OUTPATIENT
Start: 2022-03-14

## 2022-03-26 DIAGNOSIS — I10 ESSENTIAL HYPERTENSION: ICD-10-CM

## 2022-03-26 DIAGNOSIS — J31.0 CHRONIC RHINITIS: ICD-10-CM

## 2022-03-28 DIAGNOSIS — K21.9 GASTROESOPHAGEAL REFLUX DISEASE WITHOUT ESOPHAGITIS: ICD-10-CM

## 2022-03-28 DIAGNOSIS — I10 ESSENTIAL HYPERTENSION: ICD-10-CM

## 2022-03-28 DIAGNOSIS — E78.5 DYSLIPIDEMIA: ICD-10-CM

## 2022-03-28 RX ORDER — FAMOTIDINE 20 MG/1
TABLET, FILM COATED ORAL
Qty: 90 TABLET | Refills: 2 | Status: SHIPPED | OUTPATIENT
Start: 2022-03-28

## 2022-03-28 RX ORDER — OLMESARTAN MEDOXOMIL 40 MG/1
TABLET ORAL
Qty: 90 TABLET | Refills: 2 | Status: SHIPPED | OUTPATIENT
Start: 2022-03-28

## 2022-03-28 RX ORDER — CETIRIZINE HYDROCHLORIDE 10 MG/1
TABLET ORAL
Qty: 30 TABLET | Refills: 0 | OUTPATIENT
Start: 2022-03-28

## 2022-03-28 RX ORDER — METOPROLOL SUCCINATE 50 MG/1
TABLET, EXTENDED RELEASE ORAL
Qty: 90 TABLET | Refills: 1 | OUTPATIENT
Start: 2022-03-28

## 2022-03-28 RX ORDER — PRAVASTATIN SODIUM 20 MG
20 TABLET ORAL DAILY
Qty: 90 TABLET | Refills: 2 | Status: SHIPPED | OUTPATIENT
Start: 2022-03-28

## 2022-03-28 RX ORDER — METOPROLOL SUCCINATE 50 MG/1
50 TABLET, EXTENDED RELEASE ORAL DAILY
Qty: 90 TABLET | Refills: 2 | Status: SHIPPED | OUTPATIENT
Start: 2022-03-28 | End: 2022-04-04 | Stop reason: SDUPTHER

## 2022-04-01 ENCOUNTER — PATIENT MESSAGE (OUTPATIENT)
Dept: FAMILY MEDICINE CLINIC | Age: 47
End: 2022-04-01

## 2022-04-01 DIAGNOSIS — I10 ESSENTIAL HYPERTENSION: ICD-10-CM

## 2022-04-04 RX ORDER — METOPROLOL SUCCINATE 50 MG/1
50 TABLET, EXTENDED RELEASE ORAL DAILY
Qty: 90 TABLET | Refills: 2 | Status: SHIPPED | OUTPATIENT
Start: 2022-04-04

## 2022-04-04 NOTE — TELEPHONE ENCOUNTER
Please Approve or Refuse.   Send to Pharmacy per Pt's Request: [email or web address]     Next Visit Date:  Visit date not found   Last Visit Date: 12/21/2021    Hemoglobin A1C (%)   Date Value   09/21/2021 5.7   10/01/2019 5.7   07/22/2016 4.9             ( goal A1C is < 7)   BP Readings from Last 3 Encounters:   09/21/21 120/80   09/01/21 139/78   07/26/21 (!) 142/81          (goal 120/80)  BUN   Date Value Ref Range Status   07/26/2021 7 6 - 20 mg/dL Final     CREATININE   Date Value Ref Range Status   07/26/2021 0.41 (L) 0.50 - 0.90 mg/dL Final     Potassium   Date Value Ref Range Status   07/26/2021 3.7 3.7 - 5.3 mmol/L Final

## 2022-04-15 ENCOUNTER — PATIENT MESSAGE (OUTPATIENT)
Dept: FAMILY MEDICINE CLINIC | Age: 47
End: 2022-04-15

## 2022-04-15 DIAGNOSIS — M96.1 FAILED BACK SYNDROME, LUMBAR: ICD-10-CM

## 2022-04-15 RX ORDER — TIZANIDINE 4 MG/1
TABLET ORAL
Qty: 180 TABLET | Refills: 2 | Status: SHIPPED | OUTPATIENT
Start: 2022-04-15 | End: 2022-09-19

## 2022-04-15 NOTE — TELEPHONE ENCOUNTER
Please Approve or Refuse.   Send to Pharmacy per Pt's Request:      Next Visit Date:  Visit date not found   Last Visit Date: 12/21/2021    Hemoglobin A1C (%)   Date Value   09/21/2021 5.7   10/01/2019 5.7   07/22/2016 4.9             ( goal A1C is < 7)   BP Readings from Last 3 Encounters:   09/21/21 120/80   09/01/21 139/78   07/26/21 (!) 142/81          (goal 120/80)  BUN   Date Value Ref Range Status   07/26/2021 7 6 - 20 mg/dL Final     CREATININE   Date Value Ref Range Status   07/26/2021 0.41 (L) 0.50 - 0.90 mg/dL Final     Potassium   Date Value Ref Range Status   07/26/2021 3.7 3.7 - 5.3 mmol/L Final

## 2022-04-15 NOTE — TELEPHONE ENCOUNTER
From: Blair Guo  To: Miguel Hassan  Sent: 4/15/2022 11:44 AM EDT  Subject: Medicine     May I please have a refill on my medicine named Tizanidine HCL 4 MG Tablet.

## 2022-04-27 NOTE — TELEPHONE ENCOUNTER
Please clarify with patient why is she taking Ventolin for?     Patient Active Problem List   Diagnosis    Essential hypertension    Gastroesophageal reflux disease without esophagitis    Seasonal allergies    Tobacco dependence    Obesity, morbid, BMI 40.0-49.9 (HCC)    Failed back syndrome, lumbar    Chronic midline low back pain without sciatica    Bronchitis    Neural foraminal stenosis of lumbar spine    Pyelonephritis    Hydronephrosis of right kidney    Psychophysiological insomnia    Anxiety    Depression    History of partial hysterectomy    Hot flashes    Snores    History of sleep apnea    Other fatigue    Goiter    Leukocytosis    Hypertensive emergency    Arm paresthesia, left    Hypertensive urgency    Dyslipidemia    SHANKAR on CPAP    Paranasal sinus disease    Diuretic-induced hypokalemia    Diastasis recti    Chronic rhinitis    Osteopenia of multiple sites    Vitamin D deficiency

## 2022-04-28 DIAGNOSIS — J34.9 PARANASAL SINUS DISEASE: ICD-10-CM

## 2022-04-29 ENCOUNTER — PATIENT MESSAGE (OUTPATIENT)
Dept: FAMILY MEDICINE CLINIC | Age: 47
End: 2022-04-29

## 2022-04-29 DIAGNOSIS — J34.9 PARANASAL SINUS DISEASE: ICD-10-CM

## 2022-04-29 RX ORDER — FLUTICASONE PROPIONATE 50 MCG
SPRAY, SUSPENSION (ML) NASAL
Qty: 16 G | Refills: 2 | Status: SHIPPED | OUTPATIENT
Start: 2022-04-29 | End: 2022-08-22

## 2022-04-29 RX ORDER — FLUTICASONE PROPIONATE 50 MCG
SPRAY, SUSPENSION (ML) NASAL
Qty: 16 G | Refills: 2 | Status: SHIPPED | OUTPATIENT
Start: 2022-04-29 | End: 2022-04-29 | Stop reason: SDUPTHER

## 2022-04-29 NOTE — TELEPHONE ENCOUNTER
From: Matt Tse  To:  Miguel Hassan  Sent: 4/29/2022 9:55 AM EDT  Subject: Medicine     Can I have a refill on my nasal spray Fluticasone Propionate and my inhaler Ventolin I have allergies and I haven't been able to breathe well since I got Covid in February please and thank you

## 2022-05-05 DIAGNOSIS — J40 BRONCHITIS: ICD-10-CM

## 2022-05-05 RX ORDER — ALBUTEROL SULFATE 90 UG/1
2 AEROSOL, METERED RESPIRATORY (INHALATION) EVERY 6 HOURS PRN
Qty: 18 G | Refills: 3 | Status: SHIPPED | OUTPATIENT
Start: 2022-05-05 | End: 2022-06-22

## 2022-05-05 NOTE — TELEPHONE ENCOUNTER
Patient is asking for a refill on her rescue inhaler but I do not see one on her med list. Please advise. Please Approve or Refuse.   Send to Pharmacy per Pt's Request:      Next Visit Date:  Visit date not found   Last Visit Date: 12/21/2021    Hemoglobin A1C (%)   Date Value   09/21/2021 5.7   10/01/2019 5.7   07/22/2016 4.9             ( goal A1C is < 7)   BP Readings from Last 3 Encounters:   09/21/21 120/80   09/01/21 139/78   07/26/21 (!) 142/81          (goal 120/80)  BUN   Date Value Ref Range Status   07/26/2021 7 6 - 20 mg/dL Final     CREATININE   Date Value Ref Range Status   07/26/2021 0.41 (L) 0.50 - 0.90 mg/dL Final     Potassium   Date Value Ref Range Status   07/26/2021 3.7 3.7 - 5.3 mmol/L Final

## 2022-05-24 ENCOUNTER — NURSE TRIAGE (OUTPATIENT)
Dept: OTHER | Facility: CLINIC | Age: 47
End: 2022-05-24

## 2022-05-24 NOTE — TELEPHONE ENCOUNTER
Received call from Eveline at [facility] with [employer]. Subjective: Caller states \"tightness and difficulty breathing\"     Current Symptoms: Continued shortness of breath after second COVID infection in February. Productive cough with clear mucous. Swelling reported to bilateral ankles and feet, does not improve with elevation. BP reported WNL for patient. Recently seen by ENT. Onset: a few months ago; unchanged    Associated Symptoms: NA    Pain Severity: 6/10; pressure; intermittent, waxing and waning    Temperature: Denies    What has been tried: Allergy medications    LMP: 20yrs Pregnant: No    Recommended disposition: See PCP within 3 Days    Care advice provided, patient verbalizes understanding; denies any other questions or concerns; instructed to call back for any new or worsening symptoms. Patient/Caller agrees with recommended disposition; writer provided warm transfer to [employer] at [facility] for appointment scheduling     Attention Provider: Thank you for allowing me to participate in the care of your patient. The patient was connected to triage in response to information provided to the ECC/PSC. Please do not respond through this encounter as the response is not directed to a shared pool.       Reason for Disposition   MODERATE longstanding difficulty breathing (e.g., speaks in phrases, SOB even at rest, pulse 100-120) and SAME as normal    Protocols used: BREATHING DIFFICULTY-ADULT-OH

## 2022-05-24 NOTE — TELEPHONE ENCOUNTER
Pt. Called and stated that it was not that serious so she will await at her schedule appt on 06/24/22

## 2022-06-22 RX ORDER — OMEPRAZOLE 40 MG/1
CAPSULE, DELAYED RELEASE ORAL
COMMUNITY
Start: 2022-04-15

## 2022-06-22 ASSESSMENT — PATIENT HEALTH QUESTIONNAIRE - PHQ9
8. MOVING OR SPEAKING SO SLOWLY THAT OTHER PEOPLE COULD HAVE NOTICED. OR THE OPPOSITE, BEING SO FIGETY OR RESTLESS THAT YOU HAVE BEEN MOVING AROUND A LOT MORE THAN USUAL: 0
4. FEELING TIRED OR HAVING LITTLE ENERGY: 3
6. FEELING BAD ABOUT YOURSELF - OR THAT YOU ARE A FAILURE OR HAVE LET YOURSELF OR YOUR FAMILY DOWN: 0
5. POOR APPETITE OR OVEREATING: 3
1. LITTLE INTEREST OR PLEASURE IN DOING THINGS: 3
3. TROUBLE FALLING OR STAYING ASLEEP: 3
SUM OF ALL RESPONSES TO PHQ QUESTIONS 1-9: 18
2. FEELING DOWN, DEPRESSED OR HOPELESS: 3
SUM OF ALL RESPONSES TO PHQ9 QUESTIONS 1 & 2: 6
SUM OF ALL RESPONSES TO PHQ QUESTIONS 1-9: 18
7. TROUBLE CONCENTRATING ON THINGS, SUCH AS READING THE NEWSPAPER OR WATCHING TELEVISION: 3
SUM OF ALL RESPONSES TO PHQ QUESTIONS 1-9: 18
9. THOUGHTS THAT YOU WOULD BE BETTER OFF DEAD, OR OF HURTING YOURSELF: 0
10. IF YOU CHECKED OFF ANY PROBLEMS, HOW DIFFICULT HAVE THESE PROBLEMS MADE IT FOR YOU TO DO YOUR WORK, TAKE CARE OF THINGS AT HOME, OR GET ALONG WITH OTHER PEOPLE: 2
SUM OF ALL RESPONSES TO PHQ QUESTIONS 1-9: 18

## 2022-06-22 ASSESSMENT — ENCOUNTER SYMPTOMS
SINUS PRESSURE: 0
CONSTIPATION: 0
WHEEZING: 0
EYE PAIN: 0
SORE THROAT: 0
DIARRHEA: 1
VOMITING: 1
CHEST TIGHTNESS: 0

## 2022-06-22 NOTE — PROGRESS NOTES
Visit Information    Have you changed or started any medications since your last visit including any over-the-counter medicines, vitamins, or herbal medicines? no   Have you stopped taking any of your medications? Is so, why? -  no  Are you having any side effects from any of your medications? - no    Have you seen any other physician or provider since your last visit? yes -    Have you had any other diagnostic tests since your last visit?  no   Have you been seen in the emergency room and/or had an admission in a hospital since we last saw you?  no   Have you had your routine dental cleaning in the past 6 months?  no     Do you have an active MyChart account? If no, what is the barrier?   Yes    Patient Care Team:  HAYDEE Tomas CNP as PCP - General (Family Medicine)  HAYDEE Tomas CNP as PCP - [facility]  Elvan Landau, MD as Obstetrician (Obstetrics & Gynecology)  Irene Belcher MD as Consulting Physician (Neurosurgery)  HAYDEE Billingsley CNP as Nurse Practitioner (Obstetrics & Gynecology)    Medical History Review  Past Medical, Family, and Social History reviewed and does contribute to the patient presenting condition    Health Maintenance   Topic Date Due    COVID-19 Vaccine (1) Never done    Hepatitis C screen  Never done    Pneumococcal 0-64 years Vaccine (2 - PCV) 07/22/2017    Colorectal Cancer Screen  Never done    Lipids  07/26/2022    Flu vaccine (Season Ended) 09/01/2022    A1C test (Diabetic or Prediabetic)  09/21/2022    Depression Monitoring  09/21/2022    DTaP/Tdap/Td vaccine (2 - Td or Tdap) 04/10/2025    HIV screen  Completed    Hepatitis A vaccine  Aged Out    Hepatitis B vaccine  Aged Out    Hib vaccine  Aged Out    Meningococcal (ACWY) vaccine  Aged Out

## 2022-06-23 ENCOUNTER — TELEMEDICINE (OUTPATIENT)
Dept: FAMILY MEDICINE CLINIC | Age: 47
End: 2022-06-23
Payer: MEDICARE

## 2022-06-23 DIAGNOSIS — Z87.19 HISTORY OF REPAIR OF HIATAL HERNIA: ICD-10-CM

## 2022-06-23 DIAGNOSIS — R10.13 EPIGASTRIC ABDOMINAL PAIN: Primary | ICD-10-CM

## 2022-06-23 DIAGNOSIS — Z98.890 HISTORY OF REPAIR OF HIATAL HERNIA: ICD-10-CM

## 2022-06-23 DIAGNOSIS — R53.82 CHRONIC FATIGUE: ICD-10-CM

## 2022-06-23 DIAGNOSIS — E78.2 MIXED HYPERLIPIDEMIA: ICD-10-CM

## 2022-06-23 DIAGNOSIS — E66.01 MORBID OBESITY WITH BMI OF 40.0-44.9, ADULT (HCC): ICD-10-CM

## 2022-06-23 DIAGNOSIS — R63.4 WEIGHT LOSS: ICD-10-CM

## 2022-06-23 DIAGNOSIS — I10 ESSENTIAL HYPERTENSION: ICD-10-CM

## 2022-06-23 DIAGNOSIS — R11.2 INTRACTABLE VOMITING WITH NAUSEA, UNSPECIFIED VOMITING TYPE: ICD-10-CM

## 2022-06-23 DIAGNOSIS — R19.7 DIARRHEA, UNSPECIFIED TYPE: ICD-10-CM

## 2022-06-23 PROCEDURE — G8427 DOCREV CUR MEDS BY ELIG CLIN: HCPCS | Performed by: FAMILY MEDICINE

## 2022-06-23 PROCEDURE — 81003 URINALYSIS AUTO W/O SCOPE: CPT | Performed by: FAMILY MEDICINE

## 2022-06-23 PROCEDURE — 99214 OFFICE O/P EST MOD 30 MIN: CPT | Performed by: FAMILY MEDICINE

## 2022-06-23 RX ORDER — PROMETHAZINE HYDROCHLORIDE 12.5 MG/1
12.5 TABLET ORAL 4 TIMES DAILY PRN
Qty: 60 TABLET | Refills: 2 | Status: SHIPPED | OUTPATIENT
Start: 2022-06-23 | End: 2022-07-23

## 2022-06-23 RX ORDER — LOPERAMIDE HYDROCHLORIDE 2 MG/1
2 CAPSULE ORAL PRN
Qty: 30 CAPSULE | Refills: 2 | Status: SHIPPED | OUTPATIENT
Start: 2022-06-23 | End: 2022-07-20 | Stop reason: SDUPTHER

## 2022-06-23 ASSESSMENT — ENCOUNTER SYMPTOMS
NAUSEA: 1
COUGH: 0
ABDOMINAL PAIN: 1
SHORTNESS OF BREATH: 0

## 2022-06-23 NOTE — PROGRESS NOTES
[address]  [address]  [address] [place] [id number]  Dept: 218 Vijaya Owens (:  1975) is a 56 y.o. female. Patient is here for evaluation of the following chief complaint(s):  Chief Complaint   Patient presents with    Diarrhea     been going on for about a month     Emesis     emesis, been going on for the past 2 weeks, able to keep water down. only eats when she feels like she is going to pass out    Other     lump pops out below sternum below bellybutton, softball sized if she doesnt eat, if she does eat it l;ooks like she id pregnant         SUBJECTIVE/OBJECTIVE:  Bell Herrera is a 56 y.o. female patient. Patient is an established patient of mine. Patient has a known history of hypertension, hyperlipoidemia, morbid obesity previous history of hiatal hernia repair. Patient scheduled this appointment today due to some ongoing problems with diarrhea, nausea, vomiting, and epigastric pain. Patient states that she has been having diarrhea for about a month. She describes this as loose stools happening about 3-4 times a day. This is associated with constant dull abdominal pain. Pain gets worse when she is having bowel movement. She denies noticing any blood in her stool. She also complains about nausea. And vomiting. She does tolerate some food and fluids but had noticed some weight loss. However, she is not able to tell us how much weight she is lost she is not able to weigh herself at home. She also reported a previous hiatal hernia repair several years ago she also had a seroma removed had a mesh placed. She does not recall who the surgeon was. She does have some chronic intermittent nausea since but not this bad. She also noticed some area in her epigastric region popping out at times specially when she is about to lay down or get up. This is very difficult to assess virtually.   Patient have not used any over-the-counter treatment that actually helps. She does have some Zofran that was given to her in the past which she also noted does not help with the nausea. She denies any fever or chills. She denies any sick contact. She denies anybody at home that has the same type of illnesses. HYPERTENSION -  Jason Haddad has a known history of hypertension. she is currently Olmesartan,Triamterene-and  Metoprolol. Patient's most recent BP in the office was stable. she Recommended. reports some increased BP readings at home. Patient denies any adverse reaction to this therapy. she denies any CP, SOB, HA, or palpitations. Patient admits to exercising and adheres to low salt diet. No history of organ damage due to condition noted. Lab Results   Component Value Date    CREATININE 0.41 (L) 07/26/2021     Mariely Rodgers is currently on pravastatin (Pravachol). Patient denies adverse reaction to this medication. Compliance with treatment thus far has been good. The patient is known to have coexisting coronary artery disease. The 10-year CVD risk score (D'Agostino, et al., 2008) is: 8.9%    Values used to calculate the score:      Age: 56 years      Sex: Female      Diabetic: No      Tobacco smoker: Yes      Systolic Blood Pressure: 037 mmHg      Is BP treated: Yes      HDL Cholesterol: 35 mg/dL      Total Cholesterol: 192 mg/dL  Lab Results   Component Value Date/Time    CHOLFAST 204 (H) 05/19/2021 03:06 PM    CHOL 192 07/26/2021 02:18 AM    HDL 35 (L) 07/26/2021 02:18 AM    LDLCHOLESTEROL 132 (H) 07/26/2021 02:18 AM    TRIGLYCFAST 70 05/19/2021 03:06 PM    CHOLHDLRATIO 5.5 (H) 07/26/2021 02:18 AM    TRIG 125 07/26/2021 02:18 AM    VLDL NOT REPORTED 07/26/2021 02:18 AM       WEIGHT-- losing weight. Unable to get accurate weight  Patient's BMI is There is no height or weight on file to calculate BMI.  kg/m2. BMI is decreasing.  Patient understands that this condition increases the patient's risk for Depression     Endometriosis     had hyst for this    Essential hypertension 3/8/2016    Gastroesophageal reflux disease without esophagitis 3/8/2016    H/O menorrhagia     had hyst    History of stress incontinence     had surgery    Hydronephrosis of right kidney 12/27/2017    Obesity 4/10/2017    Osteophyte, vertebrae     thoracic    Seasonal allergies 3/8/2016      MEDICATIONS  Prior to Visit Medications    Medication Sig Taking? Authorizing Provider   promethazine (PHENERGAN) 12.5 MG tablet Take 1 tablet by mouth 4 times daily as needed for Nausea Yes HAYDEE Sarmiento CNP   loperamide (IMODIUM) 2 MG capsule Take 1 capsule by mouth as needed for Diarrhea Take 2 capsules now. Then 1 capsule every bowel movement. Maximum dose 8 capsules per day.  Yes HAYDEE Sarmienot CNP   omeprazole (PRILOSEC) 40 MG delayed release capsule take 1 capsule by mouth 30 MINUTES BEFORE MORNING MEAL Yes Historical Provider, MD   albuterol sulfate  (90 Base) MCG/ACT inhaler Inhale 2 puffs into the lungs every 6 hours as needed for Wheezing or Shortness of Breath Yes HAYDEE Sarmiento CNP   fluticasone (FLONASE) 50 MCG/ACT nasal spray instill 1 spray into each nostril once daily Yes Christiane Cox MD   tiZANidine (ZANAFLEX) 4 MG tablet q4-6 hour Yes HAYDEE Sarmiento CNP   metoprolol succinate (TOPROL XL) 50 MG extended release tablet Take 1 tablet by mouth daily Yes HAYDEE Sarmiento CNP   pravastatin (PRAVACHOL) 20 MG tablet Take 1 tablet by mouth daily Yes HAYDEE Sarmiento CNP   famotidine (PEPCID) 20 MG tablet take 1 tablet by mouth nightly if needed Yes HAYDEE Sarmiento CNP   olmesartan (BENICAR) 40 MG tablet take 1 tablet by mouth once daily Yes HAYDEE Sarmiento CNP   meloxicam (MOBIC) 15 MG tablet take 1 tablet by mouth once daily Yes HAYDEE Sarmiento CNP   loratadine (CLARITIN) 10 MG tablet Take 1 tablet by mouth daily Yes Miguel QUINONES Gauamis, APRN - CNP   montelukast (SINGULAIR) 10 MG tablet take 1 tablet by mouth every evening Yes HAYDEE Sarmiento CNP   ondansetron (ZOFRAN) 4 MG tablet take 1 tablet by mouth three times a day if needed for nausea and vomiting Yes HAYDEE Sarmiento CNP   potassium chloride (KLOR-CON M) 10 MEQ extended release tablet take 1 tablet by mouth twice a day Yes HAYDEE Sarmiento CNP   Calcium Carbonate-Vitamin D (OYSTER SHELL CALCIUM/D) 500-200 MG-UNIT TABS Take 1 tablet by mouth daily Yes HAYDEE Sarmiento CNP   Multiple Vitamins-Minerals (MULTIVITAMIN-MINERALS) TABS tablet take 1 tablet by mouth once daily Yes HAYDEE Sarmiento CNP   triamterene-hydroCHLOROthiazide (MAXZIDE) 75-50 MG per tablet take 1 tablet by mouth once daily Yes HAYDEE Sarmiento CNP   zinc 50 MG CAPS Take 50 mg by mouth daily Yes HAYDEE Sarmiento CNP   Blood Pressure Monitor KIT Use as directed. Yes HAYDEE Sarmiento CNP   acetaminophen (TYLENOL) 500 MG tablet Take 1,000 mg by mouth every 6 hours as needed for Pain Take 2 tablets (=1000mg) every 6 hours as needed for pain Yes Historical Provider, MD   benzonatate (TESSALON) 100 MG capsule take 1 to 2 capsules by mouth three times a day if needed for cough  Patient not taking: Reported on 1/25/2022  HAYDEE Sarmiento CNP     Controlled Substance Monitoring:  Acute and Chronic Pain Monitoring:   RX Monitoring 11/5/2019   Attestation -   Periodic Controlled Substance Monitoring Possible medication side effects, risk of tolerance/dependence & alternative treatments discussed. ;Obtaining appropriate analgesic effect of treatment. ASSESSMENT/PLAN:  1. Epigastric abdominal pain  Worsening  Continue to evaluate  Recommend consult to gastroenterology and general surgeon  Rule out IBS  Rule out incisional hernia      - Dustin Landin MD, Gastroenterology, Marvin Tejada MD, General Surgery, [place]    2. Essential hypertension  Stable  Continue current therapy. DISCUSSED AND ADVISED TO:  Cut down on your salt intake. Cut down on caffeinated drinks, sports drinks. Instructed to check BP at home regularly. Report for any chest pains, shortness of breath, headaches, and lightheadedness. Call the office if your blood pressure continue to be higher than 140/90 or 90/50.      - CBC with Auto Differential; Future  - Comprehensive Metabolic Panel, Fasting; Future  - Urinalysis with Reflex to Culture; Future    3. Mixed hyperlipidemia  Stable  Continue therapy. Advised to decrease the consumption of red meats, fried foods, trans fats, sweets, sugary beverages. Advised to increase fish, vegetables, and fruits consumption. Advised to add fiber or OTC supplements in diet. Discussed weight loss which will result in improvement of lipids levels. Advised to increase daily physical activities and add regular exercises. - Comprehensive Metabolic Panel, Fasting; Future  - Lipid, Fasting; Future    4. Intractable vomiting with nausea, unspecified vomiting type  Failure to Improve  DISCUSSED and ADVISED TO:  Drink plenty of fluids, enough until urine is light yellow or clear like water. Choose water and other caffeine-free clear liquids. If you do not feel like eating or drinking, try taking small sips of water, sports drinks, or other rehydration drinks. Get plenty of rest.  Go to the Emergency Room if you are not able to tolerate any food or water. - Albin Page MD, Gastroenterology, [place]  - promethazine (PHENERGAN) 12.5 MG tablet; Take 1 tablet by mouth 4 times daily as needed for Nausea  Dispense: 60 tablet; Refill: 2    5. Diarrhea, unspecified type  Failure to Improve  DISCUSSED and ADVISED TO:  Drink plenty of fluids, enough until urine is light yellow or clear like water. Choose water and other caffeine-free clear liquids.   If you do not feel like eating or drinking, try taking small sips signed by HAYDEE Salazar CNP on 12/19/21 at 6:00 PM EST     --HAYDEE Sarmiento - CNP

## 2022-06-23 NOTE — PATIENT INSTRUCTIONS
New Updates for [id number] [facility] Nitro/ [facility] PHIL    Thank you for choosing US to give you the best care! [facility] is always trying to think of new ways to help their patients. We are asking all patients to try out the new digital registration that is now available through your [facility] account or the new PHIL, [facility]. Via the phil you're now able to update your personal and registration information prior to your upcoming appointment. This will save you time once you arrive at the office to check-in, not to mention your information remains safe!! Many other perks come from signing up for an account, such as:   Requesting refills   Scheduling an appointment   Completing an [address] Sending a message to the office/provider   Having access to your medication list   Paying your bill/copay prior to your appointment   Scheduling your yearly mammogram   Review your test results    If you are not familiar with [facility] or the [facility] PHIL, please ask one of us and we will be happy to answer any questions or help you set-up your account. Your [facility] office,  [facility]    Patient Education        Hiatal Hernia: Care Instructions  Your Care Instructions  A hiatal hernia occurs when part of the stomach bulges into the chest cavity. A hiatal hernia may allow stomach acid and juices to back up into the esophagus (acid reflux). This can cause a feeling of burning, warmth, heat, or pain behind the breastbone. This feeling may often occur after you eat, soon after you lie down, or when you bend forward, and it may come and go. You also may have a sour taste in your mouth. These symptoms are commonly known as heartburnor reflux. But not all hiatal hernias cause symptoms. Follow-up care is a key part of your treatment and safety. Be sure to make and go to all appointments, and call your doctor if you are having problems.  It's also a good idea to know your test results and keep alist of the medicines you take. How can you care for yourself at home?  Take your medicines exactly as prescribed. Call your doctor if you think you are having a problem with your medicine.  Do not take aspirin or other nonsteroidal anti-inflammatory drugs (NSAIDs), such as ibuprofen (Advil, Motrin) or naproxen (Aleve), unless your doctor says it is okay. Ask your doctor what you can take for pain.  Your doctor may recommend over-the-counter medicine. For mild or occasional indigestion, antacids such as Tums, Gaviscon, Maalox, or Mylanta may help. Your doctor also may recommend over-the-counter acid reducers, such as famotidine (Pepcid AC), cimetidine (Tagamet HB), or omeprazole (Prilosec). Read and follow all instructions on the label. If you use these medicines often, talk with your doctor.  Change your eating habits. ? It's best to eat several small meals instead of two or three large meals. ? After you eat, wait 2 to 3 hours before you lie down. Late-night snacks aren't a good idea. ? Avoid foods that make your symptoms worse. These may include chocolate, mint, alcohol, pepper, spicy foods, high-fat foods, or drinks with caffeine in them, such as tea, coffee, barrington, or energy drinks. If your symptoms are worse after you eat a certain food, you may want to stop eating it to see if your symptoms get better.  Do not smoke or chew tobacco.   If you get heartburn at night, raise the head of your bed 6 to 8 inches by putting the frame on blocks or placing a foam wedge under the head of your mattress. (Adding extra pillows does not work.)   Do not wear tight clothing around your middle.  Lose weight if you need to. Losing just 5 to 10 pounds can help. When should you call for help? Call your doctor now or seek immediate medical care if:     You have new or worse belly pain.      You are vomiting.    Watch closely for changes in your health, and be sure to contact your doctor if:     You have new or worse symptoms of indigestion.      You have trouble or pain swallowing.      You are losing weight.      You do not get better as expected. Where can you learn more? Go to [email or web address] and sign in to your [employer] account. Enter H537 in the [facility] box to learn more about \"Hiatal Hernia: Care Instructions. \"     If you do not have an account, please click on the \"Sign Up Now\" link. Current as of: September 8, 2021               Content Version: 13.3  © 7583-2029 [employer]wise, Incorporated. Care instructions adapted under license by [facility] ([facility]). If you have questions about a medical condition or this instruction, always ask your healthcare professional. [address] any warranty or liability for your use of this information.

## 2022-06-25 DIAGNOSIS — M85.89 OSTEOPENIA OF MULTIPLE SITES: ICD-10-CM

## 2022-06-25 DIAGNOSIS — E55.9 VITAMIN D DEFICIENCY: ICD-10-CM

## 2022-06-27 RX ORDER — ASPIRIN 81 MG
TABLET, DELAYED RELEASE (ENTERIC COATED) ORAL
Qty: 90 TABLET | Refills: 1 | Status: SHIPPED | OUTPATIENT
Start: 2022-06-27

## 2022-07-06 ENCOUNTER — TELEPHONE (OUTPATIENT)
Dept: GASTROENTEROLOGY | Age: 47
End: 2022-07-06

## 2022-07-06 NOTE — TELEPHONE ENCOUNTER
NP / Intractable vomiting with nausea, unspecified vomiting type - Diarrhea, unspecified type - Epigastric abdominal pain / para adv

## 2022-07-11 NOTE — TELEPHONE ENCOUNTER
Called patient to schedule and someone answered the phone then hung up. Third attempt and sending back to the referring provider.

## 2022-07-12 DIAGNOSIS — I10 ESSENTIAL HYPERTENSION: ICD-10-CM

## 2022-07-12 DIAGNOSIS — T50.2X5A DIURETIC-INDUCED HYPOKALEMIA: ICD-10-CM

## 2022-07-12 DIAGNOSIS — E87.6 DIURETIC-INDUCED HYPOKALEMIA: ICD-10-CM

## 2022-07-12 RX ORDER — POTASSIUM CHLORIDE 750 MG/1
TABLET, EXTENDED RELEASE ORAL
Qty: 180 TABLET | Refills: 2 | Status: SHIPPED | OUTPATIENT
Start: 2022-07-12

## 2022-07-12 RX ORDER — TRIAMTERENE AND HYDROCHLOROTHIAZIDE 75; 50 MG/1; MG/1
TABLET ORAL
Qty: 90 TABLET | Refills: 2 | Status: SHIPPED | OUTPATIENT
Start: 2022-07-12

## 2022-07-19 ENCOUNTER — HOSPITAL ENCOUNTER (OUTPATIENT)
Dept: MAMMOGRAPHY | Age: 47
Discharge: HOME | End: 2022-07-19

## 2022-07-19 DIAGNOSIS — Z12.31 SCREENING MAMMOGRAM FOR HIGH-RISK PATIENT: ICD-10-CM

## 2022-07-20 DIAGNOSIS — R19.7 DIARRHEA, UNSPECIFIED TYPE: ICD-10-CM

## 2022-07-20 RX ORDER — LOPERAMIDE HYDROCHLORIDE 2 MG/1
CAPSULE ORAL
Qty: 30 CAPSULE | Refills: 2 | Status: SHIPPED | OUTPATIENT
Start: 2022-07-20 | End: 2022-08-09

## 2022-08-01 RX ORDER — ZINC GLUCONATE 50 MG
TABLET ORAL
Qty: 90 TABLET | Refills: 1 | Status: SHIPPED | OUTPATIENT
Start: 2022-08-01

## 2022-08-09 DIAGNOSIS — R19.7 DIARRHEA, UNSPECIFIED TYPE: ICD-10-CM

## 2022-08-09 RX ORDER — LOPERAMIDE HYDROCHLORIDE 2 MG/1
CAPSULE ORAL
Qty: 90 CAPSULE | Refills: 2 | Status: SHIPPED | OUTPATIENT
Start: 2022-08-09 | End: 2022-09-19

## 2022-08-15 ENCOUNTER — OFFICE VISIT (OUTPATIENT)
Dept: GASTROENTEROLOGY | Age: 47
End: 2022-08-15
Payer: MEDICARE

## 2022-08-15 VITALS
SYSTOLIC BLOOD PRESSURE: 144 MMHG | BODY MASS INDEX: 47.3 KG/M2 | TEMPERATURE: 97.8 F | DIASTOLIC BLOOD PRESSURE: 80 MMHG | WEIGHT: 267 LBS

## 2022-08-15 DIAGNOSIS — K58.2 IRRITABLE BOWEL SYNDROME WITH BOTH CONSTIPATION AND DIARRHEA: ICD-10-CM

## 2022-08-15 DIAGNOSIS — E66.01 OBESITY, MORBID, BMI 40.0-49.9 (HCC): ICD-10-CM

## 2022-08-15 DIAGNOSIS — R11.0 NAUSEA: ICD-10-CM

## 2022-08-15 DIAGNOSIS — R14.0 ABDOMINAL BLOATING: ICD-10-CM

## 2022-08-15 DIAGNOSIS — F17.200 TOBACCO DEPENDENCE: ICD-10-CM

## 2022-08-15 DIAGNOSIS — K21.9 GASTROESOPHAGEAL REFLUX DISEASE WITHOUT ESOPHAGITIS: Primary | ICD-10-CM

## 2022-08-15 DIAGNOSIS — Z80.0 FAMILY HISTORY OF COLON CANCER: ICD-10-CM

## 2022-08-15 PROCEDURE — G8427 DOCREV CUR MEDS BY ELIG CLIN: HCPCS | Performed by: INTERNAL MEDICINE

## 2022-08-15 PROCEDURE — 4004F PT TOBACCO SCREEN RCVD TLK: CPT | Performed by: INTERNAL MEDICINE

## 2022-08-15 PROCEDURE — G8417 CALC BMI ABV UP PARAM F/U: HCPCS | Performed by: INTERNAL MEDICINE

## 2022-08-15 PROCEDURE — 99204 OFFICE O/P NEW MOD 45 MIN: CPT | Performed by: INTERNAL MEDICINE

## 2022-08-15 RX ORDER — BISACODYL 5 MG
20 TABLET, DELAYED RELEASE (ENTERIC COATED) ORAL ONCE
Qty: 4 TABLET | Refills: 0 | Status: SHIPPED | OUTPATIENT
Start: 2022-08-15 | End: 2022-08-15

## 2022-08-15 RX ORDER — POLYETHYLENE GLYCOL 3350 17 G/17G
238 POWDER, FOR SOLUTION ORAL ONCE
Qty: 238 G | Refills: 0 | Status: SHIPPED | OUTPATIENT
Start: 2022-08-15 | End: 2022-08-15

## 2022-08-15 ASSESSMENT — ENCOUNTER SYMPTOMS
CONSTIPATION: 0
VOICE CHANGE: 0
RECTAL PAIN: 0
ANAL BLEEDING: 0
ABDOMINAL DISTENTION: 0
WHEEZING: 0
BLOOD IN STOOL: 0
ABDOMINAL PAIN: 1
NAUSEA: 1
SORE THROAT: 1
SHORTNESS OF BREATH: 0
CHOKING: 0
DIARRHEA: 1
COUGH: 0
VOMITING: 1
TROUBLE SWALLOWING: 1

## 2022-08-15 NOTE — PROGRESS NOTES
Essential hypertension 3/8/2016    Gastroesophageal reflux disease without esophagitis 3/8/2016    H/O menorrhagia     had hyst    History of stress incontinence     had surgery    Hydronephrosis of right kidney 2017    Obesity 4/10/2017    Osteophyte, vertebrae     thoracic    Seasonal allergies 3/8/2016       Past Surgical History:   Procedure Laterality Date    ABDOMEN SURGERY      seroma removed     SECTION      ELBOW JOINT FUSION Right     HERNIA REPAIR      umbilical    HYSTERECTOMY VAGINAL      ovaries remain    INCONTINENCE SURGERY      KNEE ARTHROSCOPY Right 3/10/2020    KNEE ARTHROSCOPY PARITAL MEDIAL MENISECTOMY performed by Case Guan MD at [address] [facility]      rods & cage inserted L5-S1, fusion & decompression    SPINE SURGERY      revision because cage had backed out and severed a nerve, cage restabilized       CURRENT MEDICATIONS:    Current Outpatient Medications:     loperamide (IMODIUM) 2 MG capsule, take 1 capsule by mouth if needed for diarrhea take 2 capsules by mouth immediately then 1 capsule AFTER EVERY BOWEL MOVEMENT maximum daily dose of 8, Disp: 90 capsule, Rfl: 2    RA ZINC 50 MG tablet, take 1 tablet by mouth once daily, Disp: 90 tablet, Rfl: 1    triamterene-hydroCHLOROthiazide (MAXZIDE) 75-50 MG per tablet, take 1 tablet by mouth once daily, Disp: 90 tablet, Rfl: 2    potassium chloride (KLOR-CON M) 10 MEQ extended release tablet, take 1 tablet by mouth twice a day, Disp: 180 tablet, Rfl: 2    Multiple Vitamins-Minerals (MULTIVITAMIN-MINERALS) TABS tablet, take 1 tablet by mouth once daily, Disp: 90 tablet, Rfl: 1    omeprazole (PRILOSEC) 40 MG delayed release capsule, take 1 capsule by mouth 30 MINUTES BEFORE MORNING MEAL, Disp: , Rfl:     fluticasone (FLONASE) 50 MCG/ACT nasal spray, instill 1 spray into each nostril once daily, Disp: 16 g, Rfl: 2    tiZANidine (ZANAFLEX) 4 MG tablet, q4-6 hour, Disp: 180 tablet, Rfl: 2    metoprolol succinate (TOPROL XL) Vomiting     Can take plain tylenol       FAMILY HISTORY:       Problem Relation Age of Onset    Eczema Mother     COPD Mother     Liver Disease Mother     Heart Disease Mother     Heart Disease Maternal Grandmother     Atrial Fibrillation Maternal Grandfather     Heart Attack Paternal Grandfather          SOCIAL HISTORY:   Social History     Socioeconomic History    Marital status:      Spouse name: Not on file    Number of children: Not on file    Years of education: Not on file    Highest education level: Not on file   Occupational History    Not on file   Tobacco Use    Smoking status: Every Day     Packs/day: 0.50     Types: Cigarettes    Smokeless tobacco: Never   Vaping Use    Vaping Use: Never used   Substance and Sexual Activity    Alcohol use: Not Currently     Alcohol/week: 0.0 standard drinks    Drug use: Yes     Types: Marijuana Delona Members)    Sexual activity: Yes   Other Topics Concern    Not on file   Social History Narrative    Not on file     Social Determinants of Health     Financial Resource Strain: Not on file   Food Insecurity: Not on file   Transportation Needs: Not on file   Physical Activity: Not on file   Stress: Not on file   Social Connections: Not on file   Intimate Partner Violence: Not on file   Housing Stability: Not on file         REVIEW OF SYSTEMS:         Review of Systems   Constitutional:  Positive for appetite change, fatigue and unexpected weight change. HENT:  Positive for sore throat and trouble swallowing. Negative for voice change. Respiratory:  Negative for cough, choking, shortness of breath and wheezing. Cardiovascular:  Positive for leg swelling. Negative for chest pain and palpitations. Gastrointestinal:  Positive for abdominal pain, diarrhea, nausea and vomiting. Negative for abdominal distention, anal bleeding, blood in stool, constipation and rectal pain. Neurological:  Positive for dizziness and light-headedness.  Negative for weakness, numbness and headaches. Hematological:  Does not bruise/bleed easily. Psychiatric/Behavioral:  Negative for confusion and sleep disturbance. The patient is not nervous/anxious. PHYSICAL EXAMINATION: Vital signs reviewed per the nursing documentation. BP (!) 144/80   Temp 97.8 °F (36.6 °C)   Wt 267 lb (121.1 kg)   BMI 47.30 kg/m²   Body mass index is 47.3 kg/m². Physical Exam  Nursing note reviewed. Constitutional:       Appearance: She is well-developed. Comments: Anxious   Obesity    HENT:      Head: Normocephalic and atraumatic. Eyes:      Conjunctiva/sclera: Conjunctivae normal.      Pupils: Pupils are equal, round, and reactive to light. Cardiovascular:      Heart sounds: Normal heart sounds. Pulmonary:      Effort: Pulmonary effort is normal.      Breath sounds: Normal breath sounds. Abdominal:      General: Bowel sounds are normal.      Palpations: Abdomen is soft. Comments: NON TENDER, NON DISTENTED  LIVER SPLEEN AND HERNIAS ARE NOT  PALPABLE  BOWEL SOUNDS ARE POSITIVE      Musculoskeletal:         General: Normal range of motion. Cervical back: Normal range of motion and neck supple. Skin:     General: Skin is warm. Neurological:      Mental Status: She is alert and oriented to person, place, and time.    Psychiatric:         Behavior: Behavior normal.         LABORATORY DATA: Reviewed  Lab Results   Component Value Date    WBC 16.7 (H) 07/26/2021    HGB 12.3 07/26/2021    HCT 36.7 07/26/2021    MCV 97.6 07/26/2021     07/26/2021     07/26/2021    K 3.7 07/26/2021     07/26/2021    CO2 22 07/26/2021    BUN 7 07/26/2021    CREATININE 0.41 (L) 07/26/2021    LABALBU 3.7 07/26/2021    BILITOT 0.19 (L) 07/26/2021    ALKPHOS 77 07/26/2021    AST 13 07/26/2021    ALT 14 07/26/2021         Lab Results   Component Value Date    RBC 3.76 (L) 07/26/2021    HGB 12.3 07/26/2021    MCV 97.6 07/26/2021    MCH 32.7 07/26/2021    MCHC 33.5 07/26/2021    RDW 13.2 07/26/2021 MPV 9.5 07/26/2021    BASOPCT 1 07/25/2021    LYMPHSABS 4.68 07/25/2021    MONOSABS 0.65 07/25/2021    NEUTROABS 6.76 07/25/2021    EOSABS 0.39 07/25/2021    BASOSABS 0.13 07/25/2021         DIAGNOSTIC TESTING:     No results found. Assessment  1. Gastroesophageal reflux disease without esophagitis    2. Abdominal bloating    3. Obesity, morbid, BMI 40.0-49.9 ([address]    4. Tobacco dependence    5. Nausea    6. Irritable bowel syndrome with both constipation and diarrhea        Plan    Plan EGD and Colonoscopy     The Endoscopic procedure was explained to the patient in detail  The prep and NPO were explained  All the Risks, Benefits, and Alternatives were explained  Risk of Bleeding, Perforation and Cardio Respiratory risks were explained  her questions were answered  The procedure has been scheduled with the  in the office  Patient was asked to give us a call for any questions  The patient has verbalized understanding and agreement to this plan. Pt seems to have signs and symptoms consistent with GERD, acid indigestion and heartburns. She was discussed  in detail about some possible life style and dietary modifications. She was stressed about the maintenance  of appropriate weight and effect of obesity contributing to reflux symptoms. Routine exercise was streesed. Avoidance of Caffeine, nicotine and chocolate were explained. Pt was asked to avoid spices grease and fried food. Advices were also given about avoidance of any kind of fast foods, soda pops and high energy drinks. Pt was advised to place two small block under the head end of the bed which may help with night time reflux. Was advised not to eat any thin at least 2-3 hrs before going to bed and walk especially after dinner    Pt has verbalized understanding and agreement to this plan. Pt was advised in detail about some life style and dietary modifications. She was advised about avoidance of caffeine, nicotine and chocolate. Pt was also told to stay away from any kind of fast foods, soda pops. She was also advised to avoid lots of spices, grease and fried food etc.     Instructions were also given about trying to arrange the timing, quality and quantity of food. Instructions were given about using ample amount of fiber including dietary and supplemental fiber either metamucil, bennafiber or citrucell etc.  Pt was advised about drinking ample amount of water without any colors or chemicals. Stress was given about regular exercise. Pt has verbalized understanding and agreement to these modifications. Pt was given advices and instructions about weight loss. She was advised about the significance of exercise at least three times a week . Dietary advices were also given about the avoidance of fast food, fried food and lots of spices and grease. nstructions were given about using ample amount of fiber including dietary and supplemental fiber either metamucil, bennafiber or citrucell etc.  Pt was advised about drinking ample amount of water without any colors or chemicals. Stress was given about regular exercise. Pt was told to stay way from soda pops. Pt has verbalized understanding and agrrement    The patient was instructed to start taking some OTC Probiotics products   These are available over the counter at the Pharmacy stores and Grocery stores  He was explained about the beneficial effects they have in the GI track  They will help to establish the good bacterial sarah and will help with the digestion and bowel movements  The patient has verbalized understanding and agreement to this plan    More than half of patient's clinic visit time was spent in counseling about lifestyle and dietary modifications  Patient's  questions were answered in this regard as well  The patient has verbalized understanding and agreement       Thank you for allowing me to participate in the care of Ms. Villalobos.  For any further questions please do not hesitate to contact me. I have reviewed and agree with the ROS entered by the MA/Nurse.          Humberto Borden MD, [facility]  Board Certified in Gastroenterology and [address] [place] [address] Gastroenterology  Office #: [phone]

## 2022-08-21 DIAGNOSIS — J34.9 PARANASAL SINUS DISEASE: ICD-10-CM

## 2022-08-22 RX ORDER — FLUTICASONE PROPIONATE 50 MCG
SPRAY, SUSPENSION (ML) NASAL
Qty: 16 G | Refills: 2 | Status: SHIPPED | OUTPATIENT
Start: 2022-08-22

## 2022-09-18 DIAGNOSIS — R19.7 DIARRHEA, UNSPECIFIED TYPE: ICD-10-CM

## 2022-09-18 DIAGNOSIS — M96.1 FAILED BACK SYNDROME, LUMBAR: ICD-10-CM

## 2022-09-19 RX ORDER — TIZANIDINE 4 MG/1
TABLET ORAL
Qty: 180 TABLET | Refills: 1 | Status: SHIPPED | OUTPATIENT
Start: 2022-09-19 | End: 2022-11-21

## 2022-09-19 RX ORDER — LOPERAMIDE HYDROCHLORIDE 2 MG/1
CAPSULE ORAL
Qty: 90 CAPSULE | Refills: 1 | Status: SHIPPED | OUTPATIENT
Start: 2022-09-19 | End: 2022-10-26

## 2022-10-06 NOTE — PROGRESS NOTES
Transitions of [facility] Service   Medication Review    The patient's list of current home medications has been reviewed. Source(s) of information: patient, surescripts    Based on information provided by the above source(s), I have updated the patient's home med list as described below. I changed or updated the following medications on the patient's home medication list:  Discontinued None      Added Tylenol 500mg - 2Q6H PRN pain     Adjusted   None      Other Notes Zofran ODT 4mg - Last filled September 2020 - still has medication at home, only uses PRN           Please feel free to call me with any questions about this encounter. Thank you. This note will be reviewed and co-signed by the [facility] Pharmacist. The pharmacist will review inpatient orders and contact the physician about any discrepancies. Dariana Redd, pharmacy technician  [facility]  Phone:  [phone]  Fax: [phone]      Electronically signed by Dariana Redd on 7/26/2021 at 11:38 AM     Prior to Admission medications    Medication Sig Start Date End Date Taking?  Authorizing Provider   acetaminophen (TYLENOL) 500 MG tablet Take 1,000 mg by mouth every 6 hours as needed for Pain Take 2 tablets (=1000mg) every 6 hours as needed for pain       tiZANidine (ZANAFLEX) 4 MG tablet take 1 tablet by mouth every 6 hours if needed for back pain       famotidine (PEPCID) 20 MG tablet take 1 tablet by mouth nightly if needed       loratadine (CLARITIN) 10 MG tablet take 1 tablet by mouth once daily       meloxicam (MOBIC) 15 MG tablet take 1 tablet by mouth once daily       montelukast (SINGULAIR) 10 MG tablet take 1 tablet by mouth every evening       Multiple Vitamins-Minerals (MULTIVITAMIN-MINERALS) TABS tablet take 1 tablet by mouth once daily       olmesartan (BENICAR) 40 MG tablet take 1 tablet by mouth once daily       triamterene-hydroCHLOROthiazide (MAXZIDE-25) 37.5-25 MG per tablet take 1 tablet by mouth once daily       Calcium Carbonate-Vitamin D (OYSTER SHELL CALCIUM/D) 500-200 MG-UNIT TABS Take 1 tablet by mouth daily       ondansetron (ZOFRAN ODT) 4 MG disintegrating tablet Take 1 tablet by mouth every 6 hours as needed for Nausea or Vomiting Roslyn

## 2022-10-24 ENCOUNTER — HOSPITAL ENCOUNTER (OUTPATIENT)
Dept: PAIN MANAGEMENT | Age: 47
Discharge: HOME | End: 2022-10-24
Payer: MEDICARE

## 2022-10-24 VITALS
WEIGHT: 267 LBS | HEART RATE: 88 BPM | SYSTOLIC BLOOD PRESSURE: 156 MMHG | HEIGHT: 63 IN | DIASTOLIC BLOOD PRESSURE: 93 MMHG | TEMPERATURE: 97.3 F | OXYGEN SATURATION: 97 % | BODY MASS INDEX: 47.31 KG/M2

## 2022-10-24 DIAGNOSIS — M96.1 LUMBAR POST-LAMINECTOMY SYNDROME: ICD-10-CM

## 2022-10-24 DIAGNOSIS — M54.16 LUMBAR RADICULOPATHY: Primary | ICD-10-CM

## 2022-10-24 DIAGNOSIS — E66.01 CLASS 3 SEVERE OBESITY WITH BODY MASS INDEX (BMI) OF 45.0 TO 49.9 IN ADULT, UNSPECIFIED OBESITY TYPE, UNSPECIFIED WHETHER SERIOUS COMORBIDITY PRESENT (HCC): ICD-10-CM

## 2022-10-24 DIAGNOSIS — M51.36 LUMBAR DEGENERATIVE DISC DISEASE: ICD-10-CM

## 2022-10-24 PROBLEM — M51.369 LUMBAR DEGENERATIVE DISC DISEASE: Status: ACTIVE | Noted: 2022-10-24

## 2022-10-24 PROBLEM — E66.813 CLASS 3 SEVERE OBESITY WITH BODY MASS INDEX (BMI) OF 45.0 TO 49.9 IN ADULT: Status: ACTIVE | Noted: 2017-04-10

## 2022-10-24 PROCEDURE — 99203 OFFICE O/P NEW LOW 30 MIN: CPT

## 2022-10-24 PROCEDURE — 99204 OFFICE O/P NEW MOD 45 MIN: CPT | Performed by: STUDENT IN AN ORGANIZED HEALTH CARE EDUCATION/TRAINING PROGRAM

## 2022-10-24 ASSESSMENT — PAIN SCALES - GENERAL: PAINLEVEL_OUTOF10: 8
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
## 2022-10-26 ENCOUNTER — OFFICE VISIT (OUTPATIENT)
Dept: FAMILY MEDICINE CLINIC | Age: 47
End: 2022-10-26
Payer: MEDICARE

## 2022-10-26 VITALS
HEIGHT: 62 IN | BODY MASS INDEX: 49.39 KG/M2 | TEMPERATURE: 97.5 F | DIASTOLIC BLOOD PRESSURE: 76 MMHG | WEIGHT: 268.4 LBS | SYSTOLIC BLOOD PRESSURE: 128 MMHG

## 2022-10-26 DIAGNOSIS — R59.0 AXILLARY LYMPHADENOPATHY: ICD-10-CM

## 2022-10-26 DIAGNOSIS — R59.0 CERVICAL LYMPHADENOPATHY: ICD-10-CM

## 2022-10-26 DIAGNOSIS — Z80.7 FAMILY HISTORY OF LYMPHOMA: ICD-10-CM

## 2022-10-26 DIAGNOSIS — I10 ESSENTIAL HYPERTENSION: ICD-10-CM

## 2022-10-26 DIAGNOSIS — Z99.89 OSA ON CPAP: ICD-10-CM

## 2022-10-26 DIAGNOSIS — G47.33 OSA ON CPAP: ICD-10-CM

## 2022-10-26 DIAGNOSIS — D72.829 LEUKOCYTOSIS, UNSPECIFIED TYPE: Primary | ICD-10-CM

## 2022-10-26 PROBLEM — N12 PYELONEPHRITIS: Status: RESOLVED | Noted: 2017-12-27 | Resolved: 2022-10-26

## 2022-10-26 PROCEDURE — 4004F PT TOBACCO SCREEN RCVD TLK: CPT | Performed by: FAMILY MEDICINE

## 2022-10-26 PROCEDURE — 3078F DIAST BP <80 MM HG: CPT | Performed by: FAMILY MEDICINE

## 2022-10-26 PROCEDURE — G8484 FLU IMMUNIZE NO ADMIN: HCPCS | Performed by: FAMILY MEDICINE

## 2022-10-26 PROCEDURE — 3074F SYST BP LT 130 MM HG: CPT | Performed by: FAMILY MEDICINE

## 2022-10-26 PROCEDURE — G8427 DOCREV CUR MEDS BY ELIG CLIN: HCPCS | Performed by: FAMILY MEDICINE

## 2022-10-26 PROCEDURE — 99214 OFFICE O/P EST MOD 30 MIN: CPT | Performed by: FAMILY MEDICINE

## 2022-10-26 PROCEDURE — G8417 CALC BMI ABV UP PARAM F/U: HCPCS | Performed by: FAMILY MEDICINE

## 2022-10-26 RX ORDER — POLYETHYLENE GLYCOL 3350 17 G/17G
POWDER, FOR SOLUTION ORAL
COMMUNITY
Start: 2022-08-17 | End: 2022-10-26

## 2022-10-26 RX ORDER — IPRATROPIUM BROMIDE 21 UG/1
SPRAY, METERED NASAL
COMMUNITY
Start: 2022-09-13

## 2022-10-26 RX ORDER — BISACODYL 5 MG
TABLET, DELAYED RELEASE (ENTERIC COATED) ORAL
COMMUNITY
Start: 2022-08-16 | End: 2022-10-26 | Stop reason: ALTCHOICE

## 2022-10-26 RX ORDER — FAMOTIDINE 40 MG/1
TABLET, FILM COATED ORAL
COMMUNITY
Start: 2022-09-14

## 2022-10-26 SDOH — ECONOMIC STABILITY: FOOD INSECURITY: WITHIN THE PAST 12 MONTHS, THE FOOD YOU BOUGHT JUST DIDN'T LAST AND YOU DIDN'T HAVE MONEY TO GET MORE.: NEVER TRUE

## 2022-10-26 SDOH — ECONOMIC STABILITY: FOOD INSECURITY: WITHIN THE PAST 12 MONTHS, YOU WORRIED THAT YOUR FOOD WOULD RUN OUT BEFORE YOU GOT MONEY TO BUY MORE.: NEVER TRUE

## 2022-10-26 ASSESSMENT — ENCOUNTER SYMPTOMS
RECTAL PAIN: 0
CONSTIPATION: 0
CHEST TIGHTNESS: 0
STRIDOR: 0
BLOOD IN STOOL: 0
COUGH: 0
COLOR CHANGE: 0
SORE THROAT: 0
ABDOMINAL DISTENTION: 0
NAUSEA: 0
RHINORRHEA: 0
VOMITING: 0
DIARRHEA: 0
EYE REDNESS: 0
WHEEZING: 0
BACK PAIN: 0
SINUS PRESSURE: 0
ABDOMINAL PAIN: 0
SHORTNESS OF BREATH: 0
TROUBLE SWALLOWING: 0

## 2022-10-26 ASSESSMENT — SOCIAL DETERMINANTS OF HEALTH (SDOH): HOW HARD IS IT FOR YOU TO PAY FOR THE VERY BASICS LIKE FOOD, HOUSING, MEDICAL CARE, AND HEATING?: NOT HARD AT ALL

## 2022-10-26 NOTE — PROGRESS NOTES
Chief Complaint   Patient presents with    Neck Pain     THE LAST MONTH NOTICED 2 PAINFUL LUMPS         Courtney Douse  here today for follow up on chronic medical problems, go over labs and/or diagnostic studies, and medication refills. Neck Pain (THE LAST MONTH NOTICED 2 PAINFUL LUMPS)      HPI: Patient is scheduled for sick call complaining of lump on her neck. Patient reports she has noticed 2 small lumps on the right side of the neck about a month ago which are increasing in size and they are painful. Patient denies any upper respiratory symptoms or any kind of illness before that. Patient does have a history of x-ray lymph nodes also on the left side which are found during mammogram in 2019. She also had ultrasound done which showed benign findings. Patient reports she is concerned because she has strong family history of lymphomas in her mother and grandmother and grandfather. Patient reports her mom was diagnosed at the age of 61. Patient denies any history of fever chills night sweats, denies any weight loss decreased appetite. Patient had blood work ordered by PCP in June she never got that done all labs reprinted. On previous blood work she has chronic history of leukocytosis which has not been evaluated. Patient does have history of smoking, which could be cause also. Chronic obstructive sleep apnea and is using CPAP. Patient is not up-to-date on health screening has mammogram ordered she never got that done. Hypertension controlled, patient is on metoprolol and Maxide. She also takes ACE inhibitor's. History of hyperlipidemia, on is on statins no recent blood work able to tolerate.       The 10-year CVD risk score (D'Agostino, et al., 2008) is: 10.6%    Values used to calculate the score:      Age: 56 years      Sex: Female      Diabetic: No      Tobacco smoker: Yes      Systolic Blood Pressure: 162 mmHg      Is BP treated: Yes      HDL Cholesterol: 35 mg/dL      Total Cholesterol: 192 mg/dL      /76   Temp 97.5 °F (36.4 °C)   Ht 5' 2\" (1.575 m)   Wt 268 lb 6.4 oz (121.7 kg)   BMI 49.09 kg/m²    Body mass index is 49.09 kg/m². Wt Readings from Last 3 Encounters:   10/26/22 268 lb 6.4 oz (121.7 kg)   10/24/22 267 lb (121.1 kg)   08/15/22 267 lb (121.1 kg)        []Negative depression screening. PHQ Scores 6/22/2022 9/21/2021 5/13/2021 2/7/2019 10/18/2018 5/21/2018 4/10/2017   PHQ2 Score 6 0 5 6 6 4 4   PHQ9 Score 18 0 11 22 25 19 25      []1-4 = Minimal depression   []5-9 = Milddepression   []10-14 = Moderate depression   [x]15-19 = Moderately severe depression   []20-27 = Severe depression    Discussed testing with the patient and all questions fully answered.     Hospital Outpatient Visit on 10/05/2021   Component Date Value Ref Range Status    Status 10/05/2021 Interpreted   Final         Most recent labs reviewed:     Lab Results   Component Value Date    WBC 16.7 (H) 07/26/2021    HGB 12.3 07/26/2021    HCT 36.7 07/26/2021    MCV 97.6 07/26/2021     07/26/2021       @BRIEFLAB(NA,K,CL,CO2,BUN,CREATININE,GLUCOSE,CALCIUM)@     Lab Results   Component Value Date    ALT 14 07/26/2021    AST 13 07/26/2021    ALKPHOS 77 07/26/2021    BILITOT 0.19 (L) 07/26/2021       Lab Results   Component Value Date    TSH 0.18 (L) 07/25/2021       Lab Results   Component Value Date    CHOL 192 07/26/2021    CHOL 159 08/12/2019    CHOL 202 (H) 04/10/2017     Lab Results   Component Value Date    TRIG 125 07/26/2021    TRIG 183 (H) 08/12/2019    TRIG 85 04/10/2017     Lab Results   Component Value Date    HDL 35 (L) 07/26/2021    HDL 48 05/19/2021    HDL 31 (L) 08/12/2019     Lab Results   Component Value Date    LDLCHOLESTEROL 132 (H) 07/26/2021    LDLCHOLESTEROL 142 (H) 05/19/2021    LDLCHOLESTEROL 91 08/12/2019     Lab Results   Component Value Date    VLDL NOT REPORTED 07/26/2021    VLDL NOT REPORTED 05/19/2021    VLDL NOT REPORTED (H) 08/12/2019     Lab Results   Component Value Date    CHOLHDLRATIO 5.5 (H) 07/26/2021    CHOLHDLRATIO 4.3 05/19/2021    CHOLHDLRATIO 5.1 (H) 08/12/2019       Lab Results   Component Value Date    LABA1C 5.7 09/21/2021       Lab Results   Component Value Date    EXUQYYQY18 970 10/01/2019       No results found for: FOLATE    No results found for: IRON, TIBC, FERRITIN    Lab Results   Component Value Date    VITD25 47.3 05/19/2021             Current Outpatient Medications   Medication Sig Dispense Refill    Calcium Carb-Cholecalciferol (OYSTER SHELL CALCIUM W/D) 500-200 MG-UNIT TABS tablet take 1 tablet by mouth once daily      famotidine (PEPCID) 40 MG tablet take 1 tablet by mouth at bedtime      ipratropium (ATROVENT) 0.03 % nasal spray instill 1-2 sprays into each nostril three times a day      tiZANidine (ZANAFLEX) 4 MG tablet take 1 tablet by mouth every 4 to 6 hours 180 tablet 1    fluticasone (FLONASE) 50 MCG/ACT nasal spray instill 1 spray into each nostril once daily 16 g 2    RA ZINC 50 MG tablet take 1 tablet by mouth once daily 90 tablet 1    triamterene-hydroCHLOROthiazide (MAXZIDE) 75-50 MG per tablet take 1 tablet by mouth once daily 90 tablet 2    potassium chloride (KLOR-CON M) 10 MEQ extended release tablet take 1 tablet by mouth twice a day 180 tablet 2    Multiple Vitamins-Minerals (MULTIVITAMIN-MINERALS) TABS tablet take 1 tablet by mouth once daily 90 tablet 1    omeprazole (PRILOSEC) 40 MG delayed release capsule take 1 capsule by mouth 30 MINUTES BEFORE MORNING MEAL      metoprolol succinate (TOPROL XL) 50 MG extended release tablet Take 1 tablet by mouth daily 90 tablet 2    pravastatin (PRAVACHOL) 20 MG tablet Take 1 tablet by mouth daily 90 tablet 2    famotidine (PEPCID) 20 MG tablet take 1 tablet by mouth nightly if needed 90 tablet 2    olmesartan (BENICAR) 40 MG tablet take 1 tablet by mouth once daily 90 tablet 2    meloxicam (MOBIC) 15 MG tablet take 1 tablet by mouth once daily 90 tablet 2    loratadine (CLARITIN) 10 MG tablet Take 1 tablet by mouth daily 90 tablet 2    montelukast (SINGULAIR) 10 MG tablet take 1 tablet by mouth every evening 90 tablet 2    ondansetron (ZOFRAN) 4 MG tablet take 1 tablet by mouth three times a day if needed for nausea and vomiting 30 tablet 0    zinc 50 MG CAPS Take 50 mg by mouth daily 90 capsule 2    Blood Pressure Monitor KIT Use as directed. 1 kit 1    acetaminophen (TYLENOL) 500 MG tablet Take 1,000 mg by mouth every 6 hours as needed for Pain Take 2 tablets (=1000mg) every 6 hours as needed for pain      albuterol sulfate  (90 Base) MCG/ACT inhaler Inhale 2 puffs into the lungs every 6 hours as needed for Wheezing or Shortness of Breath 18 g 3    Calcium Carbonate-Vitamin D (OYSTER SHELL CALCIUM/D) 500-200 MG-UNIT TABS Take 1 tablet by mouth daily 90 tablet 5     No current facility-administered medications for this visit.              Social History     Socioeconomic History    Marital status:      Spouse name: Not on file    Number of children: Not on file    Years of education: Not on file    Highest education level: Not on file   Occupational History    Not on file   Tobacco Use    Smoking status: Every Day     Packs/day: 0.50     Types: Cigarettes    Smokeless tobacco: Never   Vaping Use    Vaping Use: Never used   Substance and Sexual Activity    Alcohol use: Not Currently     Alcohol/week: 0.0 standard drinks    Drug use: Yes     Types: Marijuana Dietra Due)    Sexual activity: Yes   Other Topics Concern    Not on file   Social History Narrative    Not on file     Social Determinants of Health     Financial Resource Strain: Low Risk     Difficulty of Paying Living Expenses: Not hard at all   Food Insecurity: No Food Insecurity    Worried About Running Out of Food in the Last Year: Never true    Ran Out of Food in the Last Year: Never true   Transportation Needs: Not on file   Physical Activity: Not on file   Stress: Not on file   Social Connections: Not on file   Intimate Partner Violence: Not on file   Housing Stability: Not on file     Ready to quit: Not Answered  Counseling given: Not Answered        Family History   Problem Relation Age of Onset    Eczema Mother     COPD Mother     Liver Disease Mother     Heart Disease Mother     Heart Disease Maternal Grandmother     Atrial Fibrillation Maternal Grandfather     Heart Attack Paternal Grandfather              -rest of complaints with corresponding details per ROS    The patient's past medical, surgical, social, and family history as well as her current medications and allergies were reviewed as documented intoday's encounter. Review of Systems   Constitutional:  Positive for activity change. Negative for appetite change, fatigue, fever and unexpected weight change. HENT:  Negative for congestion, ear pain, postnasal drip, rhinorrhea, sinus pressure, sore throat and trouble swallowing. Eyes:  Negative for redness and visual disturbance. Respiratory:  Negative for cough, chest tightness, shortness of breath, wheezing and stridor. Cardiovascular:  Negative for chest pain, palpitations and leg swelling. Gastrointestinal:  Negative for abdominal distention, abdominal pain, blood in stool, constipation, diarrhea, nausea, rectal pain and vomiting. Endocrine: Negative for polydipsia, polyphagia and polyuria. Genitourinary:  Negative for difficulty urinating, flank pain, frequency and urgency. Musculoskeletal:  Positive for arthralgias. Negative for back pain, gait problem, myalgias and neck pain. Skin:  Negative for color change, rash and wound. Allergic/Immunologic: Negative for food allergies and immunocompromised state. Neurological:  Negative for dizziness, speech difficulty, weakness, light-headedness, numbness and headaches. Hematological:  Positive for adenopathy. Psychiatric/Behavioral:  Positive for decreased concentration.  Negative for agitation, behavioral problems, dysphoric mood, hallucinations, sleep disturbance and suicidal ideas. The patient is nervous/anxious. Physical Exam  Vitals and nursing note reviewed. Constitutional:       General: She is not in acute distress. Appearance: Normal appearance. She is well-developed. She is obese. She is not diaphoretic. HENT:      Head: Normocephalic and atraumatic. Nose: Nose normal.   Eyes:      General:         Right eye: No discharge. Left eye: No discharge. Extraocular Movements: Extraocular movements intact. Conjunctiva/sclera: Conjunctivae normal.      Pupils: Pupils are equal, round, and reactive to light. Neck:      Thyroid: No thyromegaly. Comments: Patient has 2 cervical lymph nodes, about peanut size tender on palpation easily movable firm in consistency. I could not palpate any axillary lymph nodes. Cardiovascular:      Rate and Rhythm: Normal rate and regular rhythm. Heart sounds: Normal heart sounds. No murmur heard. Pulmonary:      Effort: Pulmonary effort is normal. No respiratory distress. Breath sounds: Normal breath sounds. No wheezing or rhonchi. Abdominal:      General: Bowel sounds are normal. There is no distension. Palpations: Abdomen is soft. There is no mass. Tenderness: There is no abdominal tenderness. There is no right CVA tenderness, left CVA tenderness, guarding or rebound. Musculoskeletal:         General: No tenderness. Cervical back: Normal range of motion and neck supple. Spasms present. No rigidity. Thoracic back: No spasms. Normal range of motion. Lumbar back: Spasms present. Decreased range of motion. Right lower leg: No edema. Left lower leg: No edema. Lymphadenopathy:      Cervical: Cervical adenopathy present. Skin:     Coloration: Skin is not jaundiced or pale. Findings: No bruising, erythema or rash. Neurological:      General: No focal deficit present.       Mental Status: She is alert and oriented to person, place, and time. Cranial Nerves: No cranial nerve deficit. Sensory: No sensory deficit. Motor: No weakness or tremor. Coordination: Coordination normal.      Gait: Gait and tandem walk normal.      Deep Tendon Reflexes: Reflexes are normal and symmetric. Psychiatric:         Attention and Perception: Attention and perception normal. She is attentive. Mood and Affect: Mood is anxious. Mood is not depressed. Affect is not tearful. Speech: She is communicative. Speech is not rapid and pressured, delayed or slurred. Behavior: Behavior normal. Behavior is not agitated or slowed. Thought Content: Thought content normal. Thought content is not paranoid. Judgment: Judgment normal.           ASSESSMENT AND PLAN      1. Leukocytosis, unspecified type  Due to strong family history will do ultrasound to to look for echotexture of lymph node. Order blood work to evaluate for leukocytosis. - US HEAD NECK SOFT TISSUE THYROID; Future  - Hemoglobinopathy Evaluation; Future  - Lactate Dehydrogenase; Future  - Path Review, Smear; Future    2. Cervical lymphadenopathy  Ultrasound cervical lymph node close follow-up  - US HEAD NECK SOFT TISSUE THYROID; Future    3. Family history of lymphoma  Ultrasound ordered, encourage patient to do blood work  - US HEAD NECK SOFT TISSUE THYROID; Future    4. Axillary lymphadenopathy  Benign on ultrasound of breast, encourage patient to do mammogram  - US HEAD NECK SOFT TISSUE THYROID; Future    5. SHANKAR on CPAP  Stable continue using CPAP    6. Essential hypertension  Controlled continue antihypertensives monitor blood pressure at home. Orders Placed This Encounter   Procedures    US HEAD NECK SOFT TISSUE THYROID     This procedure can be scheduled via [facility]. Access your [facility] account by visiting [email or web address].      Standing Status:   Future     Standing Expiration Date:   10/26/2023     Order Specific Question:   Reason for exam:     Answer:   right cervical lymphadenopthy    Hemoglobinopathy Evaluation     Standing Status:   Future     Standing Expiration Date:   10/26/2023    Lactate Dehydrogenase     Standing Status:   Future     Standing Expiration Date:   10/26/2023    Path Review, Smear     Standing Status:   Future     Standing Expiration Date:   10/26/2023     Order Specific Question:   Reason for Review: Answer: White cell abnormality         Medications Discontinued During This Encounter   Medication Reason    loperamide (IMODIUM) 2 MG capsule LIST CLEANUP    BISACODYL 5 MG EC tablet Therapy completed    benzonatate (TESSALON) 100 MG capsule Therapy completed    polyethylene glycol (GLYCOLAX) 17 GM/SCOOP powder        Syl received counseling on the following healthy behaviors: nutrition, exercise, and medication adherence  Reviewed prior labs and health maintenance  Continue current medications, diet and exercise. Discussed use, benefit, and side effects of prescribed medications. Barriers to medication compliance addressed. Patient given educational materials - see patient instructions  Was a self-tracking handout given in paper form or via [employer]? Yes    Requested Prescriptions      No prescriptions requested or ordered in this encounter       All patient questions answered. Patient voiced understanding. Quality Measures    Body mass index is 49.09 kg/m². Elevated. Weight control planned discussed daily exercise regimen and Healthy diet and regular exercise. BP: 128/76 Blood pressure is Normal. Treatment plan consists of Weight Reduction, DASH Eating Plan, Dietary Sodium Restriction, Increased Physical Activity, and No treatment change needed.     Lab Results   Component Value Date    LDLCHOLESTEROL 132 (H) 07/26/2021    (goal LDL reduction with dx if diabetes is 50% LDL reduction)      PHQ Scores 6/22/2022 9/21/2021 5/13/2021 2/7/2019 10/18/2018 5/21/2018 4/10/2017   PHQ2 Score 6 0 5 6 6 4 4   PHQ9 Score 18 0 11 22 25 19 25     Interpretation of Total Score Depression Severity: 1-4 = Minimal depression, 5-9 = Mild depression, 10-14 = Moderate depression, 15-19 = Moderately severe depression, 20-27 = Severe depression    The patient'spast medical, surgical, social, and family history as well as her   current medications and allergies were reviewed as documented in today's encounter. Medications, labs, diagnostic studies, consultations andfollow-up as documented in this encounter. Return in about 4 weeks (around 11/23/2022) for 30min,always chronic conditons, lab work. Patient wasseen with total face to face time of 35 minutes. More than 50% of this visit was counseling and education. Future Appointments   Date Time Provider Nena Garrison   11/21/2022 11:40 AM Iraj Gar, [address]   11/22/2022 10:30 AM HAYDEE Rogers CNP fp [id number]   12/6/2022  1:00 PM HAYDEE Alvarez CNP [id number]     This note was completed by using the assistance of a speech-recognition program. However, inadvertent computerized transcription errors may be present. Althoughevery effort was made to ensure accuracy, no guarantees can be provided that every mistake has been identified and corrected by editing.   Electronically signed by Merlinda Common, MD on 10/26/2022  4:34 PM

## 2022-10-26 NOTE — PROGRESS NOTES
Visit Information    Have you changed or started any medications since your last visit including any over-the-counter medicines, vitamins, or herbal medicines? no   Have you stopped taking any of your medications? Is so, why? -  no  Are you having any side effects from any of your medications? - no    Have you seen any other physician or provider since your last visit? yes  Have you had any other diagnostic tests since your last visit? Have you been seen in the emergency room and/or had an admission in a hospital since we last saw you?  no   Have you had your routine dental cleaning in the past 6 months?  no     Do you have an active MyChart account? If no, what is the barrier?   Yes    Patient Care Team:  HAYDEE Funes CNP as PCP - General (Family Medicine)  HAYDE[facility] C[facility] as [facility]CP [facility]  Trisha Huynh MD as Obstetrician (Obstetrics & Gynecology)  Katty Payton MD as Consulting Physician (Neurosurgery)  HAYDEE Contreras CNP as Nurse Practitioner (Obstetrics & Gynecology)    Medical History Review  Past Medical, Family, and Social History reviewed and does contribute to the patient presenting condition    Health Maintenance   Topic Date Due    COVID-19 Vaccine (1) Never done    Hepatitis C screen  Never done    Colorectal Cancer Screen  Never done    Lipids  07/26/2022    Flu vaccine (1) 08/01/2022    A1C test (Diabetic or Prediabetic)  09/21/2022    Depression Monitoring  06/22/2023    DTaP/Tdap/Td vaccine (2 - Td or Tdap) 04/10/2025    Pneumococcal 0-64 years Vaccine  Completed    HIV screen  Completed    Hepatitis A vaccine  Aged Out    Hib vaccine  Aged Out    Meningococcal (ACWY) vaccine  Aged Out

## 2022-10-26 NOTE — PATIENT INSTRUCTIONS
New Updates for [facility]/ [employer] [facility] [employer] ([facility]) PHIL    Thank you for choosing US to give you the best care! [employer] ([facility]) is always trying to think of new ways to help their patients. We are asking all patients to try out the new digital registration that is now available through your [facility] account or the new PHIL, [employer] ([facility]). Via the phil you're now able to update your personal and registration information prior to your upcoming appointment. This will save you time once you arrive at the office to check-in, not to mention your information remains safe!! Many other perks come from signing up for an account, such as:  Requesting refills  Scheduling an appointment  Completing an E-Visit  Sending a message to the office/provider  Having access to your medication list  Paying your bill/copay prior to your appointment  Scheduling your yearly mammogram  Review your test results    If you are not familiar with [facility] or the [employer] [facility] [employer] ([facility]) PHIL, please ask one of us and we will be happy to answer any questions or help you set-up your account.       Your [facility] office,  [place]

## 2022-10-31 ENCOUNTER — HOSPITAL ENCOUNTER (OUTPATIENT)
Dept: ULTRASOUND IMAGING | Age: 47
Discharge: HOME | End: 2022-11-02
Payer: MEDICARE

## 2022-10-31 DIAGNOSIS — R59.0 AXILLARY LYMPHADENOPATHY: ICD-10-CM

## 2022-10-31 DIAGNOSIS — Z80.7 FAMILY HISTORY OF LYMPHOMA: ICD-10-CM

## 2022-10-31 DIAGNOSIS — R59.0 CERVICAL LYMPHADENOPATHY: ICD-10-CM

## 2022-10-31 DIAGNOSIS — D72.829 LEUKOCYTOSIS, UNSPECIFIED TYPE: ICD-10-CM

## 2022-10-31 PROCEDURE — 76536 US EXAM OF HEAD AND NECK: CPT

## 2022-11-21 ENCOUNTER — HOSPITAL ENCOUNTER (OUTPATIENT)
Dept: GENERAL RADIOLOGY | Age: 47
Discharge: HOME | End: 2022-11-23
Payer: MEDICARE

## 2022-11-21 ENCOUNTER — HOSPITAL ENCOUNTER (OUTPATIENT)
Dept: PAIN MANAGEMENT | Age: 47
Discharge: HOME | End: 2022-11-21
Payer: MEDICARE

## 2022-11-21 VITALS
OXYGEN SATURATION: 98 % | HEART RATE: 69 BPM | WEIGHT: 268 LBS | DIASTOLIC BLOOD PRESSURE: 94 MMHG | TEMPERATURE: 97 F | RESPIRATION RATE: 15 BRPM | SYSTOLIC BLOOD PRESSURE: 139 MMHG | HEIGHT: 62 IN | BODY MASS INDEX: 49.32 KG/M2

## 2022-11-21 DIAGNOSIS — R52 PAIN MANAGEMENT: ICD-10-CM

## 2022-11-21 DIAGNOSIS — M96.1 FAILED BACK SYNDROME, LUMBAR: ICD-10-CM

## 2022-11-21 PROCEDURE — 2500000003 HC RX 250 WO HCPCS: Performed by: STUDENT IN AN ORGANIZED HEALTH CARE EDUCATION/TRAINING PROGRAM

## 2022-11-21 PROCEDURE — 6360000002 HC RX W HCPCS: Performed by: STUDENT IN AN ORGANIZED HEALTH CARE EDUCATION/TRAINING PROGRAM

## 2022-11-21 PROCEDURE — 6360000004 HC RX CONTRAST MEDICATION: Performed by: STUDENT IN AN ORGANIZED HEALTH CARE EDUCATION/TRAINING PROGRAM

## 2022-11-21 PROCEDURE — 62323 NJX INTERLAMINAR LMBR/SAC: CPT

## 2022-11-21 PROCEDURE — 62323 NJX INTERLAMINAR LMBR/SAC: CPT | Performed by: STUDENT IN AN ORGANIZED HEALTH CARE EDUCATION/TRAINING PROGRAM

## 2022-11-21 PROCEDURE — 2580000003 HC RX 258: Performed by: STUDENT IN AN ORGANIZED HEALTH CARE EDUCATION/TRAINING PROGRAM

## 2022-11-21 PROCEDURE — 3209999900 FLUORO FOR SURGICAL PROCEDURES

## 2022-11-21 RX ORDER — LIDOCAINE HYDROCHLORIDE 10 MG/ML
INJECTION, SOLUTION EPIDURAL; INFILTRATION; INTRACAUDAL; PERINEURAL
Status: COMPLETED | OUTPATIENT
Start: 2022-11-21 | End: 2022-11-21

## 2022-11-21 RX ORDER — TIZANIDINE 4 MG/1
TABLET ORAL
Qty: 180 TABLET | Refills: 1 | Status: SHIPPED | OUTPATIENT
Start: 2022-11-21

## 2022-11-21 RX ORDER — TRIAMCINOLONE ACETONIDE 40 MG/ML
INJECTION, SUSPENSION INTRA-ARTICULAR; INTRAMUSCULAR
Status: COMPLETED | OUTPATIENT
Start: 2022-11-21 | End: 2022-11-21

## 2022-11-21 RX ORDER — SODIUM CHLORIDE 0.9 % (FLUSH) 0.9 %
SYRINGE (ML) INJECTION
Status: COMPLETED | OUTPATIENT
Start: 2022-11-21 | End: 2022-11-21

## 2022-11-21 RX ADMIN — LIDOCAINE HYDROCHLORIDE 5 ML: 10 INJECTION, SOLUTION EPIDURAL; INFILTRATION; INTRACAUDAL; PERINEURAL at 10:56

## 2022-11-21 RX ADMIN — TRIAMCINOLONE ACETONIDE 80 MG: 40 INJECTION, SUSPENSION INTRA-ARTICULAR; INTRAMUSCULAR at 10:58

## 2022-11-21 RX ADMIN — Medication 4 ML: at 10:58

## 2022-11-21 RX ADMIN — LIDOCAINE HYDROCHLORIDE 3 ML: 10 INJECTION, SOLUTION EPIDURAL; INFILTRATION; INTRACAUDAL; PERINEURAL at 10:58

## 2022-11-21 RX ADMIN — IOPAMIDOL 1 ML: 408 INJECTION, SOLUTION INTRATHECAL at 10:57

## 2022-11-21 ASSESSMENT — PAIN - FUNCTIONAL ASSESSMENT
PAIN_FUNCTIONAL_ASSESSMENT: 0-10
PAIN_FUNCTIONAL_ASSESSMENT: 0-10
PAIN_FUNCTIONAL_ASSESSMENT: PREVENTS OR INTERFERES WITH ALL ACTIVE AND SOME PASSIVE ACTIVITIES

## 2022-11-21 ASSESSMENT — PAIN DESCRIPTION - DESCRIPTORS: DESCRIPTORS: SHOOTING;ACHING

## 2022-11-21 NOTE — OP NOTE
PROCEDURE PERFORMED: Caudal epidural steroid injection    PREOPERATIVE DIAGNOSIS: Lumbosacral radiculopathy    INDICATIONS: Radicular leg pain    The patient's history and physical exam were reviewed. The risk, benefits, and alternatives of the procedure were discussed and all questions were answered to the patient's satisfaction. The patient agreed to proceed and written informed consent was obtained. POSTOPERATIVE DIAGNOSIS: Same    PHYSICIAN:  Dr. Matt Moore DO    ANESTHESIA:  LOCAL    ASSISTANT:  NONE    PATHOLOGY:  NONE    ESTIMATED BLOOD LOSS:  N/A    IMPLANTS:  NONE    PROCEDURE DESCRIPTION: Caudal epidural injection using fluoroscopy    The patient was placed on the operative bed in prone position. The area was prepped with  Chlorhexidine. The area was then draped in a sterile fashion. A lateral fluoroscopic view was obtained to observe for the entry point of the sacral hiatus. The sacral hiatus was identified and marked. The skin and subcutaneous tissues were anesthetized with 1% lidocaine. A 22-gauge 3-1/2 inch Quincke spinal needle was then advanced through the sacral hiatus. It was then advanced in the lateral fluoroscopic view until the appropriate level was reached. After negative aspiration was confirmed, Omnipaque was injected. Epidural spread was observed. The epidural spread was also confirmed in the AP view. Then after negative aspiration, the injectate was easily injected. The injectate solution consisted of 80 mg triamcinolone and 5 mL preservative-free normal saline. The needle was removed and the patient's back was dressed appropriately. There was no neurological or hemodynamic sequelae after the procedure. The patient was transferred to the postoperative care unit in stable condition. Written discharge instructions were given to the patient. COMPLICATIONS:  There were no apparent complications. The patient tolerated the procedure well.

## 2022-11-21 NOTE — DISCHARGE INSTRUCTIONS
Discharge Instructions following Sedation or Anesthesia:  You have  received  a sedative/anesthetic therefore, you should not consume any alcoholic beverages for minimum of 12 hours. Do not drive or operate machinery for 24 hours. Do not sign legal documents for 24 hours. Dizziness, drowsiness, and unsteadiness may occur. Rest when need to. Increase diet as tolerated. Keep up on fluids if diet allows. General Instructions:  Do not take a tub bath for 72 hours after procedure (this includes hot tubs and swimming pools). You may shower, but avoid hot water to injection site. Avoid strenuous activity TODAY especially if you experience dizziness. Remove band-aid the next day. Wash off any residual iodine   Do not use heat, heating pad, or any other heating device over the injection site for 3 days after the procedure. If you experience pain after your procedure, you may continue with your current pain medication as prescribed. (DO NOT INCREASE YOUR PAIN MEDICATION WITHOUT TALKING TO DOCTOR)  Soreness and pain at injection site is common, may use ice to reduce soreness. What To Expect After A Steroid Injection  You should start to feel the effects of the steroid injection in about 48-72 hours  You may experience facial flushing, night sweats and irritability. Other common steroid related side effects are increased appetite, mood elevation, insomnia and fluid retention. These effects usually subside in a few days. Steroids used in epidural injections may cause muscle spasms for a few days. If you are diabetic, your blood sugar may be elevated after your procedure due to the steroids. You will need to monitor your blood sugar more closely while going through a series of injections (Check blood sugar at meals and bedtime for 5 days). You may require adjustment in your diabetic medications, contact your PCP office to discuss.     Call Farzad Boone at [phone] if you experience:   Fever, chills or temperature over 100    Vomiting, Headache, persistent stiff neck, nausea, blurred vision   Difficulty in urinating or unable to urinate with 8 hours   Increase in weakness, numbness or loss of function   Increased redness, swelling or drainage at the injection site

## 2022-11-21 NOTE — INTERVAL H&P NOTE
Update History & Physical    The patient's History and Physical of October 24, 2022 was reviewed with the patient and I examined the patient. There was no change. The surgical site was confirmed by the patient and me. Plan: The risks, benefits, expected outcome, and alternative to the recommended procedure have been discussed with the patient. Patient understands and wants to proceed with the procedure.      Electronically signed by Jeremiah Solis DO on 11/21/2022 at 10:31 AM

## 2022-11-25 DIAGNOSIS — I10 ESSENTIAL HYPERTENSION: ICD-10-CM

## 2022-11-25 RX ORDER — METOPROLOL SUCCINATE 50 MG/1
50 TABLET, EXTENDED RELEASE ORAL DAILY
Qty: 90 TABLET | Refills: 0 | Status: SHIPPED | OUTPATIENT
Start: 2022-11-25

## 2022-11-25 NOTE — TELEPHONE ENCOUNTER
Please Approve or Refuse.   Send to Pharmacy per Pt's Request:      Next Visit Date:  Visit date not found   Last Visit Date: 10/26/2022    Hemoglobin A1C (%)   Date Value   09/21/2021 5.7   10/01/2019 5.7   07/22/2016 4.9             ( goal A1C is < 7)   BP Readings from Last 3 Encounters:   11/21/22 (!) 139/94   10/26/22 128/76   10/24/22 (!) 156/93          (goal 120/80)  BUN   Date Value Ref Range Status   07/26/2021 7 6 - 20 mg/dL Final     Creatinine   Date Value Ref Range Status   07/26/2021 0.41 (L) 0.50 - 0.90 mg/dL Final     Potassium   Date Value Ref Range Status   07/26/2021 3.7 3.7 - 5.3 mmol/L Final

## 2022-11-30 RX ORDER — FLUTICASONE PROPIONATE 50 MCG
1 SPRAY, SUSPENSION (ML) NASAL 2 TIMES DAILY
Qty: 1 EACH | Refills: 1 | Status: SHIPPED | OUTPATIENT
Start: 2022-11-30 | End: 2022-12-30

## 2022-11-30 RX ORDER — CETIRIZINE HYDROCHLORIDE 10 MG/1
10 TABLET ORAL DAILY
Qty: 30 TABLET | Refills: 1 | Status: SHIPPED | OUTPATIENT
Start: 2022-11-30 | End: 2022-12-30

## 2022-11-30 RX ORDER — METHYLPREDNISOLONE 4 MG/1
TABLET ORAL
Qty: 1 KIT | Refills: 0 | Status: SHIPPED | OUTPATIENT
Start: 2022-11-30

## 2022-11-30 ASSESSMENT — LIFESTYLE VARIABLES
SMOKING_STATUS: YES
SMOKING_YEARS: 20

## 2022-11-30 NOTE — PROGRESS NOTES
[address] BILLING OFFICE  [facility] EVISITS      E-VISIT PROGRESS NOTE    HPI  Sury Lees is a 56 y.o. female patient  has requested an audio/video evaluation for the following concern(s): See below    PATIENT MESSAGE  Patient Questionnaire Submission  --------------------------------     Questionnaire:  EVISIT SINUS/COUGH/COLD QUESTIONNAIRE 1     Question: Have you been experiencing nasal congestion? Answer:   No     Question: When you blow your nose, what color is the mucus? Answer:   Mostly thin and yellow or green     Question: Have you had pain or pressure around your nose and face or do your upper teeth hurt? Answer:   Yes     Question: Has your throat been sore? Answer:   No     Question: Have you noticed any swollen glands in your neck? Answer:   No     Question: Have you been sneezing? Answer:   Yes     Question: Have you been coughing? Answer:   No     Questionnaire:  EVISIT SINUS/COUGH/COLD QUESTIONNAIRE 3     Question: Have you been hoarse or lost your voice? Answer:   No     Question: Have your ears been bothering you? Answer:   No     Question: If yes, please describe. Answer:   Popping     Question: Have your eyes been bothering you? Answer:   Yes     Question: If yes, please describe. Answer:   Eye boogers and itching watering     Question: Have you been experiencing significant body aches? Answer:   No     Question: Have you had severe headaches of stiff neck? Answer:   No     Question: Have you been experiencing consistent nausea, vomitting, or dizziness? Answer:   No     Question: Have you been experiencing swelling of your mouth or tongue, or difficulty swallowing? Answer:   No     Question: Have you been experiencing chest pain or shortness of breath? Answer:   No     Question: Have you developed a new rash? Answer:   No     Question: How long have you been having these symptoms? Answer:   A few years     Question: Have you been having fevers?   Answer:   No, I have not had any fevers     Questionnaire:  EVISIT SINUS/COUGH/COLD QUESTIONNAIRE 5     Question: Do you currently smoke? Answer:   Yes     Questionnaire:  EVISIT SINUS/COUGH/COLD QUESTIONNAIRE 6     Question: How many packs per day? Answer:   1     Question: How many years have you smoked? Answer:   20     Questionnaire:  EVISIT SINUS/COUGH/COLD QUESTIONNAIRE 8     Question: Do you have any chronic illnesses, such as diabetes, heart disease, asthma, emphysema, HIV, cancer, or kidney failure, or have you recently taken any medications that can weaken your ability to fight infection, such as prednisone  Answer:   No     Question: Please enter the details of your other illness(es) or medications that can weaken your ability to fight infection  Answer:        Question: Have you been recently hospitalized? Answer:   No     Question: Have you been exposed to people with similar symptoms? Answer:   No     Question: Have you traveled within the past month? Answer:   No     Question: If yes, when and where? Answer:        Question: Are your symptoms worse when you are exposed to pollen, dust, mold, pets, or other things in your environment? Answer:   Yes     Question: Are your symptoms better, worse, or staying the same? Answer:   My symptoms are gradually improving     Question: Have you experienced similar symptoms in the past?  Answer:   Yes     Questionnaire:  EVISIT SINUS/COUGH/COLD QUESTIONNAIRE 9     Question: What treatments have worked in the past?  Answer:   Medication     Question: What treatments have not worked? Answer:   I've only used medicine     Question: Are you currently using any medications or other treatments for these symptoms? Answer:   Yes     Questionnaire:  EVISIT SINUS/COUGH/COLD QUESTIONNAIRE 10     Question: Please enter the names of any medications or other treatments that you have tried for your current symptoms.   Answer:   Flonase and [place] and singular Question: Are these treatments providing any relief? Answer: They have provided significant relief     Questionnaire: AMISH MONTAGUE SINUS/COUGH/COLD QUESTIONNAIRE 11     Question: Are you pregnant or trying to become pregnant? Answer:   No     Question: Are you breastfeeding? Answer:   No     Question: Do you have anything else to add? Answer: All i need is my medicine refilled  Review of Systems   Past Medical History:   Diagnosis Date    Anxiety 10/18/2018    Chronic midline low back pain without sciatica 8/29/2017    Depression     Endometriosis     had hyst for this    Essential hypertension 3/8/2016    Gastroesophageal reflux disease without esophagitis 3/8/2016    H/O menorrhagia     had hyst    History of stress incontinence     had surgery    Hydronephrosis of right kidney 12/27/2017    Obesity 4/10/2017    Osteophyte, vertebrae     thoracic    Seasonal allergies 3/8/2016      Allergies   Allergen Reactions    Fentanyl      Pt never wants this again as it caused her hair and teeth to fall out. Medication List            Accurate as of October 25, 2021 11:59 PM. If you have any questions, ask your nurse or doctor. START taking these medications      cetirizine 10 MG tablet  Commonly known as: ZYRTEC  Take 1 tablet by mouth daily  Started by: MD Lawrence     methylPREDNISolone 4 MG tablet  Commonly known as: MEDROL DOSEPACK  Take by mouth. Started by: Miya Perez MD     zinc 50 MG Caps  Take 50 mg by mouth daily  Started by: MD Lawrence            CHANGE how you take these medications      * fluticasone 50 MCG/ACT nasal spray  Commonly known as: FLONASE  1 spray by Each Nostril route daily  What changed: Another medication with the same name was added. Make sure you understand how and when to take each. Changed by: MD Lawrence     * fluticasone 50 MCG/ACT nasal spray  Commonly known as: FLONASE  1 spray by Each Nostril route in the morning and at bedtime  What changed:  You were already taking a medication with the same name, and this prescription was added. Make sure you understand how and when to take each. Changed by: [address] Sanford MD           * This list has 2 medication(s) that are the same as other medications prescribed for you. Read the directions carefully, and ask your doctor or other care provider to review them with you. CONTINUE taking these medications      acetaminophen 500 MG tablet  Commonly known as: TYLENOL     Blood Pressure Monitor Kit  Use as directed. diphenhydrAMINE 25 MG tablet  Commonly known as: BENADRYL  Take 1 tablet by mouth nightly     loratadine 10 MG tablet  Commonly known as: CLARITIN     potassium chloride 10 MEQ extended release tablet  Commonly known as: KLOR-CON M  take 1 tablet by mouth twice a day     tiZANidine 4 MG tablet  Commonly known as: ZANAFLEX  take 1 tablet by mouth every 8 hours if needed for back pain     triamterene-hydroCHLOROthiazide 75-50 MG per tablet  Commonly known as: MAXZIDE  Take 1 tablet by mouth daily               Where to Get Your Medications        You can get these medications from any pharmacy    Bring a paper prescription for each of these medications  cetirizine 10 MG tablet  fluticasone 50 MCG/ACT nasal spray  methylPREDNISolone 4 MG tablet  zinc 50 MG Caps          Based on the symptoms reported by the patient, it seems that patient has   CURRENT MEDS W/ ASSOC DIAG           Start Date End Date     acetaminophen (TYLENOL) 500 MG tablet  --  --     Associated Diagnoses:  --     albuterol sulfate  (90 Base) MCG/ACT inhaler ()  22     Inhale 2 puffs into the lungs every 6 hours as needed for Wheezing or Shortness of Breath     Associated Diagnoses:  Bronchitis     Blood Pressure Monitor KIT  21  --     Use as directed.      Associated Diagnoses:  Hypertensive emergency, Essential hypertension     Calcium Carb-Cholecalciferol (OYSTER SHELL CALCIUM W/D) 500-200 MG-UNIT TABS tablet  10/02/22  --     Associated Diagnoses:  --     Calcium Carbonate-Vitamin D (OYSTER SHELL CALCIUM/D) 500-200 MG-UNIT TABS ()  21     Take 1 tablet by mouth daily     Associated Diagnoses:  Vitamin D deficiency, Osteopenia of multiple sites     famotidine (PEPCID) 20 MG tablet  22  --     take 1 tablet by mouth nightly if needed     Associated Diagnoses:  Gastroesophageal reflux disease without esophagitis     famotidine (PEPCID) 40 MG tablet  22  --     Associated Diagnoses:  --     fluticasone (FLONASE) 50 MCG/ACT nasal spray  22  --     instill 1 spray into each nostril once daily     Associated Diagnoses:  Paranasal sinus disease     ipratropium (ATROVENT) 0.03 % nasal spray  22  --     Associated Diagnoses:  --     loratadine (CLARITIN) 10 MG tablet  22  --     Take 1 tablet by mouth daily     Associated Diagnoses:  Chronic rhinitis     meloxicam (MOBIC) 15 MG tablet  22  --     take 1 tablet by mouth once daily     Associated Diagnoses:  Chronic midline low back pain without sciatica     metoprolol succinate (TOPROL XL) 50 MG extended release tablet  22  --     Take 1 tablet by mouth daily     Associated Diagnoses:  Essential hypertension     montelukast (SINGULAIR) 10 MG tablet  22  --     take 1 tablet by mouth every evening     Associated Diagnoses:  Seasonal allergies     Multiple Vitamins-Minerals (MULTIVITAMIN-MINERALS) TABS tablet  22  --     take 1 tablet by mouth once daily     Associated Diagnoses:  Vitamin D deficiency, Osteopenia of multiple sites     olmesartan (BENICAR) 40 MG tablet  22  --     take 1 tablet by mouth once daily     Associated Diagnoses:  Essential hypertension     omeprazole (PRILOSEC) 40 MG delayed release capsule  04/15/22  --     Associated Diagnoses:  --     ondansetron (ZOFRAN) 4 MG tablet  22  --     take 1 tablet by mouth three times a day if needed for nausea and vomiting Associated Diagnoses:  Nausea     potassium chloride (KLOR-CON M) 10 MEQ extended release tablet  22  --     take 1 tablet by mouth twice a day     Associated Diagnoses:  Essential hypertension, Diuretic-induced hypokalemia     pravastatin (PRAVACHOL) 20 MG tablet  22  --     Take 1 tablet by mouth daily     Associated Diagnoses:  Dyslipidemia     RA ZINC 50 MG tablet  22  --     take 1 tablet by mouth once daily     Associated Diagnoses:  --     tiZANidine (ZANAFLEX) 4 MG tablet  22  --     take 1 tablet by mouth every 4 to 6 hours     Associated Diagnoses: Failed back syndrome, lumbar     triamterene-hydroCHLOROthiazide (MAXZIDE) 75-50 MG per tablet  22  --     take 1 tablet by mouth once daily     Associated Diagnoses:  Essential hypertension     zinc 50 MG CAPS  10/26/21  --     Take 50 mg by mouth daily     Associated Diagnoses:  Chronic rhinitis           Orders Placed This Encounter   Medications    zinc 50 MG CAPS     Sig: Take 50 mg by mouth daily     Dispense:  30 capsule     Refill:  1    fluticasone (FLONASE) 50 MCG/ACT nasal spray     Si spray by Each Nostril route in the morning and at bedtime     Dispense:  1 each     Refill:  1    cetirizine (ZYRTEC) 10 MG tablet     Sig: Take 1 tablet by mouth daily     Dispense:  30 tablet     Refill:  1    methylPREDNISolone (MEDROL DOSEPACK) 4 MG tablet     Sig: Take by mouth. Dispense:  1 kit     Refill:  0     No orders of the defined types were placed in this encounter. Diagnosis Orders   1. Chronic rhinitis  zinc 50 MG CAPS    fluticasone (FLONASE) 50 MCG/ACT nasal spray    cetirizine (ZYRTEC) 10 MG tablet    methylPREDNISolone (MEDROL DOSEPACK) 4 MG tablet      2. Tobacco use             Patient was advised to contact me if symptoms not improving or getting worse, or to call us for an appointment.     On this date 10/25/2021 I have spent 15 minutes reviewing previous notes, test results and face to face with the patient discussing the diagnosis and importance of compliance with the treatment plan as well as documenting on the day of the visit. This note was completed by using the assistance of a speech-recognition program. However, inadvertent computerized transcription errors may be present.  Although every effort was made to ensure accuracy, no guarantees can be provided that every mistake has been identified and corrected by editing   Electronically signed by HAYDEE Luna CNP on 11/30/22 at 2:51 PM EST

## 2022-12-06 ENCOUNTER — HOSPITAL ENCOUNTER (OUTPATIENT)
Dept: PAIN MANAGEMENT | Age: 47
Discharge: HOME | End: 2022-12-06
Payer: MEDICARE

## 2022-12-06 VITALS
TEMPERATURE: 97.3 F | SYSTOLIC BLOOD PRESSURE: 131 MMHG | HEIGHT: 63 IN | BODY MASS INDEX: 47.31 KG/M2 | HEART RATE: 61 BPM | DIASTOLIC BLOOD PRESSURE: 80 MMHG | WEIGHT: 267 LBS | OXYGEN SATURATION: 97 %

## 2022-12-06 DIAGNOSIS — M51.36 LUMBAR DEGENERATIVE DISC DISEASE: ICD-10-CM

## 2022-12-06 DIAGNOSIS — M54.16 LUMBAR RADICULOPATHY: Primary | ICD-10-CM

## 2022-12-06 PROCEDURE — 99213 OFFICE O/P EST LOW 20 MIN: CPT

## 2022-12-06 PROCEDURE — 99214 OFFICE O/P EST MOD 30 MIN: CPT | Performed by: NURSE PRACTITIONER

## 2022-12-06 ASSESSMENT — ENCOUNTER SYMPTOMS
SHORTNESS OF BREATH: 0
CONSTIPATION: 0
BACK PAIN: 1
COUGH: 0
BOWEL INCONTINENCE: 0

## 2022-12-06 ASSESSMENT — PAIN SCALES - GENERAL: PAINLEVEL_OUTOF10: 10
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
## 2022-12-07 DIAGNOSIS — J31.0 CHRONIC RHINITIS: ICD-10-CM

## 2022-12-07 RX ORDER — CETIRIZINE HYDROCHLORIDE 10 MG/1
10 TABLET ORAL DAILY
Qty: 30 TABLET | Refills: 1 | Status: SHIPPED | OUTPATIENT
Start: 2022-12-07 | End: 2022-12-08

## 2022-12-07 RX ORDER — FLUTICASONE PROPIONATE 50 MCG
1 SPRAY, SUSPENSION (ML) NASAL 2 TIMES DAILY
Qty: 1 EACH | Refills: 1 | Status: SHIPPED | OUTPATIENT
Start: 2022-12-07 | End: 2023-01-06

## 2022-12-08 ENCOUNTER — TELEPHONE (OUTPATIENT)
Dept: FAMILY MEDICINE CLINIC | Age: 47
End: 2022-12-08

## 2022-12-08 DIAGNOSIS — J30.2 SEASONAL ALLERGIES: ICD-10-CM

## 2022-12-08 DIAGNOSIS — M54.50 CHRONIC MIDLINE LOW BACK PAIN WITHOUT SCIATICA: ICD-10-CM

## 2022-12-08 DIAGNOSIS — J31.0 CHRONIC RHINITIS: ICD-10-CM

## 2022-12-08 DIAGNOSIS — G89.29 CHRONIC MIDLINE LOW BACK PAIN WITHOUT SCIATICA: ICD-10-CM

## 2022-12-08 RX ORDER — LORATADINE 10 MG/1
10 TABLET ORAL DAILY
Qty: 30 TABLET | Refills: 1 | Status: SHIPPED | OUTPATIENT
Start: 2022-12-08

## 2022-12-08 RX ORDER — MELOXICAM 15 MG/1
TABLET ORAL
Qty: 90 TABLET | Refills: 0 | Status: SHIPPED | OUTPATIENT
Start: 2022-12-08

## 2022-12-08 RX ORDER — MONTELUKAST SODIUM 10 MG/1
TABLET ORAL
Qty: 90 TABLET | Refills: 0 | Status: SHIPPED | OUTPATIENT
Start: 2022-12-08

## 2022-12-08 RX ORDER — METHYLPREDNISOLONE 4 MG/1
TABLET ORAL
Qty: 1 KIT | Refills: 0 | Status: SHIPPED | OUTPATIENT
Start: 2022-12-08

## 2022-12-08 NOTE — TELEPHONE ENCOUNTER
Patient had an evisit with you back on 11/30/22 and was never able to  medications as pharmacist states they never received anything. She states she has called several times within the past week and has not gotten any response. She is asking that you please call in the medrol dose douglas like you stated. She is also asking that you please change her back to Claritin. Please advise.

## 2022-12-08 NOTE — TELEPHONE ENCOUNTER
Called and spoke with patient and let her know that medication was sent to pharmacy.  I apologized to patient as she has waited over a week to get these medications after her initial E-Visit on 11/30/22

## 2022-12-11 DIAGNOSIS — I10 ESSENTIAL HYPERTENSION: ICD-10-CM

## 2022-12-12 RX ORDER — OLMESARTAN MEDOXOMIL 40 MG/1
TABLET ORAL
Qty: 90 TABLET | Refills: 2 | OUTPATIENT
Start: 2022-12-12

## 2022-12-12 RX ORDER — OLMESARTAN MEDOXOMIL 40 MG/1
TABLET ORAL
Qty: 30 TABLET | Refills: 0 | Status: SHIPPED | OUTPATIENT
Start: 2022-12-12 | End: 2023-01-13 | Stop reason: SDUPTHER

## 2022-12-12 NOTE — TELEPHONE ENCOUNTER
Please Approve or Refuse.   Send to Pharmacy per Pt's Request:      Next Visit Date:  4/14/2023   Last Visit Date: 10/26/2022    Hemoglobin A1C (%)   Date Value   09/21/2021 5.7   10/01/2019 5.7   07/22/2016 4.9             ( goal A1C is < 7)   BP Readings from Last 3 Encounters:   12/06/22 131/80   11/21/22 (!) 139/94   10/26/22 128/76          (goal 120/80)  BUN   Date Value Ref Range Status   07/26/2021 7 6 - 20 mg/dL Final     Creatinine   Date Value Ref Range Status   07/26/2021 0.41 (L) 0.50 - 0.90 mg/dL Final     Potassium   Date Value Ref Range Status   07/26/2021 3.7 3.7 - 5.3 mmol/L Final

## 2022-12-29 DIAGNOSIS — E55.9 VITAMIN D DEFICIENCY: ICD-10-CM

## 2022-12-29 DIAGNOSIS — M85.89 OSTEOPENIA OF MULTIPLE SITES: ICD-10-CM

## 2022-12-29 RX ORDER — MULTIPLE VITAMINS W/ MINERALS TAB 9MG-400MCG
TAB ORAL
Qty: 90 TABLET | Refills: 1 | Status: SHIPPED | OUTPATIENT
Start: 2022-12-29

## 2023-01-09 ENCOUNTER — TELEPHONE (OUTPATIENT)
Dept: GASTROENTEROLOGY | Age: 48
End: 2023-01-09

## 2023-01-09 NOTE — TELEPHONE ENCOUNTER
Patient LVM for a call back regarding her prep. Returned call and patient asking what she should have for medications/ prep. Informed miralax and dulcolax. Prepping the day before. Writer thanked and call ended.

## 2023-01-12 ENCOUNTER — PATIENT MESSAGE (OUTPATIENT)
Dept: FAMILY MEDICINE CLINIC | Age: 48
End: 2023-01-12

## 2023-01-12 DIAGNOSIS — I10 ESSENTIAL HYPERTENSION: ICD-10-CM

## 2023-01-12 RX ORDER — OLMESARTAN MEDOXOMIL 40 MG/1
TABLET ORAL
Qty: 30 TABLET | Refills: 0 | OUTPATIENT
Start: 2023-01-12

## 2023-01-12 NOTE — TELEPHONE ENCOUNTER
Please Approve or Refuse.   Send to Pharmacy per Pt's Request: [facility]      Next Visit Date:  4/14/2023   Last Visit Date: 10/26/2022    Hemoglobin A1C (%)   Date Value   09/21/2021 5.7   10/01/2019 5.7   07/22/2016 4.9             ( goal A1C is < 7)   BP Readings from Last 3 Encounters:   12/06/22 131/80   11/21/22 (!) 139/94   10/26/22 128/76          (goal 120/80)  BUN   Date Value Ref Range Status   07/26/2021 7 6 - 20 mg/dL Final     Creatinine   Date Value Ref Range Status   07/26/2021 0.41 (L) 0.50 - 0.90 mg/dL Final     Potassium   Date Value Ref Range Status   07/26/2021 3.7 3.7 - 5.3 mmol/L Final

## 2023-01-12 NOTE — TELEPHONE ENCOUNTER
Needs labs overdue, in the computer since June  I will send her a [employer] message, please also call her then I will refill the medications right away     [facility] Refill Protocol Failed 01/12/2023 02:34 PM   Protocol Details  Last creatinine level resulted within the past 12 months    Last potassium level normal, within the past 12 months

## 2023-01-13 RX ORDER — OLMESARTAN MEDOXOMIL 40 MG/1
TABLET ORAL
Qty: 30 TABLET | Refills: 0 | Status: SHIPPED | OUTPATIENT
Start: 2023-01-13

## 2023-01-13 NOTE — TELEPHONE ENCOUNTER
PATIENT STATES SHE IS HAVING A PROCEDURE DONE MANDAY AND WILL BE ABLE TO DO THE LABS THEN.  SHE IS UNABLE TO GO OVER THERE BEFORE THEN SHE WOULD LIKE TO KNOW IF SHE CAN HAVE ENOUGH OF HER BLOOD PRESSURE MEDS TO GET HER THROUGH THE WEEKEND TO KEEP HER PRESSURE UNDER CONTROL

## 2023-01-16 ENCOUNTER — ANESTHESIA (OUTPATIENT)
Dept: OPERATING ROOM | Age: 48
End: 2023-01-16
Payer: MEDICARE

## 2023-01-16 ENCOUNTER — HOSPITAL ENCOUNTER (OUTPATIENT)
Age: 48
Discharge: HOME | End: 2023-01-16
Payer: MEDICARE

## 2023-01-16 ENCOUNTER — HOSPITAL ENCOUNTER (OUTPATIENT)
Age: 48
Setting detail: OUTPATIENT SURGERY
Discharge: HOME | End: 2023-01-16
Attending: INTERNAL MEDICINE | Admitting: INTERNAL MEDICINE
Payer: MEDICARE

## 2023-01-16 ENCOUNTER — ANESTHESIA EVENT (OUTPATIENT)
Dept: OPERATING ROOM | Age: 48
End: 2023-01-16
Payer: MEDICARE

## 2023-01-16 VITALS
TEMPERATURE: 97.5 F | WEIGHT: 257 LBS | BODY MASS INDEX: 47.29 KG/M2 | DIASTOLIC BLOOD PRESSURE: 79 MMHG | HEART RATE: 67 BPM | OXYGEN SATURATION: 95 % | SYSTOLIC BLOOD PRESSURE: 132 MMHG | RESPIRATION RATE: 16 BRPM | HEIGHT: 62 IN

## 2023-01-16 DIAGNOSIS — R19.7 DIARRHEA, UNSPECIFIED TYPE: ICD-10-CM

## 2023-01-16 DIAGNOSIS — R53.82 CHRONIC FATIGUE: ICD-10-CM

## 2023-01-16 DIAGNOSIS — K58.9 IRRITABLE BOWEL SYNDROME, UNSPECIFIED TYPE: ICD-10-CM

## 2023-01-16 DIAGNOSIS — I10 ESSENTIAL HYPERTENSION: ICD-10-CM

## 2023-01-16 DIAGNOSIS — R63.4 WEIGHT LOSS: ICD-10-CM

## 2023-01-16 DIAGNOSIS — E78.2 MIXED HYPERLIPIDEMIA: ICD-10-CM

## 2023-01-16 DIAGNOSIS — K21.9 GASTROESOPHAGEAL REFLUX DISEASE, UNSPECIFIED WHETHER ESOPHAGITIS PRESENT: ICD-10-CM

## 2023-01-16 LAB
ABSOLUTE EOS #: 0.19 K/UL (ref 0–0.4)
ABSOLUTE IMMATURE GRANULOCYTE: 0.19 K/UL (ref 0–0.3)
ABSOLUTE LYMPH #: 4.22 K/UL (ref 1–4.8)
ABSOLUTE MONO #: 0.77 K/UL (ref 0.1–0.8)
ALBUMIN SERPL-MCNC: 4 G/DL (ref 3.5–5.2)
ALBUMIN/GLOBULIN RATIO: 1.5 (ref 1–2.5)
ALP BLD-CCNC: 95 U/L (ref 35–104)
ALT SERPL-CCNC: 24 U/L (ref 5–33)
ANION GAP SERPL CALCULATED.3IONS-SCNC: 9 MMOL/L (ref 9–17)
AST SERPL-CCNC: 26 U/L
BACTERIA: ABNORMAL
BASOPHILS # BLD: 0 % (ref 0–2)
BASOPHILS ABSOLUTE: 0 K/UL (ref 0–0.2)
BILIRUB SERPL-MCNC: 0.2 MG/DL (ref 0.3–1.2)
BILIRUBIN URINE: NEGATIVE
BUN BLDV-MCNC: 11 MG/DL (ref 6–20)
CALCIUM SERPL-MCNC: 9.1 MG/DL (ref 8.6–10.4)
CASTS UA: ABNORMAL /LPF (ref 0–8)
CHLORIDE BLD-SCNC: 100 MMOL/L (ref 98–107)
CHOLESTEROL, FASTING: 220 MG/DL
CHOLESTEROL/HDL RATIO: 7.6
CO2: 30 MMOL/L (ref 20–31)
COLOR: YELLOW
CREAT SERPL-MCNC: 0.58 MG/DL (ref 0.5–0.9)
EOSINOPHILS RELATIVE PERCENT: 1 % (ref 1–4)
EPITHELIAL CELLS UA: ABNORMAL /HPF (ref 0–5)
ESTIMATED AVERAGE GLUCOSE: 128 MG/DL
GFR SERPL CREATININE-BSD FRML MDRD: >60 ML/MIN/1.73M2
GLUCOSE FASTING: 126 MG/DL (ref 70–99)
GLUCOSE URINE: NEGATIVE
HBA1C MFR BLD: 6.1 % (ref 4–6)
HCT VFR BLD CALC: 39.4 % (ref 36.3–47.1)
HDLC SERPL-MCNC: 29 MG/DL
HEMOGLOBIN: 12.9 G/DL (ref 11.9–15.1)
IMMATURE GRANULOCYTES: 1 %
KETONES, URINE: NEGATIVE
LDL CHOLESTEROL: 119 MG/DL (ref 0–130)
LEUKOCYTE ESTERASE, URINE: NEGATIVE
LYMPHOCYTES # BLD: 22 % (ref 24–44)
MCH RBC QN AUTO: 31.9 PG (ref 25.2–33.5)
MCHC RBC AUTO-ENTMCNC: 32.7 G/DL (ref 28.4–34.8)
MCV RBC AUTO: 97.5 FL (ref 82.6–102.9)
MONOCYTES # BLD: 4 % (ref 1–7)
MORPHOLOGY: NORMAL
NITRITE, URINE: NEGATIVE
NRBC AUTOMATED: 0 PER 100 WBC
PDW BLD-RTO: 13.5 % (ref 11.8–14.4)
PH UA: 5.5 (ref 5–8)
PLATELET # BLD: 361 K/UL (ref 138–453)
PMV BLD AUTO: 9.6 FL (ref 8.1–13.5)
POTASSIUM SERPL-SCNC: 3.8 MMOL/L (ref 3.7–5.3)
PROTEIN UA: NEGATIVE
RBC # BLD: 4.04 M/UL (ref 3.95–5.11)
RBC UA: ABNORMAL /HPF (ref 0–4)
SEG NEUTROPHILS: 72 % (ref 36–66)
SEGMENTED NEUTROPHILS ABSOLUTE COUNT: 13.83 K/UL (ref 1.8–7.7)
SODIUM BLD-SCNC: 139 MMOL/L (ref 135–144)
SPECIFIC GRAVITY UA: 1.02 (ref 1–1.03)
TOTAL PROTEIN: 6.7 G/DL (ref 6.4–8.3)
TRIGLYCERIDE, FASTING: 362 MG/DL
TSH SERPL DL<=0.05 MIU/L-ACNC: 0.69 UIU/ML (ref 0.3–5)
TURBIDITY: ABNORMAL
URINE HGB: NEGATIVE
UROBILINOGEN, URINE: NORMAL
VITAMIN D 25-HYDROXY: 28.6 NG/ML
WBC # BLD: 19.2 K/UL (ref 3.5–11.3)
WBC UA: ABNORMAL /HPF (ref 0–5)

## 2023-01-16 PROCEDURE — 2709999900 HC NON-CHARGEABLE SUPPLY: Performed by: INTERNAL MEDICINE

## 2023-01-16 PROCEDURE — 87186 SC STD MICRODIL/AGAR DIL: CPT

## 2023-01-16 PROCEDURE — 6360000002 HC RX W HCPCS: Performed by: NURSE ANESTHETIST, CERTIFIED REGISTERED

## 2023-01-16 PROCEDURE — 88305 TISSUE EXAM BY PATHOLOGIST: CPT

## 2023-01-16 PROCEDURE — 2500000003 HC RX 250 WO HCPCS: Performed by: NURSE ANESTHETIST, CERTIFIED REGISTERED

## 2023-01-16 PROCEDURE — 87086 URINE CULTURE/COLONY COUNT: CPT

## 2023-01-16 PROCEDURE — 3700000000 HC ANESTHESIA ATTENDED CARE: Performed by: INTERNAL MEDICINE

## 2023-01-16 PROCEDURE — 3700000001 HC ADD 15 MINUTES (ANESTHESIA): Performed by: INTERNAL MEDICINE

## 2023-01-16 PROCEDURE — 43239 EGD BIOPSY SINGLE/MULTIPLE: CPT | Performed by: INTERNAL MEDICINE

## 2023-01-16 PROCEDURE — 3609012400 HC EGD TRANSORAL BIOPSY SINGLE/MULTIPLE: Performed by: INTERNAL MEDICINE

## 2023-01-16 PROCEDURE — 87088 URINE BACTERIA CULTURE: CPT

## 2023-01-16 PROCEDURE — 84443 ASSAY THYROID STIM HORMONE: CPT

## 2023-01-16 PROCEDURE — 7100000010 HC PHASE II RECOVERY - FIRST 15 MIN: Performed by: INTERNAL MEDICINE

## 2023-01-16 PROCEDURE — 45378 DIAGNOSTIC COLONOSCOPY: CPT | Performed by: INTERNAL MEDICINE

## 2023-01-16 PROCEDURE — 7100000011 HC PHASE II RECOVERY - ADDTL 15 MIN: Performed by: INTERNAL MEDICINE

## 2023-01-16 PROCEDURE — 83036 HEMOGLOBIN GLYCOSYLATED A1C: CPT

## 2023-01-16 PROCEDURE — 85025 COMPLETE CBC W/AUTO DIFF WBC: CPT

## 2023-01-16 PROCEDURE — 2580000003 HC RX 258: Performed by: ANESTHESIOLOGY

## 2023-01-16 PROCEDURE — 80053 COMPREHEN METABOLIC PANEL: CPT

## 2023-01-16 PROCEDURE — 36415 COLL VENOUS BLD VENIPUNCTURE: CPT

## 2023-01-16 PROCEDURE — 81001 URINALYSIS AUTO W/SCOPE: CPT

## 2023-01-16 PROCEDURE — 3609027000 HC COLONOSCOPY: Performed by: INTERNAL MEDICINE

## 2023-01-16 PROCEDURE — 82306 VITAMIN D 25 HYDROXY: CPT

## 2023-01-16 PROCEDURE — 80061 LIPID PANEL: CPT

## 2023-01-16 RX ORDER — SODIUM CHLORIDE 9 MG/ML
INJECTION, SOLUTION INTRAVENOUS CONTINUOUS
Status: DISCONTINUED | OUTPATIENT
Start: 2023-01-16 | End: 2023-01-16 | Stop reason: HOSPADM

## 2023-01-16 RX ORDER — LIDOCAINE HYDROCHLORIDE 20 MG/ML
INJECTION, SOLUTION EPIDURAL; INFILTRATION; INTRACAUDAL; PERINEURAL PRN
Status: DISCONTINUED | OUTPATIENT
Start: 2023-01-16 | End: 2023-01-16 | Stop reason: SDUPTHER

## 2023-01-16 RX ORDER — PROPOFOL 10 MG/ML
INJECTION, EMULSION INTRAVENOUS PRN
Status: DISCONTINUED | OUTPATIENT
Start: 2023-01-16 | End: 2023-01-16 | Stop reason: SDUPTHER

## 2023-01-16 RX ORDER — MIDAZOLAM HYDROCHLORIDE 1 MG/ML
INJECTION INTRAMUSCULAR; INTRAVENOUS PRN
Status: DISCONTINUED | OUTPATIENT
Start: 2023-01-16 | End: 2023-01-16 | Stop reason: SDUPTHER

## 2023-01-16 RX ORDER — SODIUM CHLORIDE 9 MG/ML
INJECTION, SOLUTION INTRAVENOUS PRN
Status: DISCONTINUED | OUTPATIENT
Start: 2023-01-16 | End: 2023-01-16 | Stop reason: HOSPADM

## 2023-01-16 RX ORDER — LIDOCAINE HYDROCHLORIDE 10 MG/ML
1 INJECTION, SOLUTION EPIDURAL; INFILTRATION; INTRACAUDAL; PERINEURAL
Status: DISCONTINUED | OUTPATIENT
Start: 2023-01-17 | End: 2023-01-16 | Stop reason: HOSPADM

## 2023-01-16 RX ORDER — SODIUM CHLORIDE, SODIUM LACTATE, POTASSIUM CHLORIDE, CALCIUM CHLORIDE 600; 310; 30; 20 MG/100ML; MG/100ML; MG/100ML; MG/100ML
INJECTION, SOLUTION INTRAVENOUS CONTINUOUS
Status: DISCONTINUED | OUTPATIENT
Start: 2023-01-16 | End: 2023-01-16 | Stop reason: HOSPADM

## 2023-01-16 RX ORDER — SODIUM CHLORIDE 0.9 % (FLUSH) 0.9 %
5-40 SYRINGE (ML) INJECTION EVERY 12 HOURS SCHEDULED
Status: DISCONTINUED | OUTPATIENT
Start: 2023-01-16 | End: 2023-01-16 | Stop reason: HOSPADM

## 2023-01-16 RX ORDER — SODIUM CHLORIDE 0.9 % (FLUSH) 0.9 %
5-40 SYRINGE (ML) INJECTION PRN
Status: DISCONTINUED | OUTPATIENT
Start: 2023-01-16 | End: 2023-01-16 | Stop reason: HOSPADM

## 2023-01-16 RX ADMIN — PROPOFOL 50 MG: 10 INJECTION, EMULSION INTRAVENOUS at 10:36

## 2023-01-16 RX ADMIN — MIDAZOLAM 2 MG: 1 INJECTION INTRAMUSCULAR; INTRAVENOUS at 10:23

## 2023-01-16 RX ADMIN — PROPOFOL 30 MG: 10 INJECTION, EMULSION INTRAVENOUS at 10:30

## 2023-01-16 RX ADMIN — PROPOFOL 30 MG: 10 INJECTION, EMULSION INTRAVENOUS at 10:39

## 2023-01-16 RX ADMIN — PROPOFOL 50 MG: 10 INJECTION, EMULSION INTRAVENOUS at 10:26

## 2023-01-16 RX ADMIN — SODIUM CHLORIDE, POTASSIUM CHLORIDE, SODIUM LACTATE AND CALCIUM CHLORIDE: 600; 310; 30; 20 INJECTION, SOLUTION INTRAVENOUS at 10:03

## 2023-01-16 RX ADMIN — PROPOFOL 50 MG: 10 INJECTION, EMULSION INTRAVENOUS at 10:33

## 2023-01-16 RX ADMIN — PROPOFOL 30 MG: 10 INJECTION, EMULSION INTRAVENOUS at 10:48

## 2023-01-16 RX ADMIN — PROPOFOL 30 MG: 10 INJECTION, EMULSION INTRAVENOUS at 10:42

## 2023-01-16 RX ADMIN — PROPOFOL 30 MG: 10 INJECTION, EMULSION INTRAVENOUS at 10:45

## 2023-01-16 RX ADMIN — PROPOFOL 50 MG: 10 INJECTION, EMULSION INTRAVENOUS at 10:27

## 2023-01-16 RX ADMIN — LIDOCAINE HYDROCHLORIDE 100 MG: 20 INJECTION, SOLUTION EPIDURAL; INFILTRATION; INTRACAUDAL; PERINEURAL at 10:26

## 2023-01-16 RX ADMIN — PROPOFOL 50 MG: 10 INJECTION, EMULSION INTRAVENOUS at 10:28

## 2023-01-16 ASSESSMENT — PAIN - FUNCTIONAL ASSESSMENT: PAIN_FUNCTIONAL_ASSESSMENT: 0-10

## 2023-01-16 ASSESSMENT — PAIN SCALES - GENERAL
PAINLEVEL_OUTOF10: 0
PAINLEVEL_OUTOF10: 0

## 2023-01-16 ASSESSMENT — LIFESTYLE VARIABLES: SMOKING_STATUS: 1

## 2023-01-16 ASSESSMENT — PAIN DESCRIPTION - DESCRIPTORS: DESCRIPTORS: STABBING

## 2023-01-16 NOTE — H&P
History and Physical Service   [place] [address]    HISTORY AND PHYSICAL EXAMINATION            Date of Evaluation: 1/16/2023  Patient name:  Blair Guo  MRN:   [id number]  YOB: 1975  PCP:    HAYDEE Harrison CNP    History Obtained From:     Patient, medical records    History of Present Illness: This is Blair Guo a 56 y.o. female who presents today for a COLORECTAL CANCER SCREENING, NOT HIGH RISK, EGD ESOPHAGOGASTRODUODENOSCOPY by Kelsey Francis MD for GASTROESOPHAGEAL REFLUX DISEASE, UNSPECIFIED WHETHER ESOPHAGITIS PRESENT [K21.9] IRRITABLE BOWEL SYNDROME, UNSPECIFIED TYPE [K58.9]. Patient c/o acid reflux accompanied by nausea daily and \"vomiting with acid taste, pain in the upper stomach that gets hard as a rock. \"  Currently treated with Prilosec and Pepcid which provides minium relief. No Previus colonoscopy No FH colon cancer or polyps Patient referred to GI was seen in 8/0222 by Dr Lakisha Hernandez  He recommended EGD and colon screening. Patient arrived for her procedures and followed the prep as directed until watery clear+FH colon cancer inher mother age 55. Patient stated she has hx diverticulitis She denies bowel changed, bloody tarry stools, unintentional weight loss  Denies fever, chills, shortness of breath, cough, congestion, wheezing, chest pain, open sores or wounds.   No DM      Past Medical History:     Past Medical History:   Diagnosis Date    Anxiety 10/18/2018    Chronic midline low back pain without sciatica 8/29/2017    Depression     Endometriosis     had hyst for this    Essential hypertension 3/8/2016    Gastroesophageal reflux disease without esophagitis 3/8/2016    H/O menorrhagia     had hyst    History of stress incontinence     had surgery    Hydronephrosis of right kidney 12/27/2017    Obesity 4/10/2017    Osteophyte, vertebrae     thoracic    Seasonal allergies 3/8/2016        Past Surgical History:     Past Surgical History: Procedure Laterality Date    ABDOMEN SURGERY      seroma removed     SECTION      ELBOW JOINT FUSION Right     HERNIA REPAIR      umbilical    HYSTERECTOMY VAGINAL      ovaries remain    INCONTINENCE SURGERY      KNEE ARTHROSCOPY Right 3/10/2020    KNEE ARTHROSCOPY PARITAL MEDIAL MENISECTOMY performed by Ventura Culp MD at [address] [place] [address]      rods & cage inserted L5-S1, fusion & decompression    SPINE SURGERY      revision because cage had backed out and severed a nerve, cage restabilized        Medications Prior to Admission:     Prior to Admission medications    Medication Sig Start Date End Date Taking?  Authorizing Provider   olmesartan (BENICAR) 40 MG tablet take 1 tablet by mouth once daily 23   Pollo Forrest MD   Multiple Vitamins-Minerals (THERA M PLUS) TABS take 1 tablet by mouth once daily 22   HAYDEE Sarmiento CNP   Calcium Carb-Cholecalciferol (OYSTER SHELL CALCIUM W/D) 500-5 MG-MCG TABS tablet take 1 tablet by mouth once daily 22   HAYDEE Sarmiento CNP   meloxicam (MOBIC) 15 MG tablet take 1 tablet by mouth once daily 22   HAYDEE Sarmiento - CNP   montelukast (SINGULAIR) 10 MG tablet take 1 tablet by mouth every evening 22   HAYDEE Sarmiento - CNP   loratadine (CLARITIN) 10 MG tablet Take 1 tablet by mouth daily 22   HAYDEE Sarmiento CNP   zinc 50 MG CAPS Take 50 mg by mouth daily 22  HAYDEE Sarmiento - CNP   fluticasone (FLONASE) 50 MCG/ACT nasal spray 1 spray by Each Nostril route in the morning and at bedtime 22  HAYDEE Sarmiento CNP   metoprolol succinate (TOPROL XL) 50 MG extended release tablet Take 1 tablet by mouth daily 22   HAYDEE Sarmiento - CNP   tiZANidine (ZANAFLEX) 4 MG tablet take 1 tablet by mouth every 4 to 6 hours 22   HAYDEE Sarmiento CNP   famotidine (PEPCID) 40 MG tablet take 1 tablet by mouth at bedtime 22 Historical Provider, MD   triamterene-hydroCHLOROthiazide (MAXZIDE) 75-50 MG per tablet take 1 tablet by mouth once daily 7/12/22   HAYDEE Sarmiento CNP   potassium chloride (KLOR-CON M) 10 MEQ extended release tablet take 1 tablet by mouth twice a day 7/12/22   HAYDEE Sarmiento CNP   omeprazole (PRILOSEC) 40 MG delayed release capsule take 1 capsule by mouth 30 MINUTES BEFORE MORNING MEAL 4/15/22   Historical Provider, MD   albuterol sulfate  (90 Base) MCG/ACT inhaler Inhale 2 puffs into the lungs every 6 hours as needed for Wheezing or Shortness of Breath 5/5/22 1/16/23  HAYDEE Sarmiento CNP   pravastatin (PRAVACHOL) 20 MG tablet Take 1 tablet by mouth daily 3/28/22   HAYDEE Sarmiento CNP   famotidine (PEPCID) 20 MG tablet take 1 tablet by mouth nightly if needed 3/28/22   HAYDEE Sarmiento CNP   ondansetron (ZOFRAN) 4 MG tablet take 1 tablet by mouth three times a day if needed for nausea and vomiting 1/24/22   HAYDEE Sarmiento CNP   Blood Pressure Monitor KIT Use as directed. 8/5/21   HAYDEE Sarmiento CNP   acetaminophen (TYLENOL) 500 MG tablet Take 1,000 mg by mouth every 6 hours as needed for Pain Take 2 tablets (=1000mg) every 6 hours as needed for pain    Historical Provider, MD        Allergies:     Fentanyl    Social History:     Tobacco:    reports that she has been smoking cigarettes. She has been smoking an average of 1 pack per day. She has never used smokeless tobacco.  Alcohol:      reports that she does not currently use alcohol. Drug Use:  reports current drug use. Drug: Marijuana Shaunna Beat).     Family History:     Family History   Problem Relation Age of Onset    Eczema Mother     COPD Mother     Liver Disease Mother     Heart Disease Mother     Heart Disease Maternal Grandmother     Atrial Fibrillation Maternal Grandfather     Heart Attack Paternal Grandfather        Review of Systems:     Positive and Negative as described in HPI.    CONSTITUTIONAL:  negative for fevers, chills, sweats, fatigue, weight loss  HEENT: sinus infection 12/2022 which has since resolved. negative for vision, hearing changes, runny nose, throat pain  RESPIRATORY: Hx asthma with albuterol available   negative for shortness of breath, cough, congestion, wheezing. CARDIOVASCULAR: Hx HTN on medication and managed by PCP negative for chest pain, palpitations. GASTROINTESTINAL: Hx GERD See HPI  negative for nausea, vomiting, diarrhea, constipation, change in bowel habits, abdominal pain   GENITOURINARY:  negative for difficulty of urination, burning with urination, frequency   INTEGUMENT:  negative for rash, skin lesions, easy bruising   HEMATOLOGIC/LYMPHATIC:  negative for swelling/edema   ALLERGIC/IMMUNOLOGIC:  negative for urticaria , itching  ENDOCRINE:  negative increase in drinking, increase in urination, hot or cold intolerance  MUSCULOSKELETAL: Lower back pain takes mobic Previous back nelson rgery L4 with cage, L5 -S1   negative joint pains, muscle aches, swelling of joints  NEUROLOGICAL:  negative for headaches, dizziness, lightheadedness, numbness, pain, tingling extremities  BEHAVIOR/PSYCH:  negative for depression, anxiety    Physical Exam:   BP (!) 156/96   Pulse 78   Temp (!) 96 °F (35.6 °C) (Temporal)   Resp 16   Ht 5' 2\" (1.575 m)   Wt 257 lb (116.6 kg)   SpO2 93%   BMI 47.01 kg/m²   No LMP recorded. Patient has had a hysterectomy. No obstetric history on file. No results for input(s): POCGLU in the last 72 hours. General Appearance:  morbidly obese female alert, well appearing, and in no acute distress  Mental status: oriented to person, place, and time with normal affect  Head:  normocephalic, atraumatic.   Eye: no icterus, redness, pupils equal and reactive, extraocular eye movements intact, conjunctiva clear  Ear: normal external ear, no discharge, hearing intact  Nose:  no drainage noted  Mouth: mucous membranes moist  Neck: supple, no carotid bruits, thyroid not palpable  Lungs: Bilateral equal air entry, clear to ausculation, no wheezing, rales or rhonchi, normal effort  Cardiovascular: HR 78 normal rate, regular rhythm, no murmur, gallop, rub. Abdomen: obese with fold, tenerness epigastric region Soft,  nondistended, normal bowel sounds  Neurologic: There are no new focal motor or sensory deficits, normal muscle tone and bulk, no abnormal sensation, normal speech, cranial nerves II through XII grossly intact  Skin: No gross lesions, rashes, bruising or bleeding on exposed skin area  Extremities:  peripheral pulses palpable, no pedal edema or calf pain with palpation  Psych: normal affect     Investigations:      Laboratory Testing:  No results found for this or any previous visit (from the past 24 hour(s)). No results for input(s): HGB, HCT, WBC, MCV, PLATELET, NA, K, CL, CO2, BUN, CREATININE, GLUCOSE, INR, PROTIME, APTT, AST, ALT, LABALBU, HCG in the last 720 hours. No results for input(s): COVID19 in the last 720 hours. Imaging/Diagnostics:    No results found. Diagnosis:      Gastroesophageal reflux disease, unspecified whether esophagitis present [K21.9]  Irritable bowel syndrome, unspecified type [K58.9]    Plans:     1.  COLORECTAL CANCER SCREENING, NOT HIGH RISK, EGD ESOPHAGOGASTRODUODENOSCOPY      HAYDEE Davis CNP  1/16/2023  10:19 AM

## 2023-01-16 NOTE — ANESTHESIA PRE PROCEDURE
Department of Anesthesiology  Preprocedure Note       Name:  Vasile Emery   Age:  56 y.o.  :  1975                                          MRN:  [id number]         Date:  2023      Surgeon: Ame Isaac):  Solomon Perkins MD    Procedure: Procedure(s):  COLORECTAL CANCER SCREENING, NOT HIGH RISK  EGD ESOPHAGOGASTRODUODENOSCOPY    Medications prior to admission:   Prior to Admission medications    Medication Sig Start Date End Date Taking?  Authorizing Provider   olmesartan (BENICAR) 40 MG tablet take 1 tablet by mouth once daily 23   Radha Deleon MD   Multiple Vitamins-Minerals (THERA M PLUS) TABS take 1 tablet by mouth once daily 22   HAYDEE Sarmiento CNP   Calcium Carb-Cholecalciferol (OYSTER SHELL CALCIUM W/D) 500-5 MG-MCG TABS tablet take 1 tablet by mouth once daily 22   HAYDEE Sarmiento CNP   meloxicam (MOBIC) 15 MG tablet take 1 tablet by mouth once daily 22   HAYDEE Sarmiento CNP   montelukast (SINGULAIR) 10 MG tablet take 1 tablet by mouth every evening 22   HAYDEE Sarmiento CNP   loratadine (CLARITIN) 10 MG tablet Take 1 tablet by mouth daily 22   HAYDEE Sarmiento CNP   zinc 50 MG CAPS Take 50 mg by mouth daily 22  HAYDEE Sarmiento CNP   fluticasone (FLONASE) 50 MCG/ACT nasal spray 1 spray by Each Nostril route in the morning and at bedtime 22  HAYDEE Sarmiento CNP   metoprolol succinate (TOPROL XL) 50 MG extended release tablet Take 1 tablet by mouth daily 22   HAYDEE Sarmiento CNP   tiZANidine (ZANAFLEX) 4 MG tablet take 1 tablet by mouth every 4 to 6 hours 22   HAYDEE Sarmiento CNP   famotidine (PEPCID) 40 MG tablet take 1 tablet by mouth at bedtime 22   Historical Provider, MD   triamterene-hydroCHLOROthiazide (MAXZIDE) 75-50 MG per tablet take 1 tablet by mouth once daily 22   HAYDEE Sarmiento - CNP   potassium chloride (KLOR-CON M) 10 MEQ extended release tablet take 1 tablet by mouth twice a day 7/12/22   HAYDEE Sarmiento CNP   omeprazole (PRILOSEC) 40 MG delayed release capsule take 1 capsule by mouth 30 MINUTES BEFORE MORNING MEAL 4/15/22   Historical Provider, MD   albuterol sulfate  (90 Base) MCG/ACT inhaler Inhale 2 puffs into the lungs every 6 hours as needed for Wheezing or Shortness of Breath 5/5/22 1/16/23  HAYDEE Sarmiento CNP   pravastatin (PRAVACHOL) 20 MG tablet Take 1 tablet by mouth daily 3/28/22   HAYDEE Sarmiento CNP   famotidine (PEPCID) 20 MG tablet take 1 tablet by mouth nightly if needed 3/28/22   HAYDEE Sarmiento CNP   ondansetron (ZOFRAN) 4 MG tablet take 1 tablet by mouth three times a day if needed for nausea and vomiting 1/24/22   HAYDEE Sarmiento CNP   Blood Pressure Monitor KIT Use as directed. 8/5/21   HAYDEE Sarmiento CNP   acetaminophen (TYLENOL) 500 MG tablet Take 1,000 mg by mouth every 6 hours as needed for Pain Take 2 tablets (=1000mg) every 6 hours as needed for pain    Historical Provider, MD       Current medications:    Current Facility-Administered Medications   Medication Dose Route Frequency Provider Last Rate Last Admin    [START ON 1/17/2023] lidocaine PF 1 % injection 1 mL  1 mL IntraDERmal Once PRN Fernando Oates MD        0.9 % sodium chloride infusion   IntraVENous Continuous Fernando Oates MD        lactated ringers infusion   IntraVENous Continuous Fernando Oates MD           Allergies: Allergies   Allergen Reactions    Fentanyl      Pt never wants this again as it caused her hair and teeth to fall out.          Problem List:    Patient Active Problem List   Diagnosis Code    Essential hypertension I10    Gastroesophageal reflux disease without esophagitis K21.9    Seasonal allergies J30.2    Tobacco dependence F17.200    Class 3 severe obesity with body mass index (BMI) of 45.0 to 49.9 in adult ([facility] E66.01, B05.91  Lumbar post-laminectomy syndrome M96.1    Chronic midline low back pain without sciatica M54.50, G89.29    Bronchitis J40    Neural foraminal stenosis of lumbar spine M48.061    Hydronephrosis of right kidney N13.30    Psychophysiological insomnia F51.04    Anxiety F41.9    Depression F32. A    History of partial hysterectomy Z90.711    Hot flashes R23.2    Snores R06.83    History of sleep apnea Z86.69    Chronic fatigue R53.82    Goiter E04.9    Leukocytosis D72.829    Hypertensive emergency I16.1    Arm paresthesia, left R20.2    Hypertensive urgency I16.0    Dyslipidemia E78.5    SHANKAR on CPAP G47.33, Z99.89    Paranasal sinus disease J34.9    Diuretic-induced hypokalemia E87.6, T50.2X5A    Diastasis recti M62.08    Chronic rhinitis J31.0    Osteopenia of multiple sites M85.89    Vitamin D deficiency E55.9    Nausea R11.0    Irritable bowel syndrome with both constipation and diarrhea K58.2    Family history of colon cancer Z80.0    Lumbar radiculopathy M54.16    Lumbar degenerative disc disease M51.36    Axillary lymphadenopathy R59.0    Family history of lymphoma Z80.7       Past Medical History:        Diagnosis Date    Anxiety 10/18/2018    Chronic midline low back pain without sciatica 2017    Depression     Endometriosis     had hyst for this    Essential hypertension 3/8/2016    Gastroesophageal reflux disease without esophagitis 3/8/2016    H/O menorrhagia     had hyst    History of stress incontinence     had surgery    Hydronephrosis of right kidney 2017    Obesity 4/10/2017    Osteophyte, vertebrae     thoracic    Seasonal allergies 3/8/2016       Past Surgical History:        Procedure Laterality Date    ABDOMEN SURGERY      seroma removed     SECTION      ELBOW JOINT FUSION Right     HERNIA REPAIR      umbilical    HYSTERECTOMY VAGINAL      ovaries remain    INCONTINENCE SURGERY      KNEE ARTHROSCOPY Right 3/10/2020    KNEE ARTHROSCOPY PARITAL MEDIAL MENISECTOMY performed by Bruno Ahuja MD at [facility] OR   • SPINE SURGERY      rods & cage inserted L5-S1, fusion & decompression   • SPINE SURGERY      revision because cage had backed out and severed a nerve, cage restabilized       Social History:    Social History     Tobacco Use   • Smoking status: Every Day     Packs/day: 1.00     Types: Cigarettes   • Smokeless tobacco: Never   Substance Use Topics   • Alcohol use: Not Currently     Alcohol/week: 0.0 standard drinks                                Ready to quit: Not Answered  Counseling given: Not Answered      Vital Signs (Current):   Vitals:    01/16/23 0923 01/16/23 0930   Pulse:  78   Resp:  16   Temp:  (!) 96 °F (35.6 °C)   TempSrc:  Temporal   SpO2:  93%   Weight: 257 lb (116.6 kg)    Height: 5' 2\" (1.575 m)                                               BP Readings from Last 3 Encounters:   12/06/22 131/80   11/21/22 (!) 139/94   10/26/22 128/76       NPO Status:                                                                                 BMI:   Wt Readings from Last 3 Encounters:   01/16/23 257 lb (116.6 kg)   12/06/22 267 lb (121.1 kg)   11/21/22 268 lb (121.6 kg)     Body mass index is 47.01 kg/m².    CBC:   Lab Results   Component Value Date/Time    WBC 16.7 07/26/2021 02:18 AM    RBC 3.76 07/26/2021 02:18 AM    HGB 12.3 07/26/2021 02:18 AM    HCT 36.7 07/26/2021 02:18 AM    MCV 97.6 07/26/2021 02:18 AM    RDW 13.2 07/26/2021 02:18 AM     07/26/2021 02:18 AM       CMP:   Lab Results   Component Value Date/Time     07/26/2021 02:18 AM    K 3.7 07/26/2021 02:18 AM     07/26/2021 02:18 AM    CO2 22 07/26/2021 02:18 AM    BUN 7 07/26/2021 02:18 AM    CREATININE 0.41 07/26/2021 02:18 AM    GFRAA >60 07/26/2021 02:18 AM    LABGLOM >60 07/26/2021 02:18 AM    GLUCOSE 96 07/26/2021 02:18 AM    PROT 6.3 07/26/2021 02:18 AM    CALCIUM 9.0 07/26/2021 02:18 AM    BILITOT 0.19 07/26/2021 02:18 AM    ALKPHOS 77  07/26/2021 02:18 AM    AST 13 07/26/2021 02:18 AM    ALT 14 07/26/2021 02:18 AM       POC Tests: No results for input(s): POCGLU, POCNA, POCK, POCCL, POCBUN, POCHEMO, POCHCT in the last 72 hours. Coags: No results found for: PROTIME, INR, APTT    HCG (If Applicable):   Lab Results   Component Value Date    HCG NEGATIVE 05/20/2020        ABGs: No results found for: PHART, PO2ART, ART3LFP, LEQ5BDD, BEART, M2HNIOSB     Type & Screen (If Applicable):  No results found for: LABABO, LABRH    Drug/Infectious Status (If Applicable):  No results found for: HIV, HEPCAB    COVID-19 Screening (If Applicable):   Lab Results   Component Value Date/Time    COVID19 Not Detected 05/21/2020 09:05 PM           Anesthesia Evaluation  Patient summary reviewed no history of anesthetic complications:   Airway: Mallampati: II  TM distance: >3 FB   Neck ROM: full  Mouth opening: > = 3 FB   Dental: normal exam         Pulmonary:   (+) asthma: current smoker    (-) COPD and sleep apnea                           Cardiovascular:  Exercise tolerance: good (>4 METS),   (+) hypertension:,     (-) valvular problems/murmurs, past MI, CABG/stent, dysrhythmias and  angina       Beta Blocker:  Dose within 24 Hrs         Neuro/Psych:   (+) depression/anxiety    (-) seizures and CVA            ROS comment: Lumbar disc disease GI/Hepatic/Renal:   (+) GERD:, morbid obesity     (-) liver disease and no renal disease       Endo/Other:        (-) diabetes mellitus, hypothyroidism               Abdominal:             Vascular:     - DVT and PE. Other Findings:           Anesthesia Plan      general     ASA 3       Induction: intravenous. MIPS: prophylactic pharmacologic antiemetic agents not administered perioperatively for documented reasons. Anesthetic plan and risks discussed with patient. Plan discussed with CRNA.     Attending anesthesiologist reviewed and agrees with Kay Fuentes MD 1/16/2023

## 2023-01-16 NOTE — OP NOTE
PROCEDURE NOTE    DATE OF PROCEDURE: 1/16/2023     SURGEON: Tanner Hardy MD    ASSISTANT: None    PREOPERATIVE DIAGNOSIS: GERD  ABD PAINS  IBS    POSTOPERATIVE DIAGNOSIS: As described below    OPERATION: Upper GI endoscopy with Biopsy    ANESTHESIA: MAC PER ANESTHESIA     ESTIMATED BLOOD LOSS: Less than 50 ml    COMPLICATIONS: None. SPECIMENS:  Was Obtained:     HISTORY: The patient is a 56y.o. year old female with history of above preop diagnosis. I recommended esophagogastroduodenoscopy with possible biopsy and I explained the risk, benefits, expected outcome, and alternatives to the procedure. Risks included but are not limited to bleeding, infection, respiratory distress, hypotension, and perforation of the esophagus, stomach, or duodenum. Patient understands and is in agreement. PROCEDURE: The patient was given IV conscious sedation. The patient's SPO2 remained above 90% throughout the procedure. The gastroscope was inserted orally and advanced under direct vision through the esophagus, through the stomach, through the pylorus, and into the descending duodenum. Findings:    Retropharyngeal area was grossly normal appearing    Esophagus: normal    Stomach:    Fundus: normal    Body: abnormal: MILD GASTRITIS    Antrum: abnormal: MOD GASTRITIS WAS BIOPSIED    Duodenum:     Descending: normal    Bulb: normal    The scope was removed and the patient tolerated the procedure well.      Recommendations/Plan:   F/U Biopsies  F/U In Office in 3-4 weeks  Discussed with the family  Post sedation patient was stable with stable vital signs and stable O2 saturations    Electronically signed by Tanner Hardy MD  on 1/16/2023 at 10:33 AM

## 2023-01-16 NOTE — ANESTHESIA POSTPROCEDURE EVALUATION
Department of Anesthesiology  Postprocedure Note    Patient: Vasile Emery  MRN: [id number]  YOB: 1975  Date of evaluation: 1/16/2023      Procedure Summary     Date: 01/16/23 Room / Location: [facility]  / [facility] - INPATIENT    Anesthesia Start: 1022 Anesthesia Stop: 1058    Procedures:       COLORECTAL CANCER SCREENING, NOT HIGH RISK (Rectum)      EGD BIOPSY (Mouth) Diagnosis:       Gastroesophageal reflux disease, unspecified whether esophagitis present      Irritable bowel syndrome, unspecified type      (Gastroesophageal reflux disease, unspecified whether esophagitis present [K21.9])      (Irritable bowel syndrome, unspecified type [K58.9])    Surgeons: Solomon Perkins MD Responsible Provider: Micheal Hills MD    Anesthesia Type: MAC ASA Status: 3          Anesthesia Type: MAC    Los Phase I:      Los Phase II: Los Score: 10      Anesthesia Post Evaluation    Patient location during evaluation: PACU  Patient participation: complete - patient participated  Level of consciousness: awake  Airway patency: patent  Nausea & Vomiting: no nausea and no vomiting  Complications: no  Cardiovascular status: hemodynamically stable  Respiratory status: acceptable  Hydration status: stable  There was medical reason for not using a multimodal analgesia pain management approach.

## 2023-01-16 NOTE — OP NOTE
PROCEDURE NOTE    DATE OF PROCEDURE: 1/16/2023    SURGEON: Fritz Galvan MD    ASSISTANT: None    PREOPERATIVE DIAGNOSIS: SCREENING  IBS    POSTOPERATIVE DIAGNOSIS: as described below    OPERATION: Total colonoscopy     ANESTHESIA: MAC PER ANESTHESIA     ESTIMATED BLOOD LOSS: less than 50     COMPLICATIONS: None. SPECIMENS:  Was Not Obtained    HISTORY: The patient is a 56y.o. year old female with history of above preop diagnosis. I recommended colonoscopy with possible biopsy or polypectomy and I explained the risk, benefits, expected outcome, and alternatives to the procedure. Risks included but are not limited to bleeding, infection, respiratory distress, hypotension, and perforation of the colon and possibility of missing a lesion. The patient understands and is in agreement. PROCEDURE: The patient was given IV conscious sedation. The patient's SPO2 remained above 90% throughout the procedure. The colonoscope was inserted per rectum and advanced under direct vision to the cecum without difficulty. The prep was fair TO GOOD  CHUNKY STOOLS THROUGHOUT  REDUNDANCY WAS NOTED    Findings:  Terminal ileum: normal    Cecum/Ascending colon: normal    Transverse colon: normal    Descending/Sigmoid colon: abnormal: MILD DIVERTICULOSIS    Rectum/Anus: examined in normal and retroflexed positions and was abnormal: SMALL INT HEMORRHOIDS    Withdrawal Time was (minutes): 14    The colon was decompressed and the scope was removed. The patient tolerated the procedure well. Recommendations/Plan:   Lifestyle and dietary modifications as discussed  F/U In Office in 3-4 weeks  Discussed with the family  Colonoscopy Recall :5 year  POST SEDATION PATIENT WAS STABLE WITH STABLE VITAL SIGNS AND OXYGEN SATURATIONS AND WAS DISCHARGED HOME WITH RIDE IN A STABLE CONDITION.     Electronically signed by Fritz Galvan MD  on 1/16/2023 at 10:52 AM

## 2023-01-16 NOTE — DISCHARGE INSTRUCTIONS
POST COLONOSCOPY & EGD INSTRUCTIONS    1. ACTIVITY   No driving, operating machinery, signing legal papers, or making important decisions for 24 hours   Resume normal activity after 24 hours   You may return to work after 24 hours. 2. DIET  _____  Diet modification: {YES / NO:65681}   Once you are able to swallow water normally you may resume your normal diet. Try to avoid spicy, greasy, or fried foods for your first meal after the procedure      3. MEDICATIONS   Do not consume alcohol, tranquilizers or sleeping medications for 24 hours unless otherwise advised by your physician. Resume your usual medications unless otherwise instructed. 4. NORMAL CHANGES YOU MAY EXPERIENCE AFTER ENDOSCOPY:  From the Colonoscopy:   Passing gas for several hours is normal   Some mild abdominal cramping is normal   If a biopsy/polypectomy was done, you may see some spotting of blood   You may feel tired or worn out for the next 24-48 hours due to the prep and sedation  From the EGD:   A sore throat is normal   A bloated feeling and belching from air in the stomach is normal   If a biopsy was done, you may spit up some blood tinged mucus         5. CALL YOUR PHYSICIAN IF YOU EXPERIENCE ANY OF THE FOLLOWING   Vomiting blood or passing blood rectally (color may be red or black)   Severe abdominal pain or tenderness (that is not relieved by passing air)   Fever, chills, or excessive sweating   Persistent nausea or vomiting   Redness or swelling at the IV site      6.  ADDITIONAL INSTRUCTIONS      IF YOU HAVE ANY QUESTIONS OR CONCERNS PLEASE CALL YOUR DOCTOR,  IF YOU FEEL YOU HAVE A MEDICAL EMERGENCY PLEASE DIAL 911

## 2023-01-17 DIAGNOSIS — N39.0 URINARY TRACT INFECTION WITHOUT HEMATURIA, SITE UNSPECIFIED: Primary | ICD-10-CM

## 2023-01-17 DIAGNOSIS — E55.9 VITAMIN D DEFICIENCY: ICD-10-CM

## 2023-01-17 LAB
CULTURE: ABNORMAL
SPECIMEN DESCRIPTION: ABNORMAL

## 2023-01-17 RX ORDER — ERGOCALCIFEROL 1.25 MG/1
50000 CAPSULE ORAL WEEKLY
Qty: 12 CAPSULE | Refills: 0 | Status: SHIPPED | OUTPATIENT
Start: 2023-01-17

## 2023-01-17 RX ORDER — CEPHALEXIN 500 MG/1
500 CAPSULE ORAL 4 TIMES DAILY
Qty: 20 CAPSULE | Refills: 0 | Status: SHIPPED | OUTPATIENT
Start: 2023-01-17 | End: 2023-01-22

## 2023-01-17 NOTE — RESULT ENCOUNTER NOTE
Please notify patient: Prediabetes is worsening.  Low-carb diet is advisable, avoid pop or any other carbs, blood glucose 126 high    White blood cells are increased  I suspect she has an infection due to the urine being abnormal, urine culture is in process, I will send an antibiotic, sent to [facility]    Vitamin D is low I will send high-dose vitamin D for her  High triglycerides, she is eating too many carbs, but bad cholesterol is improved, continue pravastatin, could start fish oil from over-the-counter    Otherwise labs within normal limits  continue current treatment        Future Appointments  1/30/2023  11:30 AM   DO RYAN Botello PINPRO         [place]  2/13/2023  11:00 AM   GREGORIA Viera* RYAN PAINMGT        [place]  4/14/2023  11:00 AM   Crista Calixto MD     [facility]               [facility]

## 2023-01-18 LAB — SURGICAL PATHOLOGY REPORT: NORMAL

## 2023-01-18 NOTE — RESULT ENCOUNTER NOTE
Mychart comment sent to patient.   UTI E. coli, Keflex sent to the pharmacy yesterday  Start taking the antibiotics    Future Appointments  1/30/2023  11:30 AM   Kiera Aleman [address]  2/13/2023  11:00 AM   [address] [place] [address]  4/14/2023  11:00 AM   [facility]     [facility]

## 2023-01-19 ENCOUNTER — PATIENT MESSAGE (OUTPATIENT)
Dept: GASTROENTEROLOGY | Age: 48
End: 2023-01-19

## 2023-01-19 NOTE — TELEPHONE ENCOUNTER
From: Milena Hilliard  To: Dr. Heredia Lucila: 1/19/2023 2:23 PM EST  Subject: Question regarding HB    I can NOT stop taking my meloxicam (mobic) my back is in terrible shape and that medicine along with my 1000 MG of Tylenol as well as my muscle relaxant is the ONLY way I'm able to walk and or move. What am I to do? I do NOT want to stop thee only relief from my back pain.

## 2023-01-19 NOTE — RESULT ENCOUNTER NOTE
Please notify patient: EGD biopsy showed mild gastritis  To avoid ibuprofen, naproxen, Aleve, Motrin, meloxicam--- I believe the changes are related to meloxicam  To try to only take Tylenol Extra Strength 500 Mg as needed up to 4 tablets a day    Continue famotidine 20 mg twice a day or omeprazole 40 Mg daily, she has them both on the list, she should take only one of them    If she needs refill let me know  Stop meloxicam!  Please advise the patient to get her flu shot at the pharmacy or nurse visit with us    Future Appointments  1/30/2023  11:30 AM   Yolande Huynh, DO         [address] [place] [address]  2/13/2023  11:00 AM   [address] [place] [address], * [address] [place] [address]  4/14/2023  11:00 AM   Bala Ramos MD     [facility]               [facility]

## 2023-01-24 DIAGNOSIS — M48.061 NEURAL FORAMINAL STENOSIS OF LUMBAR SPINE: ICD-10-CM

## 2023-01-24 DIAGNOSIS — M96.1 FAILED BACK SYNDROME, LUMBAR: ICD-10-CM

## 2023-01-24 DIAGNOSIS — M54.50 CHRONIC MIDLINE LOW BACK PAIN WITHOUT SCIATICA: ICD-10-CM

## 2023-01-24 DIAGNOSIS — G89.29 CHRONIC MIDLINE LOW BACK PAIN WITHOUT SCIATICA: ICD-10-CM

## 2023-01-24 DIAGNOSIS — M54.16 LUMBAR RADICULOPATHY: Primary | ICD-10-CM

## 2023-01-24 DIAGNOSIS — M51.36 LUMBAR DEGENERATIVE DISC DISEASE: ICD-10-CM

## 2023-01-24 RX ORDER — TIZANIDINE 4 MG/1
4 TABLET ORAL EVERY 6 HOURS PRN
Qty: 120 TABLET | Refills: 0 | Status: SHIPPED | OUTPATIENT
Start: 2023-01-24

## 2023-01-24 NOTE — TELEPHONE ENCOUNTER
Please Approve or Refuse.   Send to Pharmacy per Pt's Request:      Next Visit Date:  4/14/2023   Last Visit Date: 10/26/2022    Hemoglobin A1C (%)   Date Value   01/16/2023 6.1 (H)   09/21/2021 5.7   10/01/2019 5.7             ( goal A1C is < 7)   BP Readings from Last 3 Encounters:   01/16/23 132/79   12/06/22 131/80   11/21/22 (!) 139/94          (goal 120/80)  BUN   Date Value Ref Range Status   01/16/2023 11 6 - 20 mg/dL Final     Creatinine   Date Value Ref Range Status   01/16/2023 0.58 0.50 - 0.90 mg/dL Final     Potassium   Date Value Ref Range Status   01/16/2023 3.8 3.7 - 5.3 mmol/L Final

## 2023-01-26 ENCOUNTER — TELEPHONE (OUTPATIENT)
Dept: OTHER | Age: 48
End: 2023-01-26

## 2023-01-26 DIAGNOSIS — N39.0 E. COLI UTI: Primary | ICD-10-CM

## 2023-01-26 DIAGNOSIS — B96.20 E. COLI UTI: Primary | ICD-10-CM

## 2023-01-26 RX ORDER — CIPROFLOXACIN 500 MG/1
500 TABLET, FILM COATED ORAL 2 TIMES DAILY
Qty: 14 TABLET | Refills: 0 | Status: SHIPPED | OUTPATIENT
Start: 2023-01-26 | End: 2023-01-27

## 2023-01-26 NOTE — TELEPHONE ENCOUNTER
Per caller's request, she wanted you to know that she is upset because she called 3 hours ago about changing her antibiotic for UTI (current one not working) and she has not had a call back. Please make a medication change for her.

## 2023-01-27 ENCOUNTER — TELEPHONE (OUTPATIENT)
Dept: PAIN MANAGEMENT | Age: 48
End: 2023-01-27

## 2023-01-27 ENCOUNTER — TELEPHONE (OUTPATIENT)
Dept: FAMILY MEDICINE CLINIC | Age: 48
End: 2023-01-27

## 2023-01-27 DIAGNOSIS — B96.20 E. COLI UTI: Primary | ICD-10-CM

## 2023-01-27 DIAGNOSIS — N39.0 E. COLI UTI: Primary | ICD-10-CM

## 2023-01-27 RX ORDER — NITROFURANTOIN 25; 75 MG/1; MG/1
100 CAPSULE ORAL 2 TIMES DAILY
Qty: 10 CAPSULE | Refills: 0 | Status: SHIPPED | OUTPATIENT
Start: 2023-01-27

## 2023-01-27 NOTE — TELEPHONE ENCOUNTER
Call to patient, I said that I sent the medication to the pharmacy and I wrote instructions for and there and to pick them up from [facility] and to call our office if any issues    I did not give any other information    Please check with patient in the morning if she got the medication     Diagnosis Orders   1. E. coli UTI  ciprofloxacin (CIPRO) 500 MG tablet         Orders Placed This Encounter   Medications    ciprofloxacin (CIPRO) 500 MG tablet     Sig: Take 1 tablet by mouth 2 times daily for 7 days Absolutely stop tizanidine when taking this medication, please stop taking it for 10 days.   Also stop taking Keflex     Dispense:  14 tablet     Refill:  0       [id number] JASON Montero - [address] - [address] [phone] [address]  Phone: [phone] Fax: [phone]    Future Appointments   Date Time Provider Nena Garrison   1/30/2023 11:30 AM Dunia De Jesus  [facility] [address]   2/13/2023 11:00 AM HAYDEE Membreno - CNP [address]   4/14/2023 11:00 AM Art Hale MD fp [facility] normal...

## 2023-01-27 NOTE — TELEPHONE ENCOUNTER
Pt called in stating that the medication that was sent in for her UTI she will not take because she \"would have to stop taking her muscle relaxer, Tylenol and Mobic for 10 days and she will not go without them or she will be out looking for heroin\". Writer rajendra will send a message back. Pt started raising her voice stating she doesn't understand what is wrong with the office and its not her fault that her doctors have left and continued to yell, writer repeated multiple time will send a message back and pt continued to be rude and continued to raise her voice.

## 2023-01-27 NOTE — TELEPHONE ENCOUNTER
I called her myself last evening and left her a message and we have addressed her issues even if her provider left. Please advise patient to kindly be patient, we are trying to help her, everything was addressed on time, but yes we first have to address the patients were scheduled in the office, however the same day we have called her for everything, she should not be upset with all due respect. There is not any issue that she cannot take that medication, I will change it, however I have to change to something that her bacteria is sensitive to, that was the concern    She already had Keflex on 1/17/2023 and she said did not work however the culture does show that it is sensitive to Keflex, and it should have worked    Not to take ciprofloxacin I will cancel it, please cancel it at the pharmacy as well    The only option left I have is Macrobid, I will send to the pharmacy, again this is based on the culture, I did not just do something I chose something based on her culture from 1/16/2023      Please let the patient know to  prescription from the pharmacy. If worsening symptoms in few days or not getting better as expected needs to let us know and make appointment. Then I will refer her to urology if this does not work    Orders Placed This Encounter   Medications    nitrofurantoin, macrocrystal-monohydrate, (MACROBID) 100 MG capsule     Sig: Take 1 capsule by mouth 2 times daily **Please cancel prescription for ciprofloxacin**     Dispense:  10 capsule     Refill:  0         [id number] - [place] - [address]  [place] Doyle [address]  Phone: [phone] Fax: [phone]      No orders of the defined types were placed in this encounter.       Future Appointments   Date Time Provider Nena Garrison   1/30/2023 11:30 AM Nivia Charles  [address]   2/13/2023 11:00 AM Demond Chu, Liu Pulliam. [place]   4/14/2023 11:00 AM Nikhil Clayton MD [facility]       Thank you!

## 2023-01-27 NOTE — TELEPHONE ENCOUNTER
I returned patient's call; YESSI on her VM to call and reschedule procedure. To Note will need to be off antibiotics x2 weeks.

## 2023-01-30 ENCOUNTER — HOSPITAL ENCOUNTER (OUTPATIENT)
Dept: PAIN MANAGEMENT | Age: 48
Discharge: HOME | End: 2023-01-30

## 2023-01-30 NOTE — DISCHARGE INSTRUCTIONS
Discharge Instructions following Sedation or Anesthesia:  You have  received  a sedative/anesthetic therefore, you should not consume any alcoholic beverages for minimum of 12 hours. Do not drive or operate machinery for 24 hours. Do not sign legal documents for 24 hours. Dizziness, drowsiness, and unsteadiness may occur. Rest when need to. Increase diet as tolerated. Keep up on fluids if diet allows. General Instructions:  Do not take a tub bath for 72 hours after procedure (this includes hot tubs and swimming pools). You may shower, but avoid hot water to injection site. Avoid strenuous activity TODAY especially if you experience dizziness. Remove band-aid the next day. Wash off any residual iodine   Do not use heat, heating pad, or any other heating device over the injection site for 3 days after the procedure. If you experience pain after your procedure, you may continue with your current pain medication as prescribed. (DO NOT INCREASE YOUR PAIN MEDICATION WITHOUT TALKING TO DOCTOR)  Soreness and pain at injection site is common, may use ice to reduce soreness.     Call Farzad Boone at [phone] if you experience:   Fever, chills or temperature over 100    Vomiting, Headache, persistent stiff neck, nausea, blurred vision   Difficulty in urinating or unable to urinate with 8 hours   Increase in weakness, numbness or loss of function   Increased redness, swelling or drainage at the injection site

## 2023-02-09 DIAGNOSIS — I10 ESSENTIAL HYPERTENSION: ICD-10-CM

## 2023-02-10 RX ORDER — OLMESARTAN MEDOXOMIL 40 MG/1
TABLET ORAL
Qty: 90 TABLET | Refills: 1 | Status: SHIPPED | OUTPATIENT
Start: 2023-02-10

## 2023-02-10 NOTE — TELEPHONE ENCOUNTER
Please Approve or Refuse.   Send to Pharmacy per Pt's Request: [facility]      Next Visit Date:  4/14/2023   Last Visit Date: 10/26/2022    Hemoglobin A1C (%)   Date Value   01/16/2023 6.1 (H)   09/21/2021 5.7   10/01/2019 5.7             ( goal A1C is < 7)   BP Readings from Last 3 Encounters:   01/16/23 132/79   12/06/22 131/80   11/21/22 (!) 139/94          (goal 120/80)  BUN   Date Value Ref Range Status   01/16/2023 11 6 - 20 mg/dL Final     Creatinine   Date Value Ref Range Status   01/16/2023 0.58 0.50 - 0.90 mg/dL Final     Potassium   Date Value Ref Range Status   01/16/2023 3.8 3.7 - 5.3 mmol/L Final

## 2023-02-16 DIAGNOSIS — J31.0 CHRONIC RHINITIS: ICD-10-CM

## 2023-02-16 RX ORDER — LORATADINE 10 MG/1
10 TABLET ORAL DAILY
Qty: 30 TABLET | Refills: 1 | Status: SHIPPED | OUTPATIENT
Start: 2023-02-16

## 2023-02-16 NOTE — TELEPHONE ENCOUNTER
----- Message from [place].  Genevieve [id number] sent at 2/15/2023  5:33 PM EST -----  Regarding: UTI E. coli  Could I please have my zinc and Negra refilled thank you

## 2023-03-07 DIAGNOSIS — E78.5 DYSLIPIDEMIA: ICD-10-CM

## 2023-03-07 DIAGNOSIS — I10 ESSENTIAL HYPERTENSION: ICD-10-CM

## 2023-03-07 DIAGNOSIS — K21.9 GASTROESOPHAGEAL REFLUX DISEASE WITHOUT ESOPHAGITIS: ICD-10-CM

## 2023-03-07 DIAGNOSIS — M54.50 CHRONIC MIDLINE LOW BACK PAIN WITHOUT SCIATICA: ICD-10-CM

## 2023-03-07 DIAGNOSIS — J40 BRONCHITIS: ICD-10-CM

## 2023-03-07 DIAGNOSIS — E55.9 VITAMIN D DEFICIENCY: ICD-10-CM

## 2023-03-07 DIAGNOSIS — M51.36 LUMBAR DEGENERATIVE DISC DISEASE: ICD-10-CM

## 2023-03-07 DIAGNOSIS — M85.89 OSTEOPENIA OF MULTIPLE SITES: ICD-10-CM

## 2023-03-07 DIAGNOSIS — E87.6 DIURETIC-INDUCED HYPOKALEMIA: ICD-10-CM

## 2023-03-07 DIAGNOSIS — G89.29 CHRONIC MIDLINE LOW BACK PAIN WITHOUT SCIATICA: ICD-10-CM

## 2023-03-07 DIAGNOSIS — J31.0 CHRONIC RHINITIS: ICD-10-CM

## 2023-03-07 DIAGNOSIS — J30.2 SEASONAL ALLERGIES: ICD-10-CM

## 2023-03-07 DIAGNOSIS — M54.16 LUMBAR RADICULOPATHY: ICD-10-CM

## 2023-03-07 DIAGNOSIS — T50.2X5A DIURETIC-INDUCED HYPOKALEMIA: ICD-10-CM

## 2023-03-07 DIAGNOSIS — M48.061 NEURAL FORAMINAL STENOSIS OF LUMBAR SPINE: ICD-10-CM

## 2023-03-07 DIAGNOSIS — M96.1 FAILED BACK SYNDROME, LUMBAR: ICD-10-CM

## 2023-03-07 RX ORDER — TRIAMTERENE AND HYDROCHLOROTHIAZIDE 75; 50 MG/1; MG/1
TABLET ORAL
Qty: 90 TABLET | Refills: 0 | Status: SHIPPED | OUTPATIENT
Start: 2023-03-07

## 2023-03-07 RX ORDER — MONTELUKAST SODIUM 10 MG/1
10 TABLET ORAL NIGHTLY
Qty: 90 TABLET | Refills: 0 | Status: SHIPPED | OUTPATIENT
Start: 2023-03-07

## 2023-03-07 RX ORDER — OMEPRAZOLE 40 MG/1
CAPSULE, DELAYED RELEASE ORAL
Qty: 30 CAPSULE | Refills: 0 | Status: SHIPPED | OUTPATIENT
Start: 2023-03-07

## 2023-03-07 RX ORDER — FLUTICASONE PROPIONATE 50 MCG
2 SPRAY, SUSPENSION (ML) NASAL 2 TIMES DAILY
Qty: 16 G | Refills: 0 | Status: SHIPPED | OUTPATIENT
Start: 2023-03-07 | End: 2023-04-06

## 2023-03-07 RX ORDER — METOPROLOL SUCCINATE 50 MG/1
50 TABLET, EXTENDED RELEASE ORAL DAILY
Qty: 90 TABLET | Refills: 0 | Status: SHIPPED | OUTPATIENT
Start: 2023-03-07

## 2023-03-07 RX ORDER — FAMOTIDINE 40 MG/1
TABLET, FILM COATED ORAL
Qty: 30 TABLET | Refills: 0 | Status: SHIPPED | OUTPATIENT
Start: 2023-03-07

## 2023-03-07 RX ORDER — POTASSIUM CHLORIDE 750 MG/1
TABLET, EXTENDED RELEASE ORAL
Qty: 180 TABLET | Refills: 0 | Status: SHIPPED | OUTPATIENT
Start: 2023-03-07

## 2023-03-07 RX ORDER — TIZANIDINE 4 MG/1
4 TABLET ORAL EVERY 8 HOURS PRN
Qty: 90 TABLET | Refills: 0 | Status: SHIPPED | OUTPATIENT
Start: 2023-03-07

## 2023-03-07 RX ORDER — FAMOTIDINE 20 MG/1
TABLET, FILM COATED ORAL
Qty: 90 TABLET | Refills: 0 | OUTPATIENT
Start: 2023-03-07

## 2023-03-07 RX ORDER — MULTIPLE VITAMINS W/ MINERALS TAB 9MG-400MCG
TAB ORAL
Qty: 90 TABLET | Refills: 0 | Status: SHIPPED | OUTPATIENT
Start: 2023-03-07

## 2023-03-07 RX ORDER — ALBUTEROL SULFATE 90 UG/1
2 AEROSOL, METERED RESPIRATORY (INHALATION) EVERY 6 HOURS PRN
Qty: 18 G | Refills: 0 | Status: SHIPPED | OUTPATIENT
Start: 2023-03-07 | End: 2023-04-06

## 2023-03-07 RX ORDER — ERGOCALCIFEROL 1.25 MG/1
50000 CAPSULE ORAL WEEKLY
Qty: 12 CAPSULE | Refills: 0 | Status: SHIPPED | OUTPATIENT
Start: 2023-03-07

## 2023-03-07 RX ORDER — MELOXICAM 15 MG/1
15 TABLET ORAL DAILY PRN
Qty: 90 TABLET | Refills: 0 | OUTPATIENT
Start: 2023-03-07

## 2023-03-07 RX ORDER — PRAVASTATIN SODIUM 20 MG
20 TABLET ORAL DAILY
Qty: 90 TABLET | Refills: 0 | Status: SHIPPED | OUTPATIENT
Start: 2023-03-07

## 2023-03-07 RX ORDER — OLMESARTAN MEDOXOMIL 40 MG/1
40 TABLET ORAL DAILY
Qty: 90 TABLET | Refills: 0 | Status: SHIPPED | OUTPATIENT
Start: 2023-03-07

## 2023-03-07 NOTE — TELEPHONE ENCOUNTER
Noted. Thank you!   Cannot take meloxicam she has stomach issues, taking 2 medications for her stomach        Future Appointments   Date Time Provider Nena Garrison   4/14/2023 11:00 AM Mickey Essex, MD fp [facility]

## 2023-03-07 NOTE — TELEPHONE ENCOUNTER
Please Approve or Refuse.   Send to Pharmacy per Pt's Request: [facility]      Next Visit Date:  4/14/2023   Last Visit Date: 10/26/2022    Hemoglobin A1C (%)   Date Value   01/16/2023 6.1 (H)   09/21/2021 5.7   10/01/2019 5.7             ( goal A1C is < 7)   BP Readings from Last 3 Encounters:   01/16/23 132/79   12/06/22 131/80   11/21/22 (!) 139/94          (goal 120/80)  BUN   Date Value Ref Range Status   01/16/2023 11 6 - 20 mg/dL Final     Creatinine   Date Value Ref Range Status   01/16/2023 0.58 0.50 - 0.90 mg/dL Final     Potassium   Date Value Ref Range Status   01/16/2023 3.8 3.7 - 5.3 mmol/L Final

## 2023-04-03 DIAGNOSIS — M51.36 LUMBAR DEGENERATIVE DISC DISEASE: ICD-10-CM

## 2023-04-03 DIAGNOSIS — M54.50 CHRONIC MIDLINE LOW BACK PAIN WITHOUT SCIATICA: ICD-10-CM

## 2023-04-03 DIAGNOSIS — M96.1 FAILED BACK SYNDROME, LUMBAR: ICD-10-CM

## 2023-04-03 DIAGNOSIS — G89.29 CHRONIC MIDLINE LOW BACK PAIN WITHOUT SCIATICA: ICD-10-CM

## 2023-04-03 DIAGNOSIS — M48.061 NEURAL FORAMINAL STENOSIS OF LUMBAR SPINE: ICD-10-CM

## 2023-04-03 DIAGNOSIS — M54.16 LUMBAR RADICULOPATHY: ICD-10-CM

## 2023-04-03 RX ORDER — TIZANIDINE 4 MG/1
TABLET ORAL
Qty: 90 TABLET | Refills: 3 | Status: SHIPPED | OUTPATIENT
Start: 2023-04-03

## 2023-04-14 ENCOUNTER — TELEPHONE (OUTPATIENT)
Dept: FAMILY MEDICINE CLINIC | Age: 48
End: 2023-04-14

## 2023-04-16 PROBLEM — I16.1 HYPERTENSIVE EMERGENCY: Status: RESOLVED | Noted: 2021-07-25 | Resolved: 2023-04-16

## 2023-04-16 PROBLEM — I16.0 HYPERTENSIVE URGENCY: Status: RESOLVED | Noted: 2021-07-26 | Resolved: 2023-04-16

## 2023-04-16 PROBLEM — R59.0 AXILLARY LYMPHADENOPATHY: Status: RESOLVED | Noted: 2022-10-26 | Resolved: 2023-04-16

## 2023-04-16 PROBLEM — T50.2X5A DIURETIC-INDUCED HYPOKALEMIA: Status: RESOLVED | Noted: 2021-08-05 | Resolved: 2023-04-16

## 2023-04-16 PROBLEM — J32.0 CHRONIC MAXILLARY SINUSITIS: Status: ACTIVE | Noted: 2023-04-16

## 2023-04-16 PROBLEM — J40 BRONCHITIS: Status: RESOLVED | Noted: 2017-09-12 | Resolved: 2023-04-16

## 2023-04-16 PROBLEM — M54.42 CHRONIC MIDLINE LOW BACK PAIN WITH BILATERAL SCIATICA: Status: ACTIVE | Noted: 2017-08-29

## 2023-04-16 PROBLEM — F33.2 SEVERE EPISODE OF RECURRENT MAJOR DEPRESSIVE DISORDER, WITHOUT PSYCHOTIC FEATURES (HCC): Status: ACTIVE | Noted: 2018-11-07

## 2023-04-16 PROBLEM — E78.2 MIXED HYPERLIPIDEMIA: Status: ACTIVE | Noted: 2023-04-16

## 2023-04-16 PROBLEM — M54.41 CHRONIC MIDLINE LOW BACK PAIN WITH BILATERAL SCIATICA: Status: ACTIVE | Noted: 2017-08-29

## 2023-04-16 PROBLEM — N13.30 HYDRONEPHROSIS OF RIGHT KIDNEY: Status: RESOLVED | Noted: 2017-12-27 | Resolved: 2023-04-16

## 2023-04-16 PROBLEM — R06.00 DYSPNEA: Status: ACTIVE | Noted: 2023-04-16

## 2023-04-16 PROBLEM — E78.5 DYSLIPIDEMIA: Status: RESOLVED | Noted: 2021-08-05 | Resolved: 2023-04-16

## 2023-04-16 PROBLEM — R11.0 NAUSEA: Status: RESOLVED | Noted: 2022-08-15 | Resolved: 2023-04-16

## 2023-04-16 PROBLEM — R73.03 PREDIABETES: Status: ACTIVE | Noted: 2023-04-16

## 2023-04-16 PROBLEM — E87.6 DIURETIC-INDUCED HYPOKALEMIA: Status: RESOLVED | Noted: 2021-08-05 | Resolved: 2023-04-16

## 2023-04-25 DIAGNOSIS — M54.16 LUMBAR RADICULOPATHY: ICD-10-CM

## 2023-04-25 DIAGNOSIS — G89.29 CHRONIC MIDLINE LOW BACK PAIN WITHOUT SCIATICA: Primary | ICD-10-CM

## 2023-04-25 DIAGNOSIS — M96.1 FAILED BACK SYNDROME, LUMBAR: ICD-10-CM

## 2023-04-25 DIAGNOSIS — M48.061 NEURAL FORAMINAL STENOSIS OF LUMBAR SPINE: ICD-10-CM

## 2023-04-25 DIAGNOSIS — M51.36 LUMBAR DEGENERATIVE DISC DISEASE: ICD-10-CM

## 2023-04-25 DIAGNOSIS — M54.50 CHRONIC MIDLINE LOW BACK PAIN WITHOUT SCIATICA: Primary | ICD-10-CM

## 2023-04-25 RX ORDER — TIZANIDINE 4 MG/1
4 TABLET ORAL EVERY 6 HOURS PRN
Qty: 120 TABLET | Refills: 0 | Status: SHIPPED | OUTPATIENT
Start: 2023-04-25 | End: 2023-05-25

## 2023-04-25 NOTE — TELEPHONE ENCOUNTER
Please Approve or Refuse.   Send to Pharmacy per Pt's Request:      Next Visit Date:  4/28/2023   Last Visit Date: 4/14/2023    Hemoglobin A1C (%)   Date Value   01/16/2023 6.1 (H)   09/21/2021 5.7   10/01/2019 5.7             ( goal A1C is < 7)   BP Readings from Last 3 Encounters:   04/14/23 (!) 134/96   01/16/23 132/79   12/06/22 131/80          (goal 120/80)  BUN   Date Value Ref Range Status   01/16/2023 11 6 - 20 mg/dL Final     Creatinine   Date Value Ref Range Status   01/16/2023 0.58 0.50 - 0.90 mg/dL Final     Potassium   Date Value Ref Range Status   01/16/2023 3.8 3.7 - 5.3 mmol/L Final

## 2023-05-17 ENCOUNTER — TELEMEDICINE (OUTPATIENT)
Age: 48
End: 2023-05-17
Payer: MEDICAID

## 2023-05-17 DIAGNOSIS — F33.1 MODERATE EPISODE OF RECURRENT MAJOR DEPRESSIVE DISORDER (HCC): Primary | ICD-10-CM

## 2023-05-17 DIAGNOSIS — F41.9 ANXIETY: ICD-10-CM

## 2023-05-17 PROCEDURE — 90791 PSYCH DIAGNOSTIC EVALUATION: CPT | Performed by: SOCIAL WORKER

## 2023-05-21 DIAGNOSIS — J31.0 CHRONIC RHINITIS: ICD-10-CM

## 2023-05-22 ENCOUNTER — HOSPITAL ENCOUNTER (OUTPATIENT)
Dept: PAIN MANAGEMENT | Age: 48
Discharge: HOME | End: 2023-05-22
Payer: MEDICAID

## 2023-05-22 VITALS
TEMPERATURE: 97.3 F | WEIGHT: 264 LBS | OXYGEN SATURATION: 96 % | RESPIRATION RATE: 20 BRPM | HEIGHT: 62 IN | HEART RATE: 70 BPM | BODY MASS INDEX: 48.58 KG/M2 | SYSTOLIC BLOOD PRESSURE: 138 MMHG | DIASTOLIC BLOOD PRESSURE: 76 MMHG

## 2023-05-22 DIAGNOSIS — M96.1 LUMBAR POST-LAMINECTOMY SYNDROME: ICD-10-CM

## 2023-05-22 DIAGNOSIS — M51.36 LUMBAR DEGENERATIVE DISC DISEASE: ICD-10-CM

## 2023-05-22 DIAGNOSIS — E66.01 CLASS 3 SEVERE OBESITY WITH BODY MASS INDEX (BMI) OF 45.0 TO 49.9 IN ADULT, UNSPECIFIED OBESITY TYPE, UNSPECIFIED WHETHER SERIOUS COMORBIDITY PRESENT (HCC): ICD-10-CM

## 2023-05-22 DIAGNOSIS — M54.16 LUMBAR RADICULOPATHY: Primary | ICD-10-CM

## 2023-05-22 PROCEDURE — 99213 OFFICE O/P EST LOW 20 MIN: CPT | Performed by: STUDENT IN AN ORGANIZED HEALTH CARE EDUCATION/TRAINING PROGRAM

## 2023-05-22 PROCEDURE — 99213 OFFICE O/P EST LOW 20 MIN: CPT

## 2023-05-22 RX ORDER — FLUTICASONE PROPIONATE 50 MCG
SPRAY, SUSPENSION (ML) NASAL
Qty: 16 G | Refills: 0 | Status: SHIPPED | OUTPATIENT
Start: 2023-05-22
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
## 2023-05-22 NOTE — TELEPHONE ENCOUNTER
Please Approve or Refuse.   Send to Pharmacy per Pt's Request:      Next Visit Date:  6/15/2023   Last Visit Date: 4/14/2023    Hemoglobin A1C (%)   Date Value   01/16/2023 6.1 (H)   09/21/2021 5.7   10/01/2019 5.7             ( goal A1C is < 7)   BP Readings from Last 3 Encounters:   04/14/23 (!) 134/96   01/16/23 132/79   12/06/22 131/80          (goal 120/80)  BUN   Date Value Ref Range Status   01/16/2023 11 6 - 20 mg/dL Final     Creatinine   Date Value Ref Range Status   01/16/2023 0.58 0.50 - 0.90 mg/dL Final     Potassium   Date Value Ref Range Status   01/16/2023 3.8 3.7 - 5.3 mmol/L Final

## 2023-05-24 DIAGNOSIS — M54.16 LUMBAR RADICULOPATHY: ICD-10-CM

## 2023-05-24 DIAGNOSIS — M48.061 NEURAL FORAMINAL STENOSIS OF LUMBAR SPINE: ICD-10-CM

## 2023-05-24 DIAGNOSIS — M96.1 FAILED BACK SYNDROME, LUMBAR: ICD-10-CM

## 2023-05-24 DIAGNOSIS — M51.36 LUMBAR DEGENERATIVE DISC DISEASE: ICD-10-CM

## 2023-05-24 DIAGNOSIS — G89.29 CHRONIC MIDLINE LOW BACK PAIN WITHOUT SCIATICA: ICD-10-CM

## 2023-05-24 DIAGNOSIS — M54.50 CHRONIC MIDLINE LOW BACK PAIN WITHOUT SCIATICA: ICD-10-CM

## 2023-05-24 RX ORDER — TIZANIDINE 4 MG/1
TABLET ORAL
Qty: 120 TABLET | Refills: 0 | Status: SHIPPED | OUTPATIENT
Start: 2023-05-24

## 2023-05-24 RX ORDER — ALBUTEROL SULFATE 90 UG/1
AEROSOL, METERED RESPIRATORY (INHALATION)
Qty: 18 G | Refills: 0 | Status: SHIPPED | OUTPATIENT
Start: 2023-05-24

## 2023-05-30 DIAGNOSIS — J30.2 SEASONAL ALLERGIES: ICD-10-CM

## 2023-05-30 DIAGNOSIS — E78.5 DYSLIPIDEMIA: ICD-10-CM

## 2023-05-30 RX ORDER — MONTELUKAST SODIUM 10 MG/1
10 TABLET ORAL NIGHTLY
Qty: 90 TABLET | Refills: 3 | Status: SHIPPED | OUTPATIENT
Start: 2023-05-30

## 2023-05-30 RX ORDER — PRAVASTATIN SODIUM 20 MG
TABLET ORAL
Qty: 90 TABLET | Refills: 3 | Status: SHIPPED | OUTPATIENT
Start: 2023-05-30

## 2023-05-30 NOTE — TELEPHONE ENCOUNTER
Please Approve or Refuse.   Send to Pharmacy per Pt's Request:      Next Visit Date:  6/15/2023   Last Visit Date: 4/14/2023    Hemoglobin A1C (%)   Date Value   01/16/2023 6.1 (H)   09/21/2021 5.7   10/01/2019 5.7             ( goal A1C is < 7)   BP Readings from Last 3 Encounters:   05/22/23 138/76   04/14/23 (!) 134/96   01/16/23 132/79          (goal 120/80)  BUN   Date Value Ref Range Status   01/16/2023 11 6 - 20 mg/dL Final     Creatinine   Date Value Ref Range Status   01/16/2023 0.58 0.50 - 0.90 mg/dL Final     Potassium   Date Value Ref Range Status   01/16/2023 3.8 3.7 - 5.3 mmol/L Final

## 2023-06-05 DIAGNOSIS — M48.061 NEURAL FORAMINAL STENOSIS OF LUMBAR SPINE: ICD-10-CM

## 2023-06-05 DIAGNOSIS — M96.1 FAILED BACK SYNDROME, LUMBAR: ICD-10-CM

## 2023-06-05 DIAGNOSIS — M51.36 LUMBAR DEGENERATIVE DISC DISEASE: ICD-10-CM

## 2023-06-05 DIAGNOSIS — E55.9 VITAMIN D DEFICIENCY: ICD-10-CM

## 2023-06-05 DIAGNOSIS — M85.89 OSTEOPENIA OF MULTIPLE SITES: ICD-10-CM

## 2023-06-05 DIAGNOSIS — G89.29 CHRONIC MIDLINE LOW BACK PAIN WITHOUT SCIATICA: ICD-10-CM

## 2023-06-05 DIAGNOSIS — M54.16 LUMBAR RADICULOPATHY: ICD-10-CM

## 2023-06-05 DIAGNOSIS — I10 ESSENTIAL HYPERTENSION: ICD-10-CM

## 2023-06-05 DIAGNOSIS — M54.50 CHRONIC MIDLINE LOW BACK PAIN WITHOUT SCIATICA: ICD-10-CM

## 2023-06-06 RX ORDER — MELOXICAM 15 MG/1
TABLET ORAL
Qty: 90 TABLET | Refills: 0 | Status: SHIPPED | OUTPATIENT
Start: 2023-06-06

## 2023-06-06 RX ORDER — METOPROLOL SUCCINATE 50 MG/1
TABLET, EXTENDED RELEASE ORAL
Qty: 90 TABLET | Refills: 0 | Status: SHIPPED | OUTPATIENT
Start: 2023-06-06

## 2023-06-06 RX ORDER — MULTIPLE VITAMINS W/ MINERALS TAB 9MG-400MCG
TAB ORAL
Qty: 90 TABLET | Refills: 0 | Status: SHIPPED | OUTPATIENT
Start: 2023-06-06

## 2023-06-07 ENCOUNTER — HOSPITAL ENCOUNTER (OUTPATIENT)
Dept: MRI IMAGING | Facility: CLINIC | Age: 48
Discharge: HOME | End: 2023-06-09
Payer: MEDICAID

## 2023-06-07 DIAGNOSIS — M54.16 LUMBAR RADICULOPATHY: ICD-10-CM

## 2023-06-07 DIAGNOSIS — M54.2 NECK PAIN: ICD-10-CM

## 2023-06-07 PROCEDURE — 72148 MRI LUMBAR SPINE W/O DYE: CPT

## 2023-06-07 PROCEDURE — 72141 MRI NECK SPINE W/O DYE: CPT

## 2023-06-20 DIAGNOSIS — J31.0 CHRONIC RHINITIS: ICD-10-CM

## 2023-06-20 RX ORDER — FLUTICASONE PROPIONATE 50 MCG
SPRAY, SUSPENSION (ML) NASAL
Qty: 16 G | Refills: 0 | Status: SHIPPED | OUTPATIENT
Start: 2023-06-20

## 2023-06-20 NOTE — TELEPHONE ENCOUNTER
Please Approve or Refuse.   Send to Pharmacy per Pt's Request: [facility]     Next Visit Date:  sent mychart    Last Visit Date: 4/14/2023    Hemoglobin A1C (%)   Date Value   01/16/2023 6.1 (H)   09/21/2021 5.7   10/01/2019 5.7             ( goal A1C is < 7)   BP Readings from Last 3 Encounters:   05/22/23 138/76   04/14/23 (!) 134/96   01/16/23 132/79          (goal 120/80)  BUN   Date Value Ref Range Status   01/16/2023 11 6 - 20 mg/dL Final     Creatinine   Date Value Ref Range Status   01/16/2023 0.58 0.50 - 0.90 mg/dL Final     Potassium   Date Value Ref Range Status   01/16/2023 3.8 3.7 - 5.3 mmol/L Final

## 2023-06-21 ENCOUNTER — TELEMEDICINE (OUTPATIENT)
Age: 48
End: 2023-06-21
Payer: MEDICAID

## 2023-06-21 DIAGNOSIS — F41.9 ANXIETY: Primary | ICD-10-CM

## 2023-06-21 PROCEDURE — 90832 PSYTX W PT 30 MINUTES: CPT | Performed by: SOCIAL WORKER

## 2023-06-23 DIAGNOSIS — M48.061 NEURAL FORAMINAL STENOSIS OF LUMBAR SPINE: ICD-10-CM

## 2023-06-23 DIAGNOSIS — E87.6 DIURETIC-INDUCED HYPOKALEMIA: ICD-10-CM

## 2023-06-23 DIAGNOSIS — I10 ESSENTIAL HYPERTENSION: ICD-10-CM

## 2023-06-23 DIAGNOSIS — M54.50 CHRONIC MIDLINE LOW BACK PAIN WITHOUT SCIATICA: ICD-10-CM

## 2023-06-23 DIAGNOSIS — T50.2X5A DIURETIC-INDUCED HYPOKALEMIA: ICD-10-CM

## 2023-06-23 DIAGNOSIS — G89.29 CHRONIC MIDLINE LOW BACK PAIN WITHOUT SCIATICA: ICD-10-CM

## 2023-06-23 DIAGNOSIS — M54.16 LUMBAR RADICULOPATHY: ICD-10-CM

## 2023-06-23 DIAGNOSIS — M96.1 FAILED BACK SYNDROME, LUMBAR: ICD-10-CM

## 2023-06-23 DIAGNOSIS — M51.36 LUMBAR DEGENERATIVE DISC DISEASE: ICD-10-CM

## 2023-06-26 RX ORDER — TIZANIDINE 4 MG/1
TABLET ORAL
Qty: 120 TABLET | Refills: 0 | Status: SHIPPED | OUTPATIENT
Start: 2023-06-26

## 2023-06-26 RX ORDER — POTASSIUM CHLORIDE 750 MG/1
TABLET, EXTENDED RELEASE ORAL
Qty: 180 TABLET | Refills: 0 | Status: SHIPPED | OUTPATIENT
Start: 2023-06-26

## 2023-06-26 RX ORDER — ALBUTEROL SULFATE 90 UG/1
AEROSOL, METERED RESPIRATORY (INHALATION)
Qty: 18 G | Refills: 0 | Status: SHIPPED | OUTPATIENT
Start: 2023-06-26

## 2023-07-27 ENCOUNTER — TELEMEDICINE (OUTPATIENT)
Dept: FAMILY MEDICINE CLINIC | Age: 48
End: 2023-07-27
Payer: MEDICAID

## 2023-07-27 DIAGNOSIS — F33.0 MILD EPISODE OF RECURRENT MAJOR DEPRESSIVE DISORDER (HCC): ICD-10-CM

## 2023-07-27 DIAGNOSIS — M51.36 LUMBAR DEGENERATIVE DISC DISEASE: ICD-10-CM

## 2023-07-27 DIAGNOSIS — M54.41 CHRONIC MIDLINE LOW BACK PAIN WITH BILATERAL SCIATICA: Primary | ICD-10-CM

## 2023-07-27 DIAGNOSIS — E55.9 VITAMIN D DEFICIENCY: ICD-10-CM

## 2023-07-27 DIAGNOSIS — M48.061 NEURAL FORAMINAL STENOSIS OF LUMBAR SPINE: ICD-10-CM

## 2023-07-27 DIAGNOSIS — M54.42 CHRONIC MIDLINE LOW BACK PAIN WITH BILATERAL SCIATICA: Primary | ICD-10-CM

## 2023-07-27 DIAGNOSIS — R73.03 PREDIABETES: ICD-10-CM

## 2023-07-27 DIAGNOSIS — G57.93 NEUROPATHY INVOLVING BOTH LOWER EXTREMITIES: ICD-10-CM

## 2023-07-27 DIAGNOSIS — G89.29 CHRONIC MIDLINE LOW BACK PAIN WITH BILATERAL SCIATICA: Primary | ICD-10-CM

## 2023-07-27 DIAGNOSIS — M96.1 FAILED BACK SYNDROME, LUMBAR: ICD-10-CM

## 2023-07-27 DIAGNOSIS — I10 ESSENTIAL HYPERTENSION: ICD-10-CM

## 2023-07-27 DIAGNOSIS — Z11.59 ENCOUNTER FOR SCREENING FOR OTHER VIRAL DISEASES: ICD-10-CM

## 2023-07-27 DIAGNOSIS — E66.01 CLASS 3 SEVERE OBESITY DUE TO EXCESS CALORIES WITH SERIOUS COMORBIDITY AND BODY MASS INDEX (BMI) OF 45.0 TO 49.9 IN ADULT (HCC): ICD-10-CM

## 2023-07-27 DIAGNOSIS — M48.061 SPINAL STENOSIS AT L4-L5 LEVEL: ICD-10-CM

## 2023-07-27 DIAGNOSIS — Z12.31 ENCOUNTER FOR SCREENING MAMMOGRAM FOR BREAST CANCER: ICD-10-CM

## 2023-07-27 PROCEDURE — 99214 OFFICE O/P EST MOD 30 MIN: CPT | Performed by: FAMILY MEDICINE

## 2023-07-27 RX ORDER — NORTRIPTYLINE HYDROCHLORIDE 10 MG/1
10-20 CAPSULE ORAL NIGHTLY
Qty: 60 CAPSULE | Refills: 3 | Status: SHIPPED | OUTPATIENT
Start: 2023-07-27

## 2023-07-27 RX ORDER — TIZANIDINE 4 MG/1
4 TABLET ORAL EVERY 6 HOURS PRN
Qty: 120 TABLET | Refills: 5 | Status: SHIPPED | OUTPATIENT
Start: 2023-07-27

## 2023-07-27 RX ORDER — PROMETHAZINE HYDROCHLORIDE 12.5 MG/1
TABLET ORAL
COMMUNITY
Start: 2023-05-09

## 2023-07-27 RX ORDER — B-COMPLEX WITH VITAMIN C
1 TABLET ORAL DAILY
Qty: 90 TABLET | Refills: 3 | Status: SHIPPED | OUTPATIENT
Start: 2023-07-27

## 2023-07-27 RX ORDER — METFORMIN HYDROCHLORIDE 500 MG/1
500 TABLET, EXTENDED RELEASE ORAL
Qty: 30 TABLET | Refills: 3 | Status: SHIPPED | OUTPATIENT
Start: 2023-07-27

## 2023-07-27 SDOH — ECONOMIC STABILITY: HOUSING INSECURITY
IN THE LAST 12 MONTHS, WAS THERE A TIME WHEN YOU DID NOT HAVE A STEADY PLACE TO SLEEP OR SLEPT IN A SHELTER (INCLUDING NOW)?: YES

## 2023-07-27 SDOH — ECONOMIC STABILITY: INCOME INSECURITY: HOW HARD IS IT FOR YOU TO PAY FOR THE VERY BASICS LIKE FOOD, HOUSING, MEDICAL CARE, AND HEATING?: VERY HARD

## 2023-07-27 SDOH — ECONOMIC STABILITY: FOOD INSECURITY: WITHIN THE PAST 12 MONTHS, YOU WORRIED THAT YOUR FOOD WOULD RUN OUT BEFORE YOU GOT MONEY TO BUY MORE.: OFTEN TRUE

## 2023-07-27 SDOH — ECONOMIC STABILITY: FOOD INSECURITY: WITHIN THE PAST 12 MONTHS, THE FOOD YOU BOUGHT JUST DIDN'T LAST AND YOU DIDN'T HAVE MONEY TO GET MORE.: OFTEN TRUE

## 2023-07-27 ASSESSMENT — PATIENT HEALTH QUESTIONNAIRE - PHQ9
3. TROUBLE FALLING OR STAYING ASLEEP: 2
10. IF YOU CHECKED OFF ANY PROBLEMS, HOW DIFFICULT HAVE THESE PROBLEMS MADE IT FOR YOU TO DO YOUR WORK, TAKE CARE OF THINGS AT HOME, OR GET ALONG WITH OTHER PEOPLE: 2
5. POOR APPETITE OR OVEREATING: 0
SUM OF ALL RESPONSES TO PHQ QUESTIONS 1-9: 8
SUM OF ALL RESPONSES TO PHQ QUESTIONS 1-9: 8
2. FEELING DOWN, DEPRESSED OR HOPELESS: 1
9. THOUGHTS THAT YOU WOULD BE BETTER OFF DEAD, OR OF HURTING YOURSELF: 0
SUM OF ALL RESPONSES TO PHQ QUESTIONS 1-9: 8
6. FEELING BAD ABOUT YOURSELF - OR THAT YOU ARE A FAILURE OR HAVE LET YOURSELF OR YOUR FAMILY DOWN: 2
SUM OF ALL RESPONSES TO PHQ9 QUESTIONS 1 & 2: 2
1. LITTLE INTEREST OR PLEASURE IN DOING THINGS: 1
4. FEELING TIRED OR HAVING LITTLE ENERGY: 1
8. MOVING OR SPEAKING SO SLOWLY THAT OTHER PEOPLE COULD HAVE NOTICED. OR THE OPPOSITE, BEING SO FIGETY OR RESTLESS THAT YOU HAVE BEEN MOVING AROUND A LOT MORE THAN USUAL: 0
7. TROUBLE CONCENTRATING ON THINGS, SUCH AS READING THE NEWSPAPER OR WATCHING TELEVISION: 1
SUM OF ALL RESPONSES TO PHQ QUESTIONS 1-9: 8

## 2023-07-27 ASSESSMENT — ANXIETY QUESTIONNAIRES
GAD7 TOTAL SCORE: 15
3. WORRYING TOO MUCH ABOUT DIFFERENT THINGS: 3
7. FEELING AFRAID AS IF SOMETHING AWFUL MIGHT HAPPEN: 0
6. BECOMING EASILY ANNOYED OR IRRITABLE: 3
1. FEELING NERVOUS, ANXIOUS, OR ON EDGE: 2
5. BEING SO RESTLESS THAT IT IS HARD TO SIT STILL: 2
2. NOT BEING ABLE TO STOP OR CONTROL WORRYING: 2
4. TROUBLE RELAXING: 3
IF YOU CHECKED OFF ANY PROBLEMS ON THIS QUESTIONNAIRE, HOW DIFFICULT HAVE THESE PROBLEMS MADE IT FOR YOU TO DO YOUR WORK, TAKE CARE OF THINGS AT HOME, OR GET ALONG WITH OTHER PEOPLE: VERY DIFFICULT

## 2023-07-27 ASSESSMENT — ENCOUNTER SYMPTOMS
NAUSEA: 0
WHEEZING: 0
BACK PAIN: 1
ABDOMINAL DISTENTION: 0
ABDOMINAL PAIN: 0
VOMITING: 0
CHEST TIGHTNESS: 0
SHORTNESS OF BREATH: 0
COUGH: 0
CONSTIPATION: 0
DIARRHEA: 0

## 2023-07-27 NOTE — PROGRESS NOTES
Visit Information    Have you changed or started any medications since your last visit including any over-the-counter medicines, vitamins, or herbal medicines? no   Have you stopped taking any of your medications? Is so, why? -  no  Are you having any side effects from any of your medications? - no    Have you seen any other physician or provider since your last visit?  no   Have you had any other diagnostic tests since your last visit? yes -    Have you been seen in the emergency room and/or had an admission in a hospital since we last saw you?  no   Have you had your routine dental cleaning in the past 6 months?  no     Do you have an active MyChart account? If no, what is the barrier?   Yes    Patient Care Team:  Victor M Kimball MD as PCP - General (Family Medicine)  Victor M Kimball MD as PCP - Empaneled Provider  HAYDEE Sotelo - CNP as Nurse Practitioner (Obstetrics & Gynecology)  Daniel Barreto MD as Consulting Physician (Neurosurgery)  Salvatore Sutherland MD (Obstetrics & Gynecology)  Abhi Sullivan MD as Surgeon (Cardiology)  Osvaldo House DO as Consulting Physician (Physical Medicine and Rehab)  Kia Burns MD as Consulting Physician (Pulmonology)    Medical History Review  Past Medical, Family, and Social History reviewed and does contribute to the patient presenting condition    Health Maintenance   Topic Date Due    COVID-19 Vaccine (1) Never done    Hepatitis C screen  Never done    Flu vaccine (1) 08/01/2023    A1C test (Diabetic or Prediabetic)  01/16/2024    Lipids  01/16/2024    Depression Monitoring  04/14/2024    DTaP/Tdap/Td vaccine (2 - Td or Tdap) 04/10/2025    Colorectal Cancer Screen  01/16/2028    Pneumococcal 0-64 years Vaccine  Completed    HIV screen  Completed    Hepatitis A vaccine  Aged Out    Hib vaccine  Aged Out    Meningococcal (ACWY) vaccine  Aged Out
07/26/2021    TRIG 183 (H) 08/12/2019    TRIG 85 04/10/2017     Lab Results   Component Value Date    HDL 29 (L) 01/16/2023    HDL 35 (L) 07/26/2021    HDL 48 05/19/2021     Lab Results   Component Value Date    LDLCHOLESTEROL 119 01/16/2023    LDLCHOLESTEROL 132 (H) 07/26/2021    LDLCHOLESTEROL 142 (H) 05/19/2021       Lab Results   Component Value Date    CHOLHDLRATIO 7.6 (H) 01/16/2023    CHOLHDLRATIO 5.5 (H) 07/26/2021    CHOLHDLRATIO 4.3 05/19/2021       Lab Results   Component Value Date    LABA1C 6.1 (H) 01/16/2023    LABA1C 5.7 09/21/2021    LABA1C 5.7 10/01/2019         Lab Results   Component Value Date    AEAXYBGC31 581 10/01/2019       Lab Results   Component Value Date    VITD25 28.6 (L) 01/16/2023       Orders Placed This Encounter   Medications    tiZANidine (ZANAFLEX) 4 MG tablet     Sig: Take 1 tablet by mouth every 6 hours as needed (back pain)     Dispense:  120 tablet     Refill:  5    nortriptyline (PAMELOR) 10 MG capsule     Sig: Take 1-2 capsules by mouth nightly ** for pain, neuropathy, insomnia, depression**     Dispense:  60 capsule     Refill:  3    B Complex Vitamins (VITAMIN B COMPLEX) TABS     Sig: Take 1 tablet by mouth daily     Dispense:  90 tablet     Refill:  3    metFORMIN (GLUCOPHAGE-XR) 500 MG extended release tablet     Sig: Take 1 tablet by mouth daily (with breakfast) ** stop Metformin for 2 days before getting contrast**     Dispense:  30 tablet     Refill:  3       Orders Placed This Encounter   Procedures    CHE ELI DIGITAL SCREEN BILATERAL     Standing Status:   Future     Standing Expiration Date:   9/27/2024    CBC with Auto Differential     Standing Status:   Future     Standing Expiration Date:   8/5/2023    Comprehensive Metabolic Panel     Standing Status:   Future     Standing Expiration Date:   8/5/2023    Protein Electrophoresis, Urine     Standing Status:   Future     Standing Expiration Date:   8/5/2023    Electrophoresis Protein, Serum     Standing Status:

## 2023-07-29 DIAGNOSIS — I10 ESSENTIAL HYPERTENSION: ICD-10-CM

## 2023-07-30 ENCOUNTER — TELEPHONE (OUTPATIENT)
Dept: FAMILY MEDICINE CLINIC | Age: 48
End: 2023-07-30

## 2023-07-30 PROBLEM — F33.2 SEVERE EPISODE OF RECURRENT MAJOR DEPRESSIVE DISORDER, WITHOUT PSYCHOTIC FEATURES (HCC): Status: RESOLVED | Noted: 2018-11-07 | Resolved: 2023-07-30

## 2023-07-30 RX ORDER — TRIAMTERENE AND HYDROCHLOROTHIAZIDE 75; 50 MG/1; MG/1
TABLET ORAL
Qty: 90 TABLET | Refills: 3 | Status: SHIPPED | OUTPATIENT
Start: 2023-07-30

## 2023-07-30 NOTE — TELEPHONE ENCOUNTER
Please Approve or Refuse.   Send to Pharmacy per Pt's Request:      Next Visit Date:  Visit date not found   Last Visit Date: 7/27/2023    Hemoglobin A1C (%)   Date Value   01/16/2023 6.1 (H)   09/21/2021 5.7   10/01/2019 5.7             ( goal A1C is < 7)   BP Readings from Last 3 Encounters:   05/22/23 138/76   04/14/23 (!) 134/96   01/16/23 132/79          (goal 120/80)  BUN   Date Value Ref Range Status   01/16/2023 11 6 - 20 mg/dL Final     Creatinine   Date Value Ref Range Status   01/16/2023 0.58 0.50 - 0.90 mg/dL Final     Potassium   Date Value Ref Range Status   01/16/2023 3.8 3.7 - 5.3 mmol/L Final

## 2023-07-31 ENCOUNTER — APPOINTMENT (OUTPATIENT)
Dept: CT IMAGING | Age: 48
End: 2023-07-31
Payer: COMMERCIAL

## 2023-07-31 ENCOUNTER — HOSPITAL ENCOUNTER (EMERGENCY)
Age: 48
Discharge: HOME | End: 2023-08-01
Attending: EMERGENCY MEDICINE
Payer: COMMERCIAL

## 2023-07-31 VITALS
HEART RATE: 77 BPM | RESPIRATION RATE: 18 BRPM | OXYGEN SATURATION: 96 % | SYSTOLIC BLOOD PRESSURE: 183 MMHG | BODY MASS INDEX: 43.9 KG/M2 | DIASTOLIC BLOOD PRESSURE: 119 MMHG | WEIGHT: 240 LBS | TEMPERATURE: 98.7 F
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
## 2023-07-31 DIAGNOSIS — S02.40DB OPEN FRACTURE OF LEFT SIDE OF MAXILLA, INITIAL ENCOUNTER (HCC): ICD-10-CM

## 2023-07-31 DIAGNOSIS — S02.85XA CLOSED FRACTURE OF ORBITAL WALL, INITIAL ENCOUNTER (HCC): Primary | ICD-10-CM

## 2023-07-31 PROCEDURE — 90714 TD VACC NO PRESV 7 YRS+ IM: CPT | Performed by: EMERGENCY MEDICINE

## 2023-07-31 PROCEDURE — 96372 THER/PROPH/DIAG INJ SC/IM: CPT

## 2023-07-31 PROCEDURE — 6360000002 HC RX W HCPCS: Performed by: EMERGENCY MEDICINE

## 2023-07-31 PROCEDURE — 6370000000 HC RX 637 (ALT 250 FOR IP): Performed by: EMERGENCY MEDICINE

## 2023-07-31 PROCEDURE — 99284 EMERGENCY DEPT VISIT MOD MDM: CPT

## 2023-07-31 PROCEDURE — 12011 RPR F/E/E/N/L/M 2.5 CM/<: CPT

## 2023-07-31 PROCEDURE — 70486 CT MAXILLOFACIAL W/O DYE: CPT

## 2023-07-31 PROCEDURE — 90471 IMMUNIZATION ADMIN: CPT | Performed by: EMERGENCY MEDICINE

## 2023-07-31 RX ORDER — MORPHINE SULFATE 4 MG/ML
4 INJECTION, SOLUTION INTRAMUSCULAR; INTRAVENOUS ONCE
Status: COMPLETED | OUTPATIENT
Start: 2023-07-31 | End: 2023-07-31

## 2023-07-31 RX ORDER — KETOROLAC TROMETHAMINE 30 MG/ML
30 INJECTION, SOLUTION INTRAMUSCULAR; INTRAVENOUS ONCE
Status: COMPLETED | OUTPATIENT
Start: 2023-07-31 | End: 2023-07-31

## 2023-07-31 RX ORDER — HYDROCODONE BITARTRATE AND ACETAMINOPHEN 5; 325 MG/1; MG/1
1 TABLET ORAL ONCE
Status: COMPLETED | OUTPATIENT
Start: 2023-07-31 | End: 2023-07-31

## 2023-07-31 RX ADMIN — CLOSTRIDIUM TETANI TOXOID ANTIGEN (FORMALDEHYDE INACTIVATED) AND CORYNEBACTERIUM DIPHTHERIAE TOXOID ANTIGEN (FORMALDEHYDE INACTIVATED) 0.5 ML: 5; 2 INJECTION, SUSPENSION INTRAMUSCULAR at 23:14

## 2023-07-31 RX ADMIN — Medication 3 ML: at 23:16

## 2023-07-31 RX ADMIN — KETOROLAC TROMETHAMINE 30 MG: 30 INJECTION, SOLUTION INTRAMUSCULAR; INTRAVENOUS at 23:15

## 2023-07-31 RX ADMIN — HYDROCODONE BITARTRATE AND ACETAMINOPHEN 1 TABLET: 5; 325 TABLET ORAL at 22:16

## 2023-07-31 RX ADMIN — MORPHINE SULFATE 4 MG: 4 INJECTION, SOLUTION INTRAMUSCULAR; INTRAVENOUS at 23:15

## 2023-07-31 ASSESSMENT — PAIN SCALES - GENERAL
PAINLEVEL_OUTOF10: 10

## 2023-07-31 ASSESSMENT — PAIN - FUNCTIONAL ASSESSMENT: PAIN_FUNCTIONAL_ASSESSMENT: 0-10

## 2023-07-31 NOTE — TELEPHONE ENCOUNTER
7/31/2023  Patient is MY-CHART STATUS ACTIVE. A My-Chart message has been sent out to patient. Per Provider request to deliver message to patient in regard to scheduling appt . No future appointments.

## 2023-08-01 PROCEDURE — 6370000000 HC RX 637 (ALT 250 FOR IP): Performed by: EMERGENCY MEDICINE

## 2023-08-01 PROCEDURE — 12011 RPR F/E/E/N/L/M 2.5 CM/<: CPT

## 2023-08-01 RX ORDER — CEPHALEXIN 500 MG/1
500 CAPSULE ORAL ONCE
Status: COMPLETED | OUTPATIENT
Start: 2023-08-01 | End: 2023-08-01

## 2023-08-01 RX ORDER — OXYCODONE HYDROCHLORIDE AND ACETAMINOPHEN 5; 325 MG/1; MG/1
1 TABLET ORAL ONCE
Status: DISCONTINUED | OUTPATIENT
Start: 2023-08-01 | End: 2023-08-01

## 2023-08-01 RX ORDER — CEPHALEXIN 500 MG/1
500 CAPSULE ORAL 2 TIMES DAILY
Qty: 16 CAPSULE | Refills: 0 | Status: SHIPPED | OUTPATIENT
Start: 2023-08-01 | End: 2023-08-09

## 2023-08-01 RX ORDER — OXYCODONE HYDROCHLORIDE AND ACETAMINOPHEN 5; 325 MG/1; MG/1
1 TABLET ORAL EVERY 6 HOURS PRN
Qty: 15 TABLET | Refills: 0 | Status: SHIPPED | OUTPATIENT
Start: 2023-08-01 | End: 2023-08-04 | Stop reason: SDUPTHER

## 2023-08-01 RX ORDER — OXYCODONE HYDROCHLORIDE AND ACETAMINOPHEN 5; 325 MG/1; MG/1
2 TABLET ORAL ONCE
Status: COMPLETED | OUTPATIENT
Start: 2023-08-01 | End: 2023-08-01

## 2023-08-01 RX ORDER — OXYMETAZOLINE HYDROCHLORIDE 0.05 G/100ML
2 SPRAY NASAL 2 TIMES DAILY
Qty: 12 ML | Refills: 0 | Status: SHIPPED | OUTPATIENT
Start: 2023-08-01 | End: 2023-08-31

## 2023-08-01 RX ADMIN — CEPHALEXIN 500 MG: 500 CAPSULE ORAL at 01:28

## 2023-08-01 RX ADMIN — OXYCODONE AND ACETAMINOPHEN 2 TABLET: 5; 325 TABLET ORAL at 01:27

## 2023-08-01 ASSESSMENT — PAIN SCALES - GENERAL
PAINLEVEL_OUTOF10: 5
PAINLEVEL_OUTOF10: 10

## 2023-08-01 NOTE — DISCHARGE INSTRUCTIONS
As we discussed try to avoid blowing the nose if at all possible. Use Afrin to help with any congestion and bleeding. Take antibiotics as prescribed. It is important you follow-up with both the ophthalmologist and plastic surgeon listed here. If symptoms worsen or you start to have worsening pain and swelling right in and around the eye changes in vision or significant worsening of pain and recommend urgent reevaluation.
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
## 2023-08-02 ENCOUNTER — TELEPHONE (OUTPATIENT)
Dept: FAMILY MEDICINE CLINIC | Age: 48
End: 2023-08-02
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
## 2023-08-03 DIAGNOSIS — S02.85XA CLOSED FRACTURE OF ORBITAL WALL, INITIAL ENCOUNTER (HCC): ICD-10-CM

## 2023-08-03 RX ORDER — OXYCODONE HYDROCHLORIDE AND ACETAMINOPHEN 5; 325 MG/1; MG/1
1 TABLET ORAL EVERY 6 HOURS PRN
Qty: 15 TABLET | Refills: 0 | OUTPATIENT
Start: 2023-08-03 | End: 2023-08-08
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
## 2023-08-04 ENCOUNTER — OFFICE VISIT (OUTPATIENT)
Dept: FAMILY MEDICINE CLINIC | Age: 48
End: 2023-08-04

## 2023-08-04 ENCOUNTER — TELEPHONE (OUTPATIENT)
Dept: FAMILY MEDICINE CLINIC | Age: 48
End: 2023-08-04

## 2023-08-04 ENCOUNTER — HOSPITAL ENCOUNTER (EMERGENCY)
Age: 48
Discharge: HOME | End: 2023-08-04
Attending: EMERGENCY MEDICINE
Payer: COMMERCIAL

## 2023-08-04 VITALS
HEIGHT: 63 IN | DIASTOLIC BLOOD PRESSURE: 84 MMHG | SYSTOLIC BLOOD PRESSURE: 136 MMHG | HEART RATE: 70 BPM | WEIGHT: 263.6 LBS | BODY MASS INDEX: 46.71 KG/M2 | OXYGEN SATURATION: 96 %

## 2023-08-04 VITALS
HEART RATE: 76 BPM | BODY MASS INDEX: 42.68 KG/M2 | WEIGHT: 250 LBS | SYSTOLIC BLOOD PRESSURE: 203 MMHG | TEMPERATURE: 98.3 F | HEIGHT: 64 IN | RESPIRATION RATE: 16 BRPM | DIASTOLIC BLOOD PRESSURE: 96 MMHG | OXYGEN SATURATION: 95 %

## 2023-08-04 DIAGNOSIS — S05.92XA NON-PENETRATING INJURY OF LEFT EYE, INITIAL ENCOUNTER: ICD-10-CM

## 2023-08-04 DIAGNOSIS — H92.02 LEFT EAR PAIN: ICD-10-CM

## 2023-08-04 DIAGNOSIS — K21.9 GASTROESOPHAGEAL REFLUX DISEASE WITHOUT ESOPHAGITIS: ICD-10-CM

## 2023-08-04 DIAGNOSIS — S01.81XA FACIAL LACERATION, INITIAL ENCOUNTER: ICD-10-CM

## 2023-08-04 DIAGNOSIS — Z76.0 ENCOUNTER FOR MEDICATION REFILL: Primary | ICD-10-CM
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
## 2023-08-04 DIAGNOSIS — S02.92XA MULTIPLE CLOSED FRACTURES OF FACIAL BONE, INITIAL ENCOUNTER (HCC): ICD-10-CM

## 2023-08-04 DIAGNOSIS — R04.0 EPISTAXIS DUE TO TRAUMA: ICD-10-CM

## 2023-08-04 DIAGNOSIS — J20.9 ACUTE BRONCHITIS DUE TO INFECTION: Primary | ICD-10-CM

## 2023-08-04 DIAGNOSIS — S02.85XA CLOSED FRACTURE OF LEFT ORBIT, INITIAL ENCOUNTER (HCC): Primary | ICD-10-CM

## 2023-08-04 DIAGNOSIS — H11.32 CONJUNCTIVAL HEMORRHAGE OF LEFT EYE: ICD-10-CM

## 2023-08-04 PROBLEM — M51.26 DISPLACEMENT OF LUMBAR INTERVERTEBRAL DISC WITHOUT MYELOPATHY: Status: ACTIVE | Noted: 2023-08-04

## 2023-08-04 PROBLEM — I51.7 CARDIOMEGALY: Status: ACTIVE | Noted: 2023-08-04

## 2023-08-04 PROBLEM — H93.13 TINNITUS OF BOTH EARS: Status: ACTIVE | Noted: 2023-08-04

## 2023-08-04 PROBLEM — N39.46 MIXED URGE AND STRESS INCONTINENCE: Status: ACTIVE | Noted: 2023-08-04

## 2023-08-04 PROBLEM — M17.11 PRIMARY OSTEOARTHRITIS OF RIGHT KNEE: Status: ACTIVE | Noted: 2023-08-04

## 2023-08-04 PROBLEM — G62.9 NEUROPATHY: Status: ACTIVE | Noted: 2023-08-04

## 2023-08-04 PROBLEM — R39.82 CHRONIC BLADDER PAIN: Status: ACTIVE | Noted: 2023-08-04

## 2023-08-04 PROBLEM — H90.3 SENSORINEURAL HEARING LOSS (SNHL) OF BOTH EARS: Status: ACTIVE | Noted: 2023-08-04

## 2023-08-04 PROCEDURE — 6370000000 HC RX 637 (ALT 250 FOR IP): Performed by: EMERGENCY MEDICINE

## 2023-08-04 PROCEDURE — 99283 EMERGENCY DEPT VISIT LOW MDM: CPT

## 2023-08-04 RX ORDER — POLYVINYL ALCOHOL, POVIDONE .5; .6 G/100ML; G/100ML
1 SOLUTION/ DROPS INTRAOCULAR 4 TIMES DAILY PRN
Qty: 30 ML | Refills: 0 | Status: SHIPPED | OUTPATIENT
Start: 2023-08-04

## 2023-08-04 RX ORDER — OMEPRAZOLE 40 MG/1
CAPSULE, DELAYED RELEASE ORAL
Qty: 90 CAPSULE | Refills: 1 | Status: SHIPPED | OUTPATIENT
Start: 2023-08-04

## 2023-08-04 RX ORDER — HYDROCODONE BITARTRATE AND ACETAMINOPHEN 5; 325 MG/1; MG/1
1 TABLET ORAL EVERY 6 HOURS PRN
Qty: 10 TABLET | Refills: 0 | Status: SHIPPED | OUTPATIENT
Start: 2023-08-04 | End: 2023-08-04 | Stop reason: RX

## 2023-08-04 RX ORDER — OFLOXACIN 3 MG/ML
5 SOLUTION AURICULAR (OTIC) 2 TIMES DAILY
Qty: 5 ML | Refills: 0 | Status: SHIPPED | OUTPATIENT
Start: 2023-08-04 | End: 2023-08-11

## 2023-08-04 RX ORDER — HYDROCODONE BITARTRATE AND ACETAMINOPHEN 5; 325 MG/1; MG/1
1 TABLET ORAL ONCE
Status: COMPLETED | OUTPATIENT
Start: 2023-08-04 | End: 2023-08-04

## 2023-08-04 RX ORDER — IBUPROFEN 800 MG/1
800 TABLET ORAL EVERY 8 HOURS PRN
Qty: 90 TABLET | Refills: 0 | Status: SHIPPED | OUTPATIENT
Start: 2023-08-04

## 2023-08-04 RX ORDER — ACETAMINOPHEN 500 MG
500 TABLET ORAL EVERY 6 HOURS PRN
Qty: 120 TABLET | Refills: 3 | Status: SHIPPED | OUTPATIENT
Start: 2023-08-04

## 2023-08-04 RX ORDER — KETOROLAC TROMETHAMINE 30 MG/ML
30 INJECTION, SOLUTION INTRAMUSCULAR; INTRAVENOUS ONCE
Status: COMPLETED | OUTPATIENT
Start: 2023-08-04 | End: 2023-08-04

## 2023-08-04 RX ORDER — ONDANSETRON 4 MG/1
4 TABLET, FILM COATED ORAL EVERY 6 HOURS PRN
Qty: 24 TABLET | Refills: 0 | Status: SHIPPED | OUTPATIENT
Start: 2023-08-04 | End: 2023-08-10

## 2023-08-04 RX ORDER — ONDANSETRON 4 MG/1
4 TABLET, ORALLY DISINTEGRATING ORAL 3 TIMES DAILY PRN
Qty: 10 TABLET | Refills: 0 | Status: SHIPPED | OUTPATIENT
Start: 2023-08-04

## 2023-08-04 RX ORDER — OXYCODONE HYDROCHLORIDE AND ACETAMINOPHEN 5; 325 MG/1; MG/1
1 TABLET ORAL EVERY 6 HOURS PRN
Qty: 15 TABLET | Refills: 0 | Status: SHIPPED | OUTPATIENT
Start: 2023-08-04 | End: 2023-08-09

## 2023-08-04 RX ORDER — ONDANSETRON 4 MG/1
4 TABLET, ORALLY DISINTEGRATING ORAL EVERY 8 HOURS PRN
Status: DISCONTINUED | OUTPATIENT
Start: 2023-08-04 | End: 2023-08-04 | Stop reason: HOSPADM

## 2023-08-04 RX ORDER — NEOMYCIN SULFATE, POLYMYXIN B SULFATE, BACITRACIN ZINC, HYDROCORTISONE 3.5; 10000; 400; 1 MG/G; [USP'U]/G; [USP'U]/G; MG/G
OINTMENT OPHTHALMIC 2 TIMES DAILY
Qty: 3.5 G | Refills: 0 | Status: SHIPPED | OUTPATIENT
Start: 2023-08-04 | End: 2023-08-14

## 2023-08-04 RX ORDER — FAMOTIDINE 40 MG/1
TABLET, FILM COATED ORAL
Qty: 90 TABLET | Refills: 0 | Status: SHIPPED | OUTPATIENT
Start: 2023-08-04

## 2023-08-04 RX ORDER — ECHINACEA PURPUREA EXTRACT 125 MG
2 TABLET ORAL PRN
Qty: 104 ML | Refills: 0 | Status: SHIPPED | OUTPATIENT
Start: 2023-08-04

## 2023-08-04 RX ADMIN — HYDROCODONE BITARTRATE AND ACETAMINOPHEN 1 TABLET: 5; 325 TABLET ORAL at 02:10

## 2023-08-04 RX ADMIN — KETOROLAC TROMETHAMINE 30 MG: 30 INJECTION, SOLUTION INTRAMUSCULAR; INTRAVENOUS at 16:13

## 2023-08-04 RX ADMIN — ONDANSETRON 4 MG: 4 TABLET, ORALLY DISINTEGRATING ORAL at 02:10

## 2023-08-04 ASSESSMENT — ENCOUNTER SYMPTOMS
ABDOMINAL PAIN: 0
VOMITING: 0
TROUBLE SWALLOWING: 0
SORE THROAT: 0
NAUSEA: 0
FACIAL SWELLING: 1
WHEEZING: 0
EYE DISCHARGE: 1
EYE REDNESS: 1
DIARRHEA: 0
SHORTNESS OF BREATH: 0
COUGH: 0
CONSTIPATION: 0
CHEST TIGHTNESS: 0
PHOTOPHOBIA: 1
ABDOMINAL DISTENTION: 0
EYE PAIN: 1

## 2023-08-04 ASSESSMENT — PAIN - FUNCTIONAL ASSESSMENT: PAIN_FUNCTIONAL_ASSESSMENT: 0-10

## 2023-08-04 ASSESSMENT — PAIN SCALES - GENERAL
PAINLEVEL_OUTOF10: 10
PAINLEVEL_OUTOF10: 10
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
## 2023-08-07 ENCOUNTER — TELEPHONE (OUTPATIENT)
Dept: FAMILY MEDICINE CLINIC | Age: 48
End: 2023-08-07

## 2023-08-07 RX ORDER — AZITHROMYCIN 250 MG/1
TABLET, FILM COATED ORAL
Qty: 6 TABLET | Refills: 0 | Status: SHIPPED | OUTPATIENT
Start: 2023-08-07 | End: 2023-08-12

## 2023-08-07 RX ORDER — GUAIFENESIN 600 MG/1
600 TABLET, EXTENDED RELEASE ORAL 2 TIMES DAILY PRN
Qty: 30 TABLET | Refills: 0 | Status: SHIPPED | OUTPATIENT
Start: 2023-08-07
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
## 2023-08-07 NOTE — TELEPHONE ENCOUNTER
Has a cold Saturday coughing up green and grey phlegm with fever she said she has not yet made the ophthalmologist appointment due to her being sick she is asking if something can be called in to her pharmacy
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
## 2023-08-07 NOTE — TELEPHONE ENCOUNTER
Noted. Thank you! Please advise patient, eye patch not covered, to get one from over-the-counter    Did she go to ENT?   Did she make the appointment with ophthalmologist?    I did send WINSTON-James to the pharmacy for her bronchitis and I referred her to our care coordinator    Future Appointments   Date Time Provider [address]   8/22/2023 11:00 AM SCHEDULE, [facility] TRAUMA [facility]
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
## 2023-08-09 ENCOUNTER — CARE COORDINATION (OUTPATIENT)
Dept: CARE COORDINATION | Age: 48
End: 2023-08-09

## 2023-08-09 ENCOUNTER — TELEPHONE (OUTPATIENT)
Dept: FAMILY MEDICINE CLINIC | Age: 48
End: 2023-08-09
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
## 2023-08-10 ENCOUNTER — TELEPHONE (OUTPATIENT)
Dept: FAMILY MEDICINE CLINIC | Age: 48
End: 2023-08-10

## 2023-08-10 NOTE — TELEPHONE ENCOUNTER
Pt called in and is requesting a new order for the oxyCODONE-acetaminophen (PERCOCET) 5-325 MG per tablet  Pt states the Tylenol and Ibuprofen are not doing anything for the pain at this time. Please Approve or Refuse.   Send to Pharmacy per Pt's Request:      Next Visit Date:  11/10/2023  Last Visit Date: 8/4/2023    Hemoglobin A1C (%)   Date Value   01/16/2023 6.1 (H)   09/21/2021 5.7   10/01/2019 5.7             ( goal A1C is < 7)   BP Readings from Last 3 Encounters:   08/04/23 136/84   08/04/23 (!) 203/96   07/31/23 (!) 183/119          (goal 120/80)  BUN   Date Value Ref Range Status   01/16/2023 11 6 - 20 mg/dL Final     Creatinine   Date Value Ref Range Status   01/16/2023 0.58 0.50 - 0.90 mg/dL Final     Potassium   Date Value Ref Range Status   01/16/2023 3.8 3.7 - 5.3 mmol/L Final
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
## 2023-08-11 ENCOUNTER — TELEPHONE (OUTPATIENT)
Dept: INTERVENTIONAL RADIOLOGY/VASCULAR | Age: 48
End: 2023-08-11

## 2023-08-15 ENCOUNTER — OFFICE VISIT (OUTPATIENT)
Dept: BURN CARE | Age: 48
End: 2023-08-15
Payer: COMMERCIAL

## 2023-08-15 VITALS — HEIGHT: 63 IN | WEIGHT: 263 LBS | BODY MASS INDEX: 46.6 KG/M2

## 2023-08-15 DIAGNOSIS — S02.40BA: Primary | ICD-10-CM

## 2023-08-15 DIAGNOSIS — S02.842A FRACTURE OF LATERAL ORBITAL WALL, LEFT SIDE, INITIAL ENCOUNTER FOR CLOSED FRACTURE (HCC): ICD-10-CM

## 2023-08-15 PROCEDURE — G8427 DOCREV CUR MEDS BY ELIG CLIN: HCPCS | Performed by: PLASTIC SURGERY

## 2023-08-15 PROCEDURE — G8417 CALC BMI ABV UP PARAM F/U: HCPCS | Performed by: PLASTIC SURGERY

## 2023-08-15 PROCEDURE — 99203 OFFICE O/P NEW LOW 30 MIN: CPT | Performed by: PLASTIC SURGERY

## 2023-08-15 PROCEDURE — 4004F PT TOBACCO SCREEN RCVD TLK: CPT | Performed by: PLASTIC SURGERY

## 2023-08-15 PROCEDURE — 99204 OFFICE O/P NEW MOD 45 MIN: CPT | Performed by: PLASTIC SURGERY

## 2023-08-15 RX ORDER — OXYCODONE HYDROCHLORIDE AND ACETAMINOPHEN 5; 325 MG/1; MG/1
1 TABLET ORAL
Qty: 5 TABLET | Refills: 0 | Status: ON HOLD | OUTPATIENT
Start: 2023-08-15 | End: 2023-08-19 | Stop reason: HOSPADM
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
## 2023-08-17 ENCOUNTER — TELEPHONE (OUTPATIENT)
Dept: BURN CARE | Age: 48
End: 2023-08-17

## 2023-08-17 ENCOUNTER — TELEPHONE (OUTPATIENT)
Dept: FAMILY MEDICINE CLINIC | Age: 48
End: 2023-08-17

## 2023-08-17 DIAGNOSIS — A09 ACUTE INFECTIOUS DIARRHEA: ICD-10-CM

## 2023-08-17 DIAGNOSIS — J20.9 ACUTE BRONCHITIS DUE TO INFECTION: Primary | ICD-10-CM

## 2023-08-17 RX ORDER — CEFUROXIME AXETIL 500 MG/1
500 TABLET ORAL 2 TIMES DAILY
Qty: 14 TABLET | Refills: 0 | Status: ON HOLD | OUTPATIENT
Start: 2023-08-17 | End: 2023-08-19 | Stop reason: HOSPADM

## 2023-08-17 RX ORDER — L. ACIDOPHILUS/L.BULGARICUS 1MM CELL
2 TABLET ORAL DAILY
Qty: 180 TABLET | Refills: 0 | Status: ON HOLD | OUTPATIENT
Start: 2023-08-17 | End: 2023-08-19 | Stop reason: HOSPADM

## 2023-08-17 NOTE — TELEPHONE ENCOUNTER
Patient is scheduled for surgery on 8/22 at 2:00 PM at [facility]. Talked to patient and gave her the date, time to arrive, location, NPO after  midnight and to make sure she has a . Patient confirmed and verbalized understanding.
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
## 2023-08-17 NOTE — TELEPHONE ENCOUNTER
SPOKE WITH PATIENT, STATED: YES TOOK AND FINISHED ANTIBIOTIC, PATIENT WAS UNABLE TO COUNT LOOSE STOOLS DUE TO SHE HAS BEEN URINATING, LOOSING HER BOWELS, DIARRHEA THE PAST FEW DAYS, STATED MUCUS IS GREEN, GREY, BLOODY IN COLOR. COUGHING VERY BAD PAIN 10/10 FROM PRESSURE WHEN COUGHING. STATED SHE DID CANCEL SURGERY DUE TO SHE DOESN'T WANT TO HAVE SURGERY AND FEEL THE WAY SHE DOES RIGHT NOW. STATED SHE IS VERY SICK AND NOT FEELING GOOD ENOUGH FOR THIS. THERE IS A NOTE PLACED FROM SURGEONS OFFICE WHICH THE PATIENT DID SEE. SHE IS VERY UPSET ABOUT THE NOTE BECAUSE THE WRITER WHO PLACED THE NOTE SAID THINGS THAT WERE NOT TRUE AND THE PATIENT DIDN'T SAY.  SHE DID SAY SHE WAS UPSET BECAUSE SHE WOULD LIKE TO MEET HER SURGEON AND ALSO FEEL BETTER FOR BEFORE SURGERY

## 2023-08-18 ENCOUNTER — HOSPITAL ENCOUNTER (INPATIENT)
Age: 48
LOS: 1 days | Discharge: HOME | DRG: 139 | End: 2023-08-19
Attending: EMERGENCY MEDICINE | Admitting: INTERNAL MEDICINE
Payer: COMMERCIAL

## 2023-08-18 ENCOUNTER — APPOINTMENT (OUTPATIENT)
Dept: CT IMAGING | Age: 48
DRG: 139 | End: 2023-08-18
Payer: COMMERCIAL

## 2023-08-18 ENCOUNTER — TELEPHONE (OUTPATIENT)
Dept: BEHAVIORAL/MENTAL HEALTH CLINIC | Age: 48
End: 2023-08-18

## 2023-08-18 ENCOUNTER — TELEPHONE (OUTPATIENT)
Dept: FAMILY MEDICINE CLINIC | Age: 48
End: 2023-08-18

## 2023-08-18 ENCOUNTER — APPOINTMENT (OUTPATIENT)
Dept: GENERAL RADIOLOGY | Age: 48
DRG: 139 | End: 2023-08-18
Payer: COMMERCIAL

## 2023-08-18 DIAGNOSIS — J18.9 PNEUMONITIS: Primary | ICD-10-CM

## 2023-08-18 PROBLEM — J98.4 PNEUMONITIS: Status: ACTIVE | Noted: 2023-08-18

## 2023-08-18 PROBLEM — E87.6 HYPOKALEMIA: Status: ACTIVE | Noted: 2023-08-18

## 2023-08-18 LAB
ANION GAP SERPL CALCULATED.3IONS-SCNC: 16 MMOL/L (ref 9–17)
BASOPHILS # BLD: 0.09 K/UL (ref 0–0.2)
BASOPHILS NFR BLD: 1 % (ref 0–2)
BUN SERPL-MCNC: 14 MG/DL (ref 6–20)
BUN/CREAT SERPL: 23 (ref 9–20)
CALCIUM SERPL-MCNC: 9.4 MG/DL (ref 8.6–10.4)
CHLORIDE SERPL-SCNC: 99 MMOL/L (ref 98–107)
CO2 SERPL-SCNC: 24 MMOL/L (ref 20–31)
CREAT SERPL-MCNC: 0.6 MG/DL (ref 0.5–0.9)
EOSINOPHIL # BLD: 0.2 K/UL (ref 0–0.44)
EOSINOPHILS RELATIVE PERCENT: 1 % (ref 1–4)
ERYTHROCYTE [DISTWIDTH] IN BLOOD BY AUTOMATED COUNT: 13.2 % (ref 11.8–14.4)
FLUAV AG SPEC QL: NEGATIVE
FLUBV AG SPEC QL: NEGATIVE
GFR SERPL CREATININE-BSD FRML MDRD: >60 ML/MIN/1.73M2
GLUCOSE SERPL-MCNC: 165 MG/DL (ref 70–99)
HCT VFR BLD AUTO: 41.4 % (ref 36.3–47.1)
HGB BLD-MCNC: 14 G/DL (ref 11.9–15.1)
IMM GRANULOCYTES # BLD AUTO: 0.15 K/UL (ref 0–0.3)
IMM GRANULOCYTES NFR BLD: 1 %
LYMPHOCYTES NFR BLD: 3.28 K/UL (ref 1.1–3.7)
LYMPHOCYTES RELATIVE PERCENT: 18 % (ref 24–43)
MCH RBC QN AUTO: 31.9 PG (ref 25.2–33.5)
MCHC RBC AUTO-ENTMCNC: 33.8 G/DL (ref 28.4–34.8)
MCV RBC AUTO: 94.3 FL (ref 82.6–102.9)
MONOCYTES NFR BLD: 1.01 K/UL (ref 0.1–1.2)
MONOCYTES NFR BLD: 6 % (ref 3–12)
NEUTROPHILS NFR BLD: 73 % (ref 36–65)
NEUTS SEG NFR BLD: 13.78 K/UL (ref 1.5–8.1)
NRBC BLD-RTO: 0 PER 100 WBC
PLATELET # BLD AUTO: 365 K/UL (ref 138–453)
PMV BLD AUTO: 9.1 FL (ref 8.1–13.5)
POTASSIUM SERPL-SCNC: 3.4 MMOL/L (ref 3.7–5.3)
RBC # BLD AUTO: 4.39 M/UL (ref 3.95–5.11)
S PYO AG THROAT QL: NEGATIVE
SARS-COV-2 RDRP RESP QL NAA+PROBE: NOT DETECTED
SODIUM SERPL-SCNC: 139 MMOL/L (ref 135–144)
SPECIMEN DESCRIPTION: NORMAL
SPECIMEN SOURCE: NORMAL
WBC OTHER # BLD: 18.5 K/UL (ref 3.5–11.3)

## 2023-08-18 PROCEDURE — 96375 TX/PRO/DX INJ NEW DRUG ADDON: CPT

## 2023-08-18 PROCEDURE — 96372 THER/PROPH/DIAG INJ SC/IM: CPT

## 2023-08-18 PROCEDURE — 71046 X-RAY EXAM CHEST 2 VIEWS: CPT

## 2023-08-18 PROCEDURE — 87635 SARS-COV-2 COVID-19 AMP PRB: CPT

## 2023-08-18 PROCEDURE — G0378 HOSPITAL OBSERVATION PER HR: HCPCS

## 2023-08-18 PROCEDURE — 94640 AIRWAY INHALATION TREATMENT: CPT

## 2023-08-18 PROCEDURE — 6360000002 HC RX W HCPCS: Performed by: NURSE PRACTITIONER

## 2023-08-18 PROCEDURE — 96367 TX/PROPH/DG ADDL SEQ IV INF: CPT

## 2023-08-18 PROCEDURE — 6370000000 HC RX 637 (ALT 250 FOR IP): Performed by: NURSE PRACTITIONER

## 2023-08-18 PROCEDURE — 71250 CT THORAX DX C-: CPT

## 2023-08-18 PROCEDURE — 85025 COMPLETE CBC W/AUTO DIFF WBC: CPT

## 2023-08-18 PROCEDURE — 1200000000 HC SEMI PRIVATE

## 2023-08-18 PROCEDURE — 6370000000 HC RX 637 (ALT 250 FOR IP): Performed by: PHYSICIAN ASSISTANT

## 2023-08-18 PROCEDURE — 2580000003 HC RX 258: Performed by: NURSE PRACTITIONER

## 2023-08-18 PROCEDURE — 99222 1ST HOSP IP/OBS MODERATE 55: CPT | Performed by: NURSE PRACTITIONER

## 2023-08-18 PROCEDURE — 36415 COLL VENOUS BLD VENIPUNCTURE: CPT

## 2023-08-18 PROCEDURE — 96365 THER/PROPH/DIAG IV INF INIT: CPT

## 2023-08-18 PROCEDURE — 87040 BLOOD CULTURE FOR BACTERIA: CPT

## 2023-08-18 PROCEDURE — 80048 BASIC METABOLIC PNL TOTAL CA: CPT

## 2023-08-18 PROCEDURE — 96376 TX/PRO/DX INJ SAME DRUG ADON: CPT

## 2023-08-18 PROCEDURE — 87880 STREP A ASSAY W/OPTIC: CPT

## 2023-08-18 PROCEDURE — 99285 EMERGENCY DEPT VISIT HI MDM: CPT

## 2023-08-18 PROCEDURE — 87804 INFLUENZA ASSAY W/OPTIC: CPT

## 2023-08-18 PROCEDURE — 6360000002 HC RX W HCPCS: Performed by: PHYSICIAN ASSISTANT

## 2023-08-18 RX ORDER — POTASSIUM CHLORIDE 7.45 MG/ML
10 INJECTION INTRAVENOUS PRN
Status: DISCONTINUED | OUTPATIENT
Start: 2023-08-18 | End: 2023-08-19 | Stop reason: HOSPADM

## 2023-08-18 RX ORDER — LOSARTAN POTASSIUM 100 MG/1
100 TABLET ORAL DAILY
Status: DISCONTINUED | OUTPATIENT
Start: 2023-08-18 | End: 2023-08-19 | Stop reason: HOSPADM

## 2023-08-18 RX ORDER — SODIUM CHLORIDE 0.9 % (FLUSH) 0.9 %
5-40 SYRINGE (ML) INJECTION EVERY 12 HOURS SCHEDULED
Status: DISCONTINUED | OUTPATIENT
Start: 2023-08-18 | End: 2023-08-19 | Stop reason: HOSPADM

## 2023-08-18 RX ORDER — CETIRIZINE HYDROCHLORIDE 10 MG/1
10 TABLET ORAL DAILY
Status: DISCONTINUED | OUTPATIENT
Start: 2023-08-18 | End: 2023-08-19 | Stop reason: HOSPADM

## 2023-08-18 RX ORDER — ACETAMINOPHEN 650 MG/1
650 SUPPOSITORY RECTAL EVERY 6 HOURS PRN
Status: DISCONTINUED | OUTPATIENT
Start: 2023-08-18 | End: 2023-08-19 | Stop reason: HOSPADM

## 2023-08-18 RX ORDER — ALBUTEROL SULFATE 90 UG/1
2 AEROSOL, METERED RESPIRATORY (INHALATION) EVERY 6 HOURS PRN
Status: DISCONTINUED | OUTPATIENT
Start: 2023-08-18 | End: 2023-08-19 | Stop reason: HOSPADM

## 2023-08-18 RX ORDER — METFORMIN HYDROCHLORIDE 500 MG/1
500 TABLET, EXTENDED RELEASE ORAL
Status: DISCONTINUED | OUTPATIENT
Start: 2023-08-19 | End: 2023-08-19 | Stop reason: HOSPADM

## 2023-08-18 RX ORDER — POTASSIUM CHLORIDE 20 MEQ/1
10 TABLET, EXTENDED RELEASE ORAL 2 TIMES DAILY
Status: DISCONTINUED | OUTPATIENT
Start: 2023-08-18 | End: 2023-08-19 | Stop reason: HOSPADM

## 2023-08-18 RX ORDER — HYDROCODONE BITARTRATE AND ACETAMINOPHEN 5; 325 MG/1; MG/1
2 TABLET ORAL EVERY 4 HOURS PRN
Status: DISCONTINUED | OUTPATIENT
Start: 2023-08-18 | End: 2023-08-19 | Stop reason: HOSPADM

## 2023-08-18 RX ORDER — ONDANSETRON 2 MG/ML
4 INJECTION INTRAMUSCULAR; INTRAVENOUS EVERY 6 HOURS PRN
Status: DISCONTINUED | OUTPATIENT
Start: 2023-08-18 | End: 2023-08-19 | Stop reason: HOSPADM

## 2023-08-18 RX ORDER — METOPROLOL SUCCINATE 50 MG/1
50 TABLET, EXTENDED RELEASE ORAL DAILY
Status: DISCONTINUED | OUTPATIENT
Start: 2023-08-18 | End: 2023-08-19 | Stop reason: HOSPADM

## 2023-08-18 RX ORDER — HYDROCODONE BITARTRATE AND ACETAMINOPHEN 5; 325 MG/1; MG/1
1 TABLET ORAL EVERY 4 HOURS PRN
Status: DISCONTINUED | OUTPATIENT
Start: 2023-08-18 | End: 2023-08-19 | Stop reason: HOSPADM

## 2023-08-18 RX ORDER — FLUTICASONE PROPIONATE 50 MCG
1 SPRAY, SUSPENSION (ML) NASAL DAILY
Status: DISCONTINUED | OUTPATIENT
Start: 2023-08-18 | End: 2023-08-18

## 2023-08-18 RX ORDER — MAGNESIUM SULFATE IN WATER 40 MG/ML
2000 INJECTION, SOLUTION INTRAVENOUS PRN
Status: DISCONTINUED | OUTPATIENT
Start: 2023-08-18 | End: 2023-08-19 | Stop reason: HOSPADM

## 2023-08-18 RX ORDER — TRIAMTERENE AND HYDROCHLOROTHIAZIDE 37.5; 25 MG/1; MG/1
2 TABLET ORAL DAILY
Status: DISCONTINUED | OUTPATIENT
Start: 2023-08-18 | End: 2023-08-19 | Stop reason: HOSPADM

## 2023-08-18 RX ORDER — TIZANIDINE 4 MG/1
4 TABLET ORAL EVERY 6 HOURS PRN
Status: DISCONTINUED | OUTPATIENT
Start: 2023-08-18 | End: 2023-08-19 | Stop reason: HOSPADM

## 2023-08-18 RX ORDER — ACETAMINOPHEN 325 MG/1
650 TABLET ORAL ONCE
Status: COMPLETED | OUTPATIENT
Start: 2023-08-18 | End: 2023-08-18

## 2023-08-18 RX ORDER — SODIUM CHLORIDE 9 MG/ML
INJECTION, SOLUTION INTRAVENOUS PRN
Status: DISCONTINUED | OUTPATIENT
Start: 2023-08-18 | End: 2023-08-19 | Stop reason: HOSPADM

## 2023-08-18 RX ORDER — PRAVASTATIN SODIUM 20 MG
20 TABLET ORAL DAILY
Status: DISCONTINUED | OUTPATIENT
Start: 2023-08-18 | End: 2023-08-19 | Stop reason: HOSPADM

## 2023-08-18 RX ORDER — VITAMIN C
1 TAB ORAL DAILY
Status: DISCONTINUED | OUTPATIENT
Start: 2023-08-19 | End: 2023-08-19 | Stop reason: HOSPADM

## 2023-08-18 RX ORDER — IPRATROPIUM BROMIDE AND ALBUTEROL SULFATE 2.5; .5 MG/3ML; MG/3ML
1 SOLUTION RESPIRATORY (INHALATION)
Status: DISCONTINUED | OUTPATIENT
Start: 2023-08-19 | End: 2023-08-19 | Stop reason: HOSPADM

## 2023-08-18 RX ORDER — ALBUTEROL SULFATE 2.5 MG/3ML
2.5 SOLUTION RESPIRATORY (INHALATION) EVERY 6 HOURS PRN
Status: DISCONTINUED | OUTPATIENT
Start: 2023-08-18 | End: 2023-08-18 | Stop reason: HOSPADM

## 2023-08-18 RX ORDER — BENZONATATE 100 MG/1
100 CAPSULE ORAL 3 TIMES DAILY PRN
Status: DISCONTINUED | OUTPATIENT
Start: 2023-08-18 | End: 2023-08-19

## 2023-08-18 RX ORDER — SODIUM CHLORIDE 0.9 % (FLUSH) 0.9 %
10 SYRINGE (ML) INJECTION PRN
Status: DISCONTINUED | OUTPATIENT
Start: 2023-08-18 | End: 2023-08-19 | Stop reason: HOSPADM

## 2023-08-18 RX ORDER — ENOXAPARIN SODIUM 100 MG/ML
30 INJECTION SUBCUTANEOUS 2 TIMES DAILY
Status: DISCONTINUED | OUTPATIENT
Start: 2023-08-18 | End: 2023-08-19 | Stop reason: HOSPADM

## 2023-08-18 RX ORDER — ALBUTEROL SULFATE 2.5 MG/3ML
2.5 SOLUTION RESPIRATORY (INHALATION)
Status: DISCONTINUED | OUTPATIENT
Start: 2023-08-18 | End: 2023-08-18

## 2023-08-18 RX ORDER — VITAMIN B COMPLEX
2000 TABLET ORAL DAILY
Status: DISCONTINUED | OUTPATIENT
Start: 2023-08-18 | End: 2023-08-19 | Stop reason: HOSPADM

## 2023-08-18 RX ORDER — ALBUTEROL SULFATE 2.5 MG/3ML
2.5 SOLUTION RESPIRATORY (INHALATION) EVERY 4 HOURS PRN
Status: DISCONTINUED | OUTPATIENT
Start: 2023-08-18 | End: 2023-08-19 | Stop reason: HOSPADM

## 2023-08-18 RX ORDER — ONDANSETRON 4 MG/1
4 TABLET, ORALLY DISINTEGRATING ORAL EVERY 8 HOURS PRN
Status: DISCONTINUED | OUTPATIENT
Start: 2023-08-18 | End: 2023-08-19 | Stop reason: HOSPADM

## 2023-08-18 RX ORDER — FAMOTIDINE 20 MG/1
40 TABLET, FILM COATED ORAL DAILY
Status: DISCONTINUED | OUTPATIENT
Start: 2023-08-18 | End: 2023-08-19 | Stop reason: HOSPADM

## 2023-08-18 RX ORDER — SODIUM CHLORIDE 9 MG/ML
INJECTION, SOLUTION INTRAVENOUS CONTINUOUS
Status: CANCELLED | OUTPATIENT
Start: 2023-08-18

## 2023-08-18 RX ORDER — IPRATROPIUM BROMIDE AND ALBUTEROL SULFATE 2.5; .5 MG/3ML; MG/3ML
1 SOLUTION RESPIRATORY (INHALATION)
Status: DISCONTINUED | OUTPATIENT
Start: 2023-08-18 | End: 2023-08-18

## 2023-08-18 RX ORDER — ACETAMINOPHEN 325 MG/1
650 TABLET ORAL EVERY 6 HOURS PRN
Status: DISCONTINUED | OUTPATIENT
Start: 2023-08-18 | End: 2023-08-19 | Stop reason: HOSPADM

## 2023-08-18 RX ORDER — SODIUM CHLORIDE 9 MG/ML
INJECTION, SOLUTION INTRAVENOUS CONTINUOUS
Status: DISCONTINUED | OUTPATIENT
Start: 2023-08-18 | End: 2023-08-19

## 2023-08-18 RX ORDER — GUAIFENESIN 600 MG/1
600 TABLET, EXTENDED RELEASE ORAL 2 TIMES DAILY PRN
Status: CANCELLED | OUTPATIENT
Start: 2023-08-18

## 2023-08-18 RX ORDER — M-VIT,TX,IRON,MINS/CALC/FOLIC 27MG-0.4MG
1 TABLET ORAL DAILY
Status: DISCONTINUED | OUTPATIENT
Start: 2023-08-19 | End: 2023-08-19 | Stop reason: HOSPADM

## 2023-08-18 RX ORDER — POTASSIUM CHLORIDE 20 MEQ/1
40 TABLET, EXTENDED RELEASE ORAL PRN
Status: DISCONTINUED | OUTPATIENT
Start: 2023-08-18 | End: 2023-08-19 | Stop reason: HOSPADM

## 2023-08-18 RX ADMIN — CETIRIZINE HYDROCHLORIDE 10 MG: 10 TABLET, FILM COATED ORAL at 16:21

## 2023-08-18 RX ADMIN — ALBUTEROL SULFATE 2.5 MG: 2.5 SOLUTION RESPIRATORY (INHALATION) at 10:06

## 2023-08-18 RX ADMIN — CEFTRIAXONE SODIUM 1000 MG: 1 INJECTION, POWDER, FOR SOLUTION INTRAMUSCULAR; INTRAVENOUS at 16:28

## 2023-08-18 RX ADMIN — METOPROLOL SUCCINATE 50 MG: 50 TABLET, EXTENDED RELEASE ORAL at 16:22

## 2023-08-18 RX ADMIN — AZITHROMYCIN DIHYDRATE 500 MG: 500 INJECTION, POWDER, LYOPHILIZED, FOR SOLUTION INTRAVENOUS at 17:12

## 2023-08-18 RX ADMIN — TRIAMTERENE AND HYDROCHLOROTHIAZIDE 2 TABLET: 37.5; 25 TABLET ORAL at 16:22

## 2023-08-18 RX ADMIN — POTASSIUM CHLORIDE 10 MEQ: 1500 TABLET, EXTENDED RELEASE ORAL at 19:51

## 2023-08-18 RX ADMIN — HYDROCODONE BITARTRATE AND ACETAMINOPHEN 2 TABLET: 5; 325 TABLET ORAL at 19:50

## 2023-08-18 RX ADMIN — ENOXAPARIN SODIUM 30 MG: 100 INJECTION SUBCUTANEOUS at 21:06

## 2023-08-18 RX ADMIN — FAMOTIDINE 40 MG: 20 TABLET, FILM COATED ORAL at 16:22

## 2023-08-18 RX ADMIN — ACETAMINOPHEN 650 MG: 325 TABLET ORAL at 10:45

## 2023-08-18 RX ADMIN — BENZONATATE 100 MG: 100 CAPSULE ORAL at 21:06

## 2023-08-18 RX ADMIN — LOSARTAN POTASSIUM 100 MG: 100 TABLET, FILM COATED ORAL at 16:22

## 2023-08-18 RX ADMIN — WATER 40 MG: 1 INJECTION INTRAMUSCULAR; INTRAVENOUS; SUBCUTANEOUS at 21:57

## 2023-08-18 RX ADMIN — POTASSIUM CHLORIDE 40 MEQ: 1500 TABLET, EXTENDED RELEASE ORAL at 16:21

## 2023-08-18 RX ADMIN — PRAVASTATIN SODIUM 20 MG: 20 TABLET ORAL at 16:22

## 2023-08-18 RX ADMIN — Medication 2000 UNITS: at 16:22

## 2023-08-18 RX ADMIN — WATER 40 MG: 1 INJECTION INTRAMUSCULAR; INTRAVENOUS; SUBCUTANEOUS at 14:50

## 2023-08-18 RX ADMIN — TIZANIDINE 4 MG: 4 TABLET ORAL at 16:21

## 2023-08-18 RX ADMIN — HYDROCODONE BITARTRATE AND ACETAMINOPHEN 2 TABLET: 5; 325 TABLET ORAL at 14:50

## 2023-08-18 RX ADMIN — SODIUM CHLORIDE: 9 INJECTION, SOLUTION INTRAVENOUS at 15:39

## 2023-08-18 RX ADMIN — BENZONATATE 100 MG: 100 CAPSULE ORAL at 14:50

## 2023-08-18 ASSESSMENT — PAIN DESCRIPTION - DESCRIPTORS: DESCRIPTORS: ACHING;SHARP;PRESSURE

## 2023-08-18 ASSESSMENT — PAIN SCALES - GENERAL
PAINLEVEL_OUTOF10: 1
PAINLEVEL_OUTOF10: 9

## 2023-08-18 ASSESSMENT — ENCOUNTER SYMPTOMS
DIARRHEA: 0
WHEEZING: 0
NAUSEA: 1
CONSTIPATION: 0
SHORTNESS OF BREATH: 1
ABDOMINAL PAIN: 0
COUGH: 1
VOMITING: 1
CHEST TIGHTNESS: 0

## 2023-08-18 ASSESSMENT — PAIN DESCRIPTION - ORIENTATION: ORIENTATION: LEFT
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
## 2023-08-18 NOTE — PLAN OF CARE
Problem: Respiratory - Adult  Goal: Achieves optimal ventilation and oxygenation  Outcome: Progressing  Goal: Able to breathe comfortably  Description: Able to breathe comfortably  Outcome: Progressing  Goal: Clear lung sounds  Outcome: Progressing

## 2023-08-18 NOTE — ED PROVIDER NOTES
eMERGENCY dEPARTMENT eNCOUnter   Independent Attestation     Pt Name: Prashanth Lucero  MRN: [id number]  [address] 1975  Date of evaluation: 8/18/23     Prashanth Lucero is a 53 y.o. female with CC: Pharyngitis (Pt states she has been sick for 2 weeks. ), Cough, and Generalized Body Aches        This visit was performed by both a physician and an APC. I performed all aspects of the MDM as documented.       Anupam العلي MD  Attending Emergency Physician           Anupam العلي MD  08/18/23 8541

## 2023-08-18 NOTE — PLAN OF CARE
Problem: Discharge Planning  Goal: Discharge to home or other facility with appropriate resources  Outcome: Progressing  Flowsheets (Taken 8/18/2023 1637)  Discharge to home or other facility with appropriate resources:   Identify barriers to discharge with patient and caregiver   Arrange for needed discharge resources and transportation as appropriate   Identify discharge learning needs (meds, wound care, etc)   Refer to discharge planning if patient needs post-hospital services based on physician order or complex needs related to functional status, cognitive ability or social support system     Problem: Respiratory - Adult  Goal: Achieves optimal ventilation and oxygenation  8/18/2023 1816 by Meghana Mack RN  Outcome: Progressing  Flowsheets (Taken 8/18/2023 1637)  Achieves optimal ventilation and oxygenation:   Assess for changes in respiratory status   Assess for changes in mentation and behavior   Position to facilitate oxygenation and minimize respiratory effort   Oxygen supplementation based on oxygen saturation or arterial blood gases   Encourage broncho-pulmonary hygiene including cough, deep breathe, incentive spirometry   Assess and instruct to report shortness of breath or any respiratory difficulty   Respiratory therapy support as indicated  8/18/2023 1603 by Conor Piña RCP  Outcome: Progressing  Goal: Able to breathe comfortably  Description: Able to breathe comfortably  8/18/2023 1603 by Conor Piña RCP  Outcome: Progressing  Goal: Clear lung sounds  8/18/2023 1603 by Conor Piña RCP  Outcome: Progressing
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
## 2023-08-18 NOTE — TELEPHONE ENCOUNTER
Noted. Thank you!     Future Appointments   Date Time Provider [address]   9/11/2023  2:00 PM [facility] MRI RM 1 [place]   9/11/2023  3:00 PM [facility] MOB CT RM 1 [place] CT [facility] [place]   9/12/2023 11:45 AM [facility] DIAG MAMMO RM 3 [facility]MO [facility] Radiolog   11/10/2023  2:30 PM Raffy Dorado MD fp [facility]

## 2023-08-19 ENCOUNTER — APPOINTMENT (OUTPATIENT)
Dept: GENERAL RADIOLOGY | Age: 48
DRG: 139 | End: 2023-08-19
Payer: COMMERCIAL

## 2023-08-19 VITALS
HEIGHT: 62 IN | HEART RATE: 81 BPM | WEIGHT: 258.1 LBS | TEMPERATURE: 98.1 F | SYSTOLIC BLOOD PRESSURE: 150 MMHG | DIASTOLIC BLOOD PRESSURE: 86 MMHG | OXYGEN SATURATION: 95 % | RESPIRATION RATE: 18 BRPM | BODY MASS INDEX: 47.49 KG/M2

## 2023-08-19 LAB
ANION GAP SERPL CALCULATED.3IONS-SCNC: 15 MMOL/L (ref 9–17)
BASOPHILS # BLD: 0.04 K/UL (ref 0–0.2)
BASOPHILS NFR BLD: 0 % (ref 0–2)
BUN SERPL-MCNC: 16 MG/DL (ref 6–20)
BUN/CREAT SERPL: 27 (ref 9–20)
CALCIUM SERPL-MCNC: 9.3 MG/DL (ref 8.6–10.4)
CHLORIDE SERPL-SCNC: 100 MMOL/L (ref 98–107)
CO2 SERPL-SCNC: 19 MMOL/L (ref 20–31)
CREAT SERPL-MCNC: 0.6 MG/DL (ref 0.5–0.9)
EOSINOPHIL # BLD: <0.03 K/UL (ref 0–0.44)
EOSINOPHILS RELATIVE PERCENT: 0 % (ref 1–4)
ERYTHROCYTE [DISTWIDTH] IN BLOOD BY AUTOMATED COUNT: 13.3 % (ref 11.8–14.4)
GFR SERPL CREATININE-BSD FRML MDRD: >60 ML/MIN/1.73M2
GLUCOSE SERPL-MCNC: 218 MG/DL (ref 70–99)
HCT VFR BLD AUTO: 38.2 % (ref 36.3–47.1)
HGB BLD-MCNC: 12.3 G/DL (ref 11.9–15.1)
IMM GRANULOCYTES # BLD AUTO: 0.32 K/UL (ref 0–0.3)
IMM GRANULOCYTES NFR BLD: 2 %
LYMPHOCYTES NFR BLD: 1.9 K/UL (ref 1.1–3.7)
LYMPHOCYTES RELATIVE PERCENT: 10 % (ref 24–43)
MCH RBC QN AUTO: 31.9 PG (ref 25.2–33.5)
MCHC RBC AUTO-ENTMCNC: 32.2 G/DL (ref 28.4–34.8)
MCV RBC AUTO: 99 FL (ref 82.6–102.9)
MONOCYTES NFR BLD: 0.42 K/UL (ref 0.1–1.2)
MONOCYTES NFR BLD: 2 % (ref 3–12)
NEUTROPHILS NFR BLD: 86 % (ref 36–65)
NEUTS SEG NFR BLD: 17.39 K/UL (ref 1.5–8.1)
NRBC BLD-RTO: 0 PER 100 WBC
PLATELET # BLD AUTO: 327 K/UL (ref 138–453)
PMV BLD AUTO: 9.4 FL (ref 8.1–13.5)
POTASSIUM SERPL-SCNC: 4.2 MMOL/L (ref 3.7–5.3)
RBC # BLD AUTO: 3.86 M/UL (ref 3.95–5.11)
SODIUM SERPL-SCNC: 134 MMOL/L (ref 135–144)
WBC OTHER # BLD: 20.1 K/UL (ref 3.5–11.3)

## 2023-08-19 PROCEDURE — 85025 COMPLETE CBC W/AUTO DIFF WBC: CPT

## 2023-08-19 PROCEDURE — 94640 AIRWAY INHALATION TREATMENT: CPT

## 2023-08-19 PROCEDURE — G0378 HOSPITAL OBSERVATION PER HR: HCPCS

## 2023-08-19 PROCEDURE — 2700000000 HC OXYGEN THERAPY PER DAY

## 2023-08-19 PROCEDURE — 2580000003 HC RX 258: Performed by: NURSE PRACTITIONER

## 2023-08-19 PROCEDURE — 71046 X-RAY EXAM CHEST 2 VIEWS: CPT

## 2023-08-19 PROCEDURE — 6360000002 HC RX W HCPCS: Performed by: NURSE PRACTITIONER

## 2023-08-19 PROCEDURE — 6370000000 HC RX 637 (ALT 250 FOR IP): Performed by: NURSE PRACTITIONER

## 2023-08-19 PROCEDURE — 96376 TX/PRO/DX INJ SAME DRUG ADON: CPT

## 2023-08-19 PROCEDURE — 80048 BASIC METABOLIC PNL TOTAL CA: CPT

## 2023-08-19 PROCEDURE — 94761 N-INVAS EAR/PLS OXIMETRY MLT: CPT

## 2023-08-19 PROCEDURE — 96372 THER/PROPH/DIAG INJ SC/IM: CPT

## 2023-08-19 PROCEDURE — 36415 COLL VENOUS BLD VENIPUNCTURE: CPT

## 2023-08-19 PROCEDURE — 99232 SBSQ HOSP IP/OBS MODERATE 35: CPT | Performed by: NURSE PRACTITIONER

## 2023-08-19 RX ORDER — PREDNISONE 20 MG/1
40 TABLET ORAL DAILY
Qty: 8 TABLET | Refills: 0 | Status: SHIPPED | OUTPATIENT
Start: 2023-08-19 | End: 2023-08-23 | Stop reason: ALTCHOICE

## 2023-08-19 RX ORDER — HYDROCODONE BITARTRATE AND ACETAMINOPHEN 5; 325 MG/1; MG/1
1 TABLET ORAL EVERY 6 HOURS PRN
Qty: 12 TABLET | Refills: 0 | Status: SHIPPED | OUTPATIENT
Start: 2023-08-19 | End: 2023-08-19 | Stop reason: SDUPTHER

## 2023-08-19 RX ORDER — BENZONATATE 100 MG/1
200 CAPSULE ORAL 3 TIMES DAILY PRN
Status: DISCONTINUED | OUTPATIENT
Start: 2023-08-19 | End: 2023-08-19 | Stop reason: HOSPADM

## 2023-08-19 RX ORDER — HYDROCODONE BITARTRATE AND ACETAMINOPHEN 5; 325 MG/1; MG/1
1 TABLET ORAL EVERY 6 HOURS PRN
Qty: 20 TABLET | Refills: 0 | Status: SHIPPED | OUTPATIENT
Start: 2023-08-19 | End: 2023-08-23 | Stop reason: SDUPTHER

## 2023-08-19 RX ORDER — BENZONATATE 200 MG/1
200 CAPSULE ORAL 3 TIMES DAILY PRN
Qty: 21 CAPSULE | Refills: 0 | Status: SHIPPED | OUTPATIENT
Start: 2023-08-19 | End: 2023-08-23 | Stop reason: SDUPTHER

## 2023-08-19 RX ADMIN — SODIUM CHLORIDE, PRESERVATIVE FREE 10 ML: 5 INJECTION INTRAVENOUS at 08:40

## 2023-08-19 RX ADMIN — MULTIPLE VITAMINS W/ MINERALS TAB 1 TABLET: TAB at 08:40

## 2023-08-19 RX ADMIN — HYDROCODONE BITARTRATE AND ACETAMINOPHEN 2 TABLET: 5; 325 TABLET ORAL at 04:10

## 2023-08-19 RX ADMIN — FAMOTIDINE 40 MG: 20 TABLET, FILM COATED ORAL at 08:40

## 2023-08-19 RX ADMIN — HYDROCODONE BITARTRATE AND ACETAMINOPHEN 1 TABLET: 5; 325 TABLET ORAL at 10:18

## 2023-08-19 RX ADMIN — ENOXAPARIN SODIUM 30 MG: 100 INJECTION SUBCUTANEOUS at 08:40

## 2023-08-19 RX ADMIN — TIZANIDINE 4 MG: 4 TABLET ORAL at 10:18

## 2023-08-19 RX ADMIN — ALBUTEROL SULFATE 2.5 MG: 2.5 SOLUTION RESPIRATORY (INHALATION) at 04:50

## 2023-08-19 RX ADMIN — CETIRIZINE HYDROCHLORIDE 10 MG: 10 TABLET, FILM COATED ORAL at 08:46

## 2023-08-19 RX ADMIN — LOSARTAN POTASSIUM 100 MG: 100 TABLET, FILM COATED ORAL at 08:40

## 2023-08-19 RX ADMIN — METFORMIN HYDROCHLORIDE 500 MG: 500 TABLET, EXTENDED RELEASE ORAL at 08:40

## 2023-08-19 RX ADMIN — TRIAMTERENE AND HYDROCHLOROTHIAZIDE 2 TABLET: 37.5; 25 TABLET ORAL at 08:39

## 2023-08-19 RX ADMIN — Medication 2000 UNITS: at 08:39

## 2023-08-19 RX ADMIN — BENZONATATE 100 MG: 100 CAPSULE ORAL at 02:40

## 2023-08-19 RX ADMIN — METOPROLOL SUCCINATE 50 MG: 50 TABLET, EXTENDED RELEASE ORAL at 08:40

## 2023-08-19 RX ADMIN — Medication 1 TABLET: at 08:39

## 2023-08-19 RX ADMIN — TIZANIDINE 4 MG: 4 TABLET ORAL at 04:23

## 2023-08-19 RX ADMIN — POTASSIUM CHLORIDE 10 MEQ: 1500 TABLET, EXTENDED RELEASE ORAL at 08:39

## 2023-08-19 RX ADMIN — WATER 40 MG: 1 INJECTION INTRAMUSCULAR; INTRAVENOUS; SUBCUTANEOUS at 04:33

## 2023-08-19 RX ADMIN — IPRATROPIUM BROMIDE AND ALBUTEROL SULFATE 1 DOSE: .5; 3 SOLUTION RESPIRATORY (INHALATION) at 11:34

## 2023-08-19 RX ADMIN — IPRATROPIUM BROMIDE AND ALBUTEROL SULFATE 1 DOSE: .5; 3 SOLUTION RESPIRATORY (INHALATION) at 08:04

## 2023-08-19 RX ADMIN — BENZONATATE 200 MG: 100 CAPSULE ORAL at 10:47

## 2023-08-19 RX ADMIN — PRAVASTATIN SODIUM 20 MG: 20 TABLET ORAL at 08:40

## 2023-08-19 ASSESSMENT — ENCOUNTER SYMPTOMS
NAUSEA: 0
DIARRHEA: 0
SHORTNESS OF BREATH: 0
CHEST TIGHTNESS: 0
COUGH: 1
ABDOMINAL PAIN: 0
VOMITING: 0
CONSTIPATION: 0

## 2023-08-19 ASSESSMENT — PAIN DESCRIPTION - DESCRIPTORS
DESCRIPTORS: ACHING;PRESSURE
DESCRIPTORS: ACHING
DESCRIPTORS: ACHING;SPASM

## 2023-08-19 ASSESSMENT — PAIN SCALES - GENERAL
PAINLEVEL_OUTOF10: 8
PAINLEVEL_OUTOF10: 0
PAINLEVEL_OUTOF10: 9

## 2023-08-19 ASSESSMENT — PAIN DESCRIPTION - ORIENTATION
ORIENTATION: MID
ORIENTATION: LEFT

## 2023-08-19 ASSESSMENT — PAIN DESCRIPTION - LOCATION: LOCATION: BACK;CHEST

## 2023-08-19 NOTE — PLAN OF CARE
Problem: Respiratory - Adult  Goal: Achieves optimal ventilation and oxygenation  8/19/2023 0807 by Ortiz Mejia RCP  Outcome: Progressing     Problem: Respiratory - Adult  Goal: Able to breathe comfortably  Description: Able to breathe comfortably  Outcome: Progressing     Problem: Respiratory - Adult  Goal: Clear lung sounds  Outcome: Progressing

## 2023-08-19 NOTE — CARE COORDINATION
Case Management Assessment  Initial Evaluation    Date/Time of Evaluation: 8/19/2023 11:09 AM  Assessment Completed by: Brad Baeza RN    If patient is discharged prior to next notation, then this note serves as note for discharge by case management. Patient Name: Norman Aaron                   YOB: 1975  Diagnosis: Pneumonitis [J18.9]                   Date / Time: 8/18/2023  9:38 AM    Patient Admission Status: Inpatient   Readmission Risk (Low < 19, Mod (19-27), High > 27): Readmission Risk Score: 14.2    Current PCP: Justyn Enriquez MD  PCP verified by CM? Yes    Chart Reviewed: Yes      History Provided by: Patient  Patient Orientation: Alert and Oriented    Patient Cognition: Alert    Hospitalization in the last 30 days (Readmission):  No    If yes, Readmission Assessment in  Navigator will be completed. Advance Directives:      Code Status: Full Code   Patient's Primary Decision Maker is: Legal Next of Kin      Discharge Planning:    Patient lives with: Children Type of Home: House  Primary Care Giver: Self  Patient Support Systems include: Spouse/Significant Other, Children, Friends/Neighbors, Family Members   Current Financial resources: Medicaid  Current community resources: None  Current services prior to admission: None            Current DME:  none            Type of Home Care services:  None    ADLS  Prior functional level: Independent in ADLs/IADLs  Current functional level: Independent in ADLs/IADLs    PT AM-PAC:   /24  OT AM-PAC:   /24    Family can provide assistance at DC: Yes  Would you like Case Management to discuss the discharge plan with any other family members/significant others, and if so, who?  No  Plans to Return to Present Housing: Yes  Other Identified Issues/Barriers to RETURNING to current housing: none  Potential Assistance needed at discharge: N/A            Potential DME:  none  Patient expects to discharge to: [address] [place] [address] for transportation at

## 2023-08-19 NOTE — PLAN OF CARE
Problem: Discharge Planning  Goal: Discharge to home or other facility with appropriate resources  Outcome: Completed     Problem: Respiratory - Adult  Goal: Achieves optimal ventilation and oxygenation  8/19/2023 1231 by Neville Martin RN  Outcome: Completed  8/19/2023 0807 by Guerrero Zaragoza RCP  Outcome: Progressing  Goal: Able to breathe comfortably  Description: Able to breathe comfortably  8/19/2023 0807 by Guerrero Zaragoza RCP  Outcome: Progressing  Goal: Clear lung sounds  8/19/2023 0807 by Guerrero Zaragoza RCP  Outcome: Progressing     Problem: Safety - Adult  Goal: Free from fall injury  Outcome: Completed

## 2023-08-19 NOTE — DISCHARGE SUMMARY
HTN. Needs to check blood pressure 1-2 times a day until stable, then once a day. Goal blood pressure less than 135/85, and above 110/60. Eye Patches Misc  Needs eye patch for left eye     famotidine 40 MG tablet  Commonly known as: PEPCID  take 1 tablet by mouth at bedtime     loratadine 10 MG tablet  Commonly known as: Claritin  Take 1 tablet by mouth daily     metoprolol succinate 50 MG extended release tablet  Commonly known as: TOPROL XL  take 1 tablet by mouth once daily     montelukast 10 MG tablet  Commonly known as: SINGULAIR  take 1 tablet by mouth nightly     olmesartan 40 MG tablet  Commonly known as: BENICAR  Take 1 tablet by mouth daily     ondansetron 4 MG disintegrating tablet  Commonly known as: ZOFRAN-ODT  Take 1 tablet by mouth 3 times daily as needed for Nausea or Vomiting     potassium chloride 10 MEQ extended release tablet  Commonly known as: KLOR-CON M  take 1 tablet by mouth twice a day     pravastatin 20 MG tablet  Commonly known as: PRAVACHOL  take 1 tablet by mouth once daily     Thera M Plus Tabs  take 1 tablet by mouth once daily     tiZANidine 4 MG tablet  Commonly known as: ZANAFLEX  Take 1 tablet by mouth every 6 hours as needed (back pain)     triamterene-hydroCHLOROthiazide 75-50 MG per tablet  Commonly known as: MAXZIDE  take 1 tablet by mouth once daily     Ventolin  (90 Base) MCG/ACT inhaler  Generic drug: albuterol sulfate HFA  inhale 2 puffs by mouth and INTO THE LUNGS every 6 hours if needed for wheezing or shortness of breath     vitamin B complex Tabs  Take 1 tablet by mouth daily     vitamin D 50 MCG (2000 UT) Tabs tablet  Commonly known as: CHOLECALCIFEROL  Take 1 tablet by mouth daily           * This list has 2 medication(s) that are the same as other medications prescribed for you. Read the directions carefully, and ask your doctor or other care provider to review them with you.                 STOP taking these medications      cefUROXime 500 MG tablet  Commonly known as: CEFTIN     doxycycline hyclate 100 MG tablet  Commonly known as: VIBRA-TABS     fluticasone 50 MCG/ACT nasal spray  Commonly known as: FLONASE     guaiFENesin 600 MG extended release tablet  Commonly known as: Mucinex     ibuprofen 800 MG tablet  Commonly known as: ADVIL;MOTRIN     lactobacillus acidophilus     mupirocin 2 % ointment  Commonly known as: BACTROBAN     nortriptyline 10 MG capsule  Commonly known as: Pamelor     omeprazole 40 MG delayed release capsule  Commonly known as: PRILOSEC     oxyCODONE-acetaminophen 5-325 MG per tablet  Commonly known as: Percocet     oxymetazoline 0.05 % nasal spray  Commonly known as: 12 Hour Nasal Spray     promethazine 12.5 MG tablet  Commonly known as: PHENERGAN     sodium chloride 0.65 % nasal spray  Commonly known as: [employer] [place]            ASK your doctor about these medications      metFORMIN 500 MG extended release tablet  Commonly known as: GLUCOPHAGE-XR  Take 1 tablet by mouth daily (with breakfast) ** stop Metformin for 2 days before getting contrast**               Where to Get Your Medications        These medications were sent to [address] [place] [id number]      Phone: [phone]   benzonatate 200 MG capsule  HYDROcodone-acetaminophen 5-325 MG per tablet         No discharge procedures on file. Time Spent on discharge is  15 mins in patient examination, evaluation, counseling as well as medication reconciliation, prescriptions for required medications, discharge plan and follow up. Electronically signed by   HAYDEE Trevino CNP  8/19/2023  11:21 AM      Thank you Dr. Juan Pickens MD for the opportunity to be involved in this patient's care.
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
## 2023-08-19 NOTE — PLAN OF CARE
Problem: Discharge Planning  Goal: Discharge to home or other facility with appropriate resources  Outcome: Completed     Problem: Respiratory - Adult  Goal: Achieves optimal ventilation and oxygenation  8/19/2023 1231 by Doug Balbuena RN  Outcome: Completed  8/19/2023 0807 by Rosalino Monzon RCP  Outcome: Progressing  Goal: Able to breathe comfortably  Description: Able to breathe comfortably  8/19/2023 0807 by Rosalino Monzon RCP  Outcome: Progressing  Goal: Clear lung sounds  8/19/2023 0807 by Rosalino Monzon RCP  Outcome: Progressing     Problem: Safety - Adult  Goal: Free from fall injury  Outcome: Completed

## 2023-08-19 NOTE — PLAN OF CARE
I went to see patient, she came from bathroom  and I introduced myself. She was on RA   Nurse was present at bedside. I asked patient what brought her to the hospital. She was very rude and said why everyone is asking these questions again and again  I explained to her I need to confirm and to understand what's going on. She didnt want to answer most of the questions. She said she doesn't smoke and vapes. But she told NP she smokes. She was saturating 95% on RA. She was very upset why doctors are being changed. I explained to her yesterday admitting person saw you. But she told nurse she doesn't want me to see her. I discussed with NP who saw her yesterday. She will see her today again .

## 2023-08-20 LAB
MICROORGANISM SPEC CULT: NORMAL
MICROORGANISM SPEC CULT: NORMAL
SERVICE CMNT-IMP: NORMAL
SERVICE CMNT-IMP: NORMAL
SPECIMEN DESCRIPTION: NORMAL
SPECIMEN DESCRIPTION: NORMAL

## 2023-08-22 ENCOUNTER — TELEPHONE (OUTPATIENT)
Dept: FAMILY MEDICINE CLINIC | Age: 48
End: 2023-08-22

## 2023-08-23 ENCOUNTER — TELEMEDICINE (OUTPATIENT)
Dept: FAMILY MEDICINE CLINIC | Age: 48
End: 2023-08-23
Payer: COMMERCIAL

## 2023-08-23 ENCOUNTER — TELEPHONE (OUTPATIENT)
Dept: FAMILY MEDICINE CLINIC | Age: 48
End: 2023-08-23

## 2023-08-23 DIAGNOSIS — Z09 HOSPITAL DISCHARGE FOLLOW-UP: ICD-10-CM

## 2023-08-23 DIAGNOSIS — J45.41 MODERATE PERSISTENT REACTIVE AIRWAY DISEASE WITH WHEEZING WITH ACUTE EXACERBATION: ICD-10-CM

## 2023-08-23 DIAGNOSIS — J18.9 PNEUMONIA OF RIGHT UPPER LOBE DUE TO INFECTIOUS ORGANISM: Primary | ICD-10-CM

## 2023-08-23 DIAGNOSIS — I25.10 CORONARY ARTERY CALCIFICATION SEEN ON CAT SCAN: ICD-10-CM

## 2023-08-23 DIAGNOSIS — D72.828 OTHER ELEVATED WHITE BLOOD CELL (WBC) COUNT: ICD-10-CM

## 2023-08-23 DIAGNOSIS — S02.85XD CLOSED FRACTURE OF LEFT ORBIT WITH ROUTINE HEALING, SUBSEQUENT ENCOUNTER: ICD-10-CM

## 2023-08-23 DIAGNOSIS — E66.01 CLASS 3 SEVERE OBESITY DUE TO EXCESS CALORIES WITH SERIOUS COMORBIDITY AND BODY MASS INDEX (BMI) OF 45.0 TO 49.9 IN ADULT (HCC): ICD-10-CM

## 2023-08-23 DIAGNOSIS — R73.03 PREDIABETES: ICD-10-CM

## 2023-08-23 DIAGNOSIS — E78.2 MIXED HYPERLIPIDEMIA: ICD-10-CM

## 2023-08-23 DIAGNOSIS — S02.92XD MULTIPLE CLOSED FACIAL BONE FRACTURES WITH ROUTINE HEALING, SUBSEQUENT ENCOUNTER: ICD-10-CM

## 2023-08-23 DIAGNOSIS — Z11.59 ENCOUNTER FOR SCREENING FOR OTHER VIRAL DISEASES: ICD-10-CM

## 2023-08-23 DIAGNOSIS — I10 ESSENTIAL HYPERTENSION: ICD-10-CM

## 2023-08-23 DIAGNOSIS — E55.9 VITAMIN D DEFICIENCY: ICD-10-CM

## 2023-08-23 PROCEDURE — 99215 OFFICE O/P EST HI 40 MIN: CPT | Performed by: FAMILY MEDICINE

## 2023-08-23 PROCEDURE — G8427 DOCREV CUR MEDS BY ELIG CLIN: HCPCS | Performed by: FAMILY MEDICINE

## 2023-08-23 PROCEDURE — 1111F DSCHRG MED/CURRENT MED MERGE: CPT | Performed by: FAMILY MEDICINE

## 2023-08-23 RX ORDER — FLUCONAZOLE 150 MG/1
150 TABLET ORAL
Qty: 3 TABLET | Refills: 0 | Status: SHIPPED | OUTPATIENT
Start: 2023-08-23

## 2023-08-23 RX ORDER — HYDROCODONE BITARTRATE AND ACETAMINOPHEN 5; 325 MG/1; MG/1
1 TABLET ORAL EVERY 6 HOURS PRN
Qty: 20 TABLET | Refills: 0 | Status: SHIPPED | OUTPATIENT
Start: 2023-08-23 | End: 2023-08-28

## 2023-08-23 RX ORDER — OLMESARTAN MEDOXOMIL 40 MG/1
40 TABLET ORAL DAILY
Qty: 90 TABLET | Refills: 3 | Status: SHIPPED | OUTPATIENT
Start: 2023-08-23

## 2023-08-23 RX ORDER — METOPROLOL SUCCINATE 50 MG/1
50 TABLET, EXTENDED RELEASE ORAL DAILY
Qty: 90 TABLET | Refills: 3 | Status: SHIPPED | OUTPATIENT
Start: 2023-08-23

## 2023-08-23 RX ORDER — GUAIFENESIN AND DEXTROMETHORPHAN HYDROBROMIDE 100; 10 MG/5ML; MG/5ML
10 SOLUTION ORAL EVERY 6 HOURS PRN
Qty: 120 ML | Refills: 2 | Status: SHIPPED | OUTPATIENT
Start: 2023-08-23

## 2023-08-23 RX ORDER — PREDNISONE 10 MG/1
50 TABLET ORAL DAILY
Qty: 35 TABLET | Refills: 0 | Status: SHIPPED | OUTPATIENT
Start: 2023-08-23 | End: 2023-08-30

## 2023-08-23 RX ORDER — BENZONATATE 100 MG/1
100 CAPSULE ORAL 3 TIMES DAILY PRN
Qty: 21 CAPSULE | Refills: 1 | Status: SHIPPED | OUTPATIENT
Start: 2023-08-23 | End: 2023-08-30

## 2023-08-23 RX ORDER — ALBUTEROL SULFATE 2.5 MG/3ML
2.5 SOLUTION RESPIRATORY (INHALATION) EVERY 6 HOURS PRN
Qty: 125 EACH | Refills: 0 | Status: SHIPPED | OUTPATIENT
Start: 2023-08-23

## 2023-08-23 ASSESSMENT — ENCOUNTER SYMPTOMS
WHEEZING: 1
NAUSEA: 0
ABDOMINAL DISTENTION: 0
ABDOMINAL PAIN: 0
VOMITING: 0
SHORTNESS OF BREATH: 1
CHEST TIGHTNESS: 0
EYE PAIN: 1
SINUS PRESSURE: 0
DIARRHEA: 0
TROUBLE SWALLOWING: 0
RHINORRHEA: 1
CONSTIPATION: 0
SINUS PAIN: 0
FACIAL SWELLING: 1
COUGH: 1

## 2023-08-23 NOTE — TELEPHONE ENCOUNTER
[address] [facility],[address] Floor Transitions Initial Follow Up Call    Outreach made within 2 business days of discharge: Yes    Patient: Fallon Bucio Patient : 1975   MRN: [id number]  Reason for Admission: There are no discharge diagnoses documented for the most recent discharge. Discharge Date: 23       Spoke with: [facility]    If Virtual visit made for hospital follow up, advise patient to make sure to have family member present to help assist with visit. Please make sure mobile number is updated in patient chart. Discharge department/facility: [facility]    TCM Interactive Patient Contact:  Was patient able to fill all prescriptions:   Was patient instructed to bring all medications to the follow-up visit:   Is patient taking all medications as directed in the discharge summary?    Does patient understand their discharge instructions:   Does patient have questions or concerns that need addressed prior to 7-14 day follow up office visit:   Scheduled appointment with PCP within 7-14 days    Follow Up  Future Appointments   Date Time Provider [address]   2023  2:00 PM [facility] MOB MRI RM 1 [place]   2023  3:00 PM [facility] MOB CT RM 1 [place] [facility]   2023 11:45 AM STA DIAG MAMMO RM 3 STAZ MAMMO STA Radiolog   11/10/2023  2:30 PM Lalo Brink MD fp karina Carlin MA

## 2023-08-23 NOTE — PROGRESS NOTES
Visit Information    Have you changed or started any medications since your last visit including any over-the-counter medicines, vitamins, or herbal medicines? no   Are you having any side effects from any of your medications? -  no  Have you stopped taking any of your medications? Is so, why? -  no    Have you seen any other physician or provider since your last visit? No  Have you had any other diagnostic tests since your last visit? Yes - Records Obtained  Have you been seen in the emergency room and/or had an admission to a hospital since we last saw you? Yes - Records Obtained  Have you had your routine dental cleaning in the past 6 months? no    Have you activated your [email or web address] account? If not, what are your barriers?  Yes     Patient Care Team:  Ailyn Mejia MD as PCP - General (Family Medicine)  Ailyn Mejia MD as PCP - Empaneled Provider  HAYDEE Jessica - CNP as Nurse Practitioner (Obstetrics & Gynecology)  Kiesha Ga MD (Obstetrics & Gynecology)  Marcos Yu MD as Surgeon (Cardiology)  Yuridia Delgado DO as Consulting Physician (Physical Medicine and Rehab)  Jorge Alberto Anderson MD as Consulting Physician (Pulmonology)  Jeremy Young MD as Surgeon (Neurosurgery)  Je Arroyo, RN as Ambulatory Care Manager    Medical History Review  Past Medical, Family, and Social History reviewed and does contribute to the patient presenting condition    Health Maintenance   Topic Date Due    COVID-19 Vaccine (1) Never done    Hepatitis C screen  Never done    Flu vaccine (1) 08/01/2023    A1C test (Diabetic or Prediabetic)  01/16/2024    Lipids  01/16/2024    Depression Monitoring  07/27/2024    Colorectal Cancer Screen  01/16/2028    DTaP/Tdap/Td vaccine (3 - Td or Tdap) 07/31/2033    Pneumococcal 0-64 years Vaccine  Completed    HIV screen  Completed    Hepatitis A vaccine  Aged Out    Hib vaccine  Aged Out    Meningococcal (ACWY) vaccine  Aged Out
Respiratory effort normal.  [x] No visualized signs of difficulty breathing or respiratory distress        [x] Abnormal dry hacking cough       Musculoskeletal:   [x] Normal gait with no signs of ataxia         [x] Normal range of motion of neck        [] Abnormal-       Neurological:        [x] No Facial Asymmetry (Cranial nerve 7 motor function) (limited exam to video visit)          [x] No gaze palsy        [] Abnormal-            Skin:        [x] No significant exanthematous lesions or discoloration noted on facial skin         [] Abnormal-            Psychiatric:      [x] No Hallucinations       []Mood is normal      [x]Behavior is normal      [x]Judgment is normal      [x]Thought content is normal       [x] Abnormal- anxious, tearful    Other pertinent observable physical exam findings- none    Due to this being a TeleHealth encounter, evaluation of the following organ systems is limited: Vitals/Constitutional/EENT/Resp/CV/GI//MS/Neuro/Skin/Heme-Lymph-Imm. I personally reviewed most recent labs reviewed with the patient and all questions fully answered.    Leukocytosis  Hyponatremia, likely due to hyperglycemia  Hyperglycemia  Prediabetes  Vitamin D deficiency  Hyperlipidemia and high triglycerides improved  Otherwise labs within normal limits          Lab Results   Component Value Date    WBC 20.1 (H) 08/19/2023    HGB 12.3 08/19/2023    HCT 38.2 08/19/2023    MCV 99.0 08/19/2023     08/19/2023       Lab Results   Component Value Date/Time     08/19/2023 07:05 AM    K 4.2 08/19/2023 07:05 AM     08/19/2023 07:05 AM    CO2 19 08/19/2023 07:05 AM    BUN 16 08/19/2023 07:05 AM    CREATININE 0.6 08/19/2023 07:05 AM    GLUCOSE 218 08/19/2023 07:05 AM    CALCIUM 9.3 08/19/2023 07:05 AM        Lab Results   Component Value Date    ALT 24 01/16/2023    AST 26 01/16/2023    ALKPHOS 95 01/16/2023    BILITOT 0.2 (L) 01/16/2023       Lab Results   Component Value Date    TSH 0.69 01/16/2023

## 2023-08-25 DIAGNOSIS — M85.89 OSTEOPENIA OF MULTIPLE SITES: ICD-10-CM

## 2023-08-25 DIAGNOSIS — E55.9 VITAMIN D DEFICIENCY: ICD-10-CM

## 2023-08-25 RX ORDER — MULTIPLE VITAMINS W/ MINERALS TAB 9MG-400MCG
TAB ORAL
Qty: 90 TABLET | Refills: 0 | Status: SHIPPED | OUTPATIENT
Start: 2023-08-25

## 2023-08-25 NOTE — TELEPHONE ENCOUNTER
Please Approve or Refuse.   Send to Pharmacy per Pt's Request: [facility]      Next Visit Date:  11/10/2023   Last Visit Date: 8/23/2023    Hemoglobin A1C (%)   Date Value   01/16/2023 6.1 (H)   09/21/2021 5.7   10/01/2019 5.7             ( goal A1C is < 7)   BP Readings from Last 3 Encounters:   08/19/23 (!) 150/86   08/04/23 136/84   08/04/23 (!) 203/96          (goal 120/80)  BUN   Date Value Ref Range Status   08/19/2023 16 6 - 20 mg/dL Final     Creatinine   Date Value Ref Range Status   08/19/2023 0.6 0.5 - 0.9 mg/dL Final     Potassium   Date Value Ref Range Status   08/19/2023 4.2 3.7 - 5.3 mmol/L Final

## 2023-09-19 DIAGNOSIS — E55.9 VITAMIN D DEFICIENCY: ICD-10-CM

## 2023-09-20 RX ORDER — CHOLECALCIFEROL (VITAMIN D3) 125 MCG
1 CAPSULE ORAL DAILY
Qty: 30 TABLET | Refills: 3 | Status: SHIPPED | OUTPATIENT
Start: 2023-09-20

## 2023-09-20 NOTE — TELEPHONE ENCOUNTER
Please Approve or Refuse.   Send to Pharmacy per Pt's Request:      Next Visit Date:  11/10/2023   Last Visit Date: 8/23/2023    Hemoglobin A1C (%)   Date Value   01/16/2023 6.1 (H)   09/21/2021 5.7   10/01/2019 5.7             ( goal A1C is < 7)   BP Readings from Last 3 Encounters:   08/19/23 (!) 150/86   08/04/23 136/84   08/04/23 (!) 203/96          (goal 120/80)  BUN   Date Value Ref Range Status   08/19/2023 16 6 - 20 mg/dL Final     Creatinine   Date Value Ref Range Status   08/19/2023 0.6 0.5 - 0.9 mg/dL Final     Potassium   Date Value Ref Range Status   08/19/2023 4.2 3.7 - 5.3 mmol/L Final

## 2023-09-28 ENCOUNTER — TELEMEDICINE (OUTPATIENT)
Dept: FAMILY MEDICINE CLINIC | Age: 48
End: 2023-09-28
Payer: COMMERCIAL

## 2023-09-28 ENCOUNTER — TELEPHONE (OUTPATIENT)
Dept: FAMILY MEDICINE CLINIC | Age: 48
End: 2023-09-28

## 2023-09-28 DIAGNOSIS — H53.9 VISION CHANGES: ICD-10-CM

## 2023-09-28 DIAGNOSIS — Z11.59 ENCOUNTER FOR SCREENING FOR OTHER VIRAL DISEASES: ICD-10-CM

## 2023-09-28 DIAGNOSIS — E78.2 MIXED HYPERLIPIDEMIA: ICD-10-CM

## 2023-09-28 DIAGNOSIS — J45.41 MODERATE PERSISTENT REACTIVE AIRWAY DISEASE WITH WHEEZING WITH ACUTE EXACERBATION: ICD-10-CM

## 2023-09-28 DIAGNOSIS — I10 ESSENTIAL HYPERTENSION: ICD-10-CM

## 2023-09-28 DIAGNOSIS — S02.85XD CLOSED FRACTURE OF LEFT ORBIT WITH ROUTINE HEALING, SUBSEQUENT ENCOUNTER: ICD-10-CM

## 2023-09-28 DIAGNOSIS — R06.09 DOE (DYSPNEA ON EXERTION): ICD-10-CM

## 2023-09-28 DIAGNOSIS — M54.42 CHRONIC MIDLINE LOW BACK PAIN WITH BILATERAL SCIATICA: ICD-10-CM

## 2023-09-28 DIAGNOSIS — J20.9 ACUTE BRONCHITIS DUE TO INFECTION: Primary | ICD-10-CM

## 2023-09-28 DIAGNOSIS — I25.10 CORONARY ARTERY CALCIFICATION SEEN ON CT SCAN: ICD-10-CM

## 2023-09-28 DIAGNOSIS — M54.41 CHRONIC MIDLINE LOW BACK PAIN WITH BILATERAL SCIATICA: ICD-10-CM

## 2023-09-28 DIAGNOSIS — R73.03 PREDIABETES: ICD-10-CM

## 2023-09-28 DIAGNOSIS — E55.9 VITAMIN D DEFICIENCY: ICD-10-CM

## 2023-09-28 DIAGNOSIS — G89.29 CHRONIC MIDLINE LOW BACK PAIN WITH BILATERAL SCIATICA: ICD-10-CM

## 2023-09-28 PROBLEM — R04.0 EPISTAXIS DUE TO TRAUMA: Status: RESOLVED | Noted: 2023-08-04 | Resolved: 2023-09-28

## 2023-09-28 PROBLEM — S01.81XA FACIAL LACERATION: Status: RESOLVED | Noted: 2023-08-04 | Resolved: 2023-09-28

## 2023-09-28 PROBLEM — S05.92XA: Status: RESOLVED | Noted: 2023-08-04 | Resolved: 2023-09-28

## 2023-09-28 PROBLEM — E87.6 HYPOKALEMIA: Status: RESOLVED | Noted: 2023-08-18 | Resolved: 2023-09-28

## 2023-09-28 PROBLEM — J98.4 PNEUMONITIS: Status: RESOLVED | Noted: 2023-08-18 | Resolved: 2023-09-28

## 2023-09-28 PROBLEM — H11.32 CONJUNCTIVAL HEMORRHAGE OF LEFT EYE: Status: RESOLVED | Noted: 2023-08-04 | Resolved: 2023-09-28

## 2023-09-28 PROBLEM — J45.909 REACTIVE AIRWAY DISEASE WITH WHEEZING: Status: ACTIVE | Noted: 2023-09-28
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
## 2023-09-28 PROCEDURE — G8427 DOCREV CUR MEDS BY ELIG CLIN: HCPCS | Performed by: FAMILY MEDICINE

## 2023-09-28 PROCEDURE — 99214 OFFICE O/P EST MOD 30 MIN: CPT | Performed by: FAMILY MEDICINE

## 2023-09-28 RX ORDER — BENZONATATE 100 MG/1
100 CAPSULE ORAL 3 TIMES DAILY PRN
Qty: 21 CAPSULE | Refills: 0 | Status: SHIPPED | OUTPATIENT
Start: 2023-09-28 | End: 2023-10-05

## 2023-09-28 RX ORDER — ALBUTEROL SULFATE 2.5 MG/3ML
2.5 SOLUTION RESPIRATORY (INHALATION) EVERY 6 HOURS PRN
Qty: 125 EACH | Refills: 0 | Status: SHIPPED | OUTPATIENT
Start: 2023-09-28

## 2023-09-28 RX ORDER — FLUTICASONE PROPIONATE 50 MCG
SPRAY, SUSPENSION (ML) NASAL
COMMUNITY
Start: 2023-09-12

## 2023-09-28 RX ORDER — BUDESONIDE AND FORMOTEROL FUMARATE DIHYDRATE 80; 4.5 UG/1; UG/1
2 AEROSOL RESPIRATORY (INHALATION) 2 TIMES DAILY
Qty: 10.2 G | Refills: 3 | Status: SHIPPED | OUTPATIENT
Start: 2023-09-28

## 2023-09-28 RX ORDER — MELOXICAM 15 MG/1
15 TABLET ORAL DAILY PRN
Qty: 30 TABLET | Refills: 0 | Status: SHIPPED | OUTPATIENT
Start: 2023-09-28

## 2023-09-28 RX ORDER — GUAIFENESIN AND DEXTROMETHORPHAN HYDROBROMIDE 100; 10 MG/5ML; MG/5ML
10 SOLUTION ORAL EVERY 6 HOURS PRN
Qty: 120 ML | Refills: 2 | Status: SHIPPED | OUTPATIENT
Start: 2023-09-28

## 2023-09-28 RX ORDER — METOPROLOL SUCCINATE 50 MG/1
50 TABLET, EXTENDED RELEASE ORAL 2 TIMES DAILY
Qty: 180 TABLET | Refills: 3 | Status: SHIPPED | OUTPATIENT
Start: 2023-09-28

## 2023-09-28 RX ORDER — AZITHROMYCIN 250 MG/1
TABLET, FILM COATED ORAL
Qty: 6 TABLET | Refills: 0 | Status: SHIPPED | OUTPATIENT
Start: 2023-09-28 | End: 2023-10-03

## 2023-09-28 ASSESSMENT — ENCOUNTER SYMPTOMS
ABDOMINAL DISTENTION: 0
NAUSEA: 0
COUGH: 1
ABDOMINAL PAIN: 0
BACK PAIN: 1
DIARRHEA: 0
CONSTIPATION: 0
WHEEZING: 1
VOMITING: 0
SHORTNESS OF BREATH: 1
CHEST TIGHTNESS: 0

## 2023-09-28 NOTE — PROGRESS NOTES
labs reviewed with the patient and all questions fully answered.    Leukocytosis  Hyperglycemia, she was on steroids  Low sodium  Hyperlipidemia  Prediabetes  Vitamin D deficiency  Otherwise labs within normal limits    Lab Results   Component Value Date    WBC 20.1 (H) 08/19/2023    HGB 12.3 08/19/2023    HCT 38.2 08/19/2023    MCV 99.0 08/19/2023     08/19/2023       Lab Results   Component Value Date/Time     08/19/2023 07:05 AM    K 4.2 08/19/2023 07:05 AM     08/19/2023 07:05 AM    CO2 19 08/19/2023 07:05 AM    BUN 16 08/19/2023 07:05 AM    CREATININE 0.6 08/19/2023 07:05 AM    GLUCOSE 218 08/19/2023 07:05 AM    CALCIUM 9.3 08/19/2023 07:05 AM        Lab Results   Component Value Date    ALT 24 01/16/2023    AST 26 01/16/2023    ALKPHOS 95 01/16/2023    BILITOT 0.2 (L) 01/16/2023       Lab Results   Component Value Date    TSH 0.69 01/16/2023       Lab Results   Component Value Date    CHOL 192 07/26/2021    CHOL 159 08/12/2019    CHOL 202 (H) 04/10/2017     Lab Results   Component Value Date    TRIG 125 07/26/2021    TRIG 183 (H) 08/12/2019    TRIG 85 04/10/2017     Lab Results   Component Value Date    HDL 29 (L) 01/16/2023    HDL 35 (L) 07/26/2021    HDL 48 05/19/2021     Lab Results   Component Value Date    LDLCHOLESTEROL 119 01/16/2023    LDLCHOLESTEROL 132 (H) 07/26/2021    LDLCHOLESTEROL 142 (H) 05/19/2021       Lab Results   Component Value Date    CHOLHDLRATIO 7.6 (H) 01/16/2023    CHOLHDLRATIO 5.5 (H) 07/26/2021    CHOLHDLRATIO 4.3 05/19/2021       Lab Results   Component Value Date    LABA1C 6.1 (H) 01/16/2023       Lab Results   Component Value Date    QTTQJEVD77 581 10/01/2019       No results found for: \"FOLATE\"    No results found for: \"IRON\", \"TIBC\", \"FERRITIN\"    Lab Results   Component Value Date    VITD25 28.6 (L) 01/16/2023         On this date 9/28/2023 I have spent 39 minutes reviewing previous notes, test results and face to face (virtual) with the patient discussing

## 2023-09-28 NOTE — TELEPHONE ENCOUNTER
Return in about 3 months (around 12/28/2023) for Visit type extended 30 minutes chronic problems, HTN,HLP. Obtain CT facial from 8/29/2023 at [facility] .           Patient also needs nurse visit for blood pressure check

## 2023-09-29 NOTE — TELEPHONE ENCOUNTER
9/29/2023  Patient is MY-CHART STATUS ACTIVE. A My-Chart message has been sent out to patient. Per Provider request to deliver message to patient in regard to scheduling a bp check, also sent medical records request to [facility]. .  Future Appointments   Date Time Provider [address]   10/5/2023  6:30 PM STA MAMMO RM 1 STAZ MAMMO STA Radiolog   11/10/2023  2:30 PM Ricardo He MD [facility]

## 2023-10-17 DIAGNOSIS — E87.6 DIURETIC-INDUCED HYPOKALEMIA: ICD-10-CM

## 2023-10-17 DIAGNOSIS — I10 ESSENTIAL HYPERTENSION: ICD-10-CM

## 2023-10-17 DIAGNOSIS — T50.2X5A DIURETIC-INDUCED HYPOKALEMIA: ICD-10-CM

## 2023-10-17 RX ORDER — POTASSIUM CHLORIDE 750 MG/1
TABLET, EXTENDED RELEASE ORAL
Qty: 180 TABLET | Refills: 0 | Status: SHIPPED | OUTPATIENT
Start: 2023-10-17

## 2023-10-23 DIAGNOSIS — J20.9 ACUTE BRONCHITIS DUE TO INFECTION: ICD-10-CM

## 2023-10-23 DIAGNOSIS — J45.41 MODERATE PERSISTENT REACTIVE AIRWAY DISEASE WITH WHEEZING WITH ACUTE EXACERBATION: ICD-10-CM

## 2023-10-23 RX ORDER — BENZONATATE 100 MG/1
100 CAPSULE ORAL 3 TIMES DAILY PRN
Qty: 21 CAPSULE | Refills: 0 | Status: SHIPPED | OUTPATIENT
Start: 2023-10-23 | End: 2023-10-30

## 2023-10-23 RX ORDER — GUAIFENESIN AND DEXTROMETHORPHAN HYDROBROMIDE 100; 10 MG/5ML; MG/5ML
10 SOLUTION ORAL EVERY 6 HOURS PRN
Qty: 120 ML | Refills: 2 | Status: SHIPPED | OUTPATIENT
Start: 2023-10-23

## 2023-10-23 NOTE — TELEPHONE ENCOUNTER
Please Approve or Refuse.   Send to Pharmacy per Pt's Request: PATIENT CALLED STATING STILL HAS CONGESTION AND COUGH/OUT OF ALL COUGH SYRUP AND TESSALON PEARLS      Next Visit Date:  11/10/2023   Last Visit Date: 9/28/2023    Hemoglobin A1C (%)   Date Value   01/16/2023 6.1 (H)   09/21/2021 5.7   10/01/2019 5.7             ( goal A1C is < 7)   BP Readings from Last 3 Encounters:   08/19/23 (!) 150/86   08/04/23 136/84   08/04/23 (!) 203/96          (goal 120/80)  BUN   Date Value Ref Range Status   08/19/2023 16 6 - 20 mg/dL Final     Creatinine   Date Value Ref Range Status   08/19/2023 0.6 0.5 - 0.9 mg/dL Final     Potassium   Date Value Ref Range Status   08/19/2023 4.2 3.7 - 5.3 mmol/L Final

## 2023-10-26 DIAGNOSIS — M54.41 CHRONIC MIDLINE LOW BACK PAIN WITH BILATERAL SCIATICA: ICD-10-CM

## 2023-10-26 DIAGNOSIS — M54.42 CHRONIC MIDLINE LOW BACK PAIN WITH BILATERAL SCIATICA: ICD-10-CM

## 2023-10-26 DIAGNOSIS — G89.29 CHRONIC MIDLINE LOW BACK PAIN WITH BILATERAL SCIATICA: ICD-10-CM

## 2023-10-26 RX ORDER — MELOXICAM 15 MG/1
TABLET ORAL
Qty: 30 TABLET | Refills: 0 | Status: SHIPPED | OUTPATIENT
Start: 2023-10-26

## 2023-11-11 DIAGNOSIS — M85.89 OSTEOPENIA OF MULTIPLE SITES: ICD-10-CM

## 2023-11-11 DIAGNOSIS — E55.9 VITAMIN D DEFICIENCY: ICD-10-CM

## 2023-11-13 DIAGNOSIS — J45.40 MODERATE PERSISTENT REACTIVE AIRWAY DISEASE WITH WHEEZING WITHOUT COMPLICATION: ICD-10-CM

## 2023-11-13 DIAGNOSIS — E78.5 DYSLIPIDEMIA: ICD-10-CM

## 2023-11-13 RX ORDER — ASPIRIN 81 MG
TABLET, DELAYED RELEASE (ENTERIC COATED) ORAL
Qty: 90 TABLET | Refills: 3 | Status: SHIPPED | OUTPATIENT
Start: 2023-11-13

## 2023-11-13 NOTE — TELEPHONE ENCOUNTER
Please Approve or Refuse.   Send to Pharmacy per Pt's Request:      Next Visit Date:  Visit date not found   Last Visit Date: 9/28/2023    Hemoglobin A1C (%)   Date Value   01/16/2023 6.1 (H)   09/21/2021 5.7   10/01/2019 5.7             ( goal A1C is < 7)   BP Readings from Last 3 Encounters:   08/19/23 (!) 150/86   08/04/23 136/84   08/04/23 (!) 203/96          (goal 120/80)  BUN   Date Value Ref Range Status   08/19/2023 16 6 - 20 mg/dL Final     Creatinine   Date Value Ref Range Status   08/19/2023 0.6 0.5 - 0.9 mg/dL Final     Potassium   Date Value Ref Range Status   08/19/2023 4.2 3.7 - 5.3 mmol/L Final

## 2023-11-14 RX ORDER — PRAVASTATIN SODIUM 20 MG
20 TABLET ORAL DAILY
Qty: 90 TABLET | Refills: 3 | Status: SHIPPED | OUTPATIENT
Start: 2023-11-14

## 2023-11-14 RX ORDER — ALBUTEROL SULFATE 2.5 MG/3ML
2.5 SOLUTION RESPIRATORY (INHALATION) EVERY 6 HOURS PRN
Qty: 125 EACH | Refills: 0 | Status: SHIPPED | OUTPATIENT
Start: 2023-11-14

## 2023-11-14 RX ORDER — ALBUTEROL SULFATE 90 UG/1
2 AEROSOL, METERED RESPIRATORY (INHALATION) EVERY 6 HOURS PRN
Qty: 8 G | Refills: 3 | Status: SHIPPED | OUTPATIENT
Start: 2023-11-14

## 2023-11-14 RX ORDER — GUAIFENESIN AND DEXTROMETHORPHAN HYDROBROMIDE 100; 10 MG/5ML; MG/5ML
10 SOLUTION ORAL EVERY 6 HOURS PRN
Qty: 120 ML | Refills: 2 | Status: SHIPPED | OUTPATIENT
Start: 2023-11-14

## 2023-11-19 DIAGNOSIS — R73.03 PREDIABETES: ICD-10-CM

## 2023-11-20 RX ORDER — METFORMIN HYDROCHLORIDE 500 MG/1
TABLET, EXTENDED RELEASE ORAL
Qty: 30 TABLET | Refills: 3 | Status: SHIPPED | OUTPATIENT
Start: 2023-11-20

## 2023-11-24 DIAGNOSIS — M54.42 CHRONIC MIDLINE LOW BACK PAIN WITH BILATERAL SCIATICA: ICD-10-CM

## 2023-11-24 DIAGNOSIS — M54.41 CHRONIC MIDLINE LOW BACK PAIN WITH BILATERAL SCIATICA: ICD-10-CM

## 2023-11-24 DIAGNOSIS — G89.29 CHRONIC MIDLINE LOW BACK PAIN WITH BILATERAL SCIATICA: ICD-10-CM

## 2023-11-27 RX ORDER — MELOXICAM 15 MG/1
15 TABLET ORAL DAILY PRN
Qty: 30 TABLET | Refills: 0 | Status: SHIPPED | OUTPATIENT
Start: 2023-11-27

## 2023-11-30 DIAGNOSIS — J45.40 MODERATE PERSISTENT REACTIVE AIRWAY DISEASE WITH WHEEZING WITHOUT COMPLICATION: ICD-10-CM

## 2023-11-30 RX ORDER — GUAIFENESIN AND DEXTROMETHORPHAN HYDROBROMIDE 100; 10 MG/5ML; MG/5ML
10 SOLUTION ORAL EVERY 6 HOURS PRN
Qty: 120 ML | Refills: 2 | Status: SHIPPED | OUTPATIENT
Start: 2023-11-30

## 2023-12-12 ENCOUNTER — PATIENT MESSAGE (OUTPATIENT)
Dept: FAMILY MEDICINE CLINIC | Age: 48
End: 2023-12-12

## 2023-12-12 DIAGNOSIS — J45.40 MODERATE PERSISTENT REACTIVE AIRWAY DISEASE WITH WHEEZING WITHOUT COMPLICATION: ICD-10-CM

## 2023-12-12 DIAGNOSIS — R05.3 CHRONIC COUGH: Primary | ICD-10-CM

## 2023-12-12 DIAGNOSIS — E78.5 DYSLIPIDEMIA: ICD-10-CM

## 2023-12-12 RX ORDER — GUAIFENESIN AND DEXTROMETHORPHAN HYDROBROMIDE 100; 10 MG/5ML; MG/5ML
10 SOLUTION ORAL EVERY 6 HOURS PRN
Qty: 120 ML | Refills: 2 | Status: SHIPPED | OUTPATIENT
Start: 2023-12-12

## 2023-12-12 RX ORDER — GUAIFENESIN AND DEXTROMETHORPHAN HYDROBROMIDE 100; 10 MG/5ML; MG/5ML
10 SOLUTION ORAL EVERY 6 HOURS PRN
Qty: 120 ML | Refills: 2 | OUTPATIENT
Start: 2023-12-12

## 2023-12-12 RX ORDER — PRAVASTATIN SODIUM 20 MG
20 TABLET ORAL DAILY
Qty: 90 TABLET | Refills: 3 | Status: ON HOLD | OUTPATIENT
Start: 2023-12-12 | End: 2024-02-09 | Stop reason: HOSPADM

## 2023-12-12 NOTE — TELEPHONE ENCOUNTER
From: NAVID KOLB  To: Ryan Pencil  Sent: 12/12/2023 1:01 PM EST  Subject: cough    Tima Elizabeth MD        12/12/23 12:54 PM  Note   For chronic cough, she needs to see the pulmonologistl as referred      Guillen Wil[facility] [place]issa Elizondo, [address]  [address], [address] [facility], [address]  [phone]

## 2023-12-27 ENCOUNTER — E-VISIT (OUTPATIENT)
Dept: FAMILY MEDICINE CLINIC | Age: 48
End: 2023-12-27
Payer: COMMERCIAL

## 2023-12-27 DIAGNOSIS — U07.1 COVID-19 VIRUS INFECTION: Primary | ICD-10-CM

## 2023-12-27 PROCEDURE — 99423 OL DIG E/M SVC 21+ MIN: CPT | Performed by: FAMILY MEDICINE

## 2023-12-27 RX ORDER — BENZONATATE 100 MG/1
100 CAPSULE ORAL 3 TIMES DAILY PRN
Qty: 21 CAPSULE | Refills: 0 | Status: SHIPPED | OUTPATIENT
Start: 2023-12-27 | End: 2024-01-03

## 2023-12-27 RX ORDER — IPRATROPIUM BROMIDE 21 UG/1
2 SPRAY, METERED NASAL EVERY 12 HOURS
Qty: 30 ML | Refills: 0 | Status: SHIPPED | OUTPATIENT
Start: 2023-12-27

## 2023-12-27 NOTE — PROGRESS NOTES
[address] [facility] (1975) initiated an asynchronous digital communication through [address] [place] [address]. Date of service: 12/27/2023     HPI: per patient's questionnaire and Naonexthart messages  Awaiting more information from the patient on 12/27/2023 at 3:50 PM    [place] minutes ago (3:53 PM)       Fever my ears hurt at home covid test was positive        [address] [place] [address]  You12 minutes ago (3:50 PM)       All of the above the headache is from the cough and congestion          [place] [facility] ago (4:16 PM)       Started on the 24th and positive test in the evening of the 26th      EXAM: not applicable    Diagnoses and all orders for this visit:    1. COVID-19 virus infection  Patient reports tested positive for COVID-19  Day 0 is December 24  She will need to isolate from December 25 through 29 included  Only if symptoms greatly improved, she could go out with mask for the next 5 days   - nirmatrelvir/ritonavir 300/100 (PAXLOVID) 20 x 150 MG & 10 x 100MG TBPK; Take 3 tablets (two 150 mg nirmatrelvir and one 100 mg ritonavir tablets) by mouth every 12 hours for 5 days. Dispense: 30 tablet; Refill: 0  - benzonatate (TESSALON PERLES) 100 MG capsule; Take 1 capsule by mouth 3 times daily as needed for Cough  Dispense: 21 capsule; Refill: 0  - ipratropium (ATROVENT) 0.03 % nasal spray; 2 sprays by Each Nostril route in the morning and 2 sprays in the evening. X 10 days for congestion. Dispense: 30 mL; Refill: 0    Advised albuterol as needed 2 puffs every 6 hours for cough and congestion, Tylenol 500 Mg every 6 hours for fever and pain, rest, increase fluids. If yellow mucus develops, she will need antibiotic advised to contact us back if needed    No orders of the defined types were placed in this encounter.         Patient was advised to contact PCP if symptoms worsen or failing to change as expected          Addendum made on 12/29/2023 at 7:44 AM  --Froilan Marquez MD on 12/29/2023 at 7:44

## 2023-12-29 RX ORDER — DEXTROMETHORPHAN HYDROBROMIDE AND PROMETHAZINE HYDROCHLORIDE 15; 6.25 MG/5ML; MG/5ML
2.5 SYRUP ORAL 4 TIMES DAILY PRN
Qty: 118 ML | Refills: 0 | Status: SHIPPED | OUTPATIENT
Start: 2023-12-29

## 2023-12-29 RX ORDER — ALBUTEROL SULFATE 90 UG/1
2 AEROSOL, METERED RESPIRATORY (INHALATION) EVERY 6 HOURS PRN
Qty: 8 G | Refills: 0 | Status: SHIPPED | OUTPATIENT
Start: 2023-12-29

## 2023-12-30 DIAGNOSIS — S02.92XA MULTIPLE CLOSED FRACTURES OF FACIAL BONE, INITIAL ENCOUNTER (HCC): ICD-10-CM

## 2023-12-30 DIAGNOSIS — M54.42 CHRONIC MIDLINE LOW BACK PAIN WITH BILATERAL SCIATICA: ICD-10-CM
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
## 2023-12-30 DIAGNOSIS — S02.85XA CLOSED FRACTURE OF LEFT ORBIT, INITIAL ENCOUNTER (HCC): ICD-10-CM

## 2023-12-30 DIAGNOSIS — K21.9 GASTROESOPHAGEAL REFLUX DISEASE WITHOUT ESOPHAGITIS: ICD-10-CM

## 2023-12-30 DIAGNOSIS — S05.92XA NON-PENETRATING INJURY OF LEFT EYE, INITIAL ENCOUNTER: ICD-10-CM

## 2023-12-30 DIAGNOSIS — S01.81XA FACIAL LACERATION, INITIAL ENCOUNTER: ICD-10-CM

## 2023-12-30 DIAGNOSIS — G89.29 CHRONIC MIDLINE LOW BACK PAIN WITH BILATERAL SCIATICA: ICD-10-CM

## 2023-12-30 DIAGNOSIS — M54.41 CHRONIC MIDLINE LOW BACK PAIN WITH BILATERAL SCIATICA: ICD-10-CM

## 2024-01-02 ENCOUNTER — E-VISIT (OUTPATIENT)
Dept: FAMILY MEDICINE CLINIC | Age: 49
End: 2024-01-02

## 2024-01-02 DIAGNOSIS — J20.9 ACUTE BRONCHITIS DUE TO INFECTION: Primary | ICD-10-CM

## 2024-01-02 DIAGNOSIS — J01.80 ACUTE NON-RECURRENT SINUSITIS OF OTHER SINUS: ICD-10-CM

## 2024-01-02 PROCEDURE — 99999 PR OFFICE/OUTPT VISIT,PROCEDURE ONLY: CPT | Performed by: FAMILY MEDICINE

## 2024-01-02 RX ORDER — PSEUDOEPHED/ACETAMINOPH/DIPHEN 30MG-500MG
TABLET ORAL
Qty: 120 TABLET | Refills: 3 | Status: SHIPPED | OUTPATIENT
Start: 2024-01-02

## 2024-01-02 RX ORDER — MELOXICAM 15 MG/1
TABLET ORAL
Qty: 30 TABLET | Refills: 0 | Status: SHIPPED | OUTPATIENT
Start: 2024-01-02

## 2024-01-02 RX ORDER — FAMOTIDINE 40 MG/1
TABLET, FILM COATED ORAL
Qty: 90 TABLET | Refills: 3 | Status: SHIPPED | OUTPATIENT
Start: 2024-01-02

## 2024-01-02 NOTE — PROGRESS NOTES
Patient submitted e-visit on 12/27/2023, within 7 days, we will cancel this e-visit    I sent a Z-James and the other e-visit

## 2024-01-07 DIAGNOSIS — J45.40 MODERATE PERSISTENT REACTIVE AIRWAY DISEASE WITH WHEEZING WITHOUT COMPLICATION: ICD-10-CM

## 2024-01-08 RX ORDER — GUAIFENESIN AND DEXTROMETHORPHAN HYDROBROMIDE 100; 10 MG/5ML; MG/5ML
SOLUTION ORAL
Qty: 120 ML | Refills: 0 | Status: SHIPPED | OUTPATIENT
Start: 2024-01-08

## 2024-01-16 ENCOUNTER — TELEPHONE (OUTPATIENT)
Dept: FAMILY MEDICINE CLINIC | Age: 49
End: 2024-01-16

## 2024-01-16 DIAGNOSIS — J01.80 ACUTE NON-RECURRENT SINUSITIS OF OTHER SINUS: Primary | ICD-10-CM

## 2024-01-16 RX ORDER — GUAIFENESIN 600 MG/1
600 TABLET, EXTENDED RELEASE ORAL 2 TIMES DAILY PRN
Qty: 30 TABLET | Refills: 0 | Status: SHIPPED | OUTPATIENT
Start: 2024-01-16

## 2024-01-16 RX ORDER — CEFUROXIME AXETIL 500 MG/1
500 TABLET ORAL 2 TIMES DAILY
Qty: 14 TABLET | Refills: 0 | Status: SHIPPED | OUTPATIENT
Start: 2024-01-16 | End: 2024-01-17 | Stop reason: RX

## 2024-01-16 RX ORDER — NEOMYCIN SULFATE, POLYMYXIN B SULFATE AND HYDROCORTISONE 10; 3.5; 1 MG/ML; MG/ML; [USP'U]/ML
3 SUSPENSION/ DROPS AURICULAR (OTIC) 4 TIMES DAILY
Qty: 10 ML | Refills: 1 | Status: SHIPPED | OUTPATIENT
Start: 2024-01-16

## 2024-01-16 RX ORDER — ECHINACEA PURPUREA EXTRACT 125 MG
2 TABLET ORAL PRN
Qty: 88 ML | Refills: 0 | Status: SHIPPED | OUTPATIENT
Start: 2024-01-16

## 2024-01-16 NOTE — TELEPHONE ENCOUNTER
Syl called.    Symptoms are boogers in her face, coughing, ears popping, fever.    Started since the beginning of the new year.     Finished with Zpak and would like to know if there is anything else she could take?    Pharmacy is correct.    Please advise patient.    Thank you.

## 2024-01-16 NOTE — TELEPHONE ENCOUNTER
Please let the patient know to  prescription from the pharmacy.    If worsening symptoms in few days or not getting better as expected needs to let us know and make appointment.      Orders Placed This Encounter   Medications    neomycin-polymyxin-hydrocortisone (CORTISPORIN) 3.5-91533-3 otic suspension     Sig: Place 3 drops into both ears 4 times daily OK to substitute.  For 10 days     Dispense:  10 mL     Refill:  1    cefUROXime (CEFTIN) 500 MG tablet     Sig: Take 1 tablet by mouth 2 times daily for 7 days     Dispense:  14 tablet     Refill:  0    guaiFENesin (MUCINEX) 600 MG extended release tablet     Sig: Take 1 tablet by mouth 2 times daily as needed for Congestion     Dispense:  30 tablet     Refill:  0    sodium chloride (ALTAMIST SPRAY) 0.65 % nasal spray     Si sprays by Nasal route as needed for Congestion (Q 2-3 HOURS prn)     Dispense:  88 mL     Refill:  0         [id number] - [place], [place] - [address] [phone] -  [phone] [address] [facility] [address]  Phone: [phone] Fax: [phone]      No orders of the defined types were placed in this encounter.      Future Appointments   Date Time Provider Department Center   2024 12:30 PM SCHEDULE, JORGE LUIS [facility] NUCLE[facility]       Thank you!

## 2024-01-17 ENCOUNTER — PATIENT MESSAGE (OUTPATIENT)
Dept: FAMILY MEDICINE CLINIC | Age: 49
End: 2024-01-17

## 2024-01-17 DIAGNOSIS — J01.80 ACUTE NON-RECURRENT SINUSITIS OF OTHER SINUS: Primary | ICD-10-CM

## 2024-01-17 RX ORDER — DOXYCYCLINE HYCLATE 100 MG
100 TABLET ORAL 2 TIMES DAILY
Qty: 14 TABLET | Refills: 0 | Status: SHIPPED | OUTPATIENT
Start: 2024-01-17 | End: 2024-01-24

## 2024-01-17 RX ORDER — DEXTROMETHORPHAN HYDROBROMIDE AND PROMETHAZINE HYDROCHLORIDE 15; 6.25 MG/5ML; MG/5ML
5 SYRUP ORAL 4 TIMES DAILY PRN
Qty: 180 ML | Refills: 0 | Status: SHIPPED | OUTPATIENT
Start: 2024-01-17

## 2024-01-17 NOTE — TELEPHONE ENCOUNTER
From: Syl Dejesus  Sent: 1/17/2024 12:00 PM EST  To: [facility] Clinical Support Pool  Subject: message from dr barajas    This medication is out of stock and they don't know when it will be there in the meantime I again get to suffer. Why? Why can I not have a stronger antibiotic? My ear and back and sides hurt from coughing so why not give me the same syrup that was working. [birth year] is my [birth year] is it possible to get some relief from any of this? I'd love not to cough or have ear pain please

## 2024-01-18 DIAGNOSIS — M54.41 CHRONIC MIDLINE LOW BACK PAIN WITH BILATERAL SCIATICA: ICD-10-CM

## 2024-01-18 DIAGNOSIS — M54.42 CHRONIC MIDLINE LOW BACK PAIN WITH BILATERAL SCIATICA: ICD-10-CM

## 2024-01-18 DIAGNOSIS — M96.1 FAILED BACK SYNDROME, LUMBAR: ICD-10-CM

## 2024-01-18 DIAGNOSIS — M48.061 NEURAL FORAMINAL STENOSIS OF LUMBAR SPINE: ICD-10-CM

## 2024-01-18 DIAGNOSIS — M51.36 LUMBAR DEGENERATIVE DISC DISEASE: ICD-10-CM

## 2024-01-18 DIAGNOSIS — G89.29 CHRONIC MIDLINE LOW BACK PAIN WITH BILATERAL SCIATICA: ICD-10-CM

## 2024-01-18 RX ORDER — TIZANIDINE 4 MG/1
TABLET ORAL
Qty: 120 TABLET | Refills: 5 | Status: SHIPPED | OUTPATIENT
Start: 2024-01-18

## 2024-01-18 NOTE — TELEPHONE ENCOUNTER
Please Approve or Refuse.  Send to Pharmacy per Pt's Request: [facility]     Next Visit Date:  CALLED PT LVM.    Last Visit Date: 1/2/2024    Hemoglobin A1C (%)   Date Value   01/16/2023 6.1 (H)   09/21/2021 5.7   10/01/2019 5.7             ( goal A1C is < 7)   BP Readings from Last 3 Encounters:   01/11/24 (!) 146/82   08/19/23 (!) 150/86   08/04/23 136/84          (goal 120/80)  BUN   Date Value Ref Range Status   08/19/2023 16 6 - 20 mg/dL Final     Creatinine   Date Value Ref Range Status   08/19/2023 0.6 0.5 - 0.9 mg/dL Final     Potassium   Date Value Ref Range Status   08/19/2023 4.2 3.7 - 5.3 mmol/L Final

## 2024-01-23 DIAGNOSIS — E55.9 VITAMIN D DEFICIENCY: ICD-10-CM

## 2024-01-23 DIAGNOSIS — E87.6 DIURETIC-INDUCED HYPOKALEMIA: ICD-10-CM

## 2024-01-23 DIAGNOSIS — I10 ESSENTIAL HYPERTENSION: ICD-10-CM

## 2024-01-23 DIAGNOSIS — T50.2X5A DIURETIC-INDUCED HYPOKALEMIA: ICD-10-CM

## 2024-01-23 RX ORDER — POTASSIUM CHLORIDE 750 MG/1
TABLET, EXTENDED RELEASE ORAL
Qty: 180 TABLET | Refills: 0 | OUTPATIENT
Start: 2024-01-23

## 2024-01-23 RX ORDER — CHOLECALCIFEROL (VITAMIN D3) 125 MCG
1 CAPSULE ORAL DAILY
Qty: 30 TABLET | Refills: 3 | Status: SHIPPED | OUTPATIENT
Start: 2024-01-23

## 2024-01-23 NOTE — TELEPHONE ENCOUNTER
Needs to do her labs ASAP, potassium needs monitoring, orders in the computer.  She is not on diuretic for me to be safe with refilling her potassium    She has to do her labs before potassium can be refilled

## 2024-01-23 NOTE — TELEPHONE ENCOUNTER
Please Approve or Refuse.  Send to Pharmacy per Pt's Request:      Next Visit Date:  Visit date not found   Last Visit Date: 1/2/2024    Hemoglobin A1C (%)   Date Value   01/16/2023 6.1 (H)   09/21/2021 5.7   10/01/2019 5.7             ( goal A1C is < 7)   BP Readings from Last 3 Encounters:   01/11/24 (!) 146/82   08/19/23 (!) 150/86   08/04/23 136/84          (goal 120/80)  BUN   Date Value Ref Range Status   08/19/2023 16 6 - 20 mg/dL Final     Creatinine   Date Value Ref Range Status   08/19/2023 0.6 0.5 - 0.9 mg/dL Final     Potassium   Date Value Ref Range Status   08/19/2023 4.2 3.7 - 5.3 mmol/L Final

## 2024-01-23 NOTE — TELEPHONE ENCOUNTER
Please Approve or Refuse.  Send to Pharmacy per Pt's Request:      Next Visit Date:  1/23/2024   Last Visit Date: 1/2/2024    Hemoglobin A1C (%)   Date Value   01/16/2023 6.1 (H)   09/21/2021 5.7   10/01/2019 5.7             ( goal A1C is < 7)   BP Readings from Last 3 Encounters:   01/11/24 (!) 146/82   08/19/23 (!) 150/86   08/04/23 136/84          (goal 120/80)  BUN   Date Value Ref Range Status   08/19/2023 16 6 - 20 mg/dL Final     Creatinine   Date Value Ref Range Status   08/19/2023 0.6 0.5 - 0.9 mg/dL Final     Potassium   Date Value Ref Range Status   08/19/2023 4.2 3.7 - 5.3 mmol/L Final

## 2024-01-25 DIAGNOSIS — J45.41 MODERATE PERSISTENT REACTIVE AIRWAY DISEASE WITH WHEEZING WITH ACUTE EXACERBATION: ICD-10-CM

## 2024-01-25 RX ORDER — DILTIAZEM HYDROCHLORIDE 60 MG/1
TABLET, FILM COATED ORAL
Qty: 10.2 G | Refills: 3 | Status: SHIPPED | OUTPATIENT
Start: 2024-01-25

## 2024-01-26 DIAGNOSIS — J01.80 ACUTE NON-RECURRENT SINUSITIS OF OTHER SINUS: ICD-10-CM

## 2024-01-26 RX ORDER — DEXTROMETHORPHAN HYDROBROMIDE AND PROMETHAZINE HYDROCHLORIDE 15; 6.25 MG/5ML; MG/5ML
SYRUP ORAL
Qty: 180 ML | Refills: 0 | OUTPATIENT
Start: 2024-01-26

## 2024-01-30 DIAGNOSIS — J01.80 ACUTE NON-RECURRENT SINUSITIS OF OTHER SINUS: ICD-10-CM

## 2024-01-30 DIAGNOSIS — M54.42 CHRONIC MIDLINE LOW BACK PAIN WITH BILATERAL SCIATICA: ICD-10-CM

## 2024-01-30 DIAGNOSIS — M54.41 CHRONIC MIDLINE LOW BACK PAIN WITH BILATERAL SCIATICA: ICD-10-CM

## 2024-01-30 DIAGNOSIS — G89.29 CHRONIC MIDLINE LOW BACK PAIN WITH BILATERAL SCIATICA: ICD-10-CM

## 2024-01-30 RX ORDER — MELOXICAM 15 MG/1
TABLET ORAL
Qty: 30 TABLET | Refills: 0 | Status: SHIPPED | OUTPATIENT
Start: 2024-01-30

## 2024-01-30 RX ORDER — DEXTROMETHORPHAN HYDROBROMIDE AND PROMETHAZINE HYDROCHLORIDE 15; 6.25 MG/5ML; MG/5ML
5 SYRUP ORAL 4 TIMES DAILY PRN
Qty: 180 ML | Refills: 0 | Status: CANCELLED | OUTPATIENT
Start: 2024-01-30

## 2024-01-30 NOTE — TELEPHONE ENCOUNTER
Please Approve or Refuse.  Send to Pharmacy per Pt's Request: [facility]     Next Visit Date:  called pt lvm,   Last Visit Date: 1/2/2024    Hemoglobin A1C (%)   Date Value   01/16/2023 6.1 (H)   09/21/2021 5.7   10/01/2019 5.7             ( goal A1C is < 7)   BP Readings from Last 3 Encounters:   01/11/24 (!) 146/82   08/19/23 (!) 150/86   08/04/23 136/84          (goal 120/80)  BUN   Date Value Ref Range Status   08/19/2023 16 6 - 20 mg/dL Final     Creatinine   Date Value Ref Range Status   08/19/2023 0.6 0.5 - 0.9 mg/dL Final     Potassium   Date Value Ref Range Status   08/19/2023 4.2 3.7 - 5.3 mmol/L Final

## 2024-02-08 ENCOUNTER — HOSPITAL ENCOUNTER (INPATIENT)
Age: 49
LOS: 1 days | Discharge: HOME | DRG: 175 | End: 2024-02-09
Attending: INTERNAL MEDICINE | Admitting: INTERNAL MEDICINE
Payer: COMMERCIAL

## 2024-02-08 DIAGNOSIS — R94.39 ABNORMAL STRESS TEST: ICD-10-CM

## 2024-02-08 DIAGNOSIS — I25.118 CORONARY ARTERY DISEASE INVOLVING NATIVE CORONARY ARTERY OF NATIVE HEART WITH OTHER FORM OF ANGINA PECTORIS (HCC): Primary | ICD-10-CM

## 2024-02-08 LAB
ANION GAP SERPL CALCULATED.3IONS-SCNC: 11 MMOL/L (ref 9–17)
BUN SERPL-MCNC: 18 MG/DL (ref 6–20)
BUN/CREAT SERPL: 30 (ref 9–20)
CALCIUM SERPL-MCNC: 9.7 MG/DL (ref 8.6–10.4)
CHLORIDE SERPL-SCNC: 99 MMOL/L (ref 98–107)
CHOLEST SERPL-MCNC: 157 MG/DL
CHOLESTEROL/HDL RATIO: 5.8
CO2 SERPL-SCNC: 29 MMOL/L (ref 20–31)
CREAT SERPL-MCNC: 0.6 MG/DL (ref 0.5–0.9)
ECHO BSA: 2.29 M2
EKG ATRIAL RATE: 74 BPM
EKG P AXIS: 64 DEGREES
EKG P-R INTERVAL: 168 MS
EKG Q-T INTERVAL: 412 MS
EKG QRS DURATION: 92 MS
EKG QTC CALCULATION (BAZETT): 457 MS
EKG R AXIS: 43 DEGREES
EKG T AXIS: 50 DEGREES
EKG VENTRICULAR RATE: 74 BPM
ERYTHROCYTE [DISTWIDTH] IN BLOOD BY AUTOMATED COUNT: 13.2 % (ref 11.8–14.4)
GFR SERPL CREATININE-BSD FRML MDRD: >60 ML/MIN/1.73M2
GLUCOSE SERPL-MCNC: 148 MG/DL (ref 70–99)
HCT VFR BLD AUTO: 37.8 % (ref 36.3–47.1)
HDLC SERPL-MCNC: 27 MG/DL
HGB BLD-MCNC: 12.8 G/DL (ref 11.9–15.1)
LDLC SERPL CALC-MCNC: ABNORMAL MG/DL (ref 0–130)
LDLC SERPL DIRECT ASSAY-MCNC: 57 MG/DL
MCH RBC QN AUTO: 32.5 PG (ref 25.2–33.5)
MCHC RBC AUTO-ENTMCNC: 33.9 G/DL (ref 28.4–34.8)
MCV RBC AUTO: 95.9 FL (ref 82.6–102.9)
NRBC BLD-RTO: 0 PER 100 WBC
PLATELET # BLD AUTO: 302 K/UL (ref 138–453)
PMV BLD AUTO: 9.4 FL (ref 8.1–13.5)
POTASSIUM SERPL-SCNC: 3.6 MMOL/L (ref 3.7–5.3)
RBC # BLD AUTO: 3.94 M/UL (ref 3.95–5.11)
SODIUM SERPL-SCNC: 139 MMOL/L (ref 135–144)
TRIGL SERPL-MCNC: 490 MG/DL
WBC OTHER # BLD: 14.9 K/UL (ref 3.5–11.3)

## 2024-02-08 PROCEDURE — 02703ZZ DILATION OF CORONARY ARTERY, ONE ARTERY, PERCUTANEOUS APPROACH: ICD-10-PCS | Performed by: INTERNAL MEDICINE

## 2024-02-08 PROCEDURE — C1894 INTRO/SHEATH, NON-LASER: HCPCS | Performed by: INTERNAL MEDICINE

## 2024-02-08 PROCEDURE — 93005 ELECTROCARDIOGRAM TRACING: CPT | Performed by: INTERNAL MEDICINE

## 2024-02-08 PROCEDURE — 93458 L HRT ARTERY/VENTRICLE ANGIO: CPT | Performed by: INTERNAL MEDICINE

## 2024-02-08 PROCEDURE — 94761 N-INVAS EAR/PLS OXIMETRY MLT: CPT

## 2024-02-08 PROCEDURE — 92921 HC PRQ CARDIAC ANGIO ADDL ART: CPT | Performed by: INTERNAL MEDICINE

## 2024-02-08 PROCEDURE — 80048 BASIC METABOLIC PNL TOTAL CA: CPT

## 2024-02-08 PROCEDURE — C1725 CATH, TRANSLUMIN NON-LASER: HCPCS | Performed by: INTERNAL MEDICINE

## 2024-02-08 PROCEDURE — 2580000003 HC RX 258: Performed by: INTERNAL MEDICINE

## 2024-02-08 PROCEDURE — 6370000000 HC RX 637 (ALT 250 FOR IP): Performed by: INTERNAL MEDICINE

## 2024-02-08 PROCEDURE — 4A023N7 MEASUREMENT OF CARDIAC SAMPLING AND PRESSURE, LEFT HEART, PERCUTANEOUS APPROACH: ICD-10-PCS | Performed by: INTERNAL MEDICINE

## 2024-02-08 PROCEDURE — 2060000000 HC ICU INTERMEDIATE R&B

## 2024-02-08 PROCEDURE — 93010 ELECTROCARDIOGRAM REPORT: CPT | Performed by: INTERNAL MEDICINE

## 2024-02-08 PROCEDURE — C1769 GUIDE WIRE: HCPCS | Performed by: INTERNAL MEDICINE

## 2024-02-08 PROCEDURE — 2709999900 HC NON-CHARGEABLE SUPPLY: Performed by: INTERNAL MEDICINE

## 2024-02-08 PROCEDURE — 83721 ASSAY OF BLOOD LIPOPROTEIN: CPT

## 2024-02-08 PROCEDURE — 92929 PR PRQ TRLUML CORONARY STENT W/ANGIO ADDL ART/BRNCH: CPT | Performed by: INTERNAL MEDICINE

## 2024-02-08 PROCEDURE — 99221 1ST HOSP IP/OBS SF/LOW 40: CPT | Performed by: INTERNAL MEDICINE

## 2024-02-08 PROCEDURE — B2111ZZ FLUOROSCOPY OF MULTIPLE CORONARY ARTERIES USING LOW OSMOLAR CONTRAST: ICD-10-PCS | Performed by: INTERNAL MEDICINE

## 2024-02-08 PROCEDURE — 2500000003 HC RX 250 WO HCPCS: Performed by: INTERNAL MEDICINE

## 2024-02-08 PROCEDURE — 94640 AIRWAY INHALATION TREATMENT: CPT

## 2024-02-08 PROCEDURE — 99152 MOD SED SAME PHYS/QHP 5/>YRS: CPT | Performed by: INTERNAL MEDICINE

## 2024-02-08 PROCEDURE — 6360000002 HC RX W HCPCS: Performed by: INTERNAL MEDICINE

## 2024-02-08 PROCEDURE — 85027 COMPLETE CBC AUTOMATED: CPT

## 2024-02-08 PROCEDURE — 80061 LIPID PANEL: CPT

## 2024-02-08 PROCEDURE — C1874 STENT, COATED/COV W/DEL SYS: HCPCS | Performed by: INTERNAL MEDICINE

## 2024-02-08 PROCEDURE — C1887 CATHETER, GUIDING: HCPCS | Performed by: INTERNAL MEDICINE

## 2024-02-08 PROCEDURE — 6360000004 HC RX CONTRAST MEDICATION: Performed by: INTERNAL MEDICINE

## 2024-02-08 PROCEDURE — 99153 MOD SED SAME PHYS/QHP EA: CPT | Performed by: INTERNAL MEDICINE

## 2024-02-08 PROCEDURE — 027136Z DILATION OF CORONARY ARTERY, TWO ARTERIES WITH THREE DRUG-ELUTING INTRALUMINAL DEVICES, PERCUTANEOUS APPROACH: ICD-10-PCS | Performed by: INTERNAL MEDICINE

## 2024-02-08 PROCEDURE — 92928 PRQ TCAT PLMT NTRAC ST 1 LES: CPT | Performed by: INTERNAL MEDICINE

## 2024-02-08 PROCEDURE — C9600 PERC DRUG-EL COR STENT SING: HCPCS | Performed by: INTERNAL MEDICINE

## 2024-02-08 PROCEDURE — B2151ZZ FLUOROSCOPY OF LEFT HEART USING LOW OSMOLAR CONTRAST: ICD-10-PCS | Performed by: INTERNAL MEDICINE

## 2024-02-08 DEVICE — STENT RONYX22526UX RESOLUTE ONYX 2.25X26
Type: IMPLANTABLE DEVICE | Status: FUNCTIONAL
Brand: RESOLUTE ONYX™

## 2024-02-08 DEVICE — STENT RONYX25026UX RESOLUTE ONYX 2.50X26
Type: IMPLANTABLE DEVICE | Status: FUNCTIONAL
Brand: RESOLUTE ONYX™

## 2024-02-08 DEVICE — STENT ONYXNG35026UX ONYX 3.50X26RX
Type: IMPLANTABLE DEVICE | Status: FUNCTIONAL
Brand: ONYX FRONTIER™

## 2024-02-08 DEVICE — STENT RONYX22530UX RESOLUTE ONYX 2.25X30
Type: IMPLANTABLE DEVICE | Status: FUNCTIONAL
Brand: RESOLUTE ONYX™

## 2024-02-08 RX ORDER — ATORVASTATIN CALCIUM 80 MG/1
80 TABLET, FILM COATED ORAL NIGHTLY
Status: DISCONTINUED | OUTPATIENT
Start: 2024-02-08 | End: 2024-02-09 | Stop reason: HOSPADM

## 2024-02-08 RX ORDER — FAMOTIDINE 20 MG/1
40 TABLET, FILM COATED ORAL NIGHTLY
Status: DISCONTINUED | OUTPATIENT
Start: 2024-02-08 | End: 2024-02-09 | Stop reason: HOSPADM

## 2024-02-08 RX ORDER — MONTELUKAST SODIUM 10 MG/1
10 TABLET ORAL NIGHTLY
Status: DISCONTINUED | OUTPATIENT
Start: 2024-02-08 | End: 2024-02-09 | Stop reason: HOSPADM

## 2024-02-08 RX ORDER — SODIUM CHLORIDE 9 MG/ML
INJECTION, SOLUTION INTRAVENOUS PRN
Status: DISCONTINUED | OUTPATIENT
Start: 2024-02-08 | End: 2024-02-09 | Stop reason: HOSPADM

## 2024-02-08 RX ORDER — GUAIFENESIN 600 MG/1
600 TABLET, EXTENDED RELEASE ORAL 2 TIMES DAILY PRN
Status: DISCONTINUED | OUTPATIENT
Start: 2024-02-08 | End: 2024-02-09 | Stop reason: HOSPADM

## 2024-02-08 RX ORDER — SODIUM CHLORIDE 9 MG/ML
INJECTION, SOLUTION INTRAVENOUS CONTINUOUS PRN
Status: COMPLETED | OUTPATIENT
Start: 2024-02-08 | End: 2024-02-08

## 2024-02-08 RX ORDER — POTASSIUM CHLORIDE 20 MEQ/1
10 TABLET, EXTENDED RELEASE ORAL 2 TIMES DAILY
Status: DISCONTINUED | OUTPATIENT
Start: 2024-02-08 | End: 2024-02-09 | Stop reason: HOSPADM

## 2024-02-08 RX ORDER — METOPROLOL SUCCINATE 50 MG/1
50 TABLET, EXTENDED RELEASE ORAL 2 TIMES DAILY
Status: DISCONTINUED | OUTPATIENT
Start: 2024-02-08 | End: 2024-02-09

## 2024-02-08 RX ORDER — MIDAZOLAM HYDROCHLORIDE 1 MG/ML
INJECTION INTRAMUSCULAR; INTRAVENOUS PRN
Status: DISCONTINUED | OUTPATIENT
Start: 2024-02-08 | End: 2024-02-08 | Stop reason: HOSPADM

## 2024-02-08 RX ORDER — ALBUTEROL SULFATE 2.5 MG/3ML
2.5 SOLUTION RESPIRATORY (INHALATION) EVERY 6 HOURS PRN
Status: DISCONTINUED | OUTPATIENT
Start: 2024-02-08 | End: 2024-02-08

## 2024-02-08 RX ORDER — TIZANIDINE 4 MG/1
4 TABLET ORAL EVERY 6 HOURS PRN
Status: DISCONTINUED | OUTPATIENT
Start: 2024-02-08 | End: 2024-02-09 | Stop reason: HOSPADM

## 2024-02-08 RX ORDER — FLUTICASONE PROPIONATE 50 MCG
1 SPRAY, SUSPENSION (ML) NASAL DAILY
Status: DISCONTINUED | OUTPATIENT
Start: 2024-02-08 | End: 2024-02-09 | Stop reason: HOSPADM

## 2024-02-08 RX ORDER — IPRATROPIUM BROMIDE 21 UG/1
2 SPRAY, METERED NASAL EVERY 12 HOURS
Status: DISCONTINUED | OUTPATIENT
Start: 2024-02-08 | End: 2024-02-09 | Stop reason: HOSPADM

## 2024-02-08 RX ORDER — HEPARIN SODIUM 1000 [USP'U]/ML
INJECTION, SOLUTION INTRAVENOUS; SUBCUTANEOUS PRN
Status: DISCONTINUED | OUTPATIENT
Start: 2024-02-08 | End: 2024-02-08 | Stop reason: HOSPADM

## 2024-02-08 RX ORDER — NITROGLYCERIN 20 MG/100ML
INJECTION INTRAVENOUS PRN
Status: DISCONTINUED | OUTPATIENT
Start: 2024-02-08 | End: 2024-02-08 | Stop reason: HOSPADM

## 2024-02-08 RX ORDER — VITAMIN B COMPLEX
2 TABLET ORAL DAILY
Status: DISCONTINUED | OUTPATIENT
Start: 2024-02-08 | End: 2024-02-09 | Stop reason: HOSPADM

## 2024-02-08 RX ORDER — NEOMYCIN SULFATE, POLYMYXIN B SULFATE AND HYDROCORTISONE 10; 3.5; 1 MG/ML; MG/ML; [USP'U]/ML
3 SUSPENSION/ DROPS AURICULAR (OTIC) 4 TIMES DAILY
Status: DISCONTINUED | OUTPATIENT
Start: 2024-02-08 | End: 2024-02-08

## 2024-02-08 RX ORDER — ONDANSETRON 2 MG/ML
4 INJECTION INTRAMUSCULAR; INTRAVENOUS EVERY 6 HOURS PRN
Status: DISCONTINUED | OUTPATIENT
Start: 2024-02-08 | End: 2024-02-09 | Stop reason: HOSPADM

## 2024-02-08 RX ORDER — ALBUTEROL SULFATE 90 UG/1
2 AEROSOL, METERED RESPIRATORY (INHALATION) EVERY 6 HOURS PRN
Status: DISCONTINUED | OUTPATIENT
Start: 2024-02-08 | End: 2024-02-09 | Stop reason: HOSPADM

## 2024-02-08 RX ORDER — SODIUM CHLORIDE 9 MG/ML
INJECTION, SOLUTION INTRAVENOUS CONTINUOUS
Status: DISCONTINUED | OUTPATIENT
Start: 2024-02-08 | End: 2024-02-09 | Stop reason: HOSPADM

## 2024-02-08 RX ORDER — ACETAMINOPHEN 325 MG/1
650 TABLET ORAL EVERY 4 HOURS PRN
Status: DISCONTINUED | OUTPATIENT
Start: 2024-02-08 | End: 2024-02-08

## 2024-02-08 RX ORDER — ATROPINE SULFATE 1 MG/ML
0.5 INJECTION, SOLUTION INTRAVENOUS
Status: ACTIVE | OUTPATIENT
Start: 2024-02-08 | End: 2024-02-09

## 2024-02-08 RX ORDER — CETIRIZINE HYDROCHLORIDE 5 MG/1
5 TABLET ORAL DAILY
Status: DISCONTINUED | OUTPATIENT
Start: 2024-02-08 | End: 2024-02-09 | Stop reason: HOSPADM

## 2024-02-08 RX ORDER — MORPHINE SULFATE 2 MG/ML
2 INJECTION, SOLUTION INTRAMUSCULAR; INTRAVENOUS
Status: ACTIVE | OUTPATIENT
Start: 2024-02-08 | End: 2024-02-09

## 2024-02-08 RX ORDER — FENTANYL CITRATE 50 UG/ML
INJECTION, SOLUTION INTRAMUSCULAR; INTRAVENOUS PRN
Status: DISCONTINUED | OUTPATIENT
Start: 2024-02-08 | End: 2024-02-08 | Stop reason: HOSPADM

## 2024-02-08 RX ORDER — TRIAMTERENE AND HYDROCHLOROTHIAZIDE 37.5; 25 MG/1; MG/1
2 TABLET ORAL DAILY
Status: DISCONTINUED | OUTPATIENT
Start: 2024-02-08 | End: 2024-02-09 | Stop reason: HOSPADM

## 2024-02-08 RX ORDER — SODIUM CHLORIDE 0.9 % (FLUSH) 0.9 %
5-40 SYRINGE (ML) INJECTION PRN
Status: DISCONTINUED | OUTPATIENT
Start: 2024-02-08 | End: 2024-02-09 | Stop reason: HOSPADM

## 2024-02-08 RX ORDER — ASPIRIN 81 MG/1
81 TABLET ORAL DAILY
Status: DISCONTINUED | OUTPATIENT
Start: 2024-02-09 | End: 2024-02-09 | Stop reason: HOSPADM

## 2024-02-08 RX ORDER — LOSARTAN POTASSIUM 100 MG/1
100 TABLET ORAL DAILY
Status: DISCONTINUED | OUTPATIENT
Start: 2024-02-08 | End: 2024-02-09 | Stop reason: HOSPADM

## 2024-02-08 RX ORDER — SODIUM CHLORIDE 0.9 % (FLUSH) 0.9 %
5-40 SYRINGE (ML) INJECTION EVERY 12 HOURS SCHEDULED
Status: DISCONTINUED | OUTPATIENT
Start: 2024-02-08 | End: 2024-02-09 | Stop reason: HOSPADM

## 2024-02-08 RX ORDER — DEXTROMETHORPHAN HYDROBROMIDE AND PROMETHAZINE HYDROCHLORIDE 15; 6.25 MG/5ML; MG/5ML
5 SYRUP ORAL 4 TIMES DAILY PRN
Status: DISCONTINUED | OUTPATIENT
Start: 2024-02-08 | End: 2024-02-09 | Stop reason: HOSPADM

## 2024-02-08 RX ORDER — ACETAMINOPHEN 325 MG/1
650 TABLET ORAL EVERY 6 HOURS PRN
Status: DISCONTINUED | OUTPATIENT
Start: 2024-02-08 | End: 2024-02-09 | Stop reason: HOSPADM

## 2024-02-08 RX ORDER — ALBUTEROL SULFATE 90 UG/1
2 AEROSOL, METERED RESPIRATORY (INHALATION) EVERY 6 HOURS PRN
Status: DISCONTINUED | OUTPATIENT
Start: 2024-02-08 | End: 2024-02-08 | Stop reason: SDUPTHER

## 2024-02-08 RX ORDER — BUDESONIDE AND FORMOTEROL FUMARATE DIHYDRATE 80; 4.5 UG/1; UG/1
2 AEROSOL RESPIRATORY (INHALATION)
Status: DISCONTINUED | OUTPATIENT
Start: 2024-02-08 | End: 2024-02-09 | Stop reason: HOSPADM

## 2024-02-08 RX ORDER — BIVALIRUDIN 250 MG/5ML
INJECTION, POWDER, LYOPHILIZED, FOR SOLUTION INTRAVENOUS PRN
Status: DISCONTINUED | OUTPATIENT
Start: 2024-02-08 | End: 2024-02-08 | Stop reason: HOSPADM

## 2024-02-08 RX ORDER — VITAMIN C
1 TAB ORAL DAILY
Status: DISCONTINUED | OUTPATIENT
Start: 2024-02-08 | End: 2024-02-09 | Stop reason: HOSPADM

## 2024-02-08 RX ADMIN — TIZANIDINE 4 MG: 4 TABLET ORAL at 22:59

## 2024-02-08 RX ADMIN — TICAGRELOR 90 MG: 90 TABLET ORAL at 21:14

## 2024-02-08 RX ADMIN — ATORVASTATIN CALCIUM 80 MG: 80 TABLET, FILM COATED ORAL at 21:15

## 2024-02-08 RX ADMIN — SODIUM CHLORIDE: 9 INJECTION, SOLUTION INTRAVENOUS at 08:30

## 2024-02-08 RX ADMIN — SODIUM CHLORIDE: 9 INJECTION, SOLUTION INTRAVENOUS at 12:40

## 2024-02-08 RX ADMIN — ACETAMINOPHEN 650 MG: 325 TABLET ORAL at 22:52

## 2024-02-08 RX ADMIN — SODIUM CHLORIDE, PRESERVATIVE FREE 10 ML: 5 INJECTION INTRAVENOUS at 21:15

## 2024-02-08 RX ADMIN — METOPROLOL SUCCINATE 50 MG: 50 TABLET, EXTENDED RELEASE ORAL at 21:14

## 2024-02-08 RX ADMIN — POTASSIUM CHLORIDE 10 MEQ: 1500 TABLET, EXTENDED RELEASE ORAL at 21:15

## 2024-02-08 RX ADMIN — MONTELUKAST 10 MG: 10 TABLET, FILM COATED ORAL at 21:14

## 2024-02-08 RX ADMIN — LOSARTAN POTASSIUM 100 MG: 100 TABLET, FILM COATED ORAL at 12:35

## 2024-02-08 RX ADMIN — TIZANIDINE 4 MG: 4 TABLET ORAL at 12:34

## 2024-02-08 RX ADMIN — FAMOTIDINE 40 MG: 20 TABLET, FILM COATED ORAL at 21:14

## 2024-02-08 ASSESSMENT — PAIN DESCRIPTION - ORIENTATION: ORIENTATION: MID;LOWER

## 2024-02-08 ASSESSMENT — PAIN DESCRIPTION - DESCRIPTORS
DESCRIPTORS: ACHING
DESCRIPTORS: ACHING

## 2024-02-08 ASSESSMENT — PAIN DESCRIPTION - LOCATION
LOCATION: BACK

## 2024-02-08 ASSESSMENT — PAIN DESCRIPTION - PAIN TYPE: TYPE: CHRONIC PAIN

## 2024-02-08 ASSESSMENT — PAIN DESCRIPTION - FREQUENCY: FREQUENCY: CONTINUOUS

## 2024-02-08 ASSESSMENT — PAIN DESCRIPTION - ONSET: ONSET: ON-GOING

## 2024-02-08 ASSESSMENT — PAIN - FUNCTIONAL ASSESSMENT
PAIN_FUNCTIONAL_ASSESSMENT: 0-10
PAIN_FUNCTIONAL_ASSESSMENT: ACTIVITIES ARE NOT PREVENTED

## 2024-02-08 ASSESSMENT — PAIN SCALES - GENERAL
PAINLEVEL_OUTOF10: 7
PAINLEVEL_OUTOF10: 6
PAINLEVEL_OUTOF10: 8
PAINLEVEL_OUTOF10: 7

## 2024-02-08 NOTE — PROGRESS NOTES
Patient admitted from Cath Lab.  Family at bedside.  Hemodynamic monitoring placed on patient.  Oriented to room and call light.  Call light in reach.  Initial admission assessment completed by BEN Langston.  Home medications verified with Patient.  Cardiologist at bedside spoke with patient and family.  Will continue to monitor.

## 2024-02-08 NOTE — CARE COORDINATION
A referral to cardiac rehab has been sent with patient information. All pertinent patient information has been sent to selected cardiac rehab program. Patient informed about the cardiac rehab program, and that they will be contacted by the referred program.

## 2024-02-08 NOTE — RT PROTOCOL NOTE
RT Inhaler-Nebulizer Bronchodilator Protocol Note    There is a bronchodilator order in the chart from a provider indicating to follow the RT Bronchodilator Protocol and there is an “Initiate RT Inhaler-Nebulizer Bronchodilator Protocol” order as well (see protocol at bottom of note).    CXR Findings:  No results found.    The findings from the last RT Protocol Assessment were as follows:   History Pulmonary Disease: Chronic pulmonary disease  Respiratory Pattern: Regular pattern and RR 12-20 bpm  Breath Sounds: Clear breath sounds  Cough: Strong, spontaneous, non-productive  Indication for Bronchodilator Therapy: On home bronchodilators (prn)  Bronchodilator Assessment Score: 2    Aerosolized bronchodilator medication orders have been revised according to the RT Inhaler-Nebulizer Bronchodilator Protocol below.    Respiratory Therapist to perform RT Therapy Protocol Assessment initially then follow the protocol.  Repeat RT Therapy Protocol Assessment PRN for score 0-3 or on second treatment, BID, and PRN for scores above 3.    No Indications - adjust the frequency to every 6 hours PRN wheezing or bronchospasm, if no treatments needed after 48 hours then discontinue using Per Protocol order mode.     If indication present, adjust the RT bronchodilator orders based on the Bronchodilator Assessment Score as indicated below.  Use Inhaler orders unless patient has one or more of the following: on home nebulizer, not able to hold breath for 10 seconds, is not alert and oriented, cannot activate and use MDI correctly, or respiratory rate 25 breaths per minute or more, then use the equivalent nebulizer order(s) with same Frequency and PRN reasons based on the score.  If a patient is on this medication at home then do not decrease Frequency below that used at home.    0-3 - enter or revise RT bronchodilator order(s) to equivalent RT Bronchodilator order with Frequency of every 4 hours PRN for wheezing or increased work of  breathing using Per Protocol order mode.        4-6 - enter or revise RT Bronchodilator order(s) to two equivalent RT bronchodilator orders with one order with BID Frequency and one order with Frequency of every 4 hours PRN wheezing or increased work of breathing using Per Protocol order mode.        7-10 - enter or revise RT Bronchodilator order(s) to two equivalent RT bronchodilator orders with one order with TID Frequency and one order with Frequency of every 4 hours PRN wheezing or increased work of breathing using Per Protocol order mode.       11-13 - enter or revise RT Bronchodilator order(s) to one equivalent RT bronchodilator order with QID Frequency and an Albuterol order with Frequency of every 4 hours PRN wheezing or increased work of breathing using Per Protocol order mode.      Greater than 13 - enter or revise RT Bronchodilator order(s) to one equivalent RT bronchodilator order with every 4 hours Frequency and an Albuterol order with Frequency of every 2 hours PRN wheezing or increased work of breathing using Per Protocol order mode.     RT to enter RT Home Evaluation for COPD & MDI Assessment order using Per Protocol order mode.    Electronically signed by Lauren Webber RCP on 2/8/2024 at 11:24 AM

## 2024-02-08 NOTE — BRIEF OP NOTE
Brief Postoperative Note      Patient: Syl Dejesus  YOB: 1975  MRN: [id number]    Date of Procedure: 2/8/2024    Pre-Op Diagnosis Codes:     * Abnormal stress test [R94.39]    Post-Op Diagnosis: Same       Procedure(s):  Left heart cath / coronary angiography  Percutaneous coronary intervention    Surgeon(s):  Skyler Kenny MD    Assistant:  * No surgical staff found *    Anesthesia: IV Sedation    Estimated Blood Loss (mL): minimal    Complications: none      Two vessel coronary artery disease.    Normal LV systolic function.    5F guide PCI performed due to radial spasm.    Successful PTCA/NESTOR of mid and proximal LAD.    Successful POBA only of ostial D2.    Successful PTCA/NESTOR of proximal LCx.     Recommendations    Post PCI protocol recommended.    A referral for a PCI    Patient management should include: Aggressive risk factor modification, optimal medical management and cardiac rehabillitation.       Electronically signed by Skyler Kenny MD on 2/8/2024 at 10:52 AM

## 2024-02-08 NOTE — CARE COORDINATION
Case Management Assessment  Initial Evaluation    Date/Time of Evaluation: 2/8/2024 4:07 PM  Assessment Completed by: Mamta Galindo RN    If patient is discharged prior to next notation, then this note serves as note for discharge by case management.    Patient Name: Syl Dejesus                   YOB: 1975  Diagnosis: Abnormal stress test [R94.39]  Coronary artery disease involving native coronary artery of native heart with other form of angina pectoris (HCC) [I25.118]                   Date / Time: 2/8/2024  7:24 AM    Patient Admission Status: Inpatient   Readmission Risk (Low < 19, Mod (19-27), High > 27): Readmission Risk Score: 8    Current PCP: Crista Calixto MD  PCP verified by CM? Yes    Chart Reviewed: Yes      History Provided by: Patient  Patient Orientation: Alert and Oriented, Person, Place, Situation, Self    Patient Cognition: Alert    Hospitalization in the last 30 days (Readmission):  No    If yes, Readmission Assessment in CM Navigator will be completed.    Advance Directives:      Code Status: Full Code   Patient's Primary Decision Maker is: Legal Next of Kin      Discharge Planning:    Patient lives with: Children Type of Home: House  Primary Care Giver: Self  Patient Support Systems include: Children   Current Financial resources: None  Current community resources: None  Current services prior to admission: None            Current DME:              Type of Home Care services:  None    ADLS  Prior functional level: Independent in ADLs/IADLs  Current functional level: Independent in ADLs/IADLs    PT AM-PAC:   /24  OT AM-PAC:   /24    Family can provide assistance at DC: Yes  Would you like Case Management to discuss the discharge plan with any other family members/significant others, and if so, who? No  Plans to Return to Present Housing:    Other Identified Issues/Barriers to RETURNING to current housing: medical condition  Potential Assistance needed at discharge:
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
## 2024-02-09 VITALS
RESPIRATION RATE: 20 BRPM | WEIGHT: 266.76 LBS | OXYGEN SATURATION: 96 % | HEIGHT: 62 IN | BODY MASS INDEX: 49.09 KG/M2 | DIASTOLIC BLOOD PRESSURE: 86 MMHG | SYSTOLIC BLOOD PRESSURE: 157 MMHG | HEART RATE: 72 BPM | TEMPERATURE: 98 F

## 2024-02-09 LAB
ANION GAP SERPL CALCULATED.3IONS-SCNC: 9 MMOL/L (ref 9–17)
BUN SERPL-MCNC: 15 MG/DL (ref 6–20)
BUN/CREAT SERPL: 25 (ref 9–20)
CALCIUM SERPL-MCNC: 9.5 MG/DL (ref 8.6–10.4)
CHLORIDE SERPL-SCNC: 102 MMOL/L (ref 98–107)
CO2 SERPL-SCNC: 30 MMOL/L (ref 20–31)
CREAT SERPL-MCNC: 0.6 MG/DL (ref 0.5–0.9)
ERYTHROCYTE [DISTWIDTH] IN BLOOD BY AUTOMATED COUNT: 13.2 % (ref 11.8–14.4)
GFR SERPL CREATININE-BSD FRML MDRD: >60 ML/MIN/1.73M2
GLUCOSE SERPL-MCNC: 138 MG/DL (ref 70–99)
HCT VFR BLD AUTO: 35.2 % (ref 36.3–47.1)
HGB BLD-MCNC: 11.9 G/DL (ref 11.9–15.1)
MCH RBC QN AUTO: 32.4 PG (ref 25.2–33.5)
MCHC RBC AUTO-ENTMCNC: 33.8 G/DL (ref 28.4–34.8)
MCV RBC AUTO: 95.9 FL (ref 82.6–102.9)
NRBC BLD-RTO: 0 PER 100 WBC
PLATELET # BLD AUTO: 289 K/UL (ref 138–453)
PMV BLD AUTO: 9.5 FL (ref 8.1–13.5)
POTASSIUM SERPL-SCNC: 3.4 MMOL/L (ref 3.7–5.3)
RBC # BLD AUTO: 3.67 M/UL (ref 3.95–5.11)
SODIUM SERPL-SCNC: 141 MMOL/L (ref 135–144)
WBC OTHER # BLD: 16.1 K/UL (ref 3.5–11.3)

## 2024-02-09 PROCEDURE — 36415 COLL VENOUS BLD VENIPUNCTURE: CPT

## 2024-02-09 PROCEDURE — 6370000000 HC RX 637 (ALT 250 FOR IP): Performed by: NURSE PRACTITIONER

## 2024-02-09 PROCEDURE — 85027 COMPLETE CBC AUTOMATED: CPT

## 2024-02-09 PROCEDURE — 6370000000 HC RX 637 (ALT 250 FOR IP): Performed by: INTERNAL MEDICINE

## 2024-02-09 PROCEDURE — 99239 HOSP IP/OBS DSCHRG MGMT >30: CPT | Performed by: NURSE PRACTITIONER

## 2024-02-09 PROCEDURE — 80048 BASIC METABOLIC PNL TOTAL CA: CPT

## 2024-02-09 PROCEDURE — 94761 N-INVAS EAR/PLS OXIMETRY MLT: CPT

## 2024-02-09 RX ORDER — METOPROLOL SUCCINATE 25 MG/1
25 TABLET, EXTENDED RELEASE ORAL 2 TIMES DAILY
Qty: 30 TABLET | Refills: 3 | Status: SHIPPED | OUTPATIENT
Start: 2024-02-09

## 2024-02-09 RX ORDER — ASPIRIN 81 MG/1
81 TABLET ORAL DAILY
Qty: 30 TABLET | Refills: 3 | Status: SHIPPED | OUTPATIENT
Start: 2024-02-09

## 2024-02-09 RX ORDER — ATORVASTATIN CALCIUM 80 MG/1
80 TABLET, FILM COATED ORAL NIGHTLY
Qty: 30 TABLET | Refills: 3 | Status: SHIPPED | OUTPATIENT
Start: 2024-02-09

## 2024-02-09 RX ORDER — METOPROLOL SUCCINATE 25 MG/1
25 TABLET, EXTENDED RELEASE ORAL 2 TIMES DAILY
Status: DISCONTINUED | OUTPATIENT
Start: 2024-02-09 | End: 2024-02-09 | Stop reason: HOSPADM

## 2024-02-09 RX ORDER — NITROGLYCERIN 0.4 MG/1
0.4 TABLET SUBLINGUAL EVERY 5 MIN PRN
Qty: 25 TABLET | Refills: 3 | Status: SHIPPED | OUTPATIENT
Start: 2024-02-09

## 2024-02-09 RX ORDER — POTASSIUM CHLORIDE 20 MEQ/1
40 TABLET, EXTENDED RELEASE ORAL ONCE
Status: COMPLETED | OUTPATIENT
Start: 2024-02-09 | End: 2024-02-09

## 2024-02-09 RX ADMIN — CETIRIZINE HYDROCHLORIDE 5 MG: 5 TABLET ORAL at 09:41

## 2024-02-09 RX ADMIN — TICAGRELOR 90 MG: 90 TABLET ORAL at 09:41

## 2024-02-09 RX ADMIN — Medication 2000 UNITS: at 09:41

## 2024-02-09 RX ADMIN — POTASSIUM CHLORIDE 10 MEQ: 1500 TABLET, EXTENDED RELEASE ORAL at 09:40

## 2024-02-09 RX ADMIN — POTASSIUM CHLORIDE 40 MEQ: 1500 TABLET, EXTENDED RELEASE ORAL at 09:40

## 2024-02-09 RX ADMIN — LOSARTAN POTASSIUM 100 MG: 100 TABLET, FILM COATED ORAL at 09:41

## 2024-02-09 RX ADMIN — Medication 1 TABLET: at 09:41

## 2024-02-09 RX ADMIN — TRIAMTERENE AND HYDROCHLOROTHIAZIDE 2 TABLET: 37.5; 25 TABLET ORAL at 09:43

## 2024-02-09 RX ADMIN — ASPIRIN 81 MG: 81 TABLET, COATED ORAL at 09:41

## 2024-02-09 NOTE — DISCHARGE SUMMARY
[facility]  Discharge Note                 Name:  Syl Dejesus  YOB: 1975  Social Security Number:  [id number]  Medical Record Number:  [id number]    Date of Admission:  2/8/2024  Date of Discharge:  2/9/2024    Admitting physician: Skyler Kenny MD    Discharge Attending: HAYDEE STALLWORTH CNP, CNP  Primary Care Physician: Crista Calixto MD  Consultants: Cardiology  Discharge to Home  Stable Condition   HOSPITAL ADMISSION PROBLEM LIST:  Patient Active Problem List   Diagnosis    Essential hypertension    Gastroesophageal reflux disease without esophagitis    Seasonal allergies    Tobacco dependence    Class 3 severe obesity with body mass index (BMI) of 45.0 to 49.9 in adult (HCC)    Failed back syndrome, lumbar    Chronic midline low back pain with bilateral sciatica    Neural foraminal stenosis of lumbar spine    Psychophysiological insomnia    Anxiety    History of partial hysterectomy    Hot flashes    Snores    History of sleep apnea    Chronic fatigue    Goiter    Leukocytosis    Arm paresthesia, left    Paranasal sinus disease    Diastasis recti    Chronic rhinitis    Osteopenia of multiple sites    Vitamin D deficiency    Irritable bowel syndrome with both constipation and diarrhea    Family history of colon cancer    Lumbar radiculopathy    Lumbar degenerative disc disease    Family history of lymphoma    Chronic maxillary sinusitis    SYKES (dyspnea on exertion)    Prediabetes    Mixed hyperlipidemia    Spinal stenosis at L4-L5 level    Neuropathy involving both lower extremities    Multiple closed facial bone fractures (HCC)    Closed fracture of left orbit (HCC)    Left ear pain    Cardiomegaly    Chronic bladder pain    Displacement of lumbar intervertebral disc without myelopathy    Laryngopharyngeal reflux (LPR)    Mixed urge and stress incontinence    Primary osteoarthritis of right knee    Sensorineural hearing loss (SNHL) of both ears     same as other medications prescribed for you. Read the directions carefully, and ask your doctor or other care provider to review them with you.                   Where to Get Your Medications        These medications were sent to [id number] - [place], [place] - [address] - P [phone] - F [phone] [address] [address]      Phone: [phone]   aspirin 81 MG EC tablet  atorvastatin 80 MG tablet  metoprolol succinate 25 MG extended release tablet  nitroGLYCERIN 0.4 MG SL tablet  ticagrelor 90 MG Tabs tablet            Coronary Discharge Core Measure: Please indicate the medication given by X, and if not the reasons not given:    Not Given Reason  Given      Beta Blockers X   On arb    ACE-I       Statins X      ASA X       OAP (Plavix/Effient/Brilinta) X    SL Nitro   X     Chest pain  Abnormal stress test  Evidence of CAD on CT scan  HTN  HL    Pt seen and examined.  Pt resting in bed.  She denies any chest pain. Pt does report a \"heaviness to breath\" since PCI.  Very likely brilinta side effect.  Long discussion with pt and will add Caffeine while taking meds.  Advised the side effect should resolve in 1-2 weeks.  If not, advised to call office.  Instructed NOT to stop at any time, pt verbalizes understanding.   Radial site soft.  Pt slightly bradycardic while sleeping, may have some sleep apnea.  Will decrease BB to 25mg BID   Discussed with patient and nursing. Medications and discharge instructions reviewed with patient and nursing. Pt verbalizes understanding regarding medications and activity restrictions.  Script for brilinta  given.  Will follow up in 2 weeks.       Electronically signed by HAYDEE STALLWORTH CNP on 2/9/2024 at 9:32 AM  [facility]      [phone]

## 2024-02-09 NOTE — PLAN OF CARE
Problem: Pain  Goal: Verbalizes/displays adequate comfort level or baseline comfort level  2/8/2024 2140 by Marycruz Hopkins RN  Outcome: Progressing  2/8/2024 1414 by Kian Quach, RN  Outcome: Progressing     Problem: Discharge Planning  Goal: Discharge to home or other facility with appropriate resources  2/8/2024 2140 by Marycruz Hopkins RN  Outcome: Progressing  Flowsheets (Taken 2/8/2024 2000)  Discharge to home or other facility with appropriate resources:   Identify barriers to discharge with patient and caregiver   Arrange for needed discharge resources and transportation as appropriate  2/8/2024 1414 by Kian Quach, RN  Outcome: Progressing     Problem: Respiratory - Adult  Goal: Achieves optimal ventilation and oxygenation  2/8/2024 2140 by Marycruz Hopkins RN  Outcome: Progressing  2/8/2024 1414 by Kian Quach, RN  Outcome: Progressing  2/8/2024 1123 by Lauren Webber RCP  Outcome: Progressing  Goal: Able to breathe comfortably  Description: Able to breathe comfortably  2/8/2024 1123 by Lauren Webber RCP  Outcome: Progressing     Problem: Cardiovascular - Adult  Goal: Maintains optimal cardiac output and hemodynamic stability  2/8/2024 2140 by Marycruz Hopkins RN  Outcome: Progressing  Flowsheets (Taken 2/8/2024 2000)  Maintains optimal cardiac output and hemodynamic stability:   Monitor blood pressure and heart rate   Assess for signs of decreased cardiac output  2/8/2024 1414 by Kian Quach, RN  Outcome: Progressing  Goal: Absence of cardiac dysrhythmias or at baseline  2/8/2024 2140 by Marycruz Hopkins RN  Outcome: Progressing  Flowsheets (Taken 2/8/2024 2000)  Absence of cardiac dysrhythmias or at baseline:   Monitor cardiac rate and rhythm   Assess for signs of decreased cardiac output  2/8/2024 1414 by Kian Quach, RN  Outcome: Progressing     Problem: Skin/Tissue Integrity - Adult  Goal: Skin integrity remains intact  2/8/2024 2140 by Kellie 
  Problem: Pain  Goal: Verbalizes/displays adequate comfort level or baseline comfort level  2/9/2024 1424 by Valerie Atkinson RN  Outcome: Completed  2/9/2024 0039 by Anup Lema RN  Outcome: Progressing     Problem: Discharge Planning  Goal: Discharge to home or other facility with appropriate resources  2/9/2024 1424 by Valerie Atkinson RN  Outcome: Completed  2/9/2024 0039 by Anup Lema RN  Outcome: Progressing  Flowsheets (Taken 2/8/2024 2300)  Discharge to home or other facility with appropriate resources:   Identify barriers to discharge with patient and caregiver   Arrange for needed discharge resources and transportation as appropriate   Identify discharge learning needs (meds, wound care, etc)   Refer to discharge planning if patient needs post-hospital services based on physician order or complex needs related to functional status, cognitive ability or social support system     Problem: Respiratory - Adult  Goal: Achieves optimal ventilation and oxygenation  2/9/2024 1424 by Valerie Atkinson RN  Outcome: Completed  2/9/2024 0039 by Anup Lema RN  Outcome: Progressing     Problem: Cardiovascular - Adult  Goal: Maintains optimal cardiac output and hemodynamic stability  2/9/2024 1424 by Valerie Atkinson RN  Outcome: Completed  2/9/2024 0039 by Anup Lema RN  Outcome: Progressing  Flowsheets (Taken 2/8/2024 2300)  Maintains optimal cardiac output and hemodynamic stability:   Monitor blood pressure and heart rate   Monitor urine output and notify Licensed Independent Practitioner for values outside of normal range   Assess for signs of decreased cardiac output  Goal: Absence of cardiac dysrhythmias or at baseline  2/9/2024 1424 by Valerie Atkinson RN  Outcome: Completed  2/9/2024 0039 by Anup Lema RN  Outcome: Progressing  Flowsheets (Taken 2/8/2024 2300)  Absence of cardiac dysrhythmias or at baseline:   Monitor cardiac rate and rhythm   Assess for signs of decreased cardiac output   
  Problem: Pain  Goal: Verbalizes/displays adequate comfort level or baseline comfort level  Outcome: Progressing     Problem: Discharge Planning  Goal: Discharge to home or other facility with appropriate resources  Outcome: Progressing     Problem: Respiratory - Adult  Goal: Achieves optimal ventilation and oxygenation  2/8/2024 1414 by Kian Quach RN  Outcome: Progressing     Problem: Cardiovascular - Adult  Goal: Maintains optimal cardiac output and hemodynamic stability  Outcome: Progressing     Problem: Cardiovascular - Adult  Goal: Absence of cardiac dysrhythmias or at baseline  Outcome: Progressing     Problem: Skin/Tissue Integrity - Adult  Goal: Skin integrity remains intact  Outcome: Progressing     Problem: Skin/Tissue Integrity - Adult  Goal: Incisions, wounds, or drain sites healing without S/S of infection  Outcome: Progressing     
  Problem: Respiratory - Adult  Goal: Achieves optimal ventilation and oxygenation  Outcome: Progressing  Goal: Able to breathe comfortably  Description: Able to breathe comfortably  Outcome: Progressing     
wounds, or drain sites healing without S/S of infection  2/9/2024 0039 by Anup Lema, RN  Outcome: Progressing  Flowsheets (Taken 2/8/2024 2300)  Incisions, Wounds, or Drain Sites Healing Without Sign and Symptoms of Infection:   ADMISSION and DAILY: Assess and document risk factors for pressure ulcer development   TWICE DAILY: Assess and document skin integrity  2/8/2024 2140 by Marycruz Hopkins RN  Outcome: Progressing  Flowsheets (Taken 2/8/2024 2000)  Incisions, Wounds, or Drain Sites Healing Without Sign and Symptoms of Infection: ADMISSION and DAILY: Assess and document risk factors for pressure ulcer development  2/8/2024 1414 by Kian Quach, RN  Outcome: Progressing     Problem: Safety - Adult  Goal: Free from fall injury  2/9/2024 0039 by Anup Lema RN  Outcome: Progressing  2/8/2024 2140 by Marycruz Hopkins RN  Outcome: Progressing

## 2024-02-09 NOTE — DISCHARGE INSTRUCTIONS
Discharge Instructions Following Heart Catheterization or Intervention    After the Procedure  The effects of the medication or sedation that were used before and/or during your procedure can last for up to 24 hours. Due to the medication or sedation in your system you should not:  Drive a car, operate machinery or power tools  Drink alcoholic beverages  Make important decisions that might be affected by your judgment    Following your procedure, rest at home for the remainder of the day. Do not exercise and do not lift heavy objects greater than 10 pounds.    Days after the Procedure  Resume your low-fat, low-cholesterol diet upon discharge from the hospital.  Instructions for activities are as follows:    RADIAL ACCESS  Do not use the wrist used in the procedure to lift more than 2 pounds for 3 days.  Do not participate in strenuous activities for 3-5 days after the procedure. This includes most sports - jogging, golfing, playing tennis, and bowling.  Do not use a , motorcycle, chainsaw, or all-terrain vehicle for 48 hours.  After three days, you may gradually increase your activities until you reach your normal activity, unless otherwise specified by your cardiologist.  Do not drive for 3 days.      To help eliminate the contrast material from your body, we encourage you to increase your fluid intake following your catheterization procedure. (8-10 eight oz. Non-alcoholic fluids. I.e. Water, juice)  Patients with CHF and renal disease should follow usual fluid intake instructions  Wear loose fitting clothing over the catheter insertion site for the next 72 hours.   Remove all bandages or dressings the morning after the procedure. Keep the catheter insertion site clean and dry.  You may take a shower the morning after the procedure. Avoid immersing the insertion site in water. Wash the insertion site with soap and water and pat dry. Do not apply powder, perfumes, or ointments in the area. Avoid activities  such as swimming or sitting in the hot tub until the catheter insertion site has completely healed. This healing process takes approximately seven days.  You may have a small knot (not larger than the size of a quarter) in the groin. You may also have a bruise in the groin, near the insertion site. This will gradually go away.  When at home following the catheterization if you start to experience chest pain, pressure, tightness, or burning in the chest, arm, jaw, or stomach call office during regular business hours and call 911 after regular business hours. They should take you immediately to the nearest emergency center.       Call your doctor if:  There is bleeding at the catheter insertion site that does not stop when you apply pressure.  Your arm or leg below where the catheter was inserted changes color, is cool to the touch, or is numb.  The small incision for your catheter becomes red or painful, or yellow or green discharge is draining from it.   You have chest pain or shortness of breath that does not go away with rest.  Your pulse feels irregular - it is very slow (fewer than 60 beats a minute) or very fast (over 100 to 120 beats a minute).  You have dizziness, fainting, or you are very tired.  You have problems taking any of your heart medicines.  You have chills or a fever over 101 degrees ferenheit.

## 2024-02-09 NOTE — FLOWSHEET NOTE
02/09/24 0900   Treatment Team Notification   Reason for Communication Medication concern   Name of Team Member Notified E Tere   Treatment Team Role Advanced Practice Nurse   Method of Communication Secure Message   Response See orders     Provider notified of bradycardic episodes this AM  Requesting clarification for administration of beta blocker  Notified of low potassium with no orders for replacement    0906  \"Will place orders now. She also need[s] caffeine this am to help with sob from Brilinta\"    Awaiting orders 0916

## 2024-02-09 NOTE — PROGRESS NOTES
AVS completed including new medication education and side effects information, PCI recovery education with post-op recovery/restrictions.   CAD & MI prevention, medication compliance, heart healthy diet, physical activity, smoking cessation, and blood thinner safety education provided.   Stent ID cards also included with discharge paperwork.      Brilinta prescription transferred from [facility] to [facility] on [address] per pt request as [facility] does not have it in stock. Patient provided with Brilinta pamphlet and free first month/ monthly discount cards.    Discharge appointment changed from [place] office to [place] office per pt request. Date/time/location included on AVS. Referral for Cardiac Rehab ordered, requisition also included with paperwork.    Upon presenting discharge paperwork, patient refused all education stating \"I'm not taking any of this [expletive].\" When asked if verbal education could be given prior to departure, pt replies \"Nope, you can keep it all.\"  Family of patient accepted stent cards and Brilinta information with AVS to take home in the event that patient changes her mind.    See discharge event for time of discharge.

## 2024-02-10 ENCOUNTER — HOSPITAL ENCOUNTER (EMERGENCY)
Age: 49
Discharge: HOME | End: 2024-02-10
Payer: COMMERCIAL

## 2024-02-10 VITALS
SYSTOLIC BLOOD PRESSURE: 127 MMHG | RESPIRATION RATE: 17 BRPM | HEART RATE: 80 BPM | HEIGHT: 62 IN | BODY MASS INDEX: 49.13 KG/M2 | OXYGEN SATURATION: 97 % | DIASTOLIC BLOOD PRESSURE: 68 MMHG | WEIGHT: 267 LBS

## 2024-02-10 PROCEDURE — 93005 ELECTROCARDIOGRAM TRACING: CPT

## 2024-02-11 ENCOUNTER — APPOINTMENT (OUTPATIENT)
Dept: GENERAL RADIOLOGY | Age: 49
End: 2024-02-11
Payer: COMMERCIAL

## 2024-02-11 ENCOUNTER — HOSPITAL ENCOUNTER (EMERGENCY)
Age: 49
Discharge: HOME | End: 2024-02-11
Attending: EMERGENCY MEDICINE
Payer: COMMERCIAL

## 2024-02-11 VITALS
OXYGEN SATURATION: 95 % | BODY MASS INDEX: 47.9 KG/M2 | HEART RATE: 82 BPM | TEMPERATURE: 98.3 F | RESPIRATION RATE: 19 BRPM | WEIGHT: 261.91 LBS | DIASTOLIC BLOOD PRESSURE: 87 MMHG | SYSTOLIC BLOOD PRESSURE: 161 MMHG

## 2024-02-11 DIAGNOSIS — J06.9 VIRAL URI WITH COUGH: Primary | ICD-10-CM

## 2024-02-11 LAB
ANION GAP SERPL CALCULATED.3IONS-SCNC: 15 MMOL/L (ref 9–17)
BASOPHILS # BLD: 0.07 K/UL (ref 0–0.2)
BASOPHILS NFR BLD: 1 % (ref 0–2)
BUN SERPL-MCNC: 11 MG/DL (ref 6–20)
CALCIUM SERPL-MCNC: 9.4 MG/DL (ref 8.6–10.4)
CHLORIDE SERPL-SCNC: 99 MMOL/L (ref 98–107)
CO2 SERPL-SCNC: 23 MMOL/L (ref 20–31)
CREAT SERPL-MCNC: 0.7 MG/DL (ref 0.5–0.9)
EOSINOPHIL # BLD: 0.07 K/UL (ref 0–0.44)
EOSINOPHILS RELATIVE PERCENT: 1 % (ref 1–4)
ERYTHROCYTE [DISTWIDTH] IN BLOOD BY AUTOMATED COUNT: 13.5 % (ref 11.8–14.4)
FLUAV AG SPEC QL: NEGATIVE
FLUBV AG SPEC QL: NEGATIVE
GFR SERPL CREATININE-BSD FRML MDRD: >60 ML/MIN/1.73M2
GLUCOSE SERPL-MCNC: 136 MG/DL (ref 70–99)
HCT VFR BLD AUTO: 38.5 % (ref 36.3–47.1)
HGB BLD-MCNC: 13.3 G/DL (ref 11.9–15.1)
IMM GRANULOCYTES # BLD AUTO: 0.1 K/UL (ref 0–0.3)
IMM GRANULOCYTES NFR BLD: 1 %
LYMPHOCYTES NFR BLD: 2.57 K/UL (ref 1.1–3.7)
LYMPHOCYTES RELATIVE PERCENT: 19 % (ref 24–43)
MCH RBC QN AUTO: 32.7 PG (ref 25.2–33.5)
MCHC RBC AUTO-ENTMCNC: 34.5 G/DL (ref 28.4–34.8)
MCV RBC AUTO: 94.6 FL (ref 82.6–102.9)
MONOCYTES NFR BLD: 0.98 K/UL (ref 0.1–1.2)
MONOCYTES NFR BLD: 7 % (ref 3–12)
NEUTROPHILS NFR BLD: 71 % (ref 36–65)
NEUTS SEG NFR BLD: 9.49 K/UL (ref 1.5–8.1)
NRBC BLD-RTO: 0 PER 100 WBC
PLATELET # BLD AUTO: 287 K/UL (ref 138–453)
PMV BLD AUTO: 9.6 FL (ref 8.1–13.5)
POTASSIUM SERPL-SCNC: 3.7 MMOL/L (ref 3.7–5.3)
RBC # BLD AUTO: 4.07 M/UL (ref 3.95–5.11)
SARS-COV-2 RDRP RESP QL NAA+PROBE: NOT DETECTED
SODIUM SERPL-SCNC: 137 MMOL/L (ref 135–144)
SPECIMEN DESCRIPTION: NORMAL
TROPONIN I SERPL HS-MCNC: 10 NG/L (ref 0–14)
TROPONIN I SERPL HS-MCNC: 10 NG/L (ref 0–14)
WBC OTHER # BLD: 13.3 K/UL (ref 3.5–11.3)

## 2024-02-11 PROCEDURE — 94761 N-INVAS EAR/PLS OXIMETRY MLT: CPT

## 2024-02-11 PROCEDURE — 93005 ELECTROCARDIOGRAM TRACING: CPT

## 2024-02-11 PROCEDURE — 84484 ASSAY OF TROPONIN QUANT: CPT

## 2024-02-11 PROCEDURE — 6360000002 HC RX W HCPCS

## 2024-02-11 PROCEDURE — 94640 AIRWAY INHALATION TREATMENT: CPT

## 2024-02-11 PROCEDURE — 87635 SARS-COV-2 COVID-19 AMP PRB: CPT

## 2024-02-11 PROCEDURE — 6370000000 HC RX 637 (ALT 250 FOR IP)

## 2024-02-11 PROCEDURE — 85025 COMPLETE CBC W/AUTO DIFF WBC: CPT

## 2024-02-11 PROCEDURE — 80048 BASIC METABOLIC PNL TOTAL CA: CPT

## 2024-02-11 PROCEDURE — 87804 INFLUENZA ASSAY W/OPTIC: CPT

## 2024-02-11 PROCEDURE — 99285 EMERGENCY DEPT VISIT HI MDM: CPT

## 2024-02-11 PROCEDURE — 71046 X-RAY EXAM CHEST 2 VIEWS: CPT

## 2024-02-11 RX ORDER — ACETAMINOPHEN 325 MG/1
650 TABLET ORAL ONCE
Status: COMPLETED | OUTPATIENT
Start: 2024-02-11 | End: 2024-02-11

## 2024-02-11 RX ORDER — ASPIRIN 81 MG/1
81 TABLET, CHEWABLE ORAL DAILY
Status: DISCONTINUED | OUTPATIENT
Start: 2024-02-11 | End: 2024-02-11 | Stop reason: HOSPADM

## 2024-02-11 RX ORDER — ALBUTEROL SULFATE 2.5 MG/3ML
2.5 SOLUTION RESPIRATORY (INHALATION) ONCE
Status: COMPLETED | OUTPATIENT
Start: 2024-02-11 | End: 2024-02-11

## 2024-02-11 RX ORDER — PREDNISONE 50 MG/1
50 TABLET ORAL DAILY
Qty: 4 TABLET | Refills: 0 | Status: SHIPPED | OUTPATIENT
Start: 2024-02-11 | End: 2024-02-15

## 2024-02-11 RX ORDER — AZITHROMYCIN 250 MG/1
TABLET, FILM COATED ORAL
Qty: 1 PACKET | Refills: 0 | Status: SHIPPED | OUTPATIENT
Start: 2024-02-11 | End: 2024-02-15

## 2024-02-11 RX ORDER — PREDNISONE 20 MG/1
60 TABLET ORAL ONCE
Status: COMPLETED | OUTPATIENT
Start: 2024-02-11 | End: 2024-02-11

## 2024-02-11 RX ADMIN — ALBUTEROL SULFATE 2.5 MG: 2.5 SOLUTION RESPIRATORY (INHALATION) at 09:21

## 2024-02-11 RX ADMIN — TICAGRELOR 90 MG: 90 TABLET ORAL at 10:31

## 2024-02-11 RX ADMIN — ACETAMINOPHEN 650 MG: 325 TABLET ORAL at 09:27

## 2024-02-11 RX ADMIN — ASPIRIN 81 MG 81 MG: 81 TABLET ORAL at 10:31

## 2024-02-11 RX ADMIN — PREDNISONE 60 MG: 20 TABLET ORAL at 09:27

## 2024-02-11 NOTE — ED NOTES
Pt to room 28 with c/o cough and nasal congestion. Pt reports that she had a heart procedure done last week and the last few days has started feeling cough, nasal congestion and  generalized fatigue. Pt reports that she has felt feverish at home. Pt alert and oriented x4, talking in complete sentences, respirations even and unlabored. Pt acting age appropriate. White board updated, will continue to plan of care

## 2024-02-11 NOTE — ED PROVIDER NOTES
[facility] ED     Emergency Department     Faculty Attestation    I performed a history and physical examination of the patient and discussed management with the resident. I reviewed the resident’s note and agree with the documented findings and plan of care. Any areas of disagreement are noted on the chart. I was personally present for the key portions of any procedures. I have documented in the chart those procedures where I was not present during the key portions. I have reviewed the emergency nurses triage note. I agree with the chief complaint, past medical history, past surgical history, allergies, medications, social and family history as documented unless otherwise noted below. For Physician Assistant/ Nurse Practitioner cases/documentation I have personally evaluated this patient and have completed at least one if not all key elements of the E/M (history, physical exam, and MDM). Additional findings are as noted.    Note Started: 9:36 AM EST    Patient here with cough congestion chest pain.  States no relief with her inhalers at home.  Did have COVID approximately 2 months ago recovered from that but significant other is currently ill as well.  Chest pain is primarily only with coughing anterior nonradiating.  On exam diffuse wheezing throughout but speaking in nearly full sentences not tachypneic no retractions heart regular no calf tenderness or edema.  Will check labs EKG chest x-ray COVID flu breathing treatments reevaluate need for additional workup    EKG interpretation: Sinus rhythm 91 normal intervals normal axis no acute ST or T changes no acute findings    Critical Care     none    Haim Broussard MD, FACEP, FAAEM  Attending Emergency  Physician           Haim Broussard MD  02/11/24 0939
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
## 2024-02-11 NOTE — DISCHARGE INSTRUCTIONS
Please feel free to come to ER if your symptoms are getting worse, if you experience chest pain, dizziness, palpitation    Please take prednisone 50 mg for 4 days and Azithromycin 500 mg on day 1 and 250 mg daily from day 2 to day 5

## 2024-02-11 NOTE — ED NOTES
The following labs were labeled with appropriate pt sticker and tubed to lab:     [x] Blue     [x] Lavender   [] on ice  [x] Green/yellow  [x] Green/black [] on ice  [] Grey  [] on ice  [] Yellow  [x] Red  [] Pink  [] Type/ Screen  [] ABG  [] VBG    [] COVID-19 swab    [] Rapid  [] PCR  [] Flu swab  [] Peds Viral Panel     [] Urine Sample  [] Fecal Sample  [] Pelvic Cultures  [] Blood Cultures  [] X 2  [] STREP Cultures  [] Wound Cultures

## 2024-02-11 NOTE — ED NOTES
Patient is alert and oriented x4, answering questions appropriately. Respirations even and unlabored. Patient changed into gown, placed on full cardiac monitor, BP cuff, and pulse ox. EKG completed. IV established. Call light within reach. Will continue to monitor.

## 2024-02-12 ENCOUNTER — TELEPHONE (OUTPATIENT)
Dept: FAMILY MEDICINE CLINIC | Age: 49
End: 2024-02-12

## 2024-02-12 LAB
EKG ATRIAL RATE: 76 BPM
EKG ATRIAL RATE: 91 BPM
EKG P AXIS: 47 DEGREES
EKG P AXIS: 54 DEGREES
EKG P-R INTERVAL: 134 MS
EKG P-R INTERVAL: 150 MS
EKG Q-T INTERVAL: 366 MS
EKG Q-T INTERVAL: 408 MS
EKG QRS DURATION: 86 MS
EKG QRS DURATION: 86 MS
EKG QTC CALCULATION (BAZETT): 450 MS
EKG QTC CALCULATION (BAZETT): 459 MS
EKG R AXIS: 35 DEGREES
EKG R AXIS: 40 DEGREES
EKG T AXIS: 48 DEGREES
EKG T AXIS: 54 DEGREES
EKG VENTRICULAR RATE: 76 BPM
EKG VENTRICULAR RATE: 91 BPM

## 2024-02-12 PROCEDURE — 93010 ELECTROCARDIOGRAM REPORT: CPT | Performed by: INTERNAL MEDICINE

## 2024-02-12 NOTE — TELEPHONE ENCOUNTER
[facility] ED Follow up Call    Reason for ED visit:COUGH         FU appts/Provider:    Future Appointments   Date Time Provider Department Center   2/14/2024  9:45 AM SCHEDULE, [facility] [place] E[facility] [facility] [place]   2/15/2024 10:00 AM Dorota Carranza APRN - CNP [facility] [place]   3/8/2024 12:00 PM ST[facility] CARDIAC REHAB RM 01 [facility] Card Re  [place]       VOICEMAIL DOCUMENTATION - ERASE IF NOT USED  Hi, this message is for  ADRIANO  This is GEE from Crista Reeves office. Just calling to see how you are doing after your recent visit to the Emergency Room. Crista Reeves wants to make sure you were able to fill any prescriptions and that you understand your discharge instructions. Please return our call if you need to make a follow up appointment with your provider or have any further needs.   Our phone number is [phone]*. Have a great day.

## 2024-02-16 ENCOUNTER — TELEMEDICINE (OUTPATIENT)
Dept: PRIMARY CARE CLINIC | Age: 49
End: 2024-02-16
Payer: COMMERCIAL

## 2024-02-16 DIAGNOSIS — J06.9 ACUTE URI: Primary | ICD-10-CM

## 2024-02-16 PROCEDURE — 1111F DSCHRG MED/CURRENT MED MERGE: CPT | Performed by: NURSE PRACTITIONER

## 2024-02-16 PROCEDURE — G8417 CALC BMI ABV UP PARAM F/U: HCPCS | Performed by: NURSE PRACTITIONER

## 2024-02-16 PROCEDURE — G8427 DOCREV CUR MEDS BY ELIG CLIN: HCPCS | Performed by: NURSE PRACTITIONER

## 2024-02-16 PROCEDURE — G8484 FLU IMMUNIZE NO ADMIN: HCPCS | Performed by: NURSE PRACTITIONER

## 2024-02-16 PROCEDURE — 99213 OFFICE O/P EST LOW 20 MIN: CPT | Performed by: NURSE PRACTITIONER

## 2024-02-16 PROCEDURE — 1036F TOBACCO NON-USER: CPT | Performed by: NURSE PRACTITIONER

## 2024-02-16 RX ORDER — MELOXICAM 15 MG/1
15 TABLET ORAL DAILY
COMMUNITY

## 2024-02-16 NOTE — PROGRESS NOTES
Syl Dejesus (:  1975) is a Established patient, here for evaluation of the following:    URI (Pt states that she was seen at the ED and diagnosed with a URI and prescribed a z-douglas but is still experiencing a lot nasal congestion/drainage, mucus is clear/yellow, cough with yellow mucus production, denies body aches, fever, or chills. )       Assessment & Plan:  Below is the assessment and plan developed based on review of pertinent history, physical exam, labs, studies, and medications.  1. Acute URI  Continue with inhalers and fluticasone nasal spray. Add afrin nasal spray for three days. Patient declined saline sinus irrigation. Patient will wait until she has trialed afrin for three days and if she is not feeling any better, can consider augmentin.  The patient would benefit from future follow up with their usual PCP. As of the end of their Virtualist Visit today, follow up visit status is as follows: Patient to follow up as previously instructed by PCP    Return if symptoms worsen or fail to improve.    Subjective: Patient had a cardiac procedure on 24. She started feeling sick on 24 and thought her symptoms were due to her procedure. She went to the ER on 24. She was tested for flu and COVID and both were negative. She was prescribed steroids and azithromycin and it didn't get rid of her symptoms. She has been using the nebulizer every 6 hours, inhalers, and nasal steroid and antihistamine. Not currently taking symbicort as she says this causes shakiness.   URI   This is a new problem. The current episode started in the past 7 days. Associated symptoms include congestion, coughing, ear pain (left), a plugged ear sensation, rhinorrhea and wheezing. Pertinent negatives include no sore throat (resolved).     Review of Systems   HENT:  Positive for congestion, ear pain (left), postnasal drip, rhinorrhea and voice change. Negative for sore throat (resolved).    Respiratory:  Positive for

## 2024-02-17 ENCOUNTER — TELEPHONE (OUTPATIENT)
Dept: FAMILY MEDICINE CLINIC | Age: 49
End: 2024-02-17

## 2024-02-17 DIAGNOSIS — U07.1 COVID-19 VIRUS INFECTION: ICD-10-CM

## 2024-02-17 DIAGNOSIS — Z76.0 MEDICATION REFILL: Primary | ICD-10-CM

## 2024-02-17 DIAGNOSIS — J45.40 MODERATE PERSISTENT REACTIVE AIRWAY DISEASE WITH WHEEZING WITHOUT COMPLICATION: ICD-10-CM

## 2024-02-17 RX ORDER — ALBUTEROL SULFATE 90 UG/1
2 AEROSOL, METERED RESPIRATORY (INHALATION) 4 TIMES DAILY PRN
Qty: 54 G | Refills: 1 | Status: SHIPPED | OUTPATIENT
Start: 2024-02-17

## 2024-02-19 ENCOUNTER — TELEPHONE (OUTPATIENT)
Dept: FAMILY MEDICINE CLINIC | Age: 49
End: 2024-02-19

## 2024-02-19 RX ORDER — ALBUTEROL SULFATE 90 UG/1
AEROSOL, METERED RESPIRATORY (INHALATION)
Qty: 8.5 G | Refills: 0 | Status: SHIPPED | OUTPATIENT
Start: 2024-02-19

## 2024-02-19 RX ORDER — ALBUTEROL SULFATE 2.5 MG/3ML
SOLUTION RESPIRATORY (INHALATION)
Qty: 150 ML | Refills: 0 | Status: SHIPPED | OUTPATIENT
Start: 2024-02-19

## 2024-02-19 NOTE — TELEPHONE ENCOUNTER
PATIENT CALLED INTO OFFICE TODAY STATING SHE WAS INFORMED TO CONTACT OFFICE IF NOT FEELING BETTER SINCE HER VIRTUAL VISIT. ASKING WHAT SHE SHOULD DO NEXT HAS BEEN SEEN MULTIPLE TIMES FOR THE SAME ISSUE

## 2024-02-19 NOTE — TELEPHONE ENCOUNTER
Please Approve or Refuse.  Send to Pharmacy per Pt's Request: [facility]     Next Visit Date:  2/17/2024   Last Visit Date: 1/2/2024    Hemoglobin A1C (%)   Date Value   01/16/2023 6.1 (H)   09/21/2021 5.7   10/01/2019 5.7             ( goal A1C is < 7)   BP Readings from Last 3 Encounters:   02/15/24 (!) 162/100   02/14/24 (!) 161/87   02/11/24 (!) 161/87          (goal 120/80)  BUN   Date Value Ref Range Status   02/11/2024 11 6 - 20 mg/dL Final     Creatinine   Date Value Ref Range Status   02/11/2024 0.7 0.5 - 0.9 mg/dL Final     Potassium   Date Value Ref Range Status   02/11/2024 3.7 3.7 - 5.3 mmol/L Final

## 2024-02-19 NOTE — TELEPHONE ENCOUNTER
Patient is welcome to schedule an office visit.  If she started to experience chest pain or shortness of breath I recommend returning to the emergency department.  It looks like she was diagnosed 3 days ago with upper respiratory infection, which may be contributing to some of her symptoms.  I do advise she also follow-up with cardiology given her history.

## 2024-02-21 ENCOUNTER — PATIENT MESSAGE (OUTPATIENT)
Dept: FAMILY MEDICINE CLINIC | Age: 49
End: 2024-02-21

## 2024-02-21 DIAGNOSIS — R05.8 PRODUCTIVE COUGH: Primary | ICD-10-CM

## 2024-02-21 RX ORDER — PREDNISONE 20 MG/1
40 TABLET ORAL DAILY
Qty: 10 TABLET | Refills: 0 | Status: SHIPPED | OUTPATIENT
Start: 2024-02-21 | End: 2024-02-26

## 2024-02-21 RX ORDER — AMOXICILLIN AND CLAVULANATE POTASSIUM 875; 125 MG/1; MG/1
1 TABLET, FILM COATED ORAL 2 TIMES DAILY
Qty: 14 TABLET | Refills: 0 | Status: SHIPPED | OUTPATIENT
Start: 2024-02-21 | End: 2024-02-28

## 2024-02-21 RX ORDER — DOXYCYCLINE HYCLATE 100 MG
100 TABLET ORAL 2 TIMES DAILY
Qty: 10 TABLET | Refills: 0 | Status: SHIPPED | OUTPATIENT
Start: 2024-02-21 | End: 2024-02-26

## 2024-02-21 NOTE — TELEPHONE ENCOUNTER
I called patient and spoke with her personally.  Apologize for any miscommunication that may have taken place and she was understanding.  She states her cough is still productive, given her comorbidities, I recommend we change antibiotic treatment, she was previously treated with a Z-James about 2 weeks ago, will switch her to Augmentin and doxycycline as well as repeat a round of steroids.  I did order another chest x-ray to be done as well, I told her she could start treatment if symptoms or not improving after couple days to go get the chest x-ray and she agrees.  She is in agreements with this plan of care,.  We discussed warning signs and when to return to the emergency department and she did state understanding of this as well.    Orders Placed This Encounter    XR CHEST STANDARD (2 VW)     Standing Status:   Future     Standing Expiration Date:   2/21/2025     Order Specific Question:   Reason for exam:     Answer:   ongoing productive cough    doxycycline hyclate (VIBRA-TABS) 100 MG tablet     Sig: Take 1 tablet by mouth 2 times daily for 5 days     Dispense:  10 tablet     Refill:  0    predniSONE (DELTASONE) 20 MG tablet     Sig: Take 2 tablets by mouth daily for 5 days     Dispense:  10 tablet     Refill:  0    amoxicillin-clavulanate (AUGMENTIN) 875-125 MG per tablet     Sig: Take 1 tablet by mouth 2 times daily for 7 days     Dispense:  14 tablet     Refill:  0     Electronically signed by HAYDEE Rivera CNP on 2/21/2024 at 2:38 PM

## 2024-02-21 NOTE — TELEPHONE ENCOUNTER
I HUNG UP ON THIS PATIENT BECAUSE SHE WAS CALLING ME OUT OF MY NAME WHILE I WAS TRYING TO FIGURE OUT HOW TO HELP HER FROM THE START OF THE PHONE CALL SHE WAS VERY RUDE SAYING WE NEVER HELP HER AND SHE NEVER CAN GET ANTIBIOTICS WHEN SHE NEEDS THEM. I DID TELL HER LOOKS LIKE THE VIRTUALIST DID NOT CALL IN ANY ANTIBIOTICS BECAUSE SHE SAID IT WAS A VIRAL INFECTION AND SHE INSTANTLY STARTED TO YELL AT ME THAT IS WHEN I ENDED THE CALL

## 2024-02-27 RX ORDER — MELOXICAM 15 MG/1
TABLET ORAL
Qty: 30 TABLET | Refills: 1 | Status: SHIPPED | OUTPATIENT
Start: 2024-02-27

## 2024-02-27 NOTE — TELEPHONE ENCOUNTER
Please Approve or Refuse.  Send to Pharmacy per Pt's Request:      Next Visit Date:  4/4/2024   Last Visit Date: 1/2/2024    Hemoglobin A1C (%)   Date Value   01/16/2023 6.1 (H)   09/21/2021 5.7   10/01/2019 5.7             ( goal A1C is < 7)   BP Readings from Last 3 Encounters:   02/15/24 (!) 162/100   02/14/24 (!) 161/87   02/11/24 (!) 161/87          (goal 120/80)  BUN   Date Value Ref Range Status   02/11/2024 11 6 - 20 mg/dL Final     Creatinine   Date Value Ref Range Status   02/11/2024 0.7 0.5 - 0.9 mg/dL Final     Potassium   Date Value Ref Range Status   02/11/2024 3.7 3.7 - 5.3 mmol/L Final

## 2024-03-05 ENCOUNTER — TELEPHONE (OUTPATIENT)
Dept: FAMILY MEDICINE CLINIC | Age: 49
End: 2024-03-05

## 2024-03-05 NOTE — TELEPHONE ENCOUNTER
Pt called in stating that she was taking an antibiotic and she has developed symptoms of a yeast infection. Pt states this does normally happen when she takes an antibiotic and when she attempted to submit an e-visit in flagged the questionnaire and adv her to call the office.     Pt would like medication sent to [facility] [facility].

## 2024-03-06 ENCOUNTER — E-VISIT (OUTPATIENT)
Dept: FAMILY MEDICINE CLINIC | Age: 49
End: 2024-03-06
Payer: COMMERCIAL

## 2024-03-06 DIAGNOSIS — B37.9 YEAST INFECTION: Primary | ICD-10-CM

## 2024-03-06 PROCEDURE — 99422 OL DIG E/M SVC 11-20 MIN: CPT | Performed by: NURSE PRACTITIONER

## 2024-03-06 RX ORDER — FLUCONAZOLE 150 MG/1
150 TABLET ORAL ONCE
Qty: 2 TABLET | Refills: 0 | Status: SHIPPED | OUTPATIENT
Start: 2024-03-06 | End: 2024-03-06

## 2024-03-06 NOTE — PROGRESS NOTES
Patient reaches out today via e-visit with complaints of some vaginal discharge, likely due to yeast infection from recent antibiotic use.  Patient has had similar symptoms in the past.  Will order Diflucan.    Orders Placed This Encounter    fluconazole (DIFLUCAN) 150 MG tablet     Sig: Take 1 tablet by mouth once for 1 dose Then take the second dose, 1 tablet by mouth in 3 days     Dispense:  2 tablet     Refill:  0     Time spent on this visit 15 minutes.    Electronically signed by HAYDEE Rivera CNP on 3/6/2024 at 3:16 PM

## 2024-03-08 ENCOUNTER — HOSPITAL ENCOUNTER (OUTPATIENT)
Dept: CARDIAC REHAB | Age: 49
Setting detail: THERAPIES SERIES
Discharge: HOME | End: 2024-03-08
Attending: INTERNAL MEDICINE

## 2024-03-08 NOTE — CARDIO/PULMONARY
PT CALLED AND CANCELED APPOINTMENT FOR TODAY, SHE STATES SHE IS FEELING ILL. PT STATED SHE WILL CALL US TO RESCHEDULE WHEN APPROACHED FOR OPTION TO RESCHEDULE TODAY FOR A FUTURE DATE.

## 2024-03-14 DIAGNOSIS — I10 ESSENTIAL HYPERTENSION: ICD-10-CM

## 2024-03-14 DIAGNOSIS — E87.6 DIURETIC-INDUCED HYPOKALEMIA: ICD-10-CM

## 2024-03-14 DIAGNOSIS — T50.2X5A DIURETIC-INDUCED HYPOKALEMIA: ICD-10-CM

## 2024-03-14 RX ORDER — POTASSIUM CHLORIDE 750 MG/1
10 TABLET, EXTENDED RELEASE ORAL 2 TIMES DAILY
Qty: 180 TABLET | Refills: 0 | Status: SHIPPED | OUTPATIENT
Start: 2024-03-14

## 2024-03-14 NOTE — TELEPHONE ENCOUNTER
Please Approve or Refuse.  Send to Pharmacy per Pt's Request:  PATIENT STATED HAVING CRAMPS IN HER LEGS AT NIGHT/FYI WANTED TO LET YOU KNOW SHE DID HAVE HEART SURGERY     Next Visit Date:  4/4/2024   Last Visit Date: 3/6/2024    Hemoglobin A1C (%)   Date Value   01/16/2023 6.1 (H)   09/21/2021 5.7   10/01/2019 5.7             ( goal A1C is < 7)   BP Readings from Last 3 Encounters:   02/15/24 (!) 162/100   02/14/24 (!) 161/87   02/11/24 (!) 161/87          (goal 120/80)  BUN   Date Value Ref Range Status   02/11/2024 11 6 - 20 mg/dL Final     Creatinine   Date Value Ref Range Status   02/11/2024 0.7 0.5 - 0.9 mg/dL Final     Potassium   Date Value Ref Range Status   02/11/2024 3.7 3.7 - 5.3 mmol/L Final

## 2024-03-27 DIAGNOSIS — J31.0 CHRONIC RHINITIS: ICD-10-CM

## 2024-03-28 RX ORDER — LORATADINE 10 MG/1
10 TABLET ORAL DAILY
Qty: 90 TABLET | Refills: 3 | Status: SHIPPED | OUTPATIENT
Start: 2024-03-28

## 2024-04-04 ENCOUNTER — OFFICE VISIT (OUTPATIENT)
Dept: FAMILY MEDICINE CLINIC | Age: 49
End: 2024-04-04
Payer: COMMERCIAL

## 2024-04-04 DIAGNOSIS — Z11.59 ENCOUNTER FOR SCREENING FOR OTHER VIRAL DISEASES: ICD-10-CM

## 2024-04-04 DIAGNOSIS — J30.2 SEASONAL ALLERGIES: ICD-10-CM

## 2024-04-04 DIAGNOSIS — G89.29 CHRONIC MIDLINE LOW BACK PAIN WITH BILATERAL SCIATICA: ICD-10-CM

## 2024-04-04 DIAGNOSIS — J01.80 ACUTE NON-RECURRENT SINUSITIS OF OTHER SINUS: ICD-10-CM

## 2024-04-04 DIAGNOSIS — G57.93 NEUROPATHY INVOLVING BOTH LOWER EXTREMITIES: ICD-10-CM

## 2024-04-04 DIAGNOSIS — J45.41 MODERATE PERSISTENT ASTHMA WITH ACUTE EXACERBATION: ICD-10-CM

## 2024-04-04 DIAGNOSIS — J20.9 ACUTE BRONCHITIS DUE TO INFECTION: ICD-10-CM

## 2024-04-04 DIAGNOSIS — R73.03 PREDIABETES: ICD-10-CM

## 2024-04-04 DIAGNOSIS — M54.42 CHRONIC MIDLINE LOW BACK PAIN WITH BILATERAL SCIATICA: ICD-10-CM

## 2024-04-04 DIAGNOSIS — M54.41 CHRONIC MIDLINE LOW BACK PAIN WITH BILATERAL SCIATICA: ICD-10-CM

## 2024-04-04 DIAGNOSIS — Z12.31 ENCOUNTER FOR SCREENING MAMMOGRAM FOR BREAST CANCER: ICD-10-CM

## 2024-04-04 DIAGNOSIS — I10 ESSENTIAL HYPERTENSION: ICD-10-CM

## 2024-04-04 DIAGNOSIS — E55.9 VITAMIN D DEFICIENCY: ICD-10-CM

## 2024-04-04 DIAGNOSIS — I25.118 CORONARY ARTERY DISEASE OF NATIVE ARTERY OF NATIVE HEART WITH STABLE ANGINA PECTORIS (HCC): Primary | ICD-10-CM

## 2024-04-04 PROCEDURE — 3074F SYST BP LT 130 MM HG: CPT | Performed by: FAMILY MEDICINE

## 2024-04-04 PROCEDURE — 3078F DIAST BP <80 MM HG: CPT | Performed by: FAMILY MEDICINE

## 2024-04-04 PROCEDURE — 1036F TOBACCO NON-USER: CPT | Performed by: FAMILY MEDICINE

## 2024-04-04 PROCEDURE — G8417 CALC BMI ABV UP PARAM F/U: HCPCS | Performed by: FAMILY MEDICINE

## 2024-04-04 PROCEDURE — 99215 OFFICE O/P EST HI 40 MIN: CPT | Performed by: FAMILY MEDICINE

## 2024-04-04 PROCEDURE — G8427 DOCREV CUR MEDS BY ELIG CLIN: HCPCS | Performed by: FAMILY MEDICINE

## 2024-04-04 RX ORDER — NITROGLYCERIN 0.4 MG/1
0.4 TABLET SUBLINGUAL EVERY 5 MIN PRN
Qty: 25 TABLET | Refills: 3 | Status: SHIPPED | OUTPATIENT
Start: 2024-04-04

## 2024-04-04 RX ORDER — TRIAMTERENE AND HYDROCHLOROTHIAZIDE 75; 50 MG/1; MG/1
1 TABLET ORAL DAILY
Qty: 90 TABLET | Refills: 3 | Status: SHIPPED | OUTPATIENT
Start: 2024-04-04

## 2024-04-04 RX ORDER — BUDESONIDE AND FORMOTEROL FUMARATE DIHYDRATE 80; 4.5 UG/1; UG/1
2 AEROSOL RESPIRATORY (INHALATION)
Qty: 10.2 G | Refills: 11 | Status: SHIPPED | OUTPATIENT
Start: 2024-04-04

## 2024-04-04 RX ORDER — METFORMIN HYDROCHLORIDE 500 MG/1
TABLET, EXTENDED RELEASE ORAL
Qty: 30 TABLET | Refills: 3 | Status: CANCELLED | OUTPATIENT
Start: 2024-04-04

## 2024-04-04 RX ORDER — METOPROLOL SUCCINATE 25 MG/1
25 TABLET, EXTENDED RELEASE ORAL 2 TIMES DAILY
Qty: 180 TABLET | Refills: 3 | Status: SHIPPED | OUTPATIENT
Start: 2024-04-04

## 2024-04-04 RX ORDER — B-COMPLEX WITH VITAMIN C
1 TABLET ORAL DAILY
Qty: 90 TABLET | Refills: 3 | Status: SHIPPED | OUTPATIENT
Start: 2024-04-04

## 2024-04-04 RX ORDER — PREDNISONE 10 MG/1
50 TABLET ORAL DAILY
Qty: 35 TABLET | Refills: 0 | Status: SHIPPED | OUTPATIENT
Start: 2024-04-04 | End: 2024-04-11

## 2024-04-04 RX ORDER — MONTELUKAST SODIUM 10 MG/1
10 TABLET ORAL NIGHTLY
Qty: 90 TABLET | Refills: 3 | Status: SHIPPED | OUTPATIENT
Start: 2024-04-04

## 2024-04-04 RX ORDER — DOXYCYCLINE HYCLATE 100 MG
100 TABLET ORAL 2 TIMES DAILY
Qty: 14 TABLET | Refills: 0 | Status: SHIPPED | OUTPATIENT
Start: 2024-04-04 | End: 2024-04-11

## 2024-04-04 RX ORDER — OLMESARTAN MEDOXOMIL 40 MG/1
40 TABLET ORAL DAILY
Qty: 90 TABLET | Refills: 3 | Status: SHIPPED | OUTPATIENT
Start: 2024-04-04

## 2024-04-04 RX ORDER — FLUTICASONE PROPIONATE 50 MCG
2 SPRAY, SUSPENSION (ML) NASAL DAILY
Qty: 16 G | Refills: 3 | Status: SHIPPED | OUTPATIENT
Start: 2024-04-04

## 2024-04-04 SDOH — ECONOMIC STABILITY: HOUSING INSECURITY
IN THE LAST 12 MONTHS, WAS THERE A TIME WHEN YOU DID NOT HAVE A STEADY PLACE TO SLEEP OR SLEPT IN A SHELTER (INCLUDING NOW)?: NO

## 2024-04-04 SDOH — ECONOMIC STABILITY: FOOD INSECURITY: WITHIN THE PAST 12 MONTHS, THE FOOD YOU BOUGHT JUST DIDN'T LAST AND YOU DIDN'T HAVE MONEY TO GET MORE.: NEVER TRUE

## 2024-04-04 SDOH — ECONOMIC STABILITY: FOOD INSECURITY: WITHIN THE PAST 12 MONTHS, YOU WORRIED THAT YOUR FOOD WOULD RUN OUT BEFORE YOU GOT MONEY TO BUY MORE.: NEVER TRUE

## 2024-04-04 SDOH — ECONOMIC STABILITY: INCOME INSECURITY: HOW HARD IS IT FOR YOU TO PAY FOR THE VERY BASICS LIKE FOOD, HOUSING, MEDICAL CARE, AND HEATING?: NOT HARD AT ALL

## 2024-04-04 ASSESSMENT — ENCOUNTER SYMPTOMS
CONSTIPATION: 0
VOMITING: 0
SHORTNESS OF BREATH: 1
SINUS PRESSURE: 1
WHEEZING: 1
CHEST TIGHTNESS: 0
NAUSEA: 0
RHINORRHEA: 1
COUGH: 1
ABDOMINAL PAIN: 0
SINUS PAIN: 1

## 2024-04-04 ASSESSMENT — PATIENT HEALTH QUESTIONNAIRE - PHQ9
3. TROUBLE FALLING OR STAYING ASLEEP: NOT AT ALL
SUM OF ALL RESPONSES TO PHQ9 QUESTIONS 1 & 2: 0
8. MOVING OR SPEAKING SO SLOWLY THAT OTHER PEOPLE COULD HAVE NOTICED. OR THE OPPOSITE, BEING SO FIGETY OR RESTLESS THAT YOU HAVE BEEN MOVING AROUND A LOT MORE THAN USUAL: NOT AT ALL
5. POOR APPETITE OR OVEREATING: NOT AT ALL
SUM OF ALL RESPONSES TO PHQ QUESTIONS 1-9: 0
6. FEELING BAD ABOUT YOURSELF - OR THAT YOU ARE A FAILURE OR HAVE LET YOURSELF OR YOUR FAMILY DOWN: NOT AT ALL
SUM OF ALL RESPONSES TO PHQ QUESTIONS 1-9: 0
SUM OF ALL RESPONSES TO PHQ QUESTIONS 1-9: 0
1. LITTLE INTEREST OR PLEASURE IN DOING THINGS: NOT AT ALL
2. FEELING DOWN, DEPRESSED OR HOPELESS: NOT AT ALL
9. THOUGHTS THAT YOU WOULD BE BETTER OFF DEAD, OR OF HURTING YOURSELF: NOT AT ALL
4. FEELING TIRED OR HAVING LITTLE ENERGY: NOT AT ALL
7. TROUBLE CONCENTRATING ON THINGS, SUCH AS READING THE NEWSPAPER OR WATCHING TELEVISION: NOT AT ALL
SUM OF ALL RESPONSES TO PHQ QUESTIONS 1-9: 0
10. IF YOU CHECKED OFF ANY PROBLEMS, HOW DIFFICULT HAVE THESE PROBLEMS MADE IT FOR YOU TO DO YOUR WORK, TAKE CARE OF THINGS AT HOME, OR GET ALONG WITH OTHER PEOPLE: NOT DIFFICULT AT ALL

## 2024-04-04 NOTE — PROGRESS NOTES
Visit Information    Have you changed or started any medications since your last visit including any over-the-counter medicines, vitamins, or herbal medicines? no   Have you stopped taking any of your medications? Is so, why? -  no  Are you having any side effects from any of your medications? - no    Have you seen any other physician or provider since your last visit?  yes -    Have you had any other diagnostic tests since your last visit?  yes -    Have you been seen in the emergency room and/or had an admission in a hospital since we last saw you?  yes -    Have you had your routine dental cleaning in the past 6 months?  no     Do you have an active MyChart account? If no, what is the barrier?  Yes    Patient Care Team:  Crista Calixto MD as PCP - General (Family Medicine)  Crista Calixto MD as PCP - Empaneled Provider  Adrienne Lizarraga APRN - CNP as Nurse Practitioner (Obstetrics & Gynecology)  Daisy Cortes MD (Obstetrics & Gynecology)  Naman French MD as Surgeon (Cardiology)  Iraj Caballero DO as Consulting Physician (Physical Medicine and Rehab)  Mendoza Centeno MD as Consulting Physician (Pulmonology)  Arnulfo Hanna MD as Surgeon (Neurosurgery)    Medical History Review  Past Medical, Family, and Social History reviewed and does contribute to the patient presenting condition    Health Maintenance   Topic Date Due    Hepatitis B vaccine (1 of 3 - 3-dose series) Never done    COVID-19 Vaccine (1) Never done    Hepatitis C screen  Never done    Pneumococcal 0-64 years Vaccine (2 of 2 - PCV) 07/22/2017    A1C test (Diabetic or Prediabetic)  01/16/2024    Flu vaccine (Season Ended) 08/01/2024    Depression Monitoring  09/28/2024    Lipids  02/08/2025    Colorectal Cancer Screen  01/16/2028    DTaP/Tdap/Td vaccine (3 - Td or Tdap) 07/31/2033    HIV screen  Completed    Hepatitis A vaccine  Aged Out    Hib vaccine  Aged Out    Polio vaccine  Aged Out    Meningococcal (ACWY) vaccine  Aged Out 
errors may be present. Although every effort was made to ensure accuracy, no guarantees can be provided that every mistake has been identified and corrected by editing.    An electronic signature was used to authenticate this note.  Electronically signed by Crista Calixto MD on 4/6/2024 at 8:37 PM

## 2024-04-06 VITALS
TEMPERATURE: 98.3 F | OXYGEN SATURATION: 96 % | WEIGHT: 252.6 LBS | HEART RATE: 93 BPM | SYSTOLIC BLOOD PRESSURE: 126 MMHG | DIASTOLIC BLOOD PRESSURE: 78 MMHG | HEIGHT: 62 IN | BODY MASS INDEX: 46.48 KG/M2

## 2024-04-06 PROBLEM — M51.26 DISPLACEMENT OF LUMBAR INTERVERTEBRAL DISC WITHOUT MYELOPATHY: Status: RESOLVED | Noted: 2023-08-04 | Resolved: 2024-04-06

## 2024-04-06 PROBLEM — I25.118 CORONARY ARTERY DISEASE OF NATIVE ARTERY OF NATIVE HEART WITH STABLE ANGINA PECTORIS (HCC): Status: ACTIVE | Noted: 2023-08-23

## 2024-04-06 PROBLEM — H92.02 LEFT EAR PAIN: Status: RESOLVED | Noted: 2023-08-04 | Resolved: 2024-04-06

## 2024-04-06 PROBLEM — S02.92XA MULTIPLE CLOSED FACIAL BONE FRACTURES (HCC): Status: RESOLVED | Noted: 2023-08-04 | Resolved: 2024-04-06

## 2024-04-06 PROBLEM — R20.2 ARM PARESTHESIA, LEFT: Status: RESOLVED | Noted: 2021-07-26 | Resolved: 2024-04-06

## 2024-04-06 PROBLEM — R39.82 CHRONIC BLADDER PAIN: Status: RESOLVED | Noted: 2023-08-04 | Resolved: 2024-04-06

## 2024-04-06 PROBLEM — K21.9 LARYNGOPHARYNGEAL REFLUX (LPR): Status: RESOLVED | Noted: 2023-08-04 | Resolved: 2024-04-06

## 2024-04-06 PROBLEM — S02.85XA CLOSED FRACTURE OF LEFT ORBIT (HCC): Status: RESOLVED | Noted: 2023-08-04 | Resolved: 2024-04-06

## 2024-04-06 PROBLEM — G62.9 NEUROPATHY: Status: RESOLVED | Noted: 2023-08-04 | Resolved: 2024-04-06

## 2024-04-06 PROBLEM — M48.061 NEURAL FORAMINAL STENOSIS OF LUMBAR SPINE: Status: RESOLVED | Noted: 2017-11-20 | Resolved: 2024-04-06

## 2024-04-06 PROBLEM — R06.09 DOE (DYSPNEA ON EXERTION): Status: RESOLVED | Noted: 2023-04-16 | Resolved: 2024-04-06

## 2024-04-06 PROBLEM — J45.909 REACTIVE AIRWAY DISEASE WITH WHEEZING: Status: RESOLVED | Noted: 2023-09-28 | Resolved: 2024-04-06

## 2024-04-06 PROBLEM — R94.39 ABNORMAL STRESS TEST: Status: RESOLVED | Noted: 2024-02-08 | Resolved: 2024-04-06

## 2024-04-06 PROBLEM — J34.9 PARANASAL SINUS DISEASE: Status: RESOLVED | Noted: 2021-08-05 | Resolved: 2024-04-06

## 2024-04-06 PROBLEM — I51.7 CARDIOMEGALY: Status: RESOLVED | Noted: 2023-08-04 | Resolved: 2024-04-06

## 2024-04-06 PROBLEM — I25.118 CORONARY ARTERY DISEASE INVOLVING NATIVE CORONARY ARTERY OF NATIVE HEART WITH OTHER FORM OF ANGINA PECTORIS (HCC): Status: RESOLVED | Noted: 2024-02-08 | Resolved: 2024-04-06

## 2024-04-06 PROBLEM — J45.41 MODERATE PERSISTENT ASTHMA WITH ACUTE EXACERBATION: Status: ACTIVE | Noted: 2024-04-06

## 2024-04-06 ASSESSMENT — ENCOUNTER SYMPTOMS
SORE THROAT: 0
TROUBLE SWALLOWING: 0
BACK PAIN: 1

## 2024-04-16 ENCOUNTER — HOSPITAL ENCOUNTER (OUTPATIENT)
Age: 49
Discharge: HOME | End: 2024-04-16
Payer: COMMERCIAL

## 2024-04-16 DIAGNOSIS — R73.03 PREDIABETES: ICD-10-CM

## 2024-04-16 DIAGNOSIS — E83.42 HYPOMAGNESEMIA: ICD-10-CM

## 2024-04-16 DIAGNOSIS — R74.8 BLOOD ALKALINE PHOSPHATASE INCREASED COMPARED WITH PRIOR MEASUREMENT: Primary | ICD-10-CM

## 2024-04-16 DIAGNOSIS — Z11.59 ENCOUNTER FOR SCREENING FOR OTHER VIRAL DISEASES: ICD-10-CM

## 2024-04-16 DIAGNOSIS — G57.93 NEUROPATHY INVOLVING BOTH LOWER EXTREMITIES: ICD-10-CM

## 2024-04-16 DIAGNOSIS — E55.9 VITAMIN D DEFICIENCY: ICD-10-CM

## 2024-04-16 DIAGNOSIS — I10 ESSENTIAL HYPERTENSION: ICD-10-CM

## 2024-04-16 LAB
25(OH)D3 SERPL-MCNC: 42.5 NG/ML (ref 30–100)
ALBUMIN SERPL-MCNC: 4.1 G/DL (ref 3.5–5.2)
ALBUMIN/GLOB SERPL: 2 {RATIO} (ref 1–2.5)
ALP SERPL-CCNC: 126 U/L (ref 35–104)
ALT SERPL-CCNC: 28 U/L (ref 10–35)
ANION GAP SERPL CALCULATED.3IONS-SCNC: 13 MMOL/L (ref 9–16)
AST SERPL-CCNC: 25 U/L (ref 10–35)
BASOPHILS # BLD: 0 K/UL (ref 0–0.2)
BASOPHILS NFR BLD: 0 % (ref 0–2)
BILIRUB SERPL-MCNC: 0.2 MG/DL (ref 0–1.2)
BUN SERPL-MCNC: 20 MG/DL (ref 6–20)
CALCIUM SERPL-MCNC: 9.4 MG/DL (ref 8.6–10.4)
CHLORIDE SERPL-SCNC: 101 MMOL/L (ref 98–107)
CO2 SERPL-SCNC: 25 MMOL/L (ref 20–31)
CREAT SERPL-MCNC: 0.9 MG/DL (ref 0.5–0.9)
EOSINOPHIL # BLD: 0.17 K/UL (ref 0–0.4)
EOSINOPHILS RELATIVE PERCENT: 1 % (ref 1–4)
ERYTHROCYTE [DISTWIDTH] IN BLOOD BY AUTOMATED COUNT: 13.9 % (ref 11.8–14.4)
EST. AVERAGE GLUCOSE BLD GHB EST-MCNC: 140 MG/DL
FOLATE SERPL-MCNC: 17.8 NG/ML (ref 4.8–24.2)
GFR SERPL CREATININE-BSD FRML MDRD: 75 ML/MIN/1.73M2
GLUCOSE SERPL-MCNC: 127 MG/DL (ref 74–99)
HBA1C MFR BLD: 6.5 % (ref 4–6)
HBV SURFACE AB SERPL IA-ACNC: <3.5 MIU/ML
HCT VFR BLD AUTO: 38 % (ref 36.3–47.1)
HCV AB SERPL QL IA: NONREACTIVE
HGB BLD-MCNC: 12.7 G/DL (ref 11.9–15.1)
IMM GRANULOCYTES # BLD AUTO: 0 K/UL (ref 0–0.3)
IMM GRANULOCYTES NFR BLD: 0 %
LYMPHOCYTES NFR BLD: 6.01 K/UL (ref 1–4.8)
LYMPHOCYTES RELATIVE PERCENT: 36 % (ref 24–44)
MAGNESIUM SERPL-MCNC: 1.6 MG/DL (ref 1.6–2.6)
MCH RBC QN AUTO: 32.7 PG (ref 25.2–33.5)
MCHC RBC AUTO-ENTMCNC: 33.4 G/DL (ref 28.4–34.8)
MCV RBC AUTO: 97.9 FL (ref 82.6–102.9)
MONOCYTES NFR BLD: 0.84 K/UL (ref 0.1–0.8)
MONOCYTES NFR BLD: 5 % (ref 1–7)
MORPHOLOGY: NORMAL
NEUTROPHILS NFR BLD: 58 % (ref 36–66)
NEUTS SEG NFR BLD: 9.68 K/UL (ref 1.8–7.7)
NRBC BLD-RTO: 0 PER 100 WBC
PLATELET # BLD AUTO: 325 K/UL (ref 138–453)
PMV BLD AUTO: 9.8 FL (ref 8.1–13.5)
POTASSIUM SERPL-SCNC: 3.8 MMOL/L (ref 3.7–5.3)
PROT SERPL-MCNC: 6.8 G/DL (ref 6.6–8.7)
RBC # BLD AUTO: 3.88 M/UL (ref 3.95–5.11)
SODIUM SERPL-SCNC: 139 MMOL/L (ref 136–145)
TSH SERPL DL<=0.05 MIU/L-ACNC: 0.33 UIU/ML (ref 0.27–4.2)
URATE SERPL-MCNC: 7.2 MG/DL (ref 2.4–5.7)
VIT B12 SERPL-MCNC: 774 PG/ML (ref 232–1245)
WBC OTHER # BLD: 16.7 K/UL (ref 3.5–11.3)

## 2024-04-16 PROCEDURE — 86317 IMMUNOASSAY INFECTIOUS AGENT: CPT

## 2024-04-16 PROCEDURE — 86803 HEPATITIS C AB TEST: CPT

## 2024-04-16 PROCEDURE — 83735 ASSAY OF MAGNESIUM: CPT

## 2024-04-16 PROCEDURE — 36415 COLL VENOUS BLD VENIPUNCTURE: CPT

## 2024-04-16 PROCEDURE — 82306 VITAMIN D 25 HYDROXY: CPT

## 2024-04-16 PROCEDURE — 82607 VITAMIN B-12: CPT

## 2024-04-16 PROCEDURE — 80053 COMPREHEN METABOLIC PANEL: CPT

## 2024-04-16 PROCEDURE — 82746 ASSAY OF FOLIC ACID SERUM: CPT

## 2024-04-16 PROCEDURE — 85025 COMPLETE CBC W/AUTO DIFF WBC: CPT

## 2024-04-16 PROCEDURE — 84443 ASSAY THYROID STIM HORMONE: CPT

## 2024-04-16 PROCEDURE — 84550 ASSAY OF BLOOD/URIC ACID: CPT

## 2024-04-16 PROCEDURE — 83036 HEMOGLOBIN GLYCOSYLATED A1C: CPT

## 2024-04-16 NOTE — RESULT ENCOUNTER NOTE
Please notify patient: Hemoglobin A1c 6.5 well-controlled diabetes, previously prediabetes, now she has diabetes.  If she drinks any pop to stop drinking pop  We can start medication like metformin can I sent to the pharmacy?    Uric acid high, can cause gout and kidney stones, we need to stop hydrochlorothiazide which can cause her to have high uric acid, this is in the medication Maxide 75/50 we need to change that one, does she agree?  I want to change it to furosemide with potassium    Glucose well-controlled 127   1 liver test, alkaline phosphatase is increased, will do an ultrasound of the liver, please give her contact information to schedule    White blood cells are worse than before, does she have any signs of infection  Hemoglobin is low, does she have any dark stools or black stools?  Might have been anemia post getting the stents.  She is up-to-date with colonoscopy and EGD done 1/16/2023.  She also has hysterectomy.  Any blood in the urine?  I will order a urine test to check for blood in the urine    Magnesium is low--to take magnesium 250 Mg daily I will send to the pharmacy  Not immune against hepatitis B she will need hepatitis B vaccine  Otherwise labs within normal limits  continue current treatment    BP Readings from Last 3 Encounters:  04/04/24 : 126/78  02/15/24 : (!) 162/100  02/14/24 : (!) 161/87        Future Appointments  5/7/2024   10:45 AM   STA DIAG MAMMO RM 3        [facility] MAMMO          STA Radiolog  5/7/2024   11:15 AM   STA PULM FUNCTION RM       [facility] PFT            [facility] [place]  5/16/2024  3:30 PM    Skyler Kenny MD       [facility] [place]        [facility] [place]  7/9/2024   3:30 PM    Crista Calixto MD    [facility]

## 2024-04-18 ENCOUNTER — TELEPHONE (OUTPATIENT)
Dept: FAMILY MEDICINE CLINIC | Age: 49
End: 2024-04-18

## 2024-04-18 DIAGNOSIS — J20.9 ACUTE BRONCHITIS DUE TO INFECTION: ICD-10-CM

## 2024-04-18 DIAGNOSIS — J01.80 ACUTE NON-RECURRENT SINUSITIS OF OTHER SINUS: ICD-10-CM

## 2024-04-18 DIAGNOSIS — E79.0 HYPERURICEMIA: ICD-10-CM

## 2024-04-18 DIAGNOSIS — I10 ESSENTIAL HYPERTENSION: Primary | ICD-10-CM

## 2024-04-18 DIAGNOSIS — J45.41 MODERATE PERSISTENT ASTHMA WITH ACUTE EXACERBATION: Primary | ICD-10-CM

## 2024-04-18 RX ORDER — POTASSIUM CHLORIDE 20 MEQ/1
20 TABLET, EXTENDED RELEASE ORAL 2 TIMES DAILY
Qty: 60 TABLET | Refills: 0 | Status: SHIPPED | OUTPATIENT
Start: 2024-04-18

## 2024-04-18 RX ORDER — FUROSEMIDE 20 MG/1
20 TABLET ORAL EVERY MORNING
Qty: 30 TABLET | Refills: 0 | Status: SHIPPED | OUTPATIENT
Start: 2024-04-18

## 2024-04-18 RX ORDER — AZITHROMYCIN 250 MG/1
TABLET, FILM COATED ORAL
Qty: 6 TABLET | Refills: 0 | Status: SHIPPED | OUTPATIENT
Start: 2024-04-18 | End: 2024-04-28

## 2024-04-18 NOTE — RESULT ENCOUNTER NOTE
Please notify patient:  Please let the patient know to  prescription from the pharmacy.  To do labs in 1 week, nonfasting    Needs nurse visit appointment for BP check in 1 week.      Orders Placed This Encounter  Medications  · azithromycin (ZITHROMAX) 250 MG tablet    Simg on day 1 followed by 250mg on days 2 - 5    Dispense:  6 tablet    Refill:  0  · furosemide (LASIX) 20 MG tablet    Sig: Take 1 tablet by mouth every morning Take with potassium.  Stop triamterene hydrochlorothiazide.  Call for refill. Goal BP bellow 130/80 and above 115/65, heart rate 56 to 90 bpm    Dispense:  30 tablet    Refill:  0  · potassium chloride (KLOR-CON M) 20 MEQ extended release tablet    Sig: Take 1 tablet by mouth 2 times daily Take with furosemide.  Call for refills    Dispense:  60 tablet    Refill:  0        [id number] - [place], [place] - [address] [phone] -  [phone] [address] [address]  Phone: [phone] Fax: [phone]      Orders Placed This Encounter  Procedures  · Magnesium    Standing Status:   Future    Standing Expiration Date:   2025  · Basic Metabolic Panel    Standing Status:   Future    Standing Expiration Date:   2025            Future Appointments  2024   10:45 AM   STA DIAG MAMMO RM 3        STAZ MAMMO          STA Radiolog  2024   11:15 AM   STA PULM FUNCTION RM       [facility] PFT            [facility] [place]  2024  3:30 PM    Skyler Kenny MD       [facility]        [facility] [place] [facility]  2024   3:30 PM    Crista Calixto MD    [facility]

## 2024-04-18 NOTE — TELEPHONE ENCOUNTER
PATIENT CALLED IN STATING SHE HAS BEEN SICK FOR MONTHS AND HAS TOOK SEVERAL ZPACKS AND KEEPS GETTING INFECTED SHE STATED ITS NOT NORMAL AND WANTS SOMETHING ELSE TO BE DONE FOR HER OTHER THAN A ZPACK   PATIENT WAS VERY RUDE AND DEMANDING WE ORDER HER MORE TESTING TO BE DONE

## 2024-04-18 NOTE — PROGRESS NOTES
Please let the patient know to  prescription from the pharmacy.  To do labs in 1 week    Needs nurse visit appointment for BP check in 1 week.      Orders Placed This Encounter   Medications    azithromycin (ZITHROMAX) 250 MG tablet     Simg on day 1 followed by 250mg on days 2 - 5     Dispense:  6 tablet     Refill:  0    furosemide (LASIX) 20 MG tablet     Sig: Take 1 tablet by mouth every morning Take with potassium.  Stop triamterene hydrochlorothiazide.  Call for refill. Goal BP bellow 130/80 and above 115/65, heart rate 56 to 90 bpm     Dispense:  30 tablet     Refill:  0    potassium chloride (KLOR-CON M) 20 MEQ extended release tablet     Sig: Take 1 tablet by mouth 2 times daily Take with furosemide.  Call for refills     Dispense:  60 tablet     Refill:  0         [id number] - [place], [place] - [address] [phone] -  [phone] [address] [address]  Phone: [phone] Fax: [phone]      Orders Placed This Encounter   Procedures    Magnesium     Standing Status:   Future     Standing Expiration Date:   2025    Basic Metabolic Panel     Standing Status:   Future     Standing Expiration Date:   2025       Future Appointments   Date Time Provider Department Center   2024 10:45 AM STA DIAG MAMMO RM 3 [facility] MAMMO ST[facility] Radiolog   2024 11:15 AM STA PULM FUNCTION RM [facility] PFT [facility] [place]   2024  3:30 PM Skyler Kenny MD AFL [facility] [place] [facility]   2024  3:30 PM [facility]       Thank you!

## 2024-04-18 NOTE — TELEPHONE ENCOUNTER
Please advise patient, she did not do her chest x-ray order in the computer already    I will also make a new referral to pulmonologist    If she smokes to quit smoking, if exposed to any smokes or perfumes or air freshener to avoid those  To check the house for mold or any other allergens  Please give her contact information for pulmonologist to schedule appointment with pulmonologist    If worsening shortness of breath go to the emergency room    Future Appointments   Date Time Provider Department Center   5/7/2024 10:45 AM STA DIAG MAMMO RM 3 [facility] MAMMO ST[facility] Radiolog   5/7/2024 11:15 AM STA PULM FUNCTION RM [facility] PFT [facility] [place]   5/16/2024  3:30 PM Skyler Kenny MD [facility] [place]   7/9/2024  3:30 PM [facility]

## 2024-04-19 RX ORDER — DEXTROMETHORPHAN HYDROBROMIDE AND PROMETHAZINE HYDROCHLORIDE 15; 6.25 MG/5ML; MG/5ML
2.5 SYRUP ORAL 4 TIMES DAILY PRN
Qty: 240 ML | Refills: 1 | Status: SHIPPED | OUTPATIENT
Start: 2024-04-19

## 2024-04-19 RX ORDER — AMOXICILLIN AND CLAVULANATE POTASSIUM 875; 125 MG/1; MG/1
1 TABLET, FILM COATED ORAL 2 TIMES DAILY
Qty: 14 TABLET | Refills: 0 | Status: SHIPPED | OUTPATIENT
Start: 2024-04-19 | End: 2024-04-26

## 2024-04-19 NOTE — TELEPHONE ENCOUNTER
I spoke with patient today, she informed me that she had her  with her. She states she did CXR back in February at [facility]. She just got over having surgery and she is not going back to hospital. She wants to know why everytime she is getting prescribed a Z-James and its clearly not working for her. Patient continued screaming at me and I told her, I am not able to speak on your behalf and I would send a message back.

## 2024-04-19 NOTE — TELEPHONE ENCOUNTER
MyChart message sent to the patient, she often checks MyChart, I am seeing patients right now until early afternoon, if she does not read the message I will have to call her to clarify

## 2024-04-19 NOTE — RESULT ENCOUNTER NOTE
Noted, please see telephone encounter from 4/19/2024  Future Appointments  5/7/2024   10:45 AM   STA DIAG MAMMO RM 3        [facility] MAMMO          STA Radiolog  5/7/2024   11:15 AM   STA PULM FUNCTION RM       [facility] PFT            [facility] [place]  5/16/2024  3:30 PM    Skyler Kenny MD       A[facility] TCC [facility]        [facility] [place]  7/9/2024   3:30 PM    [facility]  9/10/2024  2:30 PM    [facility]ok Felton [facility]   [facility]

## 2024-04-19 NOTE — TELEPHONE ENCOUNTER
I called the patient, she says \"I want another antibiotic and something for cough, patient reports her discharge and coughing with green and mucus    She says there is no mold in the house  Using nebulizer every 6 hrs  She says wheezing only when coughing, otherwise no wheezing    Patient says she already took Zpack and Doxycycline, and does not feel it helped  Does not want to do a chest x-ray, does not want to go to emergency room, she would like a third round of antibiotics,    The patient verbalizes understanding and agrees with the plan.  I also sent her clarification for instructions

## 2024-04-24 RX ORDER — MELOXICAM 15 MG/1
TABLET ORAL
Qty: 30 TABLET | Refills: 0 | Status: SHIPPED | OUTPATIENT
Start: 2024-04-24

## 2024-05-07 ENCOUNTER — HOSPITAL ENCOUNTER (OUTPATIENT)
Dept: PULMONOLOGY | Age: 49
Discharge: HOME | End: 2024-05-07
Attending: FAMILY MEDICINE

## 2024-05-14 DIAGNOSIS — I10 ESSENTIAL HYPERTENSION: ICD-10-CM

## 2024-05-15 RX ORDER — FUROSEMIDE 20 MG/1
20 TABLET ORAL EVERY MORNING
Qty: 90 TABLET | Refills: 3 | Status: SHIPPED | OUTPATIENT
Start: 2024-05-15 | End: 2024-05-16

## 2024-05-15 RX ORDER — POTASSIUM CHLORIDE 20 MEQ/1
20 TABLET, EXTENDED RELEASE ORAL 2 TIMES DAILY
Qty: 180 TABLET | Refills: 3 | Status: SHIPPED | OUTPATIENT
Start: 2024-05-15

## 2024-05-15 NOTE — TELEPHONE ENCOUNTER
Please Approve or Refuse.  Send to Pharmacy per Pt's Request:      Next Visit Date:  7/9/2024   Last Visit Date: 4/4/2024    Hemoglobin A1C (%)   Date Value   04/16/2024 6.5 (H)   01/16/2023 6.1 (H)   09/21/2021 5.7             ( goal A1C is < 7)   BP Readings from Last 3 Encounters:   04/04/24 126/78   02/15/24 (!) 162/100   02/14/24 (!) 161/87          (goal 120/80)  BUN   Date Value Ref Range Status   04/16/2024 20 6 - 20 mg/dL Final     Creatinine   Date Value Ref Range Status   04/16/2024 0.9 0.50 - 0.90 mg/dL Final     Potassium   Date Value Ref Range Status   04/16/2024 3.8 3.7 - 5.3 mmol/L Final

## 2024-06-09 DIAGNOSIS — M54.41 CHRONIC MIDLINE LOW BACK PAIN WITH BILATERAL SCIATICA: Primary | ICD-10-CM

## 2024-06-09 DIAGNOSIS — M54.42 CHRONIC MIDLINE LOW BACK PAIN WITH BILATERAL SCIATICA: Primary | ICD-10-CM

## 2024-06-09 DIAGNOSIS — G89.29 CHRONIC MIDLINE LOW BACK PAIN WITH BILATERAL SCIATICA: Primary | ICD-10-CM

## 2024-06-10 RX ORDER — MELOXICAM 15 MG/1
15 TABLET ORAL DAILY PRN
Qty: 30 TABLET | Refills: 0 | Status: SHIPPED | OUTPATIENT
Start: 2024-06-10

## 2024-06-10 NOTE — TELEPHONE ENCOUNTER
Please Approve or Refuse.  Send to Pharmacy per Pt's Request:      Next Visit Date:  7/9/2024   Last Visit Date: 4/4/2024    Hemoglobin A1C (%)   Date Value   04/16/2024 6.5 (H)   01/16/2023 6.1 (H)   09/21/2021 5.7             ( goal A1C is < 7)   BP Readings from Last 3 Encounters:   06/06/24 (!) 170/110   05/16/24 (!) 170/112   04/04/24 126/78          (goal 120/80)  BUN   Date Value Ref Range Status   04/16/2024 20 6 - 20 mg/dL Final     Creatinine   Date Value Ref Range Status   04/16/2024 0.9 0.50 - 0.90 mg/dL Final     Potassium   Date Value Ref Range Status   04/16/2024 3.8 3.7 - 5.3 mmol/L Final

## 2024-06-16 DIAGNOSIS — E55.9 VITAMIN D DEFICIENCY: ICD-10-CM

## 2024-06-17 RX ORDER — CHOLECALCIFEROL (VITAMIN D3) 50 MCG
2000 TABLET ORAL DAILY
Qty: 90 TABLET | Refills: 3 | Status: SHIPPED | OUTPATIENT
Start: 2024-06-17

## 2024-07-03 DIAGNOSIS — G89.29 CHRONIC MIDLINE LOW BACK PAIN WITH BILATERAL SCIATICA: ICD-10-CM

## 2024-07-03 DIAGNOSIS — M54.41 CHRONIC MIDLINE LOW BACK PAIN WITH BILATERAL SCIATICA: ICD-10-CM

## 2024-07-03 DIAGNOSIS — M54.42 CHRONIC MIDLINE LOW BACK PAIN WITH BILATERAL SCIATICA: ICD-10-CM

## 2024-07-03 RX ORDER — MELOXICAM 15 MG/1
15 TABLET ORAL DAILY PRN
Qty: 30 TABLET | Refills: 0 | Status: SHIPPED | OUTPATIENT
Start: 2024-07-03

## 2024-07-10 ENCOUNTER — OFFICE VISIT (OUTPATIENT)
Age: 49
End: 2024-07-10
Payer: COMMERCIAL

## 2024-07-10 VITALS
BODY MASS INDEX: 47.48 KG/M2 | HEIGHT: 62 IN | RESPIRATION RATE: 17 BRPM | DIASTOLIC BLOOD PRESSURE: 82 MMHG | WEIGHT: 258 LBS | HEART RATE: 77 BPM | SYSTOLIC BLOOD PRESSURE: 147 MMHG

## 2024-07-10 DIAGNOSIS — M54.41 CHRONIC MIDLINE LOW BACK PAIN WITH BILATERAL SCIATICA: ICD-10-CM

## 2024-07-10 DIAGNOSIS — M54.42 CHRONIC MIDLINE LOW BACK PAIN WITH BILATERAL SCIATICA: ICD-10-CM

## 2024-07-10 DIAGNOSIS — M51.36 LUMBAR DEGENERATIVE DISC DISEASE: ICD-10-CM

## 2024-07-10 DIAGNOSIS — G60.9 IDIOPATHIC PERIPHERAL NEUROPATHY: ICD-10-CM

## 2024-07-10 DIAGNOSIS — G62.9 NEUROPATHY: ICD-10-CM

## 2024-07-10 DIAGNOSIS — S02.92XA MULTIPLE CLOSED FRACTURES OF FACIAL BONE, INITIAL ENCOUNTER (HCC): ICD-10-CM

## 2024-07-10 DIAGNOSIS — M21.372 LEFT FOOT DROP: Primary | ICD-10-CM

## 2024-07-10 DIAGNOSIS — M96.1 FAILED BACK SYNDROME, LUMBAR: ICD-10-CM

## 2024-07-10 DIAGNOSIS — S01.81XA FACIAL LACERATION, INITIAL ENCOUNTER: ICD-10-CM

## 2024-07-10 DIAGNOSIS — S05.92XA NON-PENETRATING INJURY OF LEFT EYE, INITIAL ENCOUNTER: ICD-10-CM

## 2024-07-10 DIAGNOSIS — M25.552 LEFT HIP PAIN: ICD-10-CM

## 2024-07-10 DIAGNOSIS — S02.85XA CLOSED FRACTURE OF LEFT ORBIT, INITIAL ENCOUNTER (HCC): ICD-10-CM
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
## 2024-07-10 DIAGNOSIS — M54.16 LUMBAR RADICULOPATHY: ICD-10-CM

## 2024-07-10 DIAGNOSIS — M48.061 NEURAL FORAMINAL STENOSIS OF LUMBAR SPINE: ICD-10-CM

## 2024-07-10 DIAGNOSIS — G89.29 CHRONIC MIDLINE LOW BACK PAIN WITH BILATERAL SCIATICA: ICD-10-CM

## 2024-07-10 PROCEDURE — G8417 CALC BMI ABV UP PARAM F/U: HCPCS | Performed by: NEUROLOGICAL SURGERY

## 2024-07-10 PROCEDURE — 3079F DIAST BP 80-89 MM HG: CPT | Performed by: NEUROLOGICAL SURGERY

## 2024-07-10 PROCEDURE — 1036F TOBACCO NON-USER: CPT | Performed by: NEUROLOGICAL SURGERY

## 2024-07-10 PROCEDURE — 99213 OFFICE O/P EST LOW 20 MIN: CPT | Performed by: NEUROLOGICAL SURGERY

## 2024-07-10 PROCEDURE — 3077F SYST BP >= 140 MM HG: CPT | Performed by: NEUROLOGICAL SURGERY

## 2024-07-10 PROCEDURE — G8427 DOCREV CUR MEDS BY ELIG CLIN: HCPCS | Performed by: NEUROLOGICAL SURGERY

## 2024-07-10 NOTE — PROGRESS NOTES
[facility], [facility], [facility] NEUROSURGERY  [address], [address]  [facility] [address]  Dept: [phone]  Dept Fax: [phone]     Patient:  Syl Dejesus  YOB: 1975  Date: 7/10/24      Chief Complaint   Patient presents with    Follow-up     lumbar pain, bilateral leg numbness - last seen 7/24/2023  no prev testing              HPI:     I had the pleasure of seeing this patient back in the office today in follow-up.  As you know she is a 49-year-old female who was last seen in my office approximately 1 year ago.  She has a history of previous lumbar fusion surgery at L5-S1 level in the past.  She did indicate to me that she did suffer nerve damage on the left.  She has had partial foot drop since that time.  In addition she has been experiencing discomfort coursing the left lower extremity.  Lastly she describes burning discomfort in both feet.    Examination today demonstrates a well-healed midline lumbar incision.  She does have diffuse discomfort across the lumbar region to palpation.  Shakeel's testing is markedly positive on the left.  Negative on the right.  She has a partial foot drop on the left otherwise has good strength in the remaining muscle groups of both lower extremities.  Sensory exam is intact.  Gait is steady.    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  MRI imaging of the lumbar spine demonstrated postoperative changes at the L5-S1 level as well as moderate stenosis at the L4-5 level.  I did review these previous findings as noted above.  At this point time she continues to have ongoing pain which she feels is worsening.  For this reason we will proceed with a follow-up lumbar MRI as well as an MRI of the left hip given that Shakeel's testing is quite indicative of a hip problem.  Lastly I

## 2024-07-11 ENCOUNTER — TELEPHONE (OUTPATIENT)
Dept: FAMILY MEDICINE CLINIC | Age: 49
End: 2024-07-11

## 2024-07-11 RX ORDER — PSEUDOEPHED/ACETAMINOPH/DIPHEN 30MG-500MG
TABLET ORAL
Qty: 120 TABLET | Refills: 3 | Status: SHIPPED | OUTPATIENT
Start: 2024-07-11

## 2024-07-11 RX ORDER — TIZANIDINE 4 MG/1
TABLET ORAL
Qty: 120 TABLET | Refills: 5 | Status: SHIPPED | OUTPATIENT
Start: 2024-07-11

## 2024-07-11 NOTE — TELEPHONE ENCOUNTER
Patient calls in today that she is still having Burning and a tingling sensation with her left leg and left foot.  Dr. Hanna has directed her to a Neurologist. She has seen one in the past, she will go  back to the [facility] Neuroscience office.  However,  She is asking if there is something she can take for the the  burning and tingling sensation?    Please advise and route to clinical staff.

## 2024-07-11 NOTE — TELEPHONE ENCOUNTER
Could try vitamin B complex from over-the-counter, will discuss at appointment to do new labs  Future Appointments   Date Time Provider Department Center   7/13/2024  2:30 PM STA SCR MAMMO RM 2 STAZ MAMMO [facility] Radiolog   7/19/2024  9:00 AM Crista Calixto MD [facility]   7/26/2024  7:00 PM STA MRI RM STAZ MRI [facility] Radiolog   7/26/2024  7:45 PM STA MRI RM STAZ MRI [facility] Radiolog   8/14/2024 11:40 AM Arnulfo Hanna MD [facility]   9/10/2024  2:30 PM Henok Felton MD [facility]   11/19/2024  9:20 AM Kannan Lindsey MD [facility] -

## 2024-07-11 NOTE — TELEPHONE ENCOUNTER
Please Approve or Refuse.  Send to Pharmacy per Pt's Request:      Next Visit Date:  Visit date not found   Last Visit Date: 4/4/2024    Hemoglobin A1C (%)   Date Value   04/16/2024 6.5 (H)   01/16/2023 6.1 (H)   09/21/2021 5.7             ( goal A1C is < 7)   BP Readings from Last 3 Encounters:   07/10/24 (!) 147/82   06/06/24 (!) 170/110   05/16/24 (!) 170/112          (goal 120/80)  BUN   Date Value Ref Range Status   04/16/2024 20 6 - 20 mg/dL Final     Creatinine   Date Value Ref Range Status   04/16/2024 0.9 0.50 - 0.90 mg/dL Final     Potassium   Date Value Ref Range Status   04/16/2024 3.8 3.7 - 5.3 mmol/L Final

## 2024-07-12 ENCOUNTER — TELEPHONE (OUTPATIENT)
Dept: FAMILY MEDICINE CLINIC | Age: 49
End: 2024-07-12

## 2024-07-12 ENCOUNTER — HOSPITAL ENCOUNTER (EMERGENCY)
Age: 49
Discharge: HOME | End: 2024-07-12
Attending: EMERGENCY MEDICINE
Payer: COMMERCIAL

## 2024-07-12 VITALS
SYSTOLIC BLOOD PRESSURE: 156 MMHG | TEMPERATURE: 98.8 F | OXYGEN SATURATION: 97 % | DIASTOLIC BLOOD PRESSURE: 80 MMHG | HEART RATE: 83 BPM | RESPIRATION RATE: 18 BRPM

## 2024-07-12 DIAGNOSIS — M54.32 SCIATICA OF LEFT SIDE: Primary | ICD-10-CM

## 2024-07-12 PROCEDURE — 96372 THER/PROPH/DIAG INJ SC/IM: CPT

## 2024-07-12 PROCEDURE — 6370000000 HC RX 637 (ALT 250 FOR IP): Performed by: EMERGENCY MEDICINE

## 2024-07-12 PROCEDURE — 99284 EMERGENCY DEPT VISIT MOD MDM: CPT

## 2024-07-12 PROCEDURE — 6360000002 HC RX W HCPCS: Performed by: EMERGENCY MEDICINE

## 2024-07-12 RX ORDER — HYDROMORPHONE HYDROCHLORIDE 1 MG/ML
1 INJECTION, SOLUTION INTRAMUSCULAR; INTRAVENOUS; SUBCUTANEOUS ONCE
Status: COMPLETED | OUTPATIENT
Start: 2024-07-12 | End: 2024-07-12

## 2024-07-12 RX ORDER — KETOROLAC TROMETHAMINE 30 MG/ML
30 INJECTION, SOLUTION INTRAMUSCULAR; INTRAVENOUS ONCE
Status: COMPLETED | OUTPATIENT
Start: 2024-07-12 | End: 2024-07-12

## 2024-07-12 RX ADMIN — KETOROLAC TROMETHAMINE 30 MG: 30 INJECTION, SOLUTION INTRAMUSCULAR at 00:56

## 2024-07-12 RX ADMIN — DEXAMETHASONE 10 MG: 2 TABLET ORAL at 00:56

## 2024-07-12 RX ADMIN — HYDROMORPHONE HYDROCHLORIDE 1 MG: 1 INJECTION, SOLUTION INTRAMUSCULAR; INTRAVENOUS; SUBCUTANEOUS at 00:56

## 2024-07-12 ASSESSMENT — PAIN SCALES - GENERAL
PAINLEVEL_OUTOF10: 3
PAINLEVEL_OUTOF10: 10
PAINLEVEL_OUTOF10: 9

## 2024-07-12 ASSESSMENT — PAIN DESCRIPTION - LOCATION: LOCATION: BACK

## 2024-07-12 ASSESSMENT — PAIN DESCRIPTION - ORIENTATION: ORIENTATION: LOWER

## 2024-07-12 ASSESSMENT — PAIN - FUNCTIONAL ASSESSMENT: PAIN_FUNCTIONAL_ASSESSMENT: 0-10

## 2024-07-12 NOTE — TELEPHONE ENCOUNTER
Noted. Thank you!    Future Appointments   Date Time Provider Department Center   7/13/2024  2:30 PM STA SCR MAMMO RM 2 STAZ MAMMO STA Radiolog   7/19/2024  9:00 AM Crista Calixto MD [facility]   7/26/2024  7:00 PM STA MRI RM STAZ MRI [facility] Radiolog   7/26/2024  7:45 PM STA MRI RM STAZ MRI [facility] Radiolog   8/14/2024 11:40 AM Arnulfo Hanna MD [facility]   9/10/2024  2:30 PM Henok Felton MD [facility]   11/19/2024  9:20 AM Kannan Lindsey MD Neuro Spec Neurology -

## 2024-07-12 NOTE — ED PROVIDER NOTES
[facility]  Emergency Medicine Department    Pt Name: Syl Dejesus  MRN: [id number]  Birthdate 1975  Date of evaluation: 7/12/2024  Provider: Evan Holden MD    CHIEF COMPLAINT     Chief Complaint   Patient presents with    Back Pain     Lower going down LT leg for a few days, pt had surgery on L1 and L5       HISTORY OF PRESENT ILLNESS  (Location/Symptom, Timing/Onset, Context/Setting,Quality, Duration, Modifying Factors, Severity.)   Syl Dejesus is a 49 y.o. female who presents to the emergency department complaining of left-sided back pain radiating into the left leg.  It has been severe for the past 2 to 3 days.  She has been dealing with this pain for the past several years and has had multiple surgeries on the lower back.  She denies any new injuries.  No numbness or weakness of the leg.    Nursing Notes were reviewed.    ALLERGIES     Fentanyl, Hydrochlorothiazide, Nifedipine er, and Codeine    CURRENT MEDICATIONS       Previous Medications    ACETAMINOPHEN EXTRA STRENGTH 500 MG TABS    take 1 tablet by mouth every 6 hours AS NEEDED FOR PAIN (MAX DAILY AMOUNT:4 TABLETS)    ALBUTEROL (PROVENTIL) (2.5 MG/3ML) 0.083% NEBULIZER SOLUTION    inhale contents of 1 vial ( 3 milliliters ) in nebulizer by mouth and INTO THE LUNGS every 6 hours if needed for wheezing or shortness of breath    ALBUTEROL SULFATE HFA (VENTOLIN HFA) 108 (90 BASE) MCG/ACT INHALER    Inhale 2 puffs into the lungs 4 times daily as needed for Wheezing    ALLOPURINOL (ZYLOPRIM) 100 MG TABLET    Take 0.5 tablets by mouth daily    ASPIRIN 81 MG EC TABLET    Take 1 tablet by mouth daily    ATORVASTATIN (LIPITOR) 80 MG TABLET    Take 1 tablet by mouth nightly    B COMPLEX VITAMINS (VITAMIN B COMPLEX) TABS    Take 1 tablet by mouth daily    BLOOD PRESSURE KIT    Diagnosis: HTN. Needs to check blood pressure 1-2 times a day until stable, then once a day. Goal blood pressure less than 135/85, and above 110/60.     neurovascular deficits.  No red flags to warrant imaging.  Will treat her pain with a dose of pain medication, Toradol, and Decadron and reevaluate.    1)  Number and Complexity of Problems  Problem List This Visit: Back pain     Differential Diagnosis: Sciatica, herniated disc     Diagnoses Considered but Do Not Suspect: Spine fracture     Pertinent Comorbid Conditions: Obesity     2)  Data Reviewed    Decision Rules/Scores utilized:  N/A    External Documents Reviewed: I have independently reviewed patient's previous medical records including labs, notes and imaging.     Tests considered but not ordered and why: No spine imaging indicated at this time     Imaging that is independently reviewed and interpreted by me as: None indicated     See more data below for the lab and radiology tests and orders.     3)  Treatment and Disposition     Patient repeat assessment: Patient reports significant improvement after treatment.  She is comfortable with plan for discharge home.     Disposition discussion with patient/family: Patient aware and agrees with disposition plan.     Case discussed with consulting clinician: None     MIPS: N/A     Social determinants of health impacting treatment or disposition:  None    Code Status Discussion:  Not discussed    CONSULTS:  None    PROCEDURES:  None indicated    FINAL IMPRESSION     1. Sciatica of left side          DISPOSITION/PLAN   DISPOSITION Decision To Discharge 07/12/2024 02:15:45 AM    PATIENT REFERRED TO:   Crista Calixto MD  [address] [address]  [phone]    Schedule an appointment as soon as possible for a visit   For Follow up, As needed    DISCHARGE MEDICATIONS:     New Prescriptions    No medications on file     (Please note that portions of this note were completed with a voice recognition program.  Efforts were made to edit the dictations but occasionally words are mis-transcribed.)    Evan Holden MD  Attending Emergency

## 2024-07-12 NOTE — TELEPHONE ENCOUNTER
[facility] ED Follow up Call    Reason for ED visit:  Back Pain          FU appts/Provider:    Future Appointments   Date Time Provider Department Center   7/13/2024  2:30 PM STA SCR MAMMO RM 2 STAZ MAMMO STA Radiolog   7/19/2024  9:00 AM [facility]   7/26/2024  7:00 PM STA MRI RM STAZ MRI [facility] Radiolog   7/26/2024  7:45 PM STA MRI RM STAZ MRI [facility] Radiolog   8/14/2024 11:40 [facility]   [facility]/10/2024  2:30 PM [facility]   11/19/2024  9:20 AM Kannan Lindsey MD [facility] -       VOICEMAIL DOCUMENTATION - ERASE IF NOT USED  Hi, this message is for  Syl  This is Lorna from Crista Reeves office. Just calling to see how you are doing after your recent visit to the Emergency Room. Crista Reeves wants to make sure you were able to fill any prescriptions and that you understand your discharge instructions. Please return our call if you need to make a follow up appointment with your provider or have any further needs.   Our phone number is [phone]  Have a great day.

## 2024-07-12 NOTE — ED NOTES
Pt states improvement in pain. Encouraged to f/u with PCP. Pt states pending appts with Neuro and pain management, but not for \"weeks.\" Encouraged pt to call PCP and let staff know pt was seen in ER. Encouraged to alternate Tylenol and Motrin for pain. Declined wheelchair. Ambulatory out of dept with independent, steady gait.

## 2024-07-16 DIAGNOSIS — E83.42 HYPOMAGNESEMIA: ICD-10-CM

## 2024-07-16 NOTE — TELEPHONE ENCOUNTER
Please Approve or Refuse.  Send to Pharmacy per Pt's Request: [email or web address]      Next Visit Date:  7/19/2024   Last Visit Date: 4/4/2024    Hemoglobin A1C (%)   Date Value   04/16/2024 6.5 (H)   01/16/2023 6.1 (H)   09/21/2021 5.7             ( goal A1C is < 7)   BP Readings from Last 3 Encounters:   07/12/24 (!) 156/80   07/10/24 (!) 147/82   06/06/24 (!) 170/110          (goal 120/80)  BUN   Date Value Ref Range Status   04/16/2024 20 6 - 20 mg/dL Final     Creatinine   Date Value Ref Range Status   04/16/2024 0.9 0.50 - 0.90 mg/dL Final     Potassium   Date Value Ref Range Status   04/16/2024 3.8 3.7 - 5.3 mmol/L Final

## 2024-07-18 ENCOUNTER — TELEMEDICINE (OUTPATIENT)
Dept: FAMILY MEDICINE CLINIC | Age: 49
End: 2024-07-18
Payer: COMMERCIAL

## 2024-07-18 ENCOUNTER — TELEPHONE (OUTPATIENT)
Dept: FAMILY MEDICINE CLINIC | Age: 49
End: 2024-07-18

## 2024-07-18 DIAGNOSIS — G25.81 RLS (RESTLESS LEGS SYNDROME): ICD-10-CM

## 2024-07-18 DIAGNOSIS — E11.42 TYPE 2 DIABETES MELLITUS WITH DIABETIC POLYNEUROPATHY, WITHOUT LONG-TERM CURRENT USE OF INSULIN (HCC): Primary | ICD-10-CM

## 2024-07-18 DIAGNOSIS — I10 ESSENTIAL HYPERTENSION: ICD-10-CM

## 2024-07-18 DIAGNOSIS — E78.2 MIXED HYPERLIPIDEMIA: ICD-10-CM

## 2024-07-18 DIAGNOSIS — E66.01 MORBID OBESITY WITH BMI OF 45.0-49.9, ADULT (HCC): ICD-10-CM

## 2024-07-18 DIAGNOSIS — F33.2 SEVERE EPISODE OF RECURRENT MAJOR DEPRESSIVE DISORDER, WITHOUT PSYCHOTIC FEATURES (HCC): ICD-10-CM

## 2024-07-18 PROBLEM — R73.03 PREDIABETES: Status: RESOLVED | Noted: 2023-04-16 | Resolved: 2024-07-18

## 2024-07-18 PROCEDURE — G8427 DOCREV CUR MEDS BY ELIG CLIN: HCPCS | Performed by: FAMILY MEDICINE

## 2024-07-18 PROCEDURE — 99214 OFFICE O/P EST MOD 30 MIN: CPT | Performed by: FAMILY MEDICINE

## 2024-07-18 PROCEDURE — 2022F DILAT RTA XM EVC RTNOPTHY: CPT | Performed by: FAMILY MEDICINE

## 2024-07-18 PROCEDURE — 3044F HG A1C LEVEL LT 7.0%: CPT | Performed by: FAMILY MEDICINE

## 2024-07-18 RX ORDER — GLUCOSAMINE HCL/CHONDROITIN SU 500-400 MG
CAPSULE ORAL
Qty: 100 STRIP | Refills: 3 | Status: SHIPPED | OUTPATIENT
Start: 2024-07-18

## 2024-07-18 RX ORDER — DULOXETIN HYDROCHLORIDE 30 MG/1
30 CAPSULE, DELAYED RELEASE ORAL DAILY
Qty: 30 CAPSULE | Refills: 0 | Status: SHIPPED | OUTPATIENT
Start: 2024-07-18

## 2024-07-18 RX ORDER — BLOOD-GLUCOSE METER
KIT MISCELLANEOUS
Qty: 1 KIT | Refills: 0 | Status: SHIPPED | OUTPATIENT
Start: 2024-07-18

## 2024-07-18 ASSESSMENT — COLUMBIA-SUICIDE SEVERITY RATING SCALE - C-SSRS
2. HAVE YOU ACTUALLY HAD ANY THOUGHTS OF KILLING YOURSELF?: NO
7. DID THIS OCCUR IN THE LAST THREE MONTHS: NO
6. HAVE YOU EVER DONE ANYTHING, STARTED TO DO ANYTHING, OR PREPARED TO DO ANYTHING TO END YOUR LIFE?: YES
1. WITHIN THE PAST MONTH, HAVE YOU WISHED YOU WERE DEAD OR WISHED YOU COULD GO TO SLEEP AND NOT WAKE UP?: NO

## 2024-07-18 ASSESSMENT — ENCOUNTER SYMPTOMS
BACK PAIN: 1
WHEEZING: 0
DIARRHEA: 0
SHORTNESS OF BREATH: 0
COUGH: 0
VOMITING: 0
ABDOMINAL DISTENTION: 0
ABDOMINAL PAIN: 0
CHEST TIGHTNESS: 0
NAUSEA: 0
CONSTIPATION: 0

## 2024-07-18 ASSESSMENT — PATIENT HEALTH QUESTIONNAIRE - PHQ9
SUM OF ALL RESPONSES TO PHQ9 QUESTIONS 1 & 2: 5
2. FEELING DOWN, DEPRESSED OR HOPELESS: NEARLY EVERY DAY
SUM OF ALL RESPONSES TO PHQ QUESTIONS 1-9: 21
8. MOVING OR SPEAKING SO SLOWLY THAT OTHER PEOPLE COULD HAVE NOTICED. OR THE OPPOSITE, BEING SO FIGETY OR RESTLESS THAT YOU HAVE BEEN MOVING AROUND A LOT MORE THAN USUAL: NEARLY EVERY DAY
3. TROUBLE FALLING OR STAYING ASLEEP: NEARLY EVERY DAY
5. POOR APPETITE OR OVEREATING: NEARLY EVERY DAY
9. THOUGHTS THAT YOU WOULD BE BETTER OFF DEAD, OR OF HURTING YOURSELF: NOT AT ALL
1. LITTLE INTEREST OR PLEASURE IN DOING THINGS: MORE THAN HALF THE DAYS
SUM OF ALL RESPONSES TO PHQ QUESTIONS 1-9: 21
6. FEELING BAD ABOUT YOURSELF - OR THAT YOU ARE A FAILURE OR HAVE LET YOURSELF OR YOUR FAMILY DOWN: SEVERAL DAYS
4. FEELING TIRED OR HAVING LITTLE ENERGY: NEARLY EVERY DAY
SUM OF ALL RESPONSES TO PHQ QUESTIONS 1-9: 21
7. TROUBLE CONCENTRATING ON THINGS, SUCH AS READING THE NEWSPAPER OR WATCHING TELEVISION: NEARLY EVERY DAY
10. IF YOU CHECKED OFF ANY PROBLEMS, HOW DIFFICULT HAVE THESE PROBLEMS MADE IT FOR YOU TO DO YOUR WORK, TAKE CARE OF THINGS AT HOME, OR GET ALONG WITH OTHER PEOPLE: SOMEWHAT DIFFICULT
SUM OF ALL RESPONSES TO PHQ QUESTIONS 1-9: 21

## 2024-07-18 NOTE — PROGRESS NOTES
Syl Dejesus, was evaluated through a synchronous (real-time) audio-video encounter. The patient (or guardian if applicable) is aware that this is a billable service, which includes applicable co-pays. This Virtual Visit was conducted with patient's (and/or legal guardian's) consent. Patient identification was verified, and a caregiver was present when appropriate.   The patient was located at Home: [address] [place] [id number]  Provider was located at Facility (Appt Dept): [address] [place] [id number]  Confirm you are appropriately licensed, registered, or certified to deliver care in the state where the patient is located as indicated above. If you are not or unsure, please re-schedule the visit: Yes, I confirm.     Syl Dejesus (:  1975) is a Established patient, presenting virtually for evaluation of the following:Hypertension, Diabetes, and Peripheral Neuropathy       Assessment & Plan   Below is the assessment and plan developed based on review of pertinent history, physical exam, labs, studies, and medications.      1. Type 2 diabetes mellitus with diabetic polyneuropathy, without long-term current use of insulin (HCC)  Worsening polyneuropathy  Worsening hyperglycemia, previously prediabetes, now diabetic    Will do labs to rule out different causes of polyneuropathy    Lab Results   Component Value Date    LABA1C 6.5 (H) 2024    LABA1C 6.1 (H) 2023    LABA1C 5.7 2021     For neuropathy, patient reports in the past she was on gabapentin but did not help  Will start Cymbalta for polyneuropathy, with plan to increase the dosage  Will start Trulicity, she was previously on metformin but she had some GI symptoms and could not tolerate it  She has stopped drinking pop    -     CBC with Auto Differential; Future  -     Comprehensive Metabolic Panel; Future  -     Hemoglobin A1C; Future  -     Vitamin B12 & Folate; Future  -     Uric Acid;

## 2024-07-19 NOTE — TELEPHONE ENCOUNTER
7/19/2024  Patient is MY-CHART STATUS ACTIVE.    A My-Chart message has been sent out to patient. Per Provider request to deliver message to patient in regard to scheduling follow up; .    Recent Visits  Date Type Provider Dept   04/04/24 Office Visit Crista Calixto MD [facility]   08/04/23 Office Visit Crista Calixto MD [facility]   04/14/23 Office Visit [facility]a [facility]xto [facility]   Showing recent visits within past 540 days with a meds authorizing provider and meeting all other requirements  Future Appointments  No visits were found meeting these conditions.  Showing future appointments within next 150 days with a meds authorizing provider and meeting all other requirements

## 2024-07-19 NOTE — TELEPHONE ENCOUNTER
Return in about 4 weeks (around 8/15/2024) for Visit type extended 30 minutes chronic problems, LABS F/U, PHQ-9 in EPIC, DM, HTN, started new meds.

## 2024-07-26 ENCOUNTER — HOSPITAL ENCOUNTER (OUTPATIENT)
Dept: MRI IMAGING | Age: 49
End: 2024-07-26
Attending: NEUROLOGICAL SURGERY
Payer: COMMERCIAL

## 2024-07-26 ENCOUNTER — TELEPHONE (OUTPATIENT)
Age: 49
End: 2024-07-26

## 2024-07-26 DIAGNOSIS — K21.9 GASTROESOPHAGEAL REFLUX DISEASE WITHOUT ESOPHAGITIS: ICD-10-CM

## 2024-07-26 DIAGNOSIS — M21.372 LEFT FOOT DROP: ICD-10-CM

## 2024-07-26 DIAGNOSIS — M25.552 LEFT HIP PAIN: ICD-10-CM

## 2024-07-26 DIAGNOSIS — M54.16 LUMBAR RADICULOPATHY: ICD-10-CM

## 2024-07-26 PROCEDURE — 72148 MRI LUMBAR SPINE W/O DYE: CPT

## 2024-07-26 PROCEDURE — 73721 MRI JNT OF LWR EXTRE W/O DYE: CPT

## 2024-07-26 RX ORDER — ONDANSETRON 4 MG/1
4 TABLET, FILM COATED ORAL EVERY 6 HOURS PRN
Qty: 24 TABLET | Refills: 0 | Status: SHIPPED | OUTPATIENT
Start: 2024-07-26 | End: 2024-08-01

## 2024-07-26 NOTE — TELEPHONE ENCOUNTER
Patient needs appointment, she has missed the last appointment  Future Appointments   Date Time Provider Department Center   7/26/2024  7:00 PM STA MRI RM STAZ MRI [facility] Radiolog   7/26/2024  7:45 PM STA MRI RM STAZ MRI [facility] Radiolog   7/27/2024  2:00 PM STA SCR MAMMO RM 2 STAZ MAMMO STA Radiolog   8/14/2024 11:40 AM Arnulfo Hanna MD [facility]   9/10/2024  2:30 PM Henok Felton MD [facility]   11/13/2024  2:45 PM Crista Calixto MD [facility]   11/19/2024  9:20 AM Kannan Lindsey MD [facility]rology -

## 2024-07-26 NOTE — TELEPHONE ENCOUNTER
Pt called in stating the medication she is on for her neuropathy is not working (DULoxetine (CYMBALTA) 30 MG extended release capsule).  Pt also states that she is nauseous and vomiting and she can't even walk to the bathroom to vomit. Pt adv that she has tried OTC pain relievers and nothing is helping.Pt does not want to go to the ED because the last time she went she was treated very poorly.    Pt is uncertain if it is the Cymbalta or the Trulicity that is making her sick.     Pt stated it is okay to send her a [employer] message as well.

## 2024-07-30 ENCOUNTER — TELEPHONE (OUTPATIENT)
Dept: FAMILY MEDICINE CLINIC | Age: 49
End: 2024-07-30

## 2024-07-30 ENCOUNTER — TELEPHONE (OUTPATIENT)
Age: 49
End: 2024-07-30

## 2024-07-30 NOTE — TELEPHONE ENCOUNTER
OFFICE CALLED TO MAKE AWARE OF MRI HIP LEFT WO CONTRAST  RESULTS IN EPIC/ESPECIALLY TO LOOK AT IMPRESSION #1 AND #2    PATIENT DOES HAVE VIRTUAL VISIT WITH YOU TOMORROW

## 2024-07-30 NOTE — TELEPHONE ENCOUNTER
Received MRI left hip report completed today 7/30 with significant findings specific to impression 1 and 2.     Per [facility] patient has appt with PCP NATTY PAREKH (virtual) tomorrow 7/31/24 at 1030am-I faxed MRI left hip result to Teerza PAREKH with special attention to significant findings.     Patient has scheduled f/u appt with Dr Hanna on 8/14/24 1140

## 2024-07-30 NOTE — TELEPHONE ENCOUNTER
Called the office of PCP Dr Calixto spoke with office staff and informed them I faxed MRI left hip report with significant findings and pt has f/u appt scheduled with NATTY Starks tomorrow 7/31 @ 3730. Staff [place] but will make sure Tereza HUBBARD-CNP is aware of result.

## 2024-07-31 ENCOUNTER — TELEMEDICINE (OUTPATIENT)
Dept: FAMILY MEDICINE CLINIC | Age: 49
End: 2024-07-31
Payer: COMMERCIAL

## 2024-07-31 DIAGNOSIS — M54.42 CHRONIC MIDLINE LOW BACK PAIN WITH BILATERAL SCIATICA: ICD-10-CM

## 2024-07-31 DIAGNOSIS — G89.29 CHRONIC MIDLINE LOW BACK PAIN WITH BILATERAL SCIATICA: ICD-10-CM

## 2024-07-31 DIAGNOSIS — K80.20 CALCULUS OF GALLBLADDER WITHOUT CHOLECYSTITIS WITHOUT OBSTRUCTION: ICD-10-CM

## 2024-07-31 DIAGNOSIS — I10 ESSENTIAL HYPERTENSION: Primary | ICD-10-CM

## 2024-07-31 DIAGNOSIS — N28.1 RENAL CYST: ICD-10-CM

## 2024-07-31 DIAGNOSIS — N80.129 ENDOMETRIOMA: ICD-10-CM

## 2024-07-31 DIAGNOSIS — M54.41 CHRONIC MIDLINE LOW BACK PAIN WITH BILATERAL SCIATICA: ICD-10-CM

## 2024-07-31 PROCEDURE — 99214 OFFICE O/P EST MOD 30 MIN: CPT | Performed by: NURSE PRACTITIONER

## 2024-07-31 PROCEDURE — G8427 DOCREV CUR MEDS BY ELIG CLIN: HCPCS | Performed by: NURSE PRACTITIONER

## 2024-07-31 ASSESSMENT — ENCOUNTER SYMPTOMS
ABDOMINAL DISTENTION: 0
DIARRHEA: 0
BLOOD IN STOOL: 0
CONSTIPATION: 0
ABDOMINAL PAIN: 0
BACK PAIN: 1
COUGH: 0
NAUSEA: 0
WHEEZING: 0
SHORTNESS OF BREATH: 0
VOMITING: 0
CHEST TIGHTNESS: 0

## 2024-07-31 NOTE — TELEPHONE ENCOUNTER
Patient had appt today with PCP CNP, MRI left hip reviewed-see office note for further detail, additional imaging orders placed per PCP CNP for f/u from hip MRI.

## 2024-07-31 NOTE — PROGRESS NOTES
ended the call      Neurological:        [x] No Facial Asymmetry (Cranial nerve 7 motor function) (limited exam to video visit)          [x] No gaze palsy        [] Abnormal-            Skin:        [x] No significant exanthematous lesions or discoloration noted on facial skin         [] Abnormal-            Psychiatric:      [x] No Hallucinations       []Mood is normal      [x]Behavior is normal      [x]Judgment is normal      [x]Thought content is normal       [] Abnormal-patient's mood is angry and anxious    Other pertinent observable physical exam findings-patient appears well, no grimacing, skin color is appropriate, speaking in full sentences no distress is noted.    Due to this being a TeleHealth encounter, evaluation of the following organ systems is limited: Vitals/Constitutional/EENT/Resp/CV/GI//MS/Neuro/Skin/Heme-Lymph-Imm.    I personally reviewed most recent labs reviewed with the patient and all questions fully answered.     Otherwise labs within normal limits    Lab Results   Component Value Date    WBC 16.7 (H) 04/16/2024    HGB 12.7 04/16/2024    HCT 38.0 04/16/2024    MCV 97.9 04/16/2024     04/16/2024       Lab Results   Component Value Date/Time     04/16/2024 08:46 AM    K 3.8 04/16/2024 08:46 AM     04/16/2024 08:46 AM    CO2 25 04/16/2024 08:46 AM    BUN 20 04/16/2024 08:46 AM    CREATININE 0.9 04/16/2024 08:46 AM    GLUCOSE 127 04/16/2024 08:46 AM    CALCIUM 9.4 04/16/2024 08:46 AM        Lab Results   Component Value Date    ALT 28 04/16/2024    AST 25 04/16/2024    ALKPHOS 126 (H) 04/16/2024    BILITOT 0.2 04/16/2024       Lab Results   Component Value Date    TSH 0.33 04/16/2024       Lab Results   Component Value Date    CHOL 157 02/08/2024    CHOL 192 07/26/2021    CHOL 159 08/12/2019     Lab Results   Component Value Date    TRIG 490 (H) 02/08/2024    TRIG 125 07/26/2021    TRIG 183 (H) 08/12/2019     Lab Results   Component Value Date    HDL 27 (L) 02/08/2024

## 2024-08-01 DIAGNOSIS — M54.42 CHRONIC MIDLINE LOW BACK PAIN WITH BILATERAL SCIATICA: ICD-10-CM

## 2024-08-01 DIAGNOSIS — G89.29 CHRONIC MIDLINE LOW BACK PAIN WITH BILATERAL SCIATICA: ICD-10-CM

## 2024-08-01 DIAGNOSIS — M54.41 CHRONIC MIDLINE LOW BACK PAIN WITH BILATERAL SCIATICA: ICD-10-CM

## 2024-08-01 RX ORDER — MELOXICAM 15 MG/1
TABLET ORAL
Qty: 30 TABLET | Refills: 0 | Status: SHIPPED | OUTPATIENT
Start: 2024-08-01

## 2024-08-01 NOTE — TELEPHONE ENCOUNTER
Please Approve or Refuse.  Send to Pharmacy per Pt's Request:      Next Visit Date:  11/13/2024   Last Visit Date: 7/31/2024    Hemoglobin A1C (%)   Date Value   04/16/2024 6.5 (H)   01/16/2023 6.1 (H)   09/21/2021 5.7             ( goal A1C is < 7)   BP Readings from Last 3 Encounters:   07/12/24 (!) 156/80   07/10/24 (!) 147/82   06/06/24 (!) 170/110          (goal 120/80)  BUN   Date Value Ref Range Status   04/16/2024 20 6 - 20 mg/dL Final     Creatinine   Date Value Ref Range Status   04/16/2024 0.9 0.50 - 0.90 mg/dL Final     Potassium   Date Value Ref Range Status   04/16/2024 3.8 3.7 - 5.3 mmol/L Final

## 2024-08-03 DIAGNOSIS — M54.42 CHRONIC MIDLINE LOW BACK PAIN WITH BILATERAL SCIATICA: ICD-10-CM

## 2024-08-03 DIAGNOSIS — G89.29 CHRONIC MIDLINE LOW BACK PAIN WITH BILATERAL SCIATICA: ICD-10-CM

## 2024-08-03 DIAGNOSIS — M54.41 CHRONIC MIDLINE LOW BACK PAIN WITH BILATERAL SCIATICA: ICD-10-CM

## 2024-08-03 RX ORDER — MELOXICAM 15 MG/1
TABLET ORAL
Qty: 30 TABLET | Refills: 0 | OUTPATIENT
Start: 2024-08-03

## 2024-08-07 DIAGNOSIS — E55.9 VITAMIN D DEFICIENCY: ICD-10-CM

## 2024-08-07 DIAGNOSIS — F33.2 SEVERE EPISODE OF RECURRENT MAJOR DEPRESSIVE DISORDER, WITHOUT PSYCHOTIC FEATURES (HCC): ICD-10-CM

## 2024-08-07 DIAGNOSIS — E11.42 TYPE 2 DIABETES MELLITUS WITH DIABETIC POLYNEUROPATHY, WITHOUT LONG-TERM CURRENT USE OF INSULIN (HCC): ICD-10-CM

## 2024-08-07 DIAGNOSIS — J45.41 MODERATE PERSISTENT ASTHMA WITH ACUTE EXACERBATION: ICD-10-CM

## 2024-08-07 DIAGNOSIS — J30.2 SEASONAL ALLERGIES: ICD-10-CM

## 2024-08-07 DIAGNOSIS — M85.89 OSTEOPENIA OF MULTIPLE SITES: ICD-10-CM

## 2024-08-07 RX ORDER — MONTELUKAST SODIUM 10 MG/1
10 TABLET ORAL NIGHTLY
Qty: 90 TABLET | Refills: 3 | Status: SHIPPED | OUTPATIENT
Start: 2024-08-07

## 2024-08-07 RX ORDER — FLUTICASONE PROPIONATE 50 MCG
2 SPRAY, SUSPENSION (ML) NASAL DAILY
Qty: 16 G | Refills: 3 | Status: SHIPPED | OUTPATIENT
Start: 2024-08-07

## 2024-08-07 RX ORDER — DULOXETIN HYDROCHLORIDE 30 MG/1
30 CAPSULE, DELAYED RELEASE ORAL DAILY
Qty: 30 CAPSULE | Refills: 0 | Status: SHIPPED | OUTPATIENT
Start: 2024-08-07

## 2024-08-07 RX ORDER — ASPIRIN 81 MG
TABLET, DELAYED RELEASE (ENTERIC COATED) ORAL
Qty: 90 TABLET | Refills: 3 | Status: SHIPPED | OUTPATIENT
Start: 2024-08-07

## 2024-08-29 ENCOUNTER — E-VISIT (OUTPATIENT)
Dept: FAMILY MEDICINE CLINIC | Age: 49
End: 2024-08-29
Payer: COMMERCIAL

## 2024-08-29 DIAGNOSIS — B37.2 CANDIDAL INTERTRIGO: Primary | ICD-10-CM

## 2024-08-29 PROCEDURE — 99423 OL DIG E/M SVC 21+ MIN: CPT | Performed by: FAMILY MEDICINE

## 2024-08-29 RX ORDER — FLUCONAZOLE 150 MG/1
150 TABLET ORAL WEEKLY
Qty: 4 TABLET | Refills: 0 | Status: SHIPPED | OUTPATIENT
Start: 2024-08-29

## 2024-08-29 RX ORDER — KETOCONAZOLE 20 MG/G
CREAM TOPICAL
Qty: 60 G | Refills: 3 | Status: SHIPPED | OUTPATIENT
Start: 2024-08-29

## 2024-08-29 NOTE — PROGRESS NOTES
Syl Dejesus (1975) initiated an asynchronous digital communication through [employer].  Date of service: 8/29/2024     HPI: per patient's questionnaire    EXAM: Pictures sent by patient showing diffuse erythematous rash in the skin folds    Diagnoses and all orders for this visit:    1. Candidal intertrigo  Worsening    - ketoconazole (NIZORAL) 2 % cream; Apply twice a day for 4 to 6 weeks on the rash on the skin folds: abdominal, under the breasts, and groins  Dispense: 60 g; Refill: 3  - fluconazole (DIFLUCAN) 150 MG tablet; Take 1 tablet by mouth once a week  Dispense: 4 tablet; Refill: 0    If not improving, will send nystatin powder  No orders of the defined types were placed in this encounter.        Patient was advised to contact PCP if symptoms worsen or failing to change as expected      Time: EV3 - 21 or more minutes were spent on the digital evaluation and management of this patient.    Electronically signed by Crista Calixto MD on 8/29/24 at 4:22 PM.

## 2024-09-04 DIAGNOSIS — M54.41 CHRONIC MIDLINE LOW BACK PAIN WITH BILATERAL SCIATICA: ICD-10-CM

## 2024-09-04 DIAGNOSIS — M54.42 CHRONIC MIDLINE LOW BACK PAIN WITH BILATERAL SCIATICA: ICD-10-CM

## 2024-09-04 DIAGNOSIS — E55.9 VITAMIN D DEFICIENCY: ICD-10-CM

## 2024-09-04 DIAGNOSIS — G89.29 CHRONIC MIDLINE LOW BACK PAIN WITH BILATERAL SCIATICA: ICD-10-CM

## 2024-09-04 RX ORDER — MELOXICAM 15 MG/1
15 TABLET ORAL DAILY PRN
Qty: 30 TABLET | Refills: 0 | Status: SHIPPED | OUTPATIENT
Start: 2024-09-04

## 2024-09-04 RX ORDER — CHOLECALCIFEROL (VITAMIN D3) 50 MCG
2000 TABLET ORAL DAILY
Qty: 90 TABLET | Refills: 3 | Status: SHIPPED | OUTPATIENT
Start: 2024-09-04

## 2024-09-09 DIAGNOSIS — E11.42 TYPE 2 DIABETES MELLITUS WITH DIABETIC POLYNEUROPATHY, WITHOUT LONG-TERM CURRENT USE OF INSULIN (HCC): ICD-10-CM

## 2024-09-09 DIAGNOSIS — F33.2 SEVERE EPISODE OF RECURRENT MAJOR DEPRESSIVE DISORDER, WITHOUT PSYCHOTIC FEATURES (HCC): ICD-10-CM

## 2024-09-10 ENCOUNTER — OFFICE VISIT (OUTPATIENT)
Dept: PULMONOLOGY | Age: 49
End: 2024-09-10
Payer: COMMERCIAL

## 2024-09-10 VITALS
RESPIRATION RATE: 16 BRPM | SYSTOLIC BLOOD PRESSURE: 122 MMHG | HEIGHT: 62 IN | HEART RATE: 75 BPM | OXYGEN SATURATION: 95 % | DIASTOLIC BLOOD PRESSURE: 66 MMHG | BODY MASS INDEX: 45.97 KG/M2 | WEIGHT: 249.8 LBS

## 2024-09-10 DIAGNOSIS — G25.81 RLS (RESTLESS LEGS SYNDROME): ICD-10-CM

## 2024-09-10 DIAGNOSIS — R53.81 PHYSICAL DECONDITIONING: ICD-10-CM

## 2024-09-10 DIAGNOSIS — J45.20 MILD INTERMITTENT ASTHMA WITHOUT COMPLICATION: Primary | ICD-10-CM

## 2024-09-10 DIAGNOSIS — I25.118 CORONARY ARTERY DISEASE OF NATIVE ARTERY OF NATIVE HEART WITH STABLE ANGINA PECTORIS (HCC): ICD-10-CM

## 2024-09-10 DIAGNOSIS — Z86.69 HISTORY OF SLEEP APNEA: ICD-10-CM

## 2024-09-10 PROCEDURE — G8417 CALC BMI ABV UP PARAM F/U: HCPCS | Performed by: INTERNAL MEDICINE

## 2024-09-10 PROCEDURE — G8427 DOCREV CUR MEDS BY ELIG CLIN: HCPCS | Performed by: INTERNAL MEDICINE

## 2024-09-10 PROCEDURE — 3074F SYST BP LT 130 MM HG: CPT | Performed by: INTERNAL MEDICINE

## 2024-09-10 PROCEDURE — 3078F DIAST BP <80 MM HG: CPT | Performed by: INTERNAL MEDICINE

## 2024-09-10 PROCEDURE — 1036F TOBACCO NON-USER: CPT | Performed by: INTERNAL MEDICINE

## 2024-09-10 PROCEDURE — 99203 OFFICE O/P NEW LOW 30 MIN: CPT | Performed by: INTERNAL MEDICINE

## 2024-09-10 RX ORDER — ALBUTEROL SULFATE 90 UG/1
2 AEROSOL, METERED RESPIRATORY (INHALATION) 4 TIMES DAILY PRN
Qty: 54 G | Refills: 3 | Status: SHIPPED | OUTPATIENT
Start: 2024-09-10

## 2024-09-10 RX ORDER — DULOXETIN HYDROCHLORIDE 30 MG/1
30 CAPSULE, DELAYED RELEASE ORAL DAILY
Qty: 30 CAPSULE | Refills: 3 | OUTPATIENT
Start: 2024-09-10

## 2024-09-10 NOTE — TELEPHONE ENCOUNTER
Noted. Thank you!    Future Appointments   Date Time Provider Department Center   9/10/2024  2:30 PM Henok Felton MD [facility]   11/13/2024  2:45 PM Crista Calixto MD [facility]   11/19/2024  9:20 AM Kannan Lindsey MD Neuro [facility] Neurology -

## 2024-09-10 NOTE — TELEPHONE ENCOUNTER
We should changes Cymbalta due to her coronary artery disease having stents and high blood pressure not controlled, failed refill, I will also send her a MyChart, hopefully she does not start screaming    SNRI Protocol [id number]/09/2024 09:53 PM   Protocol Details Last BP under 140/90 in past year or if patient has diabetes, CAD, or PVD; BP under 130/80 in past year

## 2024-09-14 DIAGNOSIS — F33.2 SEVERE EPISODE OF RECURRENT MAJOR DEPRESSIVE DISORDER, WITHOUT PSYCHOTIC FEATURES (HCC): ICD-10-CM

## 2024-09-14 DIAGNOSIS — E11.42 TYPE 2 DIABETES MELLITUS WITH DIABETIC POLYNEUROPATHY, WITHOUT LONG-TERM CURRENT USE OF INSULIN (HCC): ICD-10-CM

## 2024-09-16 RX ORDER — DULOXETIN HYDROCHLORIDE 30 MG/1
CAPSULE, DELAYED RELEASE ORAL
Qty: 30 CAPSULE | Refills: 0 | OUTPATIENT
Start: 2024-09-16

## 2024-09-23 DIAGNOSIS — G57.93 NEUROPATHY INVOLVING BOTH LOWER EXTREMITIES: ICD-10-CM

## 2024-09-23 DIAGNOSIS — M54.41 CHRONIC MIDLINE LOW BACK PAIN WITH BILATERAL SCIATICA: ICD-10-CM

## 2024-09-23 DIAGNOSIS — E83.42 HYPOMAGNESEMIA: ICD-10-CM

## 2024-09-23 DIAGNOSIS — J31.0 CHRONIC RHINITIS: ICD-10-CM

## 2024-09-23 DIAGNOSIS — M54.42 CHRONIC MIDLINE LOW BACK PAIN WITH BILATERAL SCIATICA: ICD-10-CM

## 2024-09-23 DIAGNOSIS — I10 ESSENTIAL HYPERTENSION: ICD-10-CM

## 2024-09-23 DIAGNOSIS — G89.29 CHRONIC MIDLINE LOW BACK PAIN WITH BILATERAL SCIATICA: ICD-10-CM

## 2024-09-23 DIAGNOSIS — J45.40 MODERATE PERSISTENT ASTHMA WITHOUT COMPLICATION: Primary | ICD-10-CM

## 2024-09-24 RX ORDER — MELOXICAM 15 MG/1
15 TABLET ORAL DAILY PRN
Qty: 30 TABLET | Refills: 0 | Status: SHIPPED | OUTPATIENT
Start: 2024-09-24

## 2024-09-24 RX ORDER — MELOXICAM 15 MG/1
15 TABLET ORAL DAILY PRN
Qty: 30 TABLET | Refills: 0 | OUTPATIENT
Start: 2024-09-24

## 2024-09-24 RX ORDER — B-COMPLEX WITH VITAMIN C
1 TABLET ORAL DAILY
Qty: 90 TABLET | Refills: 3 | Status: SHIPPED | OUTPATIENT
Start: 2024-09-24

## 2024-09-24 RX ORDER — OLMESARTAN MEDOXOMIL 40 MG/1
40 TABLET ORAL DAILY
Qty: 90 TABLET | Refills: 3 | Status: SHIPPED | OUTPATIENT
Start: 2024-09-24

## 2024-09-24 RX ORDER — ALBUTEROL SULFATE 90 UG/1
2 INHALANT RESPIRATORY (INHALATION) 4 TIMES DAILY PRN
Qty: 54 G | Refills: 3 | Status: SHIPPED | OUTPATIENT
Start: 2024-09-24

## 2024-09-24 RX ORDER — LORATADINE 10 MG/1
10 TABLET ORAL DAILY
Qty: 90 TABLET | Refills: 3 | Status: SHIPPED | OUTPATIENT
Start: 2024-09-24

## 2024-09-24 NOTE — TELEPHONE ENCOUNTER
Please Approve or Refuse.  Send to Pharmacy per Pt's Request: [facility]     Next Visit Date:  9/23/2024   Last Visit Date: 8/29/2024    Hemoglobin A1C (%)   Date Value   04/16/2024 6.5 (H)   01/16/2023 6.1 (H)   09/21/2021 5.7             ( goal A1C is < 7)   BP Readings from Last 3 Encounters:   09/10/24 122/66   07/12/24 (!) 156/80   07/10/24 (!) 147/82          (goal 120/80)  BUN   Date Value Ref Range Status   04/16/2024 20 6 - 20 mg/dL Final     Creatinine   Date Value Ref Range Status   04/16/2024 0.9 0.50 - 0.90 mg/dL Final     Potassium   Date Value Ref Range Status   04/16/2024 3.8 3.7 - 5.3 mmol/L Final

## 2024-10-02 DIAGNOSIS — M54.42 CHRONIC MIDLINE LOW BACK PAIN WITH BILATERAL SCIATICA: ICD-10-CM

## 2024-10-02 DIAGNOSIS — M54.41 CHRONIC MIDLINE LOW BACK PAIN WITH BILATERAL SCIATICA: ICD-10-CM

## 2024-10-02 DIAGNOSIS — G89.29 CHRONIC MIDLINE LOW BACK PAIN WITH BILATERAL SCIATICA: ICD-10-CM

## 2024-10-02 RX ORDER — MELOXICAM 15 MG/1
15 TABLET ORAL DAILY PRN
Qty: 30 TABLET | Refills: 0 | OUTPATIENT
Start: 2024-10-02

## 2024-10-02 NOTE — TELEPHONE ENCOUNTER
There is high interaction between meloxicam, blood pressure medications, aspirin and Brilinta  To stop meloxicam to only take Tylenol Extra Strength every 6 hours as needed or see a pain management or orthopedics

## 2024-10-02 NOTE — TELEPHONE ENCOUNTER
Please Approve or Refuse.  Send to Pharmacy per Pt's Request: irena      Next Visit Date:  11/13/2024   Last Visit Date: 8/29/2024    Hemoglobin A1C (%)   Date Value   04/16/2024 6.5 (H)   01/16/2023 6.1 (H)   09/21/2021 5.7             ( goal A1C is < 7)   BP Readings from Last 3 Encounters:   09/10/24 122/66   07/12/24 (!) 156/80   07/10/24 (!) 147/82          (goal 120/80)  BUN   Date Value Ref Range Status   04/16/2024 20 6 - 20 mg/dL Final     Creatinine   Date Value Ref Range Status   04/16/2024 0.9 0.50 - 0.90 mg/dL Final     Potassium   Date Value Ref Range Status   04/16/2024 3.8 3.7 - 5.3 mmol/L Final

## 2024-10-06 ENCOUNTER — APPOINTMENT (OUTPATIENT)
Dept: GENERAL RADIOLOGY | Age: 49
DRG: 141 | End: 2024-10-06
Payer: COMMERCIAL

## 2024-10-06 ENCOUNTER — HOSPITAL ENCOUNTER (INPATIENT)
Age: 49
LOS: 2 days | Discharge: HOME | DRG: 141 | End: 2024-10-08
Attending: STUDENT IN AN ORGANIZED HEALTH CARE EDUCATION/TRAINING PROGRAM | Admitting: STUDENT IN AN ORGANIZED HEALTH CARE EDUCATION/TRAINING PROGRAM
Payer: COMMERCIAL

## 2024-10-06 DIAGNOSIS — R07.9 CHEST PAIN, UNSPECIFIED TYPE: Primary | ICD-10-CM

## 2024-10-06 DIAGNOSIS — E11.42 TYPE 2 DIABETES MELLITUS WITH DIABETIC POLYNEUROPATHY, WITHOUT LONG-TERM CURRENT USE OF INSULIN (HCC): ICD-10-CM

## 2024-10-06 DIAGNOSIS — J44.1 COPD EXACERBATION (HCC): ICD-10-CM

## 2024-10-06 DIAGNOSIS — I10 ESSENTIAL HYPERTENSION: ICD-10-CM

## 2024-10-06 PROBLEM — J20.9 BRONCHITIS WITH ASTHMA, ACUTE: Status: ACTIVE | Noted: 2024-10-06

## 2024-10-06 PROBLEM — J45.909 BRONCHITIS WITH ASTHMA, ACUTE: Status: ACTIVE | Noted: 2024-10-06

## 2024-10-06 LAB
ANION GAP SERPL CALCULATED.3IONS-SCNC: 12 MMOL/L (ref 9–17)
BASOPHILS # BLD: 0.07 K/UL (ref 0–0.2)
BASOPHILS NFR BLD: 0 % (ref 0–2)
BNP SERPL-MCNC: 1090 PG/ML
BUN SERPL-MCNC: 14 MG/DL (ref 6–20)
BUN/CREAT SERPL: 23 (ref 9–20)
CALCIUM SERPL-MCNC: 9.5 MG/DL (ref 8.6–10.4)
CHLORIDE SERPL-SCNC: 102 MMOL/L (ref 98–107)
CO2 SERPL-SCNC: 28 MMOL/L (ref 20–31)
CREAT SERPL-MCNC: 0.6 MG/DL (ref 0.5–0.9)
EOSINOPHIL # BLD: 0.31 K/UL (ref 0–0.44)
EOSINOPHILS RELATIVE PERCENT: 2 % (ref 1–4)
ERYTHROCYTE [DISTWIDTH] IN BLOOD BY AUTOMATED COUNT: 12.7 % (ref 11.8–14.4)
FLUAV RNA RESP QL NAA+PROBE: NOT DETECTED
FLUBV RNA RESP QL NAA+PROBE: NOT DETECTED
GFR, ESTIMATED: >90 ML/MIN/1.73M2
GLUCOSE SERPL-MCNC: 173 MG/DL (ref 70–99)
HCT VFR BLD AUTO: 37.5 % (ref 36.3–47.1)
HGB BLD-MCNC: 13.2 G/DL (ref 11.9–15.1)
IMM GRANULOCYTES # BLD AUTO: 0.07 K/UL (ref 0–0.3)
IMM GRANULOCYTES NFR BLD: 0 %
LYMPHOCYTES NFR BLD: 3.09 K/UL (ref 1.1–3.7)
LYMPHOCYTES RELATIVE PERCENT: 19 % (ref 24–43)
MAGNESIUM SERPL-MCNC: 1.5 MG/DL (ref 1.6–2.6)
MAGNESIUM SERPL-MCNC: 1.6 MG/DL (ref 1.6–2.6)
MCH RBC QN AUTO: 32.9 PG (ref 25.2–33.5)
MCHC RBC AUTO-ENTMCNC: 35.2 G/DL (ref 28.4–34.8)
MCV RBC AUTO: 93.5 FL (ref 82.6–102.9)
MONOCYTES NFR BLD: 0.87 K/UL (ref 0.1–1.2)
MONOCYTES NFR BLD: 6 % (ref 3–12)
NEUTROPHILS NFR BLD: 73 % (ref 36–65)
NEUTS SEG NFR BLD: 11.53 K/UL (ref 1.5–8.1)
NRBC BLD-RTO: 0 PER 100 WBC
PLATELET # BLD AUTO: 287 K/UL (ref 138–453)
PMV BLD AUTO: 10.1 FL (ref 8.1–13.5)
POTASSIUM SERPL-SCNC: 3.4 MMOL/L (ref 3.7–5.3)
RBC # BLD AUTO: 4.01 M/UL (ref 3.95–5.11)
SARS-COV-2 RNA RESP QL NAA+PROBE: NOT DETECTED
SODIUM SERPL-SCNC: 142 MMOL/L (ref 135–144)
SOURCE: NORMAL
SPECIMEN DESCRIPTION: NORMAL
TROPONIN I SERPL HS-MCNC: 17 NG/L (ref 0–14)
TROPONIN I SERPL HS-MCNC: 18 NG/L (ref 0–14)
WBC OTHER # BLD: 15.9 K/UL (ref 3.5–11.3)

## 2024-10-06 PROCEDURE — 99222 1ST HOSP IP/OBS MODERATE 55: CPT | Performed by: NURSE PRACTITIONER

## 2024-10-06 PROCEDURE — 36415 COLL VENOUS BLD VENIPUNCTURE: CPT

## 2024-10-06 PROCEDURE — 6370000000 HC RX 637 (ALT 250 FOR IP): Performed by: STUDENT IN AN ORGANIZED HEALTH CARE EDUCATION/TRAINING PROGRAM

## 2024-10-06 PROCEDURE — 71045 X-RAY EXAM CHEST 1 VIEW: CPT

## 2024-10-06 PROCEDURE — 6360000002 HC RX W HCPCS: Performed by: NURSE PRACTITIONER

## 2024-10-06 PROCEDURE — 83735 ASSAY OF MAGNESIUM: CPT

## 2024-10-06 PROCEDURE — 80048 BASIC METABOLIC PNL TOTAL CA: CPT

## 2024-10-06 PROCEDURE — 87040 BLOOD CULTURE FOR BACTERIA: CPT

## 2024-10-06 PROCEDURE — 2060000000 HC ICU INTERMEDIATE R&B

## 2024-10-06 PROCEDURE — 83880 ASSAY OF NATRIURETIC PEPTIDE: CPT

## 2024-10-06 PROCEDURE — 6360000002 HC RX W HCPCS: Performed by: STUDENT IN AN ORGANIZED HEALTH CARE EDUCATION/TRAINING PROGRAM

## 2024-10-06 PROCEDURE — 6360000002 HC RX W HCPCS

## 2024-10-06 PROCEDURE — 99285 EMERGENCY DEPT VISIT HI MDM: CPT

## 2024-10-06 PROCEDURE — 6370000000 HC RX 637 (ALT 250 FOR IP): Performed by: NURSE PRACTITIONER

## 2024-10-06 PROCEDURE — 87636 SARSCOV2 & INF A&B AMP PRB: CPT

## 2024-10-06 PROCEDURE — 85025 COMPLETE CBC W/AUTO DIFF WBC: CPT

## 2024-10-06 PROCEDURE — 2580000003 HC RX 258: Performed by: STUDENT IN AN ORGANIZED HEALTH CARE EDUCATION/TRAINING PROGRAM

## 2024-10-06 PROCEDURE — 93005 ELECTROCARDIOGRAM TRACING: CPT | Performed by: STUDENT IN AN ORGANIZED HEALTH CARE EDUCATION/TRAINING PROGRAM

## 2024-10-06 PROCEDURE — 84484 ASSAY OF TROPONIN QUANT: CPT

## 2024-10-06 PROCEDURE — 94761 N-INVAS EAR/PLS OXIMETRY MLT: CPT

## 2024-10-06 PROCEDURE — 94640 AIRWAY INHALATION TREATMENT: CPT

## 2024-10-06 PROCEDURE — 2580000003 HC RX 258

## 2024-10-06 PROCEDURE — 6370000000 HC RX 637 (ALT 250 FOR IP)

## 2024-10-06 RX ORDER — ONDANSETRON 2 MG/ML
4 INJECTION INTRAMUSCULAR; INTRAVENOUS EVERY 6 HOURS PRN
Status: DISCONTINUED | OUTPATIENT
Start: 2024-10-06 | End: 2024-10-08 | Stop reason: HOSPADM

## 2024-10-06 RX ORDER — LANOLIN ALCOHOL/MO/W.PET/CERES
3 CREAM (GRAM) TOPICAL NIGHTLY
Status: DISCONTINUED | OUTPATIENT
Start: 2024-10-06 | End: 2024-10-08 | Stop reason: HOSPADM

## 2024-10-06 RX ORDER — SODIUM CHLORIDE 9 MG/ML
INJECTION, SOLUTION INTRAVENOUS CONTINUOUS
Status: DISCONTINUED | OUTPATIENT
Start: 2024-10-06 | End: 2024-10-06

## 2024-10-06 RX ORDER — MELOXICAM 15 MG/1
15 TABLET ORAL DAILY
Status: ON HOLD | COMMUNITY
End: 2024-10-08

## 2024-10-06 RX ORDER — OLMESARTAN MEDOXOMIL 40 MG/1
40 TABLET ORAL DAILY
Status: DISCONTINUED | OUTPATIENT
Start: 2024-10-06 | End: 2024-10-07

## 2024-10-06 RX ORDER — ALBUTEROL SULFATE 0.83 MG/ML
2.5 SOLUTION RESPIRATORY (INHALATION) EVERY 4 HOURS PRN
Status: DISCONTINUED | OUTPATIENT
Start: 2024-10-06 | End: 2024-10-08 | Stop reason: HOSPADM

## 2024-10-06 RX ORDER — ONDANSETRON 2 MG/ML
4 INJECTION INTRAMUSCULAR; INTRAVENOUS EVERY 6 HOURS PRN
Status: DISCONTINUED | OUTPATIENT
Start: 2024-10-06 | End: 2024-10-06 | Stop reason: SDUPTHER

## 2024-10-06 RX ORDER — LORATADINE 10 MG/1
10 TABLET ORAL DAILY
Status: DISCONTINUED | OUTPATIENT
Start: 2024-10-06 | End: 2024-10-08 | Stop reason: HOSPADM

## 2024-10-06 RX ORDER — ALBUTEROL SULFATE 0.83 MG/ML
2.5 SOLUTION RESPIRATORY (INHALATION)
Status: DISCONTINUED | OUTPATIENT
Start: 2024-10-06 | End: 2024-10-06

## 2024-10-06 RX ORDER — VITAMIN B COMPLEX
2000 TABLET ORAL DAILY
Status: DISCONTINUED | OUTPATIENT
Start: 2024-10-06 | End: 2024-10-08 | Stop reason: HOSPADM

## 2024-10-06 RX ORDER — NICOTINE 21 MG/24HR
1 PATCH, TRANSDERMAL 24 HOURS TRANSDERMAL DAILY
Status: DISCONTINUED | OUTPATIENT
Start: 2024-10-06 | End: 2024-10-08 | Stop reason: HOSPADM

## 2024-10-06 RX ORDER — AZITHROMYCIN 250 MG/1
500 TABLET, FILM COATED ORAL DAILY
Status: DISCONTINUED | OUTPATIENT
Start: 2024-10-07 | End: 2024-10-06

## 2024-10-06 RX ORDER — LABETALOL HYDROCHLORIDE 5 MG/ML
20 INJECTION, SOLUTION INTRAVENOUS ONCE
Status: COMPLETED | OUTPATIENT
Start: 2024-10-06 | End: 2024-10-06

## 2024-10-06 RX ORDER — ASPIRIN 81 MG/1
81 TABLET ORAL DAILY
Status: DISCONTINUED | OUTPATIENT
Start: 2024-10-06 | End: 2024-10-08 | Stop reason: HOSPADM

## 2024-10-06 RX ORDER — MONTELUKAST SODIUM 10 MG/1
10 TABLET ORAL NIGHTLY
Status: DISCONTINUED | OUTPATIENT
Start: 2024-10-06 | End: 2024-10-08 | Stop reason: HOSPADM

## 2024-10-06 RX ORDER — POTASSIUM CHLORIDE 7.45 MG/ML
10 INJECTION INTRAVENOUS PRN
Status: DISCONTINUED | OUTPATIENT
Start: 2024-10-06 | End: 2024-10-08 | Stop reason: HOSPADM

## 2024-10-06 RX ORDER — FLUTICASONE PROPIONATE 50 MCG
2 SPRAY, SUSPENSION (ML) NASAL DAILY
Status: DISCONTINUED | OUTPATIENT
Start: 2024-10-06 | End: 2024-10-08 | Stop reason: HOSPADM

## 2024-10-06 RX ORDER — FUROSEMIDE 10 MG/ML
40 INJECTION INTRAMUSCULAR; INTRAVENOUS 2 TIMES DAILY
Status: COMPLETED | OUTPATIENT
Start: 2024-10-06 | End: 2024-10-06

## 2024-10-06 RX ORDER — CARVEDILOL 25 MG/1
25 TABLET ORAL 2 TIMES DAILY
Status: DISCONTINUED | OUTPATIENT
Start: 2024-10-06 | End: 2024-10-08 | Stop reason: HOSPADM

## 2024-10-06 RX ORDER — LOSARTAN POTASSIUM 100 MG/1
100 TABLET ORAL DAILY
Status: DISCONTINUED | OUTPATIENT
Start: 2024-10-06 | End: 2024-10-06

## 2024-10-06 RX ORDER — IPRATROPIUM BROMIDE AND ALBUTEROL SULFATE 2.5; .5 MG/3ML; MG/3ML
1 SOLUTION RESPIRATORY (INHALATION) ONCE
Status: COMPLETED | OUTPATIENT
Start: 2024-10-06 | End: 2024-10-06

## 2024-10-06 RX ORDER — ONDANSETRON 4 MG/1
4 TABLET, ORALLY DISINTEGRATING ORAL EVERY 8 HOURS PRN
Status: DISCONTINUED | OUTPATIENT
Start: 2024-10-06 | End: 2024-10-08 | Stop reason: HOSPADM

## 2024-10-06 RX ORDER — POTASSIUM CHLORIDE 1500 MG/1
40 TABLET, EXTENDED RELEASE ORAL PRN
Status: DISCONTINUED | OUTPATIENT
Start: 2024-10-06 | End: 2024-10-08 | Stop reason: HOSPADM

## 2024-10-06 RX ORDER — ACETAMINOPHEN 325 MG/1
650 TABLET ORAL EVERY 6 HOURS PRN
Status: DISCONTINUED | OUTPATIENT
Start: 2024-10-06 | End: 2024-10-08 | Stop reason: HOSPADM

## 2024-10-06 RX ORDER — MAGNESIUM SULFATE IN WATER 40 MG/ML
2000 INJECTION, SOLUTION INTRAVENOUS PRN
Status: DISCONTINUED | OUTPATIENT
Start: 2024-10-06 | End: 2024-10-08 | Stop reason: HOSPADM

## 2024-10-06 RX ORDER — FUROSEMIDE 40 MG
40 TABLET ORAL DAILY
Status: DISCONTINUED | OUTPATIENT
Start: 2024-10-07 | End: 2024-10-08 | Stop reason: HOSPADM

## 2024-10-06 RX ORDER — ATORVASTATIN CALCIUM 80 MG/1
80 TABLET, FILM COATED ORAL NIGHTLY
Status: DISCONTINUED | OUTPATIENT
Start: 2024-10-06 | End: 2024-10-08 | Stop reason: HOSPADM

## 2024-10-06 RX ORDER — HYDROCHLOROTHIAZIDE 25 MG/1
25 TABLET ORAL 2 TIMES DAILY
Status: DISCONTINUED | OUTPATIENT
Start: 2024-10-06 | End: 2024-10-08 | Stop reason: HOSPADM

## 2024-10-06 RX ORDER — GUAIFENESIN 600 MG/1
600 TABLET, EXTENDED RELEASE ORAL 2 TIMES DAILY
Status: DISCONTINUED | OUTPATIENT
Start: 2024-10-06 | End: 2024-10-08 | Stop reason: HOSPADM

## 2024-10-06 RX ORDER — BUDESONIDE AND FORMOTEROL FUMARATE DIHYDRATE 80; 4.5 UG/1; UG/1
2 AEROSOL RESPIRATORY (INHALATION)
Status: DISCONTINUED | OUTPATIENT
Start: 2024-10-06 | End: 2024-10-08 | Stop reason: HOSPADM

## 2024-10-06 RX ORDER — ONDANSETRON 4 MG/1
4 TABLET, ORALLY DISINTEGRATING ORAL EVERY 8 HOURS PRN
Status: DISCONTINUED | OUTPATIENT
Start: 2024-10-06 | End: 2024-10-06 | Stop reason: SDUPTHER

## 2024-10-06 RX ORDER — DULOXETIN HYDROCHLORIDE 30 MG/1
30 CAPSULE, DELAYED RELEASE ORAL DAILY
Status: DISCONTINUED | OUTPATIENT
Start: 2024-10-06 | End: 2024-10-07

## 2024-10-06 RX ORDER — ALLOPURINOL 100 MG/1
50 TABLET ORAL DAILY
Status: DISCONTINUED | OUTPATIENT
Start: 2024-10-06 | End: 2024-10-08 | Stop reason: HOSPADM

## 2024-10-06 RX ORDER — ALBUTEROL SULFATE 0.83 MG/ML
2.5 SOLUTION RESPIRATORY (INHALATION) 3 TIMES DAILY
Status: DISCONTINUED | OUTPATIENT
Start: 2024-10-06 | End: 2024-10-08 | Stop reason: HOSPADM

## 2024-10-06 RX ORDER — CETIRIZINE HYDROCHLORIDE 10 MG/1
10 TABLET ORAL DAILY
Status: DISCONTINUED | OUTPATIENT
Start: 2024-10-06 | End: 2024-10-06

## 2024-10-06 RX ORDER — ASPIRIN 81 MG/1
162 TABLET, CHEWABLE ORAL ONCE
Status: COMPLETED | OUTPATIENT
Start: 2024-10-06 | End: 2024-10-06

## 2024-10-06 RX ORDER — AZITHROMYCIN 250 MG/1
500 TABLET, FILM COATED ORAL DAILY
Status: DISCONTINUED | OUTPATIENT
Start: 2024-10-07 | End: 2024-10-08 | Stop reason: HOSPADM

## 2024-10-06 RX ORDER — ACETAMINOPHEN 650 MG/1
650 SUPPOSITORY RECTAL EVERY 6 HOURS PRN
Status: DISCONTINUED | OUTPATIENT
Start: 2024-10-06 | End: 2024-10-08 | Stop reason: HOSPADM

## 2024-10-06 RX ADMIN — SODIUM CHLORIDE: 9 INJECTION, SOLUTION INTRAVENOUS at 09:09

## 2024-10-06 RX ADMIN — ATORVASTATIN CALCIUM 80 MG: 80 TABLET, FILM COATED ORAL at 21:26

## 2024-10-06 RX ADMIN — ACETAMINOPHEN 650 MG: 325 TABLET ORAL at 15:53

## 2024-10-06 RX ADMIN — ALBUTEROL SULFATE 2.5 MG: 2.5 SOLUTION RESPIRATORY (INHALATION) at 09:06

## 2024-10-06 RX ADMIN — HYDROCHLOROTHIAZIDE 25 MG: 25 TABLET ORAL at 10:30

## 2024-10-06 RX ADMIN — MAGNESIUM SULFATE HEPTAHYDRATE 2000 MG: 40 INJECTION, SOLUTION INTRAVENOUS at 10:27

## 2024-10-06 RX ADMIN — LORATADINE 10 MG: 10 TABLET ORAL at 13:16

## 2024-10-06 RX ADMIN — OLMESARTAN MEDOXOMIL 40 MG: 40 TABLET ORAL at 13:16

## 2024-10-06 RX ADMIN — FUROSEMIDE 40 MG: 10 INJECTION, SOLUTION INTRAMUSCULAR; INTRAVENOUS at 17:39

## 2024-10-06 RX ADMIN — ASPIRIN 81 MG CHEWABLE TABLET 162 MG: 81 TABLET CHEWABLE at 04:45

## 2024-10-06 RX ADMIN — FUROSEMIDE 40 MG: 10 INJECTION, SOLUTION INTRAMUSCULAR; INTRAVENOUS at 13:17

## 2024-10-06 RX ADMIN — Medication 2000 UNITS: at 10:30

## 2024-10-06 RX ADMIN — CARVEDILOL 25 MG: 25 TABLET, FILM COATED ORAL at 21:26

## 2024-10-06 RX ADMIN — ALBUTEROL SULFATE 2.5 MG: 2.5 SOLUTION RESPIRATORY (INHALATION) at 14:35

## 2024-10-06 RX ADMIN — IPRATROPIUM BROMIDE AND ALBUTEROL SULFATE 1 DOSE: .5; 2.5 SOLUTION RESPIRATORY (INHALATION) at 04:50

## 2024-10-06 RX ADMIN — ALBUTEROL SULFATE 2.5 MG: 2.5 SOLUTION RESPIRATORY (INHALATION) at 19:41

## 2024-10-06 RX ADMIN — MONTELUKAST 10 MG: 10 TABLET, FILM COATED ORAL at 21:26

## 2024-10-06 RX ADMIN — WATER 125 MG: 1 INJECTION INTRAMUSCULAR; INTRAVENOUS; SUBCUTANEOUS at 07:38

## 2024-10-06 RX ADMIN — AZITHROMYCIN MONOHYDRATE 500 MG: 500 INJECTION, POWDER, LYOPHILIZED, FOR SOLUTION INTRAVENOUS at 07:44

## 2024-10-06 RX ADMIN — ALLOPURINOL 50 MG: 100 TABLET ORAL at 10:30

## 2024-10-06 RX ADMIN — ASPIRIN 81 MG: 81 TABLET, COATED ORAL at 10:30

## 2024-10-06 RX ADMIN — AMOXICILLIN AND CLAVULANATE POTASSIUM 1 TABLET: 875; 125 TABLET, FILM COATED ORAL at 21:26

## 2024-10-06 RX ADMIN — TICAGRELOR 90 MG: 90 TABLET ORAL at 10:30

## 2024-10-06 RX ADMIN — HYDROCHLOROTHIAZIDE 25 MG: 25 TABLET ORAL at 21:26

## 2024-10-06 RX ADMIN — WATER 1000 MG: 1 INJECTION INTRAMUSCULAR; INTRAVENOUS; SUBCUTANEOUS at 07:32

## 2024-10-06 RX ADMIN — POTASSIUM CHLORIDE 40 MEQ: 1500 TABLET, EXTENDED RELEASE ORAL at 10:30

## 2024-10-06 RX ADMIN — LABETALOL HYDROCHLORIDE 20 MG: 5 INJECTION, SOLUTION INTRAVENOUS at 22:34

## 2024-10-06 RX ADMIN — CARVEDILOL 25 MG: 25 TABLET, FILM COATED ORAL at 10:30

## 2024-10-06 RX ADMIN — ACETAMINOPHEN 650 MG: 325 TABLET ORAL at 22:35

## 2024-10-06 RX ADMIN — TICAGRELOR 90 MG: 90 TABLET ORAL at 21:26

## 2024-10-06 RX ADMIN — Medication 3 MG: at 21:26

## 2024-10-06 ASSESSMENT — PAIN DESCRIPTION - ORIENTATION
ORIENTATION: LEFT
ORIENTATION: MID;LOWER
ORIENTATION: LOWER

## 2024-10-06 ASSESSMENT — PAIN DESCRIPTION - LOCATION
LOCATION: BACK
LOCATION: BACK
LOCATION: CHEST

## 2024-10-06 ASSESSMENT — PAIN - FUNCTIONAL ASSESSMENT
PAIN_FUNCTIONAL_ASSESSMENT: 0-10
PAIN_FUNCTIONAL_ASSESSMENT: ACTIVITIES ARE NOT PREVENTED

## 2024-10-06 ASSESSMENT — PAIN SCALES - GENERAL
PAINLEVEL_OUTOF10: 0
PAINLEVEL_OUTOF10: 6
PAINLEVEL_OUTOF10: 5
PAINLEVEL_OUTOF10: 5

## 2024-10-06 ASSESSMENT — PAIN DESCRIPTION - DESCRIPTORS
DESCRIPTORS: SORE
DESCRIPTORS: ACHING

## 2024-10-06 ASSESSMENT — PAIN DESCRIPTION - PAIN TYPE: TYPE: ACUTE PAIN

## 2024-10-06 ASSESSMENT — PAIN DESCRIPTION - FREQUENCY: FREQUENCY: INTERMITTENT

## 2024-10-06 NOTE — RT PROTOCOL NOTE
RT Inhaler-Nebulizer Bronchodilator Protocol Note    There is a bronchodilator order in the chart from a provider indicating to follow the RT Bronchodilator Protocol and there is an “Initiate RT Inhaler-Nebulizer Bronchodilator Protocol” order as well (see protocol at bottom of note).    CXR Findings:  XR CHEST PORTABLE    Result Date: 10/6/2024  No acute cardiopulmonary process.       The findings from the last RT Protocol Assessment were as follows:   History Pulmonary Disease: Chronic pulmonary disease  Respiratory Pattern: Regular pattern and RR 12-20 bpm  Breath Sounds: Intermittent or unilateral wheezes  Cough: Strong, productive  Indication for Bronchodilator Therapy: On home bronchodilators, Decreased or absent breath sounds  Bronchodilator Assessment Score: 7    Aerosolized bronchodilator medication orders have been revised according to the RT Inhaler-Nebulizer Bronchodilator Protocol below.    Respiratory Therapist to perform RT Therapy Protocol Assessment initially then follow the protocol.  Repeat RT Therapy Protocol Assessment PRN for score 0-3 or on second treatment, BID, and PRN for scores above 3.    No Indications - adjust the frequency to every 6 hours PRN wheezing or bronchospasm, if no treatments needed after 48 hours then discontinue using Per Protocol order mode.     If indication present, adjust the RT bronchodilator orders based on the Bronchodilator Assessment Score as indicated below.  Use Inhaler orders unless patient has one or more of the following: on home nebulizer, not able to hold breath for 10 seconds, is not alert and oriented, cannot activate and use MDI correctly, or respiratory rate 25 breaths per minute or more, then use the equivalent nebulizer order(s) with same Frequency and PRN reasons based on the score.  If a patient is on this medication at home then do not decrease Frequency below that used at home.    0-3 - enter or revise RT bronchodilator order(s) to equivalent RT  Bronchodilator order with Frequency of every 4 hours PRN for wheezing or increased work of breathing using Per Protocol order mode.        4-6 - enter or revise RT Bronchodilator order(s) to two equivalent RT bronchodilator orders with one order with BID Frequency and one order with Frequency of every 4 hours PRN wheezing or increased work of breathing using Per Protocol order mode.        7-10 - enter or revise RT Bronchodilator order(s) to two equivalent RT bronchodilator orders with one order with TID Frequency and one order with Frequency of every 4 hours PRN wheezing or increased work of breathing using Per Protocol order mode.       11-13 - enter or revise RT Bronchodilator order(s) to one equivalent RT bronchodilator order with QID Frequency and an Albuterol order with Frequency of every 4 hours PRN wheezing or increased work of breathing using Per Protocol order mode.      Greater than 13 - enter or revise RT Bronchodilator order(s) to one equivalent RT bronchodilator order with every 4 hours Frequency and an Albuterol order with Frequency of every 2 hours PRN wheezing or increased work of breathing using Per Protocol order mode.     RT to enter RT Home Evaluation for COPD & MDI Assessment order using Per Protocol order mode.    Electronically signed by ANGELA BARBOUR RCP on 10/6/2024 at 8:51 AM
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
## 2024-10-06 NOTE — PROGRESS NOTES
[x] Medication Reconciliation was completed and the patient's home medication list was verified. The Med List Status is \"Complete\". The following sources were used to assist with Medication Reconciliation:    [] Med Rec Pharmacist already completed   [] Patient had a list of medications which was transcribed into the EHR.  [] Patient provided bottles of their medications  [x] Home medications reviewed and confirmed with patient  [] Contacted patient's pharmacy to confirm home medications  [] Contacted patient's physician office to confirm home medications  [] Medical Records from another facility and/or Care Everywhere were reviewed  [] MAR from facility requested to be faxed over  [] Unable to complete due to patient condition  [] Unable to validate home medications      [] There are one or more home medications that need clarification before Medication Reconciliation can be completed. The Med List Status has been marked as In Progress.     To assist with Home Medication Reconciliation the following actions have been taken:    [] Pharmacy medication reconciliation service requested. (Note: This can be done by sending a Perfect Serve message to The Med Rec Pharmacist or by phoning [phone].)  [] Family requested to bring medications into the hospital  [] Family requested to call hospital with medication list  [] Message left with physician office  [] Request for medical records made to   [] Other

## 2024-10-06 NOTE — CARE COORDINATION
Case Management Assessment  Initial Evaluation    Date/Time of Evaluation: 10/6/2024 3:57 PM  Assessment Completed by: Maria Elena Mccrary RN    If patient is discharged prior to next notation, then this note serves as note for discharge by case management.    Patient Name: Syl Dejesus                   YOB: 1975  Diagnosis: COPD exacerbation (HCC) [J44.1]  Chest pain of unknown etiology [R07.9]  Chest pain, unspecified type [R07.9]                   Date / Time: 10/6/2024  4:14 AM    Patient Admission Status: Inpatient   Readmission Risk (Low < 19, Mod (19-27), High > 27): Readmission Risk Score: 9.9    Current PCP: Crista Calixto MD  PCP verified by CM? Yes    Chart Reviewed: Yes      History Provided by: Patient  Patient Orientation: Alert and Oriented    Patient Cognition: Alert    Hospitalization in the last 30 days (Readmission):  No    If yes, Readmission Assessment in CM Navigator will be completed.    Advance Directives:      Code Status: Full Code   Patient's Primary Decision Maker is: Legal Next of Kin      Discharge Planning:    Patient lives with: Children Type of Home: House  Primary Care Giver: Self  Patient Support Systems include: Children, Family Members   Current Financial resources:    Current community resources:    Current services prior to admission: None            Current DME:              Type of Home Care services:  None    ADLS  Prior functional level: Independent in ADLs/IADLs  Current functional level: Independent in ADLs/IADLs    PT AM-PAC:   /24  OT AM-PAC:   /24    Family can provide assistance at DC: Other (comment) (unknown Patient declines to answer questions)  Would you like Case Management to discuss the discharge plan with any other family members/significant others, and if so, who?    Plans to Return to Present Housing:    Other Identified Issues/Barriers to RETURNING to current housing: Awaiting cultures/med management  Potential Assistance needed at

## 2024-10-06 NOTE — PLAN OF CARE
Pt has been resting since admission. Pt is A&Ox4 and gets up independently. Pt's potassium and magnesium were replaced today. Pt has not c/o any pain. Pt states the chest pain is only with exertion. Pt to be NPO tonight for stress test tomorrow. NS at 75mL/hr. Pt does have a hx of HTN, home med's restarted and brought from home. NP was made aware of BP readings. Pt also requested a diuretic to help with swelling BLE, lasix ordered. Pt has had great output. All questions and concerns addressed at this time. Safety maintained. Bed is locked and in the lowest position with the call light in reach.     Problem: Chronic Conditions and Co-morbidities  Goal: Patient's chronic conditions and co-morbidity symptoms are monitored and maintained or improved  Outcome: Progressing     Problem: Discharge Planning  Goal: Discharge to home or other facility with appropriate resources  Outcome: Progressing     Problem: Respiratory - Adult  Goal: Achieves optimal ventilation and oxygenation  10/6/2024 1107 by Evelyn Virgen RN  Outcome: Progressing     Problem: Pain  Goal: Verbalizes/displays adequate comfort level or baseline comfort level  Outcome: Progressing     Problem: ABCDS Injury Assessment  Goal: Absence of physical injury  Outcome: Progressing     Problem: Neurosensory - Adult  Goal: Achieves stable or improved neurological status  Outcome: Progressing     Problem: Cardiovascular - Adult  Goal: Maintains optimal cardiac output and hemodynamic stability  Outcome: Progressing  Goal: Absence of cardiac dysrhythmias or at baseline  Outcome: Progressing     Problem: Gastrointestinal - Adult  Goal: Minimal or absence of nausea and vomiting  Outcome: Progressing     Problem: Genitourinary - Adult  Goal: Absence of urinary retention  Outcome: Progressing     Problem: Metabolic/Fluid and Electrolytes - Adult  Goal: Electrolytes maintained within normal limits  Outcome: Progressing     Problem: Skin/Tissue Integrity - Adult  Goal:

## 2024-10-06 NOTE — PROGRESS NOTES
Pt admitted to room 1007 from ED. Admission documentation complete, vitals taken and telemetry placed. Pt oriented to room and unit routine, including hourly rounding. Call light in reach and safety maintained. Will continue to provide support and education.

## 2024-10-06 NOTE — ED NOTES
Pt arrives to ED with significant other, pt reports chest pain that she woke up with Saturday morning. Pt reports that she took 81 mg aspirin on Saturday morning, pt reports that she has also been taking SL nitro throughout the day, pt reports that the nitro helped during the day and would take her pain away for awhile. Pt reports that the chest pain increased and when 2 SL nitro tablets didn't help, she decided to come in. Pt reports that she has also had left arm numbness for the past month, reports that she thought she pinched a nerve by sleeping on it wrong, but pt is now reporting that the pain in her chest is radiating up into her left shoulder and down her left arm. Pt reports that around 0300 she was nauseated and took some zofran and pepcid that helped relieve her nausea. Pt does report a cardiac hx with stents being placed in February.

## 2024-10-06 NOTE — PLAN OF CARE
Problem: Respiratory - Adult  Goal: Achieves optimal ventilation and oxygenation  10/6/2024 1942 by Tenzin Wheat RCP  Outcome: Progressing     Problem: Respiratory - Adult  Goal: Able to breathe comfortably  Description: Able to breathe comfortably  10/6/2024 1942 by Tenzin Wheat RCP  Outcome: Progressing     Problem: Respiratory - Adult  Goal: Clear lung sounds  10/6/2024 1942 by Tenzin Wheat RCP  Outcome: Progressing

## 2024-10-06 NOTE — H&P
[facility]  Office: [phone]  Zeeshan Edwards DO, Gerardo Dias, DO, Reji Salazar DO, Tanner Ferrell, DO, Wendy Jordan MD, Rakel Hua MD, Aleyda Macias MD, Sue Livingston MD,  David Robert MD, Sam Moreira MD, Vaishnavi Fine MD,  Sanya Anderson DO, Tacho Ho MD, Jose Conn MD, Haim Edwards DO, Madisyn Summers MD,  Terrence Londono DO, Ragini Lehman MD, Cathleen Richter MD, Camila Boone MD, Avni Hernandez MD,  Reji Milian MD, Miles Shen MD, Darron Olivo MD, Ignacio Rangel MD, Angelo Campo MD, Spencer Carbajal MD, Isaiah Cooper, DO, Ry Acuna DO, Donta Mejía DO, Benjamin Dale MD, Shirley Waterhouse, CNP,  Ana Cid CNP, Isaiah Duckworth, CNP,  Earlene Alcantar, DNP, Estefania Helm, CNP, Luda Zamudio, CNP, Aminata Petty, CNP, Heike Mcdonald, CNP, Lolly Espinoza, PA-C, Linh Kumar PA-C, Em Amato, CNP, Jamal Steinberg, CNP,  Kathy Cyr, CNP, Argelia Bronson, CNP, Daisy Epperson, CNP, Anel Dennison, CNS, Jazmin Gonzalez, CNP, Blessing Nuno, CNP, Tracy Schwab, CNP         [facility]   IN-PATIENT SERVICE   [facility]    HISTORY AND PHYSICAL EXAMINATION            Date:   10/6/2024  Patient name:  Syl Dejesus  Date of admission:  10/6/2024  4:14 AM  MRN:   [id number]  Account:  [id number]  YOB: 1975  PCP:    Crista Calixto MD  Room:   [address] [place] [address]  Code Status:    Full Code    Chief Complaint:     Chief Complaint   Patient presents with    Chest Pain    Shortness of Breath     Patient states she has had chest pain and sob since yesterday. Patient took her regular 81 mg asa and 2 nitro back to back for chest pain. Patient states she did have some relief with nitro.        History Obtained From:     patient    History of Present Illness:     Syl Dejesus is a 49 y.o.  /  female who presents with Chest Pain and Shortness of Breath (Patient states she has had chest pain and sob since  fusion & decompression    SPINE SURGERY      revision because cage had backed out and severed a nerve, cage restabilized    TONSILLECTOMY      TUBAL LIGATION      UPPER GASTROINTESTINAL ENDOSCOPY N/A 01/16/2023    EGD BIOPSY performed by Tom Macias MD at [facility] OR        Medications Prior to Admission:     Prior to Admission medications    Medication Sig Start Date End Date Taking? Authorizing Provider   meloxicam (MOBIC) 15 MG tablet Take 1 tablet by mouth daily   Yes ProviderNoy MD   loratadine (CLARITIN) 10 MG tablet Take 1 tablet by mouth daily 9/24/24  Yes Crista Calixto MD   olmesartan (BENICAR) 40 MG tablet Take 1 tablet by mouth daily Goal BP bellow 130/80 and above 115/65, heart rate 56 to 90 bpm 9/24/24  Yes Crista Calixto MD   B Complex Vitamins (VITAMIN B COMPLEX) TABS Take 1 tablet by mouth daily 9/24/24  Yes Crista Calixto MD   Magnesium Oxide (MAGNESIUM-OXIDE) 250 MG TABS tablet Take 1 tablet by mouth Daily with supper 9/24/24  Yes Crista Calixto MD   albuterol sulfate HFA (VENTOLIN HFA) 108 (90 Base) MCG/ACT inhaler Inhale 2 puffs into the lungs 4 times daily as needed for Wheezing 9/24/24  Yes Crista Calixto MD   vitamin D (CHOLECALCIFEROL) 50 MCG (2000 UT) TABS tablet Take 1 tablet by mouth daily 9/4/24  Yes Crista Calixto MD   carvedilol (COREG) 25 MG tablet TAKE 1 TABLET BY MOUTH TWICE DAILY 8/13/24  Yes Dunia Boogie, APRN - CNP   Multiple Vitamins-Minerals (MULTIVITAMIN-MINERALS) TABS tablet take 1 tablet by mouth once daily 8/7/24  Yes Arlen Macias MD   montelukast (SINGULAIR) 10 MG tablet Take 1 tablet by mouth nightly 8/7/24  Yes Arlen Macias MD   Acetaminophen Extra Strength 500 MG TABS take 1 tablet by mouth every 6 hours AS NEEDED FOR PAIN (MAX DAILY AMOUNT:4 TABLETS)  Patient taking differently: Take 2 tablets by mouth every 6 hours as needed (every 4-6 hours) take 1 tablet by mouth every 6 hours AS NEEDED FOR PAIN (MAX DAILY

## 2024-10-07 ENCOUNTER — APPOINTMENT (OUTPATIENT)
Age: 49
DRG: 141 | End: 2024-10-07
Payer: COMMERCIAL

## 2024-10-07 ENCOUNTER — TELEPHONE (OUTPATIENT)
Dept: FAMILY MEDICINE CLINIC | Age: 49
End: 2024-10-07

## 2024-10-07 ENCOUNTER — APPOINTMENT (OUTPATIENT)
Dept: NUCLEAR MEDICINE | Age: 49
DRG: 141 | End: 2024-10-07
Payer: COMMERCIAL

## 2024-10-07 DIAGNOSIS — M54.16 LUMBAR RADICULOPATHY: ICD-10-CM

## 2024-10-07 DIAGNOSIS — M48.061 SPINAL STENOSIS AT L4-L5 LEVEL: ICD-10-CM

## 2024-10-07 DIAGNOSIS — M96.1 FAILED BACK SYNDROME, LUMBAR: ICD-10-CM

## 2024-10-07 DIAGNOSIS — M51.362 DEGENERATION OF INTERVERTEBRAL DISC OF LUMBAR REGION WITH DISCOGENIC BACK PAIN AND LOWER EXTREMITY PAIN: Primary | ICD-10-CM

## 2024-10-07 PROBLEM — R07.9 CHEST PAIN: Status: ACTIVE | Noted: 2024-10-07

## 2024-10-07 LAB
ANION GAP SERPL CALCULATED.3IONS-SCNC: 15 MMOL/L (ref 9–17)
BASOPHILS # BLD: 0.07 K/UL (ref 0–0.2)
BASOPHILS NFR BLD: 0 % (ref 0–2)
BUN SERPL-MCNC: 18 MG/DL (ref 6–20)
BUN/CREAT SERPL: 26 (ref 9–20)
CALCIUM SERPL-MCNC: 9.7 MG/DL (ref 8.6–10.4)
CHLORIDE SERPL-SCNC: 98 MMOL/L (ref 98–107)
CHOLEST SERPL-MCNC: 115 MG/DL (ref 0–199)
CHOLESTEROL/HDL RATIO: 4
CO2 SERPL-SCNC: 27 MMOL/L (ref 20–31)
CREAT SERPL-MCNC: 0.7 MG/DL (ref 0.5–0.9)
ECHO AO ROOT DIAM: 3 CM
ECHO AO ROOT INDEX: 1.38 CM/M2
ECHO BSA: 2.28 M2
ECHO BSA: 2.28 M2
ECHO LA DIAMETER INDEX: 1.29 CM/M2
ECHO LA DIAMETER: 2.8 CM
ECHO LA TO AORTIC ROOT RATIO: 0.93
ECHO LV EDV A2C: 99 ML
ECHO LV EDV A4C: 108 ML
ECHO LV EDV INDEX A4C: 50 ML/M2
ECHO LV EDV NDEX A2C: 46 ML/M2
ECHO LV EJECTION FRACTION A2C: 45 %
ECHO LV EJECTION FRACTION A4C: 68 %
ECHO LV EJECTION FRACTION BIPLANE: 58 % (ref 55–100)
ECHO LV ESV A2C: 54 ML
ECHO LV ESV A4C: 35 ML
ECHO LV ESV INDEX A2C: 25 ML/M2
ECHO LV ESV INDEX A4C: 16 ML/M2
ECHO LV FRACTIONAL SHORTENING: 32 % (ref 28–44)
ECHO LV INTERNAL DIMENSION DIASTOLE INDEX: 2.44 CM/M2
ECHO LV INTERNAL DIMENSION DIASTOLIC: 5.3 CM (ref 3.9–5.3)
ECHO LV INTERNAL DIMENSION SYSTOLIC INDEX: 1.66 CM/M2
ECHO LV INTERNAL DIMENSION SYSTOLIC: 3.6 CM
ECHO LV IVSD: 1.3 CM (ref 0.6–0.9)
ECHO LV MASS 2D: 286.9 G (ref 67–162)
ECHO LV MASS INDEX 2D: 132.2 G/M2 (ref 43–95)
ECHO LV POSTERIOR WALL DIASTOLIC: 1.3 CM (ref 0.6–0.9)
ECHO LV RELATIVE WALL THICKNESS RATIO: 0.49
EKG ATRIAL RATE: 65 BPM
EKG P AXIS: 8 DEGREES
EKG P-R INTERVAL: 140 MS
EKG Q-T INTERVAL: 412 MS
EKG QRS DURATION: 90 MS
EKG QTC CALCULATION (BAZETT): 428 MS
EKG R AXIS: 34 DEGREES
EKG T AXIS: 56 DEGREES
EKG VENTRICULAR RATE: 65 BPM
EOSINOPHIL # BLD: <0.03 K/UL (ref 0–0.44)
EOSINOPHILS RELATIVE PERCENT: 0 % (ref 1–4)
ERYTHROCYTE [DISTWIDTH] IN BLOOD BY AUTOMATED COUNT: 13 % (ref 11.8–14.4)
GFR, ESTIMATED: >90 ML/MIN/1.73M2
GLUCOSE BLD-MCNC: 193 MG/DL (ref 65–105)
GLUCOSE BLD-MCNC: 195 MG/DL (ref 65–105)
GLUCOSE BLD-MCNC: 244 MG/DL (ref 65–105)
GLUCOSE SERPL-MCNC: 182 MG/DL (ref 70–99)
HCT VFR BLD AUTO: 39.4 % (ref 36.3–47.1)
HDLC SERPL-MCNC: 27 MG/DL
HGB BLD-MCNC: 13.3 G/DL (ref 11.9–15.1)
IMM GRANULOCYTES # BLD AUTO: 0.08 K/UL (ref 0–0.3)
IMM GRANULOCYTES NFR BLD: 0 %
LDLC SERPL CALC-MCNC: 36 MG/DL (ref 0–100)
LYMPHOCYTES NFR BLD: 4.38 K/UL (ref 1.1–3.7)
LYMPHOCYTES RELATIVE PERCENT: 21 % (ref 24–43)
MAGNESIUM SERPL-MCNC: 1.6 MG/DL (ref 1.6–2.6)
MCH RBC QN AUTO: 32.4 PG (ref 25.2–33.5)
MCHC RBC AUTO-ENTMCNC: 33.8 G/DL (ref 28.4–34.8)
MCV RBC AUTO: 96.1 FL (ref 82.6–102.9)
MONOCYTES NFR BLD: 1.34 K/UL (ref 0.1–1.2)
MONOCYTES NFR BLD: 6 % (ref 3–12)
NEUTROPHILS NFR BLD: 72 % (ref 36–65)
NEUTS SEG NFR BLD: 15.22 K/UL (ref 1.5–8.1)
PLATELET # BLD AUTO: 334 K/UL (ref 138–453)
PMV BLD AUTO: 9.9 FL (ref 8.1–13.5)
POTASSIUM SERPL-SCNC: 3.5 MMOL/L (ref 3.7–5.3)
PROCALCITONIN SERPL-MCNC: 0.06 NG/ML (ref 0–0.09)
RBC # BLD AUTO: 4.1 M/UL (ref 3.95–5.11)
SODIUM SERPL-SCNC: 140 MMOL/L (ref 135–144)
STRESS BASELINE DIAS BP: 97 MMHG
STRESS BASELINE HR: 68 BPM
STRESS BASELINE SYS BP: 193 MMHG
STRESS ESTIMATED WORKLOAD: 1 METS
STRESS EXERCISE DUR MIN: 1 MIN
STRESS EXERCISE DUR SEC: 0 SEC
STRESS PEAK DIAS BP: 94 MMHG
STRESS PEAK SYS BP: 207 MMHG
STRESS PERCENT HR ACHIEVED: 47 %
STRESS POST PEAK HR: 80 BPM
STRESS RATE PRESSURE PRODUCT: NORMAL BPM*MMHG
STRESS TARGET HR: 172 BPM
TRIGL SERPL-MCNC: 259 MG/DL
VLDLC SERPL CALC-MCNC: 52 MG/DL
WBC OTHER # BLD: 21.1 K/UL (ref 3.5–11.3)

## 2024-10-07 PROCEDURE — 2580000003 HC RX 258: Performed by: NURSE PRACTITIONER

## 2024-10-07 PROCEDURE — 6370000000 HC RX 637 (ALT 250 FOR IP): Performed by: NURSE PRACTITIONER

## 2024-10-07 PROCEDURE — 93018 CV STRESS TEST I&R ONLY: CPT | Performed by: RADIOLOGY

## 2024-10-07 PROCEDURE — 93017 CV STRESS TEST TRACING ONLY: CPT

## 2024-10-07 PROCEDURE — 80048 BASIC METABOLIC PNL TOTAL CA: CPT

## 2024-10-07 PROCEDURE — 99232 SBSQ HOSP IP/OBS MODERATE 35: CPT | Performed by: NURSE PRACTITIONER

## 2024-10-07 PROCEDURE — 6360000002 HC RX W HCPCS

## 2024-10-07 PROCEDURE — 93308 TTE F-UP OR LMTD: CPT

## 2024-10-07 PROCEDURE — 6370000000 HC RX 637 (ALT 250 FOR IP)

## 2024-10-07 PROCEDURE — 3430000000 HC RX DIAGNOSTIC RADIOPHARMACEUTICAL: Performed by: NURSE PRACTITIONER

## 2024-10-07 PROCEDURE — 93010 ELECTROCARDIOGRAM REPORT: CPT | Performed by: INTERNAL MEDICINE

## 2024-10-07 PROCEDURE — 83735 ASSAY OF MAGNESIUM: CPT

## 2024-10-07 PROCEDURE — 85025 COMPLETE CBC W/AUTO DIFF WBC: CPT

## 2024-10-07 PROCEDURE — 80061 LIPID PANEL: CPT

## 2024-10-07 PROCEDURE — 99233 SBSQ HOSP IP/OBS HIGH 50: CPT | Performed by: NURSE PRACTITIONER

## 2024-10-07 PROCEDURE — 94640 AIRWAY INHALATION TREATMENT: CPT

## 2024-10-07 PROCEDURE — 94761 N-INVAS EAR/PLS OXIMETRY MLT: CPT

## 2024-10-07 PROCEDURE — 36415 COLL VENOUS BLD VENIPUNCTURE: CPT

## 2024-10-07 PROCEDURE — 2060000000 HC ICU INTERMEDIATE R&B

## 2024-10-07 PROCEDURE — 84145 PROCALCITONIN (PCT): CPT

## 2024-10-07 PROCEDURE — 82947 ASSAY GLUCOSE BLOOD QUANT: CPT

## 2024-10-07 PROCEDURE — 6360000002 HC RX W HCPCS: Performed by: NURSE PRACTITIONER

## 2024-10-07 PROCEDURE — 78452 HT MUSCLE IMAGE SPECT MULT: CPT

## 2024-10-07 PROCEDURE — 6370000000 HC RX 637 (ALT 250 FOR IP): Performed by: INTERNAL MEDICINE

## 2024-10-07 PROCEDURE — A9500 TC99M SESTAMIBI: HCPCS | Performed by: NURSE PRACTITIONER

## 2024-10-07 PROCEDURE — 93016 CV STRESS TEST SUPVJ ONLY: CPT | Performed by: INTERNAL MEDICINE

## 2024-10-07 RX ORDER — LANOLIN ALCOHOL/MO/W.PET/CERES
200 CREAM (GRAM) TOPICAL
Status: DISCONTINUED | OUTPATIENT
Start: 2024-10-07 | End: 2024-10-08 | Stop reason: HOSPADM

## 2024-10-07 RX ORDER — TETRAKIS(2-METHOXYISOBUTYLISOCYANIDE)COPPER(I) TETRAFLUOROBORATE 1 MG/ML
47.8 INJECTION, POWDER, LYOPHILIZED, FOR SOLUTION INTRAVENOUS
Status: COMPLETED | OUTPATIENT
Start: 2024-10-07 | End: 2024-10-07

## 2024-10-07 RX ORDER — TETRAKIS(2-METHOXYISOBUTYLISOCYANIDE)COPPER(I) TETRAFLUOROBORATE 1 MG/ML
16.8 INJECTION, POWDER, LYOPHILIZED, FOR SOLUTION INTRAVENOUS
Status: COMPLETED | OUTPATIENT
Start: 2024-10-07 | End: 2024-10-07

## 2024-10-07 RX ORDER — NICOTINE 21 MG/24HR
1 PATCH, TRANSDERMAL 24 HOURS TRANSDERMAL ONCE
Status: DISCONTINUED | OUTPATIENT
Start: 2024-10-07 | End: 2024-10-08 | Stop reason: HOSPADM

## 2024-10-07 RX ORDER — SODIUM CHLORIDE 9 MG/ML
500 INJECTION, SOLUTION INTRAVENOUS CONTINUOUS PRN
Status: ACTIVE | OUTPATIENT
Start: 2024-10-07 | End: 2024-10-07

## 2024-10-07 RX ORDER — INSULIN LISPRO 100 [IU]/ML
0-4 INJECTION, SOLUTION INTRAVENOUS; SUBCUTANEOUS NIGHTLY
Status: DISCONTINUED | OUTPATIENT
Start: 2024-10-07 | End: 2024-10-08 | Stop reason: HOSPADM

## 2024-10-07 RX ORDER — ALBUTEROL SULFATE 90 UG/1
2 INHALANT RESPIRATORY (INHALATION) PRN
Status: ACTIVE | OUTPATIENT
Start: 2024-10-07 | End: 2024-10-07

## 2024-10-07 RX ORDER — HYDRALAZINE HYDROCHLORIDE 25 MG/1
25 TABLET, FILM COATED ORAL EVERY 8 HOURS SCHEDULED
Status: DISCONTINUED | OUTPATIENT
Start: 2024-10-07 | End: 2024-10-08 | Stop reason: HOSPADM

## 2024-10-07 RX ORDER — INSULIN LISPRO 100 [IU]/ML
0-4 INJECTION, SOLUTION INTRAVENOUS; SUBCUTANEOUS
Status: DISCONTINUED | OUTPATIENT
Start: 2024-10-07 | End: 2024-10-08 | Stop reason: HOSPADM

## 2024-10-07 RX ORDER — HYDRALAZINE HYDROCHLORIDE 25 MG/1
25 TABLET, FILM COATED ORAL EVERY 8 HOURS SCHEDULED
Status: DISCONTINUED | OUTPATIENT
Start: 2024-10-07 | End: 2024-10-07

## 2024-10-07 RX ORDER — ATROPINE SULFATE 0.1 MG/ML
0.5 INJECTION INTRAVENOUS EVERY 5 MIN PRN
Status: ACTIVE | OUTPATIENT
Start: 2024-10-07 | End: 2024-10-07

## 2024-10-07 RX ORDER — SODIUM CHLORIDE 0.9 % (FLUSH) 0.9 %
5-40 SYRINGE (ML) INJECTION PRN
Status: ACTIVE | OUTPATIENT
Start: 2024-10-07 | End: 2024-10-07

## 2024-10-07 RX ORDER — DEXTROSE MONOHYDRATE 100 MG/ML
INJECTION, SOLUTION INTRAVENOUS CONTINUOUS PRN
Status: DISCONTINUED | OUTPATIENT
Start: 2024-10-07 | End: 2024-10-08 | Stop reason: HOSPADM

## 2024-10-07 RX ORDER — BUTALBITAL, ACETAMINOPHEN AND CAFFEINE 50; 325; 40 MG/1; MG/1; MG/1
1 TABLET ORAL EVERY 4 HOURS PRN
Status: DISCONTINUED | OUTPATIENT
Start: 2024-10-07 | End: 2024-10-08 | Stop reason: HOSPADM

## 2024-10-07 RX ORDER — REGADENOSON 0.08 MG/ML
0.4 INJECTION, SOLUTION INTRAVENOUS
Status: COMPLETED | OUTPATIENT
Start: 2024-10-07 | End: 2024-10-07

## 2024-10-07 RX ORDER — AMINOPHYLLINE 25 MG/ML
50 INJECTION, SOLUTION INTRAVENOUS PRN
Status: ACTIVE | OUTPATIENT
Start: 2024-10-07 | End: 2024-10-07

## 2024-10-07 RX ORDER — LISINOPRIL 40 MG/1
40 TABLET ORAL DAILY
Status: DISCONTINUED | OUTPATIENT
Start: 2024-10-07 | End: 2024-10-08 | Stop reason: HOSPADM

## 2024-10-07 RX ORDER — ALBUTEROL SULFATE 0.83 MG/ML
2.5 SOLUTION RESPIRATORY (INHALATION) EVERY 6 HOURS PRN
Status: DISCONTINUED | OUTPATIENT
Start: 2024-10-07 | End: 2024-10-08 | Stop reason: HOSPADM

## 2024-10-07 RX ORDER — PANTOPRAZOLE SODIUM 40 MG/1
40 TABLET, DELAYED RELEASE ORAL
Status: DISCONTINUED | OUTPATIENT
Start: 2024-10-08 | End: 2024-10-08 | Stop reason: HOSPADM

## 2024-10-07 RX ORDER — NITROGLYCERIN 0.4 MG/1
0.4 TABLET SUBLINGUAL EVERY 5 MIN PRN
Status: ACTIVE | OUTPATIENT
Start: 2024-10-07 | End: 2024-10-07

## 2024-10-07 RX ORDER — NICOTINE 21 MG/24HR
1 PATCH, TRANSDERMAL 24 HOURS TRANSDERMAL ONCE
Status: DISCONTINUED | OUTPATIENT
Start: 2024-10-07 | End: 2024-10-07

## 2024-10-07 RX ORDER — M-VIT,TX,IRON,MINS/CALC/FOLIC 27MG-0.4MG
1 TABLET ORAL DAILY
Status: DISCONTINUED | OUTPATIENT
Start: 2024-10-07 | End: 2024-10-08 | Stop reason: HOSPADM

## 2024-10-07 RX ORDER — METOPROLOL TARTRATE 1 MG/ML
5 INJECTION, SOLUTION INTRAVENOUS EVERY 5 MIN PRN
Status: ACTIVE | OUTPATIENT
Start: 2024-10-07 | End: 2024-10-07

## 2024-10-07 RX ADMIN — CARVEDILOL 25 MG: 25 TABLET, FILM COATED ORAL at 20:24

## 2024-10-07 RX ADMIN — ATORVASTATIN CALCIUM 80 MG: 80 TABLET, FILM COATED ORAL at 20:24

## 2024-10-07 RX ADMIN — CARVEDILOL 25 MG: 25 TABLET, FILM COATED ORAL at 08:59

## 2024-10-07 RX ADMIN — ACETAMINOPHEN 650 MG: 325 TABLET ORAL at 17:05

## 2024-10-07 RX ADMIN — POTASSIUM CHLORIDE 40 MEQ: 1500 TABLET, EXTENDED RELEASE ORAL at 09:06

## 2024-10-07 RX ADMIN — TICAGRELOR 90 MG: 90 TABLET ORAL at 20:25

## 2024-10-07 RX ADMIN — AZITHROMYCIN DIHYDRATE 500 MG: 250 TABLET ORAL at 08:59

## 2024-10-07 RX ADMIN — AMOXICILLIN AND CLAVULANATE POTASSIUM 1 TABLET: 875; 125 TABLET, FILM COATED ORAL at 20:24

## 2024-10-07 RX ADMIN — Medication 200 MG: at 20:24

## 2024-10-07 RX ADMIN — SODIUM CHLORIDE, PRESERVATIVE FREE 10 ML: 5 INJECTION INTRAVENOUS at 08:15

## 2024-10-07 RX ADMIN — LISINOPRIL 40 MG: 40 TABLET ORAL at 20:25

## 2024-10-07 RX ADMIN — AMOXICILLIN AND CLAVULANATE POTASSIUM 1 TABLET: 875; 125 TABLET, FILM COATED ORAL at 08:59

## 2024-10-07 RX ADMIN — GUAIFENESIN 600 MG: 600 TABLET ORAL at 08:59

## 2024-10-07 RX ADMIN — Medication 1 TABLET: at 09:16

## 2024-10-07 RX ADMIN — ALLOPURINOL 50 MG: 100 TABLET ORAL at 08:59

## 2024-10-07 RX ADMIN — TIZANIDINE 4 MG: 4 TABLET ORAL at 09:16

## 2024-10-07 RX ADMIN — OLMESARTAN MEDOXOMIL 40 MG: 40 TABLET ORAL at 09:00

## 2024-10-07 RX ADMIN — HYDROCHLOROTHIAZIDE 25 MG: 25 TABLET ORAL at 20:24

## 2024-10-07 RX ADMIN — ACETAMINOPHEN 650 MG: 325 TABLET ORAL at 09:06

## 2024-10-07 RX ADMIN — Medication 2000 UNITS: at 08:59

## 2024-10-07 RX ADMIN — HYDROCHLOROTHIAZIDE 25 MG: 25 TABLET ORAL at 08:59

## 2024-10-07 RX ADMIN — REGADENOSON 0.4 MG: 0.08 INJECTION, SOLUTION INTRAVENOUS at 08:15

## 2024-10-07 RX ADMIN — FUROSEMIDE 40 MG: 40 TABLET ORAL at 08:59

## 2024-10-07 RX ADMIN — ALBUTEROL SULFATE 2.5 MG: 2.5 SOLUTION RESPIRATORY (INHALATION) at 13:46

## 2024-10-07 RX ADMIN — ASPIRIN 81 MG: 81 TABLET, COATED ORAL at 08:59

## 2024-10-07 RX ADMIN — Medication 47.8 MILLICURIE: at 08:18

## 2024-10-07 RX ADMIN — ALBUTEROL SULFATE 2.5 MG: 2.5 SOLUTION RESPIRATORY (INHALATION) at 21:19

## 2024-10-07 RX ADMIN — Medication 16.8 MILLICURIE: at 07:07

## 2024-10-07 RX ADMIN — MONTELUKAST 10 MG: 10 TABLET, FILM COATED ORAL at 20:24

## 2024-10-07 RX ADMIN — Medication 1 TABLET: at 10:59

## 2024-10-07 RX ADMIN — LORATADINE 10 MG: 10 TABLET ORAL at 09:00

## 2024-10-07 RX ADMIN — TICAGRELOR 90 MG: 90 TABLET ORAL at 08:59

## 2024-10-07 ASSESSMENT — PAIN DESCRIPTION - ORIENTATION
ORIENTATION: ANTERIOR
ORIENTATION: MID
ORIENTATION: DISTAL

## 2024-10-07 ASSESSMENT — ENCOUNTER SYMPTOMS
RESPIRATORY NEGATIVE: 1
GASTROINTESTINAL NEGATIVE: 1
EYES NEGATIVE: 1

## 2024-10-07 ASSESSMENT — PAIN DESCRIPTION - LOCATION
LOCATION: HEAD
LOCATION: BACK
LOCATION: HEAD

## 2024-10-07 ASSESSMENT — PAIN DESCRIPTION - DESCRIPTORS
DESCRIPTORS: ACHING

## 2024-10-07 ASSESSMENT — PAIN SCALES - GENERAL
PAINLEVEL_OUTOF10: 8
PAINLEVEL_OUTOF10: 6
PAINLEVEL_OUTOF10: 0
PAINLEVEL_OUTOF10: 8
PAINLEVEL_OUTOF10: 0

## 2024-10-07 NOTE — PROGRESS NOTES
Patient having pain on their head and rates it a 8. Pain interventions includemedication and diversional activities. Patients goal for pain relief is 1. The need for pain and symptom management will be considered in the discharge planning process to ensure patients comfort.

## 2024-10-07 NOTE — PLAN OF CARE
Problem: Respiratory - Adult  Goal: Achieves optimal ventilation and oxygenation  10/7/2024 1348 by Elma Medina RCP  Outcome: Progressing  10/7/2024 0512 by Ivet Post RN  Outcome: Progressing  Flowsheets (Taken 10/6/2024 2120)  Achieves optimal ventilation and oxygenation: Assess for changes in respiratory status  Goal: Able to breathe comfortably  Description: Able to breathe comfortably  Outcome: Progressing  Goal: Clear lung sounds  Outcome: Progressing

## 2024-10-07 NOTE — PLAN OF CARE
Problem: Chronic Conditions and Co-morbidities  Goal: Patient's chronic conditions and co-morbidity symptoms are monitored and maintained or improved  10/7/2024 1722 by Danilo Us RN  Outcome: Progressing  Note: Patient remains free from falls this shift. Patient had stress test and echo this morning, awaiting for final results to be read by cardiology. Patient restarted on BP meds, had episode of elevated pressures today. Cardio made aware, patient hesitant to have new medications started due to her medical history, cardio made aware. Patient up ad rebekah, steady gait, patient did have episode of shortness of breath following going to the bathroom. Patient being medicated for headache (see mar for detail). Patient continues on PO augmentin and zithro for copd exacerbation. Discharge planning pending, safety measures continued.      Problem: Discharge Planning  Goal: Discharge to home or other facility with appropriate resources  10/7/2024 1722 by Danilo Us RN  Outcome: Progressing     Problem: Respiratory - Adult  Goal: Achieves optimal ventilation and oxygenation  10/7/2024 1722 by Danilo Us RN  Outcome: Progressing     Problem: Pain  Goal: Verbalizes/displays adequate comfort level or baseline comfort level  10/7/2024 1722 by Danilo Us RN  Outcome: Progressing     Problem: Cardiovascular - Adult  Goal: Absence of cardiac dysrhythmias or at baseline  10/7/2024 1722 by Danilo Us RN  Outcome: Progressing     Problem: Metabolic/Fluid and Electrolytes - Adult  Goal: Electrolytes maintained within normal limits  10/7/2024 1722 by Danilo Us RN  Outcome: Progressing

## 2024-10-07 NOTE — PROGRESS NOTES
[facility]   Progress Note                   Date:   10/7/2024  Patient name: Syl Dejesus  Date of admission:  10/6/2024  4:14 AM  MRN:   [id number]  YOB: 1975  PCP: Crista Calixto MD    Reason for Admission: COPD exacerbation (HCC) [J44.1]  Chest pain of unknown etiology [R07.9]  Chest pain, unspecified type [R07.9]    Subjective:       Clinical Changes / Abnormalities: Pt seen and examined in the room.  Patient resting in bed with family at bedside. Pt denies any CP or sob.  Labs, vitals and tele reviewed.     Medications:   Scheduled Meds:   vitamin B and C  1 tablet Oral Daily    magnesium oxide  200 mg Oral Dinner    therapeutic multivitamin-minerals  1 tablet Oral Daily    [START ON 10/8/2024] pantoprazole  40 mg Oral QAM AC    insulin lispro  0-4 Units SubCUTAneous TID WC    insulin lispro  0-4 Units SubCUTAneous Nightly    melatonin  3 mg Oral Nightly    guaiFENesin  600 mg Oral BID    allopurinol  50 mg Oral Daily    aspirin  81 mg Oral Daily    atorvastatin  80 mg Oral Nightly    budesonide-formoterol  2 puff Inhalation BID RT    carvedilol  25 mg Oral BID    dulaglutide  0.75 mg SubCUTAneous Weekly    fluticasone  2 spray Nasal Daily    hydroCHLOROthiazide  25 mg Oral BID    montelukast  10 mg Oral Nightly    ticagrelor  90 mg Oral BID    Vitamin D  2,000 Units Oral Daily    albuterol  2.5 mg Nebulization TID    nicotine  1 patch TransDERmal Daily    azithromycin  500 mg Oral Daily    amoxicillin-clavulanate  1 tablet Oral 2 times per day    furosemide  40 mg Oral Daily    loratadine  10 mg Oral Daily    olmesartan  40 mg Oral Daily     Continuous Infusions:   sodium chloride      dextrose       CBC:   Recent Labs     10/06/24  0455 10/07/24  0640   WBC 15.9* 21.1*   HGB 13.2 13.3    334     BMP:    Recent Labs     10/06/24  0701 10/07/24  0640    140   K 3.4* 3.5*    98   CO2 28 27   BUN 14 18   CREATININE 0.6 0.7   GLUCOSE 173* 182*

## 2024-10-07 NOTE — PROGRESS NOTES
Physical Therapy  DATE: 10/7/2024    NAME: Syl Dejesus  MRN: [id number]   : 1975    Patient not seen this date for Physical Therapy due to:      [] Cancel by RN or physician due to:    [] Hemodialysis    [] Critical Lab Value Level     [] Blood transfusion in progress    [] Acute or unstable cardiovascular status   _MAP < 55 or more than >115  _HR < 40 or > 130    [] Acute or unstable pulmonary status   -FiO2 > 60%   _RR < 5 or >40    _O2 sats < 85%    [] Strict Bedrest    [] Off Unit for surgery or procedure    [] Off Unit for testing       [] Pending imaging to R/O fracture    [] Refusal by Patient      [] Other      [x] PT being discontinued at this time. Patient independent. No further needs.     [] PT being discontinued at this time as the patient has been transferred to hospice care. No further needs.      Maria Elena Landaverde, PT
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
## 2024-10-07 NOTE — PROGRESS NOTES
Occupational Therapy  DATE: 10/7/2024    NAME: Syl Dejesus  MRN: [id number]   : 1975    Patient not seen this date for Occupational Therapy due to:      [] Cancel by RN or physician due to:    [] Hemodialysis    [] Critical Lab Value Level     [] Blood transfusion in progress    [] Acute or unstable cardiovascular status   _MAP < 55 or more than >115  _HR < 40 or > 130    [] Acute or unstable pulmonary status   -FiO2 > 60%   _RR < 5 or >40    _O2 sats < 85%    [] Strict Bedrest    [] Off Unit for surgery or procedure    [] Off Unit for testing       [] Pending imaging to R/O fracture    [] Refusal by Patient      [] Intubated    [] Other      [x] OT being discontinued at this time. Patient independent. No further needs.  (BEN Carrasco reporting pt is indep this date with no needs. Please reorder if needs arise.)      [] OT being discontinued at this time as the patient has been transferred to hospice care. No further needs.    Dunia Love, LILY, OTR/L

## 2024-10-07 NOTE — PROGRESS NOTES
[facility]  Office: [phone]  Zeeshan Edwards DO, Gerardo Dias, DO, Reji Salazar DO, Tanner Ferrell, DO, Wendy Jordan MD, Rakel Hua MD, Aleyda Macias MD, Sue Livingston MD,  David Robert MD, Sam Moreira MD, Vaishnavi Fine MD,  Sanya Anderson DO, Tacho Ho MD, Jose Conn MD, Haim Edwards DO, Madisyn Summers MD,  Terrence Londono DO, Ragini Lehman MD, Cathleen Richter MD, Camila Boone MD, Avni Hernandez MD,  Reji Milian MD, Miles Shen MD, Darron Olivo MD, Ignacio Rangel MD, Angelo Campo MD, Spencer Carbajal MD, Isaiah Cooper, DO, Ry Acuna DO, Donta Mejía DO, Benjamin Dale MD, Shirley Waterhouse, CNP,  Ana Cid CNP, Isaiah Duckworth, CNP,  Earlene Alcantar, DNP, Estefania Helm, CNP, Luda Zamudio, CNP, Aminata Petty, CNP, Heike Mcdonald, CNP, Lolly Espinoza, PA-C, Linh Kumar PA-C, Em Amato, CNP, Jamal Steinberg, CNP,  Kathy Cyr, CNP, Argelia Bronson, CNP, Daisy Epperson, CNP, Anel Dennison, CNS, Jazmin Gonzalez, CNP, Blessing Nuno, CNP, Tracy Schwab, CNP         [facility]   IN-PATIENT SERVICE   [facility]    Progress Note    10/7/2024    8:59 AM    Name:   Syl Dejesus  MRN:     [id number]     Acct:      [id number]   Room:   1007/1007-02   Day:  1  Admit Date:  10/6/2024  4:14 AM    PCP:   Crista Calixto MD  Code Status:  Full Code    Subjective:     C/C: shortness of breath during stress now resolved  Chief Complaint   Patient presents with    Chest Pain    Shortness of Breath     Patient states she has had chest pain and sob since yesterday. Patient took her regular 81 mg asa and 2 nitro back to back for chest pain. Patient states she did have some relief with nitro.      Interval History Status: improved.     Patient sitting up in bed. She underwent Lexiscan stress- had dyspnea during the stress but it is now resolved. She had no chest pain overnight- currently no chest pain. No complaints of  abdominal pain, nausea, vomiting, complains of feeling hungry and of headache.     No issues reported overnight per nursing.     Brief History:     Per my partner's documentation:     \" Syl Dejesus is a 49 y.o.  /  female who presents with Chest Pain and Shortness of Breath (Patient states she has had chest pain and sob since yesterday. Patient took her regular 81 mg asa and 2 nitro back to back for chest pain. Patient states she did have some relief with nitro. )   and is admitted to the hospital for the management of Chest pain of unknown etiology.     Patient has been having medical complications for approximately 8 months.  She had an outpatient stress test which revealed abnormalities and subsequently had heart catheterization with stent placement.  At that time she was also informed that she likely had underlying lung disease and was prescribed DuoNebs and Symbicort.  Patient has not been compliant with these due to concerns over side effects.  She developed left-sided chest pain yesterday with numbness and tingling in the jaw and left arm.  She reported to the emergency department in the early morning hours.  Initial ER evaluation reveals normal troponins but an elevated white count.  Patient admitted for probable bronchitis with chest pain rule out.\"    Lexican stress completed this AM- no signs of ischemia on EKG.     Review of Systems:     Review of Systems   Constitutional: Negative.    HENT: Negative.     Eyes: Negative.    Respiratory: Negative.     Cardiovascular: Negative.    Gastrointestinal: Negative.    Genitourinary: Negative.    Musculoskeletal: Negative.    Skin: Negative.    Neurological: Negative.    Psychiatric/Behavioral: Negative.         Medications:     Allergies:    Allergies   Allergen Reactions    Fentanyl      Pt never wants this again as it caused her hair and teeth to fall out.   2/15/24 pt denies this allergy    Nifedipine Er Hives    Codeine Nausea And Vomiting

## 2024-10-07 NOTE — TELEPHONE ENCOUNTER
Dr Kenny,    Please confirm or decline for patient to take meloxicam.  She had 3 stents placed 2/8/2024.    Patient tells me, that you did okay her to take meloxicam.  When trying to refill it, it gives me multiple high interactions, as expected, with aspirin and Brilinta, and also with blood pressure medications, hydrochlorothiazide and olmesartan.  I refused her refill, however she insists that you okay her to take meloxicam which she is actually taking every day or almost every day.    Also her blood pressure still not controlled, NSAIDs can contribute to that, she is also on duloxetine, which can also increase her blood pressure  I explained to her other options including pain management and Tylenol Extra Strength, but she is insisting to continue to take meloxicam.    Please let me know.  Thank you!  Crista Calixto MD

## 2024-10-07 NOTE — PLAN OF CARE
Blood pressure elevated overnight, one time dose of lopressor given, IVF discontinued. Intermittent chest pain with exertion which quickly resolves with rest. Plan is for ALE and stress test today, NPO after midnight. Receiving Augmentin and Zithro. Remains free from falls, all needs met at this time.    Problem: Chronic Conditions and Co-morbidities  Goal: Patient's chronic conditions and co-morbidity symptoms are monitored and maintained or improved  Outcome: Progressing  Flowsheets (Taken 10/6/2024 2120)  Care Plan - Patient's Chronic Conditions and Co-Morbidity Symptoms are Monitored and Maintained or Improved: Monitor and assess patient's chronic conditions and comorbid symptoms for stability, deterioration, or improvement     Problem: Discharge Planning  Goal: Discharge to home or other facility with appropriate resources  Outcome: Progressing  Flowsheets (Taken 10/6/2024 2120)  Discharge to home or other facility with appropriate resources: Identify barriers to discharge with patient and caregiver     Problem: Pain  Goal: Verbalizes/displays adequate comfort level or baseline comfort level  Outcome: Progressing     Problem: ABCDS Injury Assessment  Goal: Absence of physical injury  Outcome: Progressing     Problem: Neurosensory - Adult  Goal: Achieves stable or improved neurological status  Outcome: Progressing  Flowsheets (Taken 10/6/2024 2120)  Achieves stable or improved neurological status: Assess for and report changes in neurological status     Problem: Cardiovascular - Adult  Goal: Maintains optimal cardiac output and hemodynamic stability  Outcome: Progressing  Flowsheets (Taken 10/6/2024 2120)  Maintains optimal cardiac output and hemodynamic stability: Monitor blood pressure and heart rate  Goal: Absence of cardiac dysrhythmias or at baseline  Outcome: Progressing  Flowsheets (Taken 10/6/2024 2120)  Absence of cardiac dysrhythmias or at baseline: Monitor cardiac rate and rhythm     Problem:  Gastrointestinal - Adult  Goal: Minimal or absence of nausea and vomiting  Outcome: Progressing  Flowsheets (Taken 10/6/2024 2120)  Minimal or absence of nausea and vomiting: Administer IV fluids as ordered to ensure adequate hydration     Problem: Genitourinary - Adult  Goal: Absence of urinary retention  Outcome: Progressing  Flowsheets (Taken 10/6/2024 2120)  Absence of urinary retention: Assess patient’s ability to void and empty bladder     Problem: Metabolic/Fluid and Electrolytes - Adult  Goal: Electrolytes maintained within normal limits  Outcome: Progressing  Flowsheets (Taken 10/6/2024 2120)  Electrolytes maintained within normal limits: Monitor labs and assess patient for signs and symptoms of electrolyte imbalances     Problem: Skin/Tissue Integrity - Adult  Goal: Skin integrity remains intact  Outcome: Progressing  Flowsheets (Taken 10/6/2024 2120)  Skin Integrity Remains Intact: Monitor for areas of redness and/or skin breakdown     Problem: Hematologic - Adult  Goal: Maintains hematologic stability  Outcome: Progressing  Flowsheets (Taken 10/6/2024 2120)  Maintains hematologic stability: Assess for signs and symptoms of bleeding or hemorrhage     Problem: Musculoskeletal - Adult  Goal: Return mobility to safest level of function  Outcome: Progressing  Flowsheets (Taken 10/6/2024 2120)  Return Mobility to Safest Level of Function: Assess patient stability and activity tolerance for standing, transferring and ambulating with or without assistive devices

## 2024-10-07 NOTE — FLOWSHEET NOTE
Lexiscan completed pt briefly symptomatic with c/o dyspnea and nausea symptoms subsided without intervention. Blood pressure elevated but pt has not had am meds r/t need to hold for stress test pt also c/o anxiety r/t test pt recovered on cart and taken to nuc med for further testing in nad

## 2024-10-08 VITALS
RESPIRATION RATE: 18 BRPM | HEIGHT: 65 IN | BODY MASS INDEX: 41.65 KG/M2 | WEIGHT: 250 LBS | DIASTOLIC BLOOD PRESSURE: 77 MMHG | SYSTOLIC BLOOD PRESSURE: 140 MMHG | OXYGEN SATURATION: 94 % | TEMPERATURE: 98.1 F | HEART RATE: 89 BPM

## 2024-10-08 PROBLEM — R07.9 CHEST PAIN: Status: RESOLVED | Noted: 2024-10-07 | Resolved: 2024-10-08

## 2024-10-08 PROBLEM — R07.9 CHEST PAIN OF UNKNOWN ETIOLOGY: Status: RESOLVED | Noted: 2024-10-06 | Resolved: 2024-10-08

## 2024-10-08 PROBLEM — J45.41 MODERATE PERSISTENT ASTHMA WITH ACUTE EXACERBATION: Status: RESOLVED | Noted: 2024-04-06 | Resolved: 2024-10-08

## 2024-10-08 LAB
GLUCOSE BLD-MCNC: 182 MG/DL (ref 65–105)
GLUCOSE BLD-MCNC: 245 MG/DL (ref 65–105)

## 2024-10-08 PROCEDURE — 94761 N-INVAS EAR/PLS OXIMETRY MLT: CPT

## 2024-10-08 PROCEDURE — 6370000000 HC RX 637 (ALT 250 FOR IP)

## 2024-10-08 PROCEDURE — 6360000002 HC RX W HCPCS

## 2024-10-08 PROCEDURE — 6370000000 HC RX 637 (ALT 250 FOR IP): Performed by: NURSE PRACTITIONER

## 2024-10-08 PROCEDURE — 94640 AIRWAY INHALATION TREATMENT: CPT

## 2024-10-08 PROCEDURE — 82947 ASSAY GLUCOSE BLOOD QUANT: CPT

## 2024-10-08 PROCEDURE — 6360000002 HC RX W HCPCS: Performed by: INTERNAL MEDICINE

## 2024-10-08 PROCEDURE — 6370000000 HC RX 637 (ALT 250 FOR IP): Performed by: INTERNAL MEDICINE

## 2024-10-08 PROCEDURE — 99238 HOSP IP/OBS DSCHRG MGMT 30/<: CPT | Performed by: NURSE PRACTITIONER

## 2024-10-08 RX ORDER — MELOXICAM 15 MG/1
15 TABLET ORAL DAILY
Qty: 30 TABLET | Refills: 0 | Status: SHIPPED | OUTPATIENT
Start: 2024-10-08 | End: 2024-10-11 | Stop reason: SDUPTHER

## 2024-10-08 RX ORDER — HYDRALAZINE HYDROCHLORIDE 25 MG/1
25 TABLET, FILM COATED ORAL EVERY 8 HOURS SCHEDULED
Qty: 90 TABLET | Refills: 3 | Status: SHIPPED | OUTPATIENT
Start: 2024-10-08

## 2024-10-08 RX ORDER — HYDRALAZINE HYDROCHLORIDE 20 MG/ML
20 INJECTION INTRAMUSCULAR; INTRAVENOUS ONCE
Status: COMPLETED | OUTPATIENT
Start: 2024-10-08 | End: 2024-10-08

## 2024-10-08 RX ORDER — BUDESONIDE AND FORMOTEROL FUMARATE DIHYDRATE 80; 4.5 UG/1; UG/1
2 AEROSOL RESPIRATORY (INHALATION)
Qty: 10.2 G | Refills: 3 | Status: SHIPPED | OUTPATIENT
Start: 2024-10-08

## 2024-10-08 RX ORDER — LISINOPRIL 40 MG/1
40 TABLET ORAL DAILY
Qty: 30 TABLET | Refills: 3 | Status: SHIPPED | OUTPATIENT
Start: 2024-10-09

## 2024-10-08 RX ORDER — LISINOPRIL 40 MG/1
40 TABLET ORAL ONCE
Status: COMPLETED | OUTPATIENT
Start: 2024-10-08 | End: 2024-10-08

## 2024-10-08 RX ORDER — CARVEDILOL 25 MG/1
25 TABLET ORAL ONCE
Status: COMPLETED | OUTPATIENT
Start: 2024-10-08 | End: 2024-10-08

## 2024-10-08 RX ORDER — FUROSEMIDE 40 MG
40 TABLET ORAL DAILY
Qty: 60 TABLET | Refills: 3 | Status: SHIPPED | OUTPATIENT
Start: 2024-10-09

## 2024-10-08 RX ADMIN — ALLOPURINOL 50 MG: 100 TABLET ORAL at 08:33

## 2024-10-08 RX ADMIN — LORATADINE 10 MG: 10 TABLET ORAL at 08:34

## 2024-10-08 RX ADMIN — FUROSEMIDE 40 MG: 40 TABLET ORAL at 08:33

## 2024-10-08 RX ADMIN — TICAGRELOR 90 MG: 90 TABLET ORAL at 08:33

## 2024-10-08 RX ADMIN — Medication 1 TABLET: at 08:32

## 2024-10-08 RX ADMIN — HYDRALAZINE HYDROCHLORIDE 25 MG: 25 TABLET ORAL at 05:30

## 2024-10-08 RX ADMIN — HYDRALAZINE HYDROCHLORIDE 20 MG: 20 INJECTION INTRAMUSCULAR; INTRAVENOUS at 01:45

## 2024-10-08 RX ADMIN — INSULIN LISPRO 1 UNITS: 100 INJECTION, SOLUTION INTRAVENOUS; SUBCUTANEOUS at 12:55

## 2024-10-08 RX ADMIN — LISINOPRIL 40 MG: 40 TABLET ORAL at 08:33

## 2024-10-08 RX ADMIN — AZITHROMYCIN DIHYDRATE 500 MG: 250 TABLET ORAL at 08:33

## 2024-10-08 RX ADMIN — CARVEDILOL 25 MG: 25 TABLET, FILM COATED ORAL at 02:01

## 2024-10-08 RX ADMIN — ACETAMINOPHEN 650 MG: 325 TABLET ORAL at 08:32

## 2024-10-08 RX ADMIN — CARVEDILOL 25 MG: 25 TABLET, FILM COATED ORAL at 08:33

## 2024-10-08 RX ADMIN — LISINOPRIL 40 MG: 40 TABLET ORAL at 02:32

## 2024-10-08 RX ADMIN — Medication 2000 UNITS: at 08:33

## 2024-10-08 RX ADMIN — HYDROCHLOROTHIAZIDE 25 MG: 25 TABLET ORAL at 08:33

## 2024-10-08 RX ADMIN — ASPIRIN 81 MG: 81 TABLET, COATED ORAL at 08:33

## 2024-10-08 RX ADMIN — PANTOPRAZOLE SODIUM 40 MG: 40 TABLET, DELAYED RELEASE ORAL at 05:30

## 2024-10-08 RX ADMIN — TIZANIDINE 4 MG: 4 TABLET ORAL at 04:33

## 2024-10-08 RX ADMIN — ALBUTEROL SULFATE 2.5 MG: 2.5 SOLUTION RESPIRATORY (INHALATION) at 07:25

## 2024-10-08 RX ADMIN — TIZANIDINE 4 MG: 4 TABLET ORAL at 10:36

## 2024-10-08 RX ADMIN — AMOXICILLIN AND CLAVULANATE POTASSIUM 1 TABLET: 875; 125 TABLET, FILM COATED ORAL at 08:33

## 2024-10-08 RX ADMIN — ALBUTEROL SULFATE 2.5 MG: 2.5 SOLUTION RESPIRATORY (INHALATION) at 13:58

## 2024-10-08 RX ADMIN — ACETAMINOPHEN 650 MG: 325 TABLET ORAL at 02:04

## 2024-10-08 ASSESSMENT — PAIN SCALES - GENERAL
PAINLEVEL_OUTOF10: 0
PAINLEVEL_OUTOF10: 6
PAINLEVEL_OUTOF10: 10
PAINLEVEL_OUTOF10: 8

## 2024-10-08 ASSESSMENT — PAIN - FUNCTIONAL ASSESSMENT
PAIN_FUNCTIONAL_ASSESSMENT: ACTIVITIES ARE NOT PREVENTED
PAIN_FUNCTIONAL_ASSESSMENT: ACTIVITIES ARE NOT PREVENTED

## 2024-10-08 ASSESSMENT — PAIN DESCRIPTION - ORIENTATION
ORIENTATION: ANTERIOR;POSTERIOR
ORIENTATION: LOWER
ORIENTATION: ANTERIOR

## 2024-10-08 ASSESSMENT — ENCOUNTER SYMPTOMS
EYES NEGATIVE: 1
CHEST TIGHTNESS: 0
WHEEZING: 1
GASTROINTESTINAL NEGATIVE: 1
COUGH: 0
SHORTNESS OF BREATH: 0

## 2024-10-08 ASSESSMENT — PAIN DESCRIPTION - LOCATION
LOCATION: BACK
LOCATION: HEAD
LOCATION: HEAD;NECK

## 2024-10-08 ASSESSMENT — PAIN DESCRIPTION - DESCRIPTORS
DESCRIPTORS: ACHING
DESCRIPTORS: ACHING

## 2024-10-08 ASSESSMENT — PAIN DESCRIPTION - PAIN TYPE: TYPE: ACUTE PAIN

## 2024-10-08 ASSESSMENT — PAIN DESCRIPTION - ONSET: ONSET: SUDDEN

## 2024-10-08 ASSESSMENT — PAIN DESCRIPTION - FREQUENCY: FREQUENCY: INTERMITTENT

## 2024-10-08 NOTE — PLAN OF CARE
Problem: Respiratory - Adult  Goal: Achieves optimal ventilation and oxygenation  10/7/2024 2003 by Maria Elena Brown RCP  Outcome: Progressing     Problem: Respiratory - Adult  Goal: Able to breathe comfortably  Description: Able to breathe comfortably  10/7/2024 2003 by Maria Elena Brown RCP  Outcome: Progressing     Problem: Respiratory - Adult  Goal: Clear lung sounds  10/7/2024 2003 by Maria Elena Brown RCP  Outcome: Progressing

## 2024-10-08 NOTE — PLAN OF CARE
Some change overnight  Elevated BP most of night, cardio made aware (see MAR) pressure now under control. Pt reported headache during this time managed with tylenol, resolved when BP stable  Education provided on hydralazine as pt refusing d/t hx CAD, now willing  Awaiting nuclear stress test and ECHO results, DC planning based on results  Low back pain managed with tizanidine, effective  Free of nausea, falls  Up independent overnight  All questions answered, no needs at this time, wcm    Patient having pain on their back and head and rates it a 10. Pain interventions includefrequent position changes, relaxation techniques, medication, pillow support/positioning, diversional activities, regular rest periods, medicate per physician recommendation/order, and medicate before exercise/activity. Patients goal for pain relief is 1. The need for pain and symptom management will be considered in the discharge planning process to ensure patients comfort.      Problem: Chronic Conditions and Co-morbidities  Goal: Patient's chronic conditions and co-morbidity symptoms are monitored and maintained or improved  10/8/2024 0438 by Jenaro Sterling RN  Outcome: Progressing  Flowsheets (Taken 10/7/2024 2000)  Care Plan - Patient's Chronic Conditions and Co-Morbidity Symptoms are Monitored and Maintained or Improved:   Monitor and assess patient's chronic conditions and comorbid symptoms for stability, deterioration, or improvement   Collaborate with multidisciplinary team to address chronic and comorbid conditions and prevent exacerbation or deterioration   Update acute care plan with appropriate goals if chronic or comorbid symptoms are exacerbated and prevent overall improvement and discharge     Problem: Discharge Planning  Goal: Discharge to home or other facility with appropriate resources  10/8/2024 0438 by Jenaro Sterling RN  Outcome: Progressing  Flowsheets (Taken 10/7/2024 2000)  Discharge to home or other facility with  standing, transferring and ambulating with or without assistive devices   Assist with transfers and ambulation using safe patient handling equipment as needed   Ensure adequate protection for wounds/incisions during mobilization   Obtain physical therapy/occupational therapy consults as needed

## 2024-10-08 NOTE — PROGRESS NOTES
Cardio rounded at bedside. Okay for discharge. Scripts to be delivered in the next hour at time of note. Follow up appointment made with cardiology at [facility] on November 11th, at 1pm. Patient made aware.

## 2024-10-08 NOTE — RT PROTOCOL NOTE
or revise RT bronchodilator order(s) to equivalent RT Bronchodilator order with Frequency of every 4 hours PRN for wheezing or increased work of breathing using Per Protocol order mode.        4-6 - enter or revise RT Bronchodilator order(s) to two equivalent RT bronchodilator orders with one order with BID Frequency and one order with Frequency of every 4 hours PRN wheezing or increased work of breathing using Per Protocol order mode.        7-10 - enter or revise RT Bronchodilator order(s) to two equivalent RT bronchodilator orders with one order with TID Frequency and one order with Frequency of every 4 hours PRN wheezing or increased work of breathing using Per Protocol order mode.       11-13 - enter or revise RT Bronchodilator order(s) to one equivalent RT bronchodilator order with QID Frequency and an Albuterol order with Frequency of every 4 hours PRN wheezing or increased work of breathing using Per Protocol order mode.      Greater than 13 - enter or revise RT Bronchodilator order(s) to one equivalent RT bronchodilator order with every 4 hours Frequency and an Albuterol order with Frequency of every 2 hours PRN wheezing or increased work of breathing using Per Protocol order mode.     RT to enter RT Home Evaluation for COPD & MDI Assessment order using Per Protocol order mode.    Electronically signed by Maria Elena Brown RCP on 10/7/2024 at 9:25 PM

## 2024-10-08 NOTE — DISCHARGE SUMMARY
[facility]  Office: [phone]  Zeeshan Edwards DO, Gerardo Dias DO, Reji Salazar DO, Tanner Ferrell, DO, Wendy Jordan MD, Rakel Hua MD, Aleyda Macias MD, Sue Livingston MD,  David Robert MD, Sam Moreira MD, Vaishnavi Fine MD,  Sanya Anderson DO, Tacho Ho MD, Jose Conn MD, Haim Edwards DO, Madisyn Summers MD,  Terrence Londono DO, Ragini Lehman MD, Cathleen Richter MD, Camila Boone MD, Avni Hernandez MD,  Reji Milian MD, Miles Shen MD, Darron Olivo MD, Ingacio Rangel MD, Angelo Campo MD, Spencer Carbajal MD, Isaiah Cooper, DO, Ry Acuna DO, Donta Mejía DO, Benjamin Dale MD, Shirley Waterhouse, CNP,  Ana Cid CNP, Isaiah Duckworth, CNP,  Earlene Alcantar, [facility], Estefania Helm, CNP, Luda Zamudio, CNP, Aminata Petty, CNP, Heike Mcdonald, CNP, Lolly Espinoza, PA-C, Linh Kumar PA-C, Em Amato, CNP, Jamal Steinberg, CNP,  Kathy Cyr, CNP, Argelia Bronson, CNP, Daisy Epperson, CNP, Anel Dennison, CNS, Jazmin Gonzalez, CNP, Blessing Nuno, CNP, Tracy Schwab, CNP            [facility]   IN-PATIENT SERVICE   [facility]     Discharge Summary     Patient ID: Syl Dejesus  :  1975           MRN: [id number]                                     ACCOUNT:  [id number]   Patient's PCP: Crista Calixto MD  Admit Date: 10/6/2024   Discharge Date: 10/8/2024     Length of Stay: 2  Code Status:  Full Code  Admitting Physician: Angelo Campo MD  Discharge Physician: HAYDEE Box NP      Active Discharge Diagnoses:      Hospital Problem Lists:  Principal Problem (Resolved):    Chest pain of unknown etiology  Active Problems:    Essential hypertension    Gastroesophageal reflux disease without esophagitis    Mixed hyperlipidemia    Type 2 diabetes mellitus with diabetic polyneuropathy, without long-term current use of insulin (HCC)    Bronchitis with asthma, acute  Resolved Problems:    Chest pain    Moderate  capsule  Commonly known as: Cymbalta      fluconazole 150 MG tablet  Commonly known as: Diflucan      fluticasone 50 MCG/ACT nasal spray  Commonly known as: Flonase Allergy Relief      ketoconazole 2 % cream  Commonly known as: NIZORAL                ASK your doctor about these medications       blood glucose test strips  Testing once a day.  Please dispense according to patients insurance.      dulaglutide 0.75 MG/0.5ML Sopn SC injection  Commonly known as: TRULICITY  Inject 0.5 mLs into the skin once a week                    Where to Get Your Medications          These medications were sent to [id number] - [place], [place] - [address] - P [phone] - F [phone]  [facility] [address]        Phone: [phone]   budesonide-formoterol 80-4.5 MCG/ACT Aero  furosemide 40 MG tablet  hydrALAZINE 25 MG tablet  lisinopril 40 MG tablet  meloxicam 15 MG tablet                  Discharge Procedure Orders   Basic Metabolic Panel   Standing Status: Future Standing Exp. Date: 10/08/25   Order Comments: Please send results to [facility] and PCP         Time Spent on discharge is   24 mins  in patient examination, evaluation, counseling as well as medication reconciliation, prescriptions for required medications, discharge plan and follow up.     Electronically signed by   HAYDEE Box NP  10/8/2024  11:28 AM        Thank you Crista Arredondo MD for the opportunity to be involved in this patient's care.

## 2024-10-08 NOTE — PROGRESS NOTES
Positive for wheezing. Negative for cough, chest tightness and shortness of breath.    Cardiovascular: Negative.    Gastrointestinal: Negative.    Genitourinary: Negative.    Musculoskeletal: Negative.    Skin: Negative.    Neurological: Negative.    Psychiatric/Behavioral: Negative.         Medications:     Allergies:    Allergies   Allergen Reactions    Fentanyl      Pt never wants this again as it caused her hair and teeth to fall out.   2/15/24 pt denies this allergy    Nifedipine Er Hives    Codeine Nausea And Vomiting       Current Meds:   Scheduled Meds:    vitamin B and C  1 tablet Oral Daily    magnesium oxide  200 mg Oral Dinner    therapeutic multivitamin-minerals  1 tablet Oral Daily    pantoprazole  40 mg Oral QAM AC    insulin lispro  0-4 Units SubCUTAneous TID WC    insulin lispro  0-4 Units SubCUTAneous Nightly    hydrALAZINE  25 mg Oral 3 times per day    lisinopril  40 mg Oral Daily    nicotine  1 patch TransDERmal Once    melatonin  3 mg Oral Nightly    guaiFENesin  600 mg Oral BID    allopurinol  50 mg Oral Daily    aspirin  81 mg Oral Daily    atorvastatin  80 mg Oral Nightly    budesonide-formoterol  2 puff Inhalation BID RT    carvedilol  25 mg Oral BID    dulaglutide  0.75 mg SubCUTAneous Weekly    fluticasone  2 spray Nasal Daily    hydroCHLOROthiazide  25 mg Oral BID    montelukast  10 mg Oral Nightly    ticagrelor  90 mg Oral BID    Vitamin D  2,000 Units Oral Daily    albuterol  2.5 mg Nebulization TID    nicotine  1 patch TransDERmal Daily    azithromycin  500 mg Oral Daily    amoxicillin-clavulanate  1 tablet Oral 2 times per day    furosemide  40 mg Oral Daily    loratadine  10 mg Oral Daily     Continuous Infusions:    dextrose       PRN Meds: albuterol, glucose, dextrose bolus **OR** dextrose bolus, glucagon (rDNA), dextrose, butalbital-acetaminophen-caffeine, ondansetron **OR** ondansetron, perflutren lipid microspheres, potassium chloride **OR** potassium alternative oral    Family History   Problem Relation Age of Onset    Eczema Mother     COPD Mother     Liver Disease Mother     Heart Disease Mother     Heart Disease Maternal Grandmother     Atrial Fibrillation Maternal Grandfather     Heart Attack Paternal Grandfather        Vitals:  BP (!) 114/55   Pulse 62   Temp 97.9 °F (36.6 °C)   Resp 18   Ht 1.651 m (5' 5\")   Wt 113.4 kg (250 lb)   SpO2 93%   BMI 41.60 kg/m²   Temp (24hrs), Av.9 °F (36.6 °C), Min:97.5 °F (36.4 °C), Max:98.2 °F (36.8 °C)    Recent Labs     10/07/24  1227 10/07/24  1710 10/07/24  2025   POCGLU 193* 195* 244*       I/O (24Hr):    Intake/Output Summary (Last 24 hours) at 10/8/2024 0712  Last data filed at 10/8/2024 0427  Gross per 24 hour   Intake 10 ml   Output 2800 ml   Net -2790 ml       Labs:  Hematology:  Recent Labs     10/06/24  0455 10/07/24  06   WBC 15.9* 21.1*   RBC 4.01 4.10   HGB 13.2 13.3   HCT 37.5 39.4   MCV 93.5 96.1   MCH 32.9 32.4   MCHC 35.2* 33.8   RDW 12.7 13.0    334   MPV 10.1 9.9     Chemistry:  Recent Labs     10/06/24  0701 10/06/24  0920 10/06/24  1342 10/07/24  0640     --   --  140   K 3.4*  --   --  3.5*     --   --  98   CO2 28  --   --  27   GLUCOSE 173*  --   --  182*   BUN 14  --   --  18   CREATININE 0.6  --   --  0.7   MG 1.5*  --  1.6 1.6   ANIONGAP 12  --   --  15   LABGLOM >90  --   --  >90   CALCIUM 9.5  --   --  9.7   PROBNP 1,090*  --   --   --    TROPHS 18* 17*  --   --      Recent Labs     10/07/24  0640 10/07/24  1227 10/07/24  1710 10/07/24  2025   CHOL 115  --   --   --    HDL 27*  --   --   --    CHOLHDLRATIO 4.0  --   --   --    TRIG 259*  --   --   --    VLDL 52  --   --   --    POCGLU  --  193* 195* 244*     ABG:  Lab Results   Component Value Date/Time    SPECIAL LFA,1ML 10/06/2024 01:35 PM     Lab Results   Component Value Date/Time    CULTURE NO GROWTH 1 DAY 10/06/2024 01:35 PM       Radiology:  Nuclear stress test with myocardial perfusion    Result Date: 10/7/2024  [Normal

## 2024-10-08 NOTE — PLAN OF CARE
Problem: Respiratory - Adult  Goal: Achieves optimal ventilation and oxygenation  10/8/2024 0725 by Elma Meidna RCP  Outcome: Progressing  10/8/2024 0438 by Jenaro Sterling, RN  Outcome: Progressing  10/7/2024 2003 by Maria Elena Brown RCP  Outcome: Progressing  Flowsheets (Taken 10/7/2024 2000 by Jenaro Sterling, RN)  Achieves optimal ventilation and oxygenation:   Assess for changes in respiratory status   Assess for changes in mentation and behavior   Position to facilitate oxygenation and minimize respiratory effort  Goal: Able to breathe comfortably  Description: Able to breathe comfortably  10/8/2024 0725 by Elma Medina RCP  Outcome: Progressing  10/7/2024 2003 by Maria Elena Brown RCP  Outcome: Progressing  Goal: Clear lung sounds  10/8/2024 0725 by Elma Medina RCP  Outcome: Progressing  10/7/2024 2003 by Maria Elena Brown RCP  Outcome: Progressing

## 2024-10-08 NOTE — PROGRESS NOTES
ANTIMICROBIAL STEWARDSHIP  Upon review of the patient's chart, the committee has the following recommendation for your consideration:    Indication: Bronchitis with asthma exacerbation     Day of Antimicrobial Therapy:   Recommendation   >95% of bronchitis etiology is viral. No acute pulmonary process on CXR. Received an IV high dose steroid x 1.    Procalcitonin on 10/7/2024 is negative, indicating less likely bacterial etiology.     We recommend monitoring off antibiotic therapy.         Recent Labs     10/06/24  0455 10/07/24  0640   WBC 15.9* 21.1*     Recent Labs     10/07/24  0640   PROCAL 0.06       Unnecessary or inappropriate antimicrobial use increases the risk of subsequent infections with resistant bacterial infections and drug-associated toxicities including C. difficile infection.     Thank you.  Radha Ulloa [facility], PharmD  10/8/2024  8:46 AM    The Antimicrobial Stewardship Committee at [facility] is led by  Vijay Segura MD, Infectious Diseases Section Chair.

## 2024-10-08 NOTE — PROGRESS NOTES
CLINICAL PHARMACY NOTE: MEDS TO BEDS    Total # of Prescriptions Filled: 5   The following medications were delivered to the patient:  Furosemide 40mg  Symbicort 80-4.5mcg inhaler  Hydralazine 25mg  Lisinopril 40mg  Meloxicam 15mg    Additional Documentation:

## 2024-10-08 NOTE — PROGRESS NOTES
All patient belongings collected. IV and telemetry removed. Discharge paperwork given and explained to patient regarding new scripts and follow up appointments to be made. Patient wheeled out to private auto via staff. See discharge event for further details.

## 2024-10-08 NOTE — DISCHARGE INSTRUCTIONS
Follow up with a PCP in 1 week    Follow up with cardiology in 2-4 weeks    Have labs (BMP) drawn in 1 week    Take medications as prescribed    Stop taking meloxicam and come to the hospital immediately if heartburn occurs and does not go away or if you start having dark or red stools.     Return to hospital if chest pain symptoms recur or worsen

## 2024-10-08 NOTE — PROGRESS NOTES
[facility]  Office: [phone]  Zeeshan Edwards DO, Gerardo Dias DO, Reji Salazar DO, Tanner Ferrell, DO, Wendy Jordan MD, Rakel Hua MD, Aleyda Macias MD, Sue Livingston MD,  David Robert MD, Sam Moreira MD, Vaishnavi Fine MD,  Sanya Anderson DO, Tacho Ho MD, Jose Conn MD, Haim Edwards DO, Madisyn Summers MD,  Terrence Londono DO, Ragini Lehman MD, Cathleen Richter MD, Camila Boone MD, Avni Hernandez MD,  Reji Milian MD, Miles Shen MD, Darron Olivo MD, Ignacio Rangel MD, Angelo Campo MD, Spencer Carbajal MD, Isaiah Cooper, DO, Ry Acuna DO, Donta Mejía DO, Benjamin Dale MD, Shirley Waterhouse, CNP,  Ana Cid CNP, Isaiah Duckworth, CNP,  Earlene Alcantar, [facility], Estefania Helm, CNP, Luda Zamudio, CNP, Aminata Petty, CNP, Heike Mcdonald, CNP, Lolly Espinoza, PA-C, Linh Kumar PA-C, Em Amato, CNP, Jamal Steinberg, CNP,  Kathy Cyr, CNP, Argelia Bronson, CNP, Daisy Epperson, CNP, Anel Dennison, CNS, Jazmin Gonzalez, CNP, Blessing Nuno, CNP, Tracy Schwab, CNP         [facility]   IN-PATIENT SERVICE   [facility]    Discharge Summary     Patient ID: Syl Dejesus  :  1975   MRN: [id number]     ACCOUNT:  [id number]   Patient's PCP: Crista Calixto MD  Admit Date: 10/6/2024   Discharge Date: 10/8/2024     Length of Stay: 2  Code Status:  Full Code  Admitting Physician: Angelo Campo MD  Discharge Physician: HAYDEE Box NP     Active Discharge Diagnoses:     Hospital Problem Lists:  Principal Problem (Resolved):    Chest pain of unknown etiology  Active Problems:    Essential hypertension    Gastroesophageal reflux disease without esophagitis    Mixed hyperlipidemia    Type 2 diabetes mellitus with diabetic polyneuropathy, without long-term current use of insulin (HCC)    Bronchitis with asthma, acute  Resolved Problems:    Chest pain    Moderate persistent asthma with acute

## 2024-10-11 RX ORDER — MELOXICAM 15 MG/1
15 TABLET ORAL DAILY PRN
Qty: 30 TABLET | Refills: 0 | Status: SHIPPED | OUTPATIENT
Start: 2024-10-11

## 2024-10-11 NOTE — PROGRESS NOTES
Physician Progress Note      PATIENT:               ADRIANO MILLER  CSN #:                  [id number]  :                       1975  ADMIT DATE:       10/6/2024 4:14 AM  DISCH DATE:        10/8/2024 3:46 PM  RESPONDING  PROVIDER #:        Angelo Campo MD          QUERY TEXT:    Pt admitted with chest pain. 10/8 DC Summary states \"developed chest pain   prior to coming to the ER with numbness and tingling in her left arm and jaw.   She had normal troponins but an elevated WBC. She was admitted with probable   bronchitis with chest pain rule out. She was started on atb although   procalcitonin was negative. She is chronically wheezing- hx asthma and former   smoker. Lexiscan stress was completed and revealed low risk. She was also   hypertensive this stay and her BP medications were adjusted by cardiology.   Chest pain resolved and her BP improved\". Troponin 18, 1    The medical record reflects the following:  Risk Factors: 50yo, hx Two-vessel CAD, s/p PCI with NESTOR to proximal / mid LAD   and LCD on 24, former smoker  Clinical Indicators: 10/8 DC Summary states \"developed chest pain prior to   coming to the ER with numbness and tingling in her left arm and jaw. She had   normal troponins but an elevated WBC. She was admitted with probable   bronchitis with chest pain rule out. She was started on atb although   procalcitonin was negative. She is chronically wheezing- hx asthma and former   smoker. Lexiscan stress was completed and revealed low risk. She was also   hypertensive this stay and her BP medications were adjusted by cardiology.   Chest pain resolved and her BP improved\". Troponin 18, 17. BP 22  Treatment: labs, imaging, cardiology consult, tele, ECHO, stress test, EKG, PO   aspirin, PO lipitor, PO coreg, IV lasix, PO/IV zithromax, PO/IV hydralazine,   PO hydroCHLOROthiazide, IV labetalol, PO lisinopril, PO brilinta    Thank you,  Aminata Pantoja (Klaus), RN BSN  Clinical Documentation

## 2024-10-23 DIAGNOSIS — M54.42 CHRONIC MIDLINE LOW BACK PAIN WITH BILATERAL SCIATICA: ICD-10-CM

## 2024-10-23 DIAGNOSIS — M51.369 LUMBAR DEGENERATIVE DISC DISEASE: ICD-10-CM

## 2024-10-23 DIAGNOSIS — M96.1 FAILED BACK SYNDROME, LUMBAR: ICD-10-CM

## 2024-10-23 DIAGNOSIS — M48.061 NEURAL FORAMINAL STENOSIS OF LUMBAR SPINE: ICD-10-CM

## 2024-10-23 DIAGNOSIS — M54.41 CHRONIC MIDLINE LOW BACK PAIN WITH BILATERAL SCIATICA: ICD-10-CM

## 2024-10-23 DIAGNOSIS — E83.42 HYPOMAGNESEMIA: ICD-10-CM

## 2024-10-23 DIAGNOSIS — G89.29 CHRONIC MIDLINE LOW BACK PAIN WITH BILATERAL SCIATICA: ICD-10-CM

## 2024-10-23 RX ORDER — PNV NO.95/FERROUS FUM/FOLIC AC 28MG-0.8MG
1 TABLET ORAL
Qty: 90 TABLET | Refills: 3 | Status: SHIPPED | OUTPATIENT
Start: 2024-10-23

## 2024-10-23 RX ORDER — PSEUDOEPHED/ACETAMINOPH/DIPHEN 30MG-500MG
TABLET ORAL
Qty: 120 TABLET | Refills: 3 | Status: SHIPPED | OUTPATIENT
Start: 2024-10-23

## 2024-10-23 NOTE — TELEPHONE ENCOUNTER
Please Approve or Refuse.  Send to Pharmacy per Pt's Request: irena      Next Visit Date:  11/13/2024   Last Visit Date: 8/29/2024    Hemoglobin A1C (%)   Date Value   04/16/2024 6.5 (H)   01/16/2023 6.1 (H)   09/21/2021 5.7             ( goal A1C is < 7)   BP Readings from Last 3 Encounters:   10/08/24 (!) 140/77   09/10/24 122/66   07/12/24 (!) 156/80          (goal 120/80)  BUN   Date Value Ref Range Status   10/07/2024 18 6 - 20 mg/dL Final     Creatinine   Date Value Ref Range Status   10/07/2024 0.7 0.5 - 0.9 mg/dL Final     Potassium   Date Value Ref Range Status   10/07/2024 3.5 (L) 3.7 - 5.3 mmol/L Final

## 2024-11-05 DIAGNOSIS — I10 ESSENTIAL HYPERTENSION: ICD-10-CM

## 2024-11-05 RX ORDER — POTASSIUM CHLORIDE 1500 MG/1
20 TABLET, EXTENDED RELEASE ORAL 2 TIMES DAILY
Qty: 180 TABLET | Refills: 3 | Status: SHIPPED | OUTPATIENT
Start: 2024-11-05

## 2024-11-06 DIAGNOSIS — M96.1 FAILED BACK SYNDROME, LUMBAR: ICD-10-CM

## 2024-11-06 DIAGNOSIS — M54.16 LUMBAR RADICULOPATHY: ICD-10-CM

## 2024-11-06 DIAGNOSIS — M51.362 DEGENERATION OF INTERVERTEBRAL DISC OF LUMBAR REGION WITH DISCOGENIC BACK PAIN AND LOWER EXTREMITY PAIN: ICD-10-CM

## 2024-11-06 DIAGNOSIS — M48.061 SPINAL STENOSIS AT L4-L5 LEVEL: ICD-10-CM

## 2024-11-06 RX ORDER — MELOXICAM 15 MG/1
15 TABLET ORAL DAILY PRN
Qty: 30 TABLET | Refills: 0 | Status: SHIPPED | OUTPATIENT
Start: 2024-11-06

## 2024-11-06 RX ORDER — MELOXICAM 15 MG/1
TABLET ORAL
Qty: 30 TABLET | Refills: 0 | OUTPATIENT
Start: 2024-11-06

## 2024-11-06 NOTE — TELEPHONE ENCOUNTER
Please Approve or Refuse.  Send to Pharmacy per Pt's Request:      Next Visit Date:  11/13/2024   Last Visit Date: 8/29/2024    Hemoglobin A1C (%)   Date Value   04/16/2024 6.5 (H)   01/16/2023 6.1 (H)   09/21/2021 5.7             ( goal A1C is < 7)   BP Readings from Last 3 Encounters:   10/08/24 (!) 140/77   09/10/24 122/66   07/12/24 (!) 156/80          (goal 120/80)  BUN   Date Value Ref Range Status   10/07/2024 18 6 - 20 mg/dL Final     Creatinine   Date Value Ref Range Status   10/07/2024 0.7 0.5 - 0.9 mg/dL Final     Potassium   Date Value Ref Range Status   10/07/2024 3.5 (L) 3.7 - 5.3 mmol/L Final

## 2024-11-19 ENCOUNTER — OFFICE VISIT (OUTPATIENT)
Dept: NEUROLOGY | Age: 49
End: 2024-11-19
Payer: COMMERCIAL

## 2024-11-19 VITALS
SYSTOLIC BLOOD PRESSURE: 153 MMHG | WEIGHT: 254.4 LBS | DIASTOLIC BLOOD PRESSURE: 81 MMHG | HEART RATE: 84 BPM | BODY MASS INDEX: 46.81 KG/M2 | HEIGHT: 62 IN

## 2024-11-19 DIAGNOSIS — G25.81 RLS (RESTLESS LEGS SYNDROME): ICD-10-CM

## 2024-11-19 DIAGNOSIS — G89.29 CHRONIC LEFT-SIDED LOW BACK PAIN WITH LEFT-SIDED SCIATICA: ICD-10-CM

## 2024-11-19 DIAGNOSIS — M62.838 MUSCLE SPASMS OF BOTH LOWER EXTREMITIES: ICD-10-CM

## 2024-11-19 DIAGNOSIS — M79.2 NEUROPATHIC PAIN: Primary | ICD-10-CM

## 2024-11-19 DIAGNOSIS — M54.42 CHRONIC LEFT-SIDED LOW BACK PAIN WITH LEFT-SIDED SCIATICA: ICD-10-CM

## 2024-11-19 PROCEDURE — G8417 CALC BMI ABV UP PARAM F/U: HCPCS | Performed by: PSYCHIATRY & NEUROLOGY

## 2024-11-19 PROCEDURE — G8427 DOCREV CUR MEDS BY ELIG CLIN: HCPCS | Performed by: PSYCHIATRY & NEUROLOGY

## 2024-11-19 PROCEDURE — 99204 OFFICE O/P NEW MOD 45 MIN: CPT | Performed by: PSYCHIATRY & NEUROLOGY

## 2024-11-19 PROCEDURE — G8484 FLU IMMUNIZE NO ADMIN: HCPCS | Performed by: PSYCHIATRY & NEUROLOGY

## 2024-11-19 PROCEDURE — 3079F DIAST BP 80-89 MM HG: CPT | Performed by: PSYCHIATRY & NEUROLOGY

## 2024-11-19 PROCEDURE — 1036F TOBACCO NON-USER: CPT | Performed by: PSYCHIATRY & NEUROLOGY

## 2024-11-19 PROCEDURE — 3077F SYST BP >= 140 MM HG: CPT | Performed by: PSYCHIATRY & NEUROLOGY

## 2024-11-19 RX ORDER — TIZANIDINE 2 MG/1
TABLET ORAL
Qty: 120 TABLET | Refills: 3 | Status: SHIPPED | OUTPATIENT
Start: 2024-11-19

## 2024-11-19 RX ORDER — GABAPENTIN 100 MG/1
CAPSULE ORAL
Qty: 180 CAPSULE | Refills: 1 | Status: SHIPPED | OUTPATIENT
Start: 2024-11-19 | End: 2025-11-24

## 2024-11-19 NOTE — PROGRESS NOTES
software and is inherently subject to errors including those of syntax and \"sound alike\" substitutions which may escape proofreading.  In such instances, original meaning may be extrapolated by contextual derivation.

## 2024-11-20 ENCOUNTER — PATIENT MESSAGE (OUTPATIENT)
Dept: FAMILY MEDICINE CLINIC | Age: 49
End: 2024-11-20

## 2024-11-20 DIAGNOSIS — L30.4 INTERTRIGO: Primary | ICD-10-CM

## 2024-11-21 RX ORDER — KETOCONAZOLE 20 MG/G
CREAM TOPICAL
Qty: 60 G | Refills: 3 | Status: SHIPPED | OUTPATIENT
Start: 2024-11-21

## 2024-11-25 ENCOUNTER — TELEPHONE (OUTPATIENT)
Dept: NEUROLOGY | Age: 49
End: 2024-11-25

## 2024-11-25 NOTE — TELEPHONE ENCOUNTER
Patient called into office stating when she picked up her tizanidine prescription on Friday, she realized that it was 2 mg tablets instead of 4 mg. She said that she has been taking 4 mg QID. She would like to resume this dose. Please advise.

## 2024-11-27 DIAGNOSIS — Z51.81 MEDICATION MONITORING ENCOUNTER: Primary | ICD-10-CM

## 2024-11-27 NOTE — TELEPHONE ENCOUNTER
Please let the patient know that I was looking for liver enzymes and I did not find any test results for past one or two years.   I did put in that request; please let her know. Until then, she can use up the present supply.   Thank you.   -dr. nowak

## 2024-12-06 DIAGNOSIS — M51.362 DEGENERATION OF INTERVERTEBRAL DISC OF LUMBAR REGION WITH DISCOGENIC BACK PAIN AND LOWER EXTREMITY PAIN: ICD-10-CM

## 2024-12-06 DIAGNOSIS — M48.061 SPINAL STENOSIS AT L4-L5 LEVEL: ICD-10-CM

## 2024-12-06 DIAGNOSIS — M96.1 FAILED BACK SYNDROME, LUMBAR: ICD-10-CM

## 2024-12-06 DIAGNOSIS — M54.16 LUMBAR RADICULOPATHY: ICD-10-CM

## 2024-12-06 RX ORDER — MELOXICAM 15 MG/1
15 TABLET ORAL DAILY PRN
Qty: 30 TABLET | Refills: 0 | Status: SHIPPED | OUTPATIENT
Start: 2024-12-06

## 2024-12-06 NOTE — TELEPHONE ENCOUNTER
Current Outpatient Medications on File Prior to Visit   Medication Sig Dispense Refill    ketoconazole (NIZORAL) 2 % cream Apply under the skin folds with rash twice a day for 6 weeks 60 g 3    gabapentin (NEURONTIN) 100 MG capsule Take one cap three times daily x 2wk, then two cap three times daily 180 capsule 1    tiZANidine (ZANAFLEX) 2 MG tablet Take one tab four times daily 120 tablet 3    Omega-3 Fatty Acids (OMEGA 3 500 PO) Take by mouth      Blood Pressure KIT 1 each by Does not apply route daily 1 kit 0    hydroCHLOROthiazide (HYDRODIURIL) 25 MG tablet Take 1 tablet by mouth every morning 90 tablet 1    amLODIPine (NORVASC) 5 MG tablet Take 1 tablet by mouth every evening 30 tablet 3    meloxicam (MOBIC) 15 MG tablet Take 1 tablet by mouth daily as needed for Pain (Back pain) There is high interaction with Brilinta, aspirin, and blood pressure medications, increases the risk of bleeding, ulcers, and kidney failure. 30 tablet 0    potassium chloride (KLOR-CON M) 20 MEQ extended release tablet Take 1 tablet by mouth 2 times daily Take with furosemide 180 tablet 3    Acetaminophen Extra Strength 500 MG TABS TAKE 1 TABLET BY MOUTH EVERY 6 HOURS AS NEEDED FOR PAIN. MAX DAILY AMOUNT: 4 TABLETS 120 tablet 3    furosemide (LASIX) 40 MG tablet Take 1 tablet by mouth daily 60 tablet 3    loratadine (CLARITIN) 10 MG tablet Take 1 tablet by mouth daily 90 tablet 3    olmesartan (BENICAR) 40 MG tablet Take 1 tablet by mouth daily Goal BP bellow 130/80 and above 115/65, heart rate 56 to 90 bpm 90 tablet 3    B Complex Vitamins (VITAMIN B COMPLEX) TABS Take 1 tablet by mouth daily 90 tablet 3    Magnesium Oxide (MAGNESIUM-OXIDE) 250 MG TABS tablet Take 1 tablet by mouth Daily with supper 90 tablet 0    vitamin D (CHOLECALCIFEROL) 50 MCG (2000 UT) TABS tablet Take 1 tablet by mouth daily 90 tablet 3    carvedilol (COREG) 25 MG tablet TAKE 1 TABLET BY MOUTH TWICE DAILY 180 tablet 3    Multiple Vitamins-Minerals

## 2024-12-22 DIAGNOSIS — M96.1 FAILED BACK SYNDROME, LUMBAR: ICD-10-CM

## 2024-12-22 DIAGNOSIS — M51.362 DEGENERATION OF INTERVERTEBRAL DISC OF LUMBAR REGION WITH DISCOGENIC BACK PAIN AND LOWER EXTREMITY PAIN: ICD-10-CM

## 2024-12-22 DIAGNOSIS — M54.16 LUMBAR RADICULOPATHY: ICD-10-CM

## 2024-12-22 DIAGNOSIS — M48.061 SPINAL STENOSIS AT L4-L5 LEVEL: ICD-10-CM

## 2024-12-23 DIAGNOSIS — J31.0 CHRONIC RHINITIS: ICD-10-CM

## 2024-12-23 DIAGNOSIS — G89.29 CHRONIC MIDLINE LOW BACK PAIN WITH BILATERAL SCIATICA: ICD-10-CM

## 2024-12-23 DIAGNOSIS — M96.1 FAILED BACK SYNDROME, LUMBAR: ICD-10-CM

## 2024-12-23 DIAGNOSIS — M54.41 CHRONIC MIDLINE LOW BACK PAIN WITH BILATERAL SCIATICA: ICD-10-CM

## 2024-12-23 DIAGNOSIS — M48.061 NEURAL FORAMINAL STENOSIS OF LUMBAR SPINE: ICD-10-CM

## 2024-12-23 DIAGNOSIS — M54.42 CHRONIC MIDLINE LOW BACK PAIN WITH BILATERAL SCIATICA: ICD-10-CM

## 2024-12-23 DIAGNOSIS — M51.369 LUMBAR DEGENERATIVE DISC DISEASE: ICD-10-CM

## 2024-12-23 RX ORDER — PSEUDOEPHED/ACETAMINOPH/DIPHEN 30MG-500MG
TABLET ORAL
Qty: 120 TABLET | Refills: 3 | Status: SHIPPED | OUTPATIENT
Start: 2024-12-23

## 2024-12-23 RX ORDER — MELOXICAM 15 MG/1
15 TABLET ORAL DAILY PRN
Qty: 30 TABLET | Refills: 0 | Status: SHIPPED | OUTPATIENT
Start: 2024-12-23

## 2024-12-23 RX ORDER — LORATADINE 10 MG/1
10 TABLET ORAL DAILY
Qty: 90 TABLET | Refills: 3 | Status: SHIPPED | OUTPATIENT
Start: 2024-12-23

## 2024-12-23 NOTE — TELEPHONE ENCOUNTER
Please Approve or Refuse.  Send to Pharmacy per Pt's Request: YULIET     Next Visit Date:  3/5/2025   Last Visit Date: 8/29/2024    Hemoglobin A1C (%)   Date Value   04/16/2024 6.5 (H)   01/16/2023 6.1 (H)   09/21/2021 5.7             ( goal A1C is < 7)   BP Readings from Last 3 Encounters:   11/19/24 (!) 153/81   11/11/24 (!) 180/108   10/08/24 (!) 140/77          (goal 120/80)  BUN   Date Value Ref Range Status   10/07/2024 18 6 - 20 mg/dL Final     Creatinine   Date Value Ref Range Status   10/07/2024 0.7 0.5 - 0.9 mg/dL Final     Potassium   Date Value Ref Range Status   10/07/2024 3.5 (L) 3.7 - 5.3 mmol/L Final

## 2024-12-23 NOTE — TELEPHONE ENCOUNTER
Please Approve or Refuse.  Send to Pharmacy per Pt's Request:      Next Visit Date:  3/5/2025   Last Visit Date: 8/29/2024    Hemoglobin A1C (%)   Date Value   04/16/2024 6.5 (H)   01/16/2023 6.1 (H)   09/21/2021 5.7             ( goal A1C is < 7)   BP Readings from Last 3 Encounters:   11/19/24 (!) 153/81   11/11/24 (!) 180/108   10/08/24 (!) 140/77          (goal 120/80)  BUN   Date Value Ref Range Status   10/07/2024 18 6 - 20 mg/dL Final     Creatinine   Date Value Ref Range Status   10/07/2024 0.7 0.5 - 0.9 mg/dL Final     Potassium   Date Value Ref Range Status   10/07/2024 3.5 (L) 3.7 - 5.3 mmol/L Final

## 2024-12-24 DIAGNOSIS — I10 ESSENTIAL HYPERTENSION: ICD-10-CM

## 2024-12-24 RX ORDER — OLMESARTAN MEDOXOMIL 40 MG/1
40 TABLET ORAL DAILY
Qty: 90 TABLET | Refills: 0 | Status: SHIPPED | OUTPATIENT
Start: 2024-12-24

## 2024-12-29 DIAGNOSIS — M96.1 FAILED BACK SYNDROME, LUMBAR: ICD-10-CM

## 2024-12-29 DIAGNOSIS — J31.0 CHRONIC RHINITIS: ICD-10-CM

## 2024-12-29 DIAGNOSIS — M48.061 SPINAL STENOSIS AT L4-L5 LEVEL: ICD-10-CM

## 2024-12-29 DIAGNOSIS — M51.362 DEGENERATION OF INTERVERTEBRAL DISC OF LUMBAR REGION WITH DISCOGENIC BACK PAIN AND LOWER EXTREMITY PAIN: ICD-10-CM

## 2024-12-29 DIAGNOSIS — M54.16 LUMBAR RADICULOPATHY: ICD-10-CM

## 2024-12-30 RX ORDER — MELOXICAM 15 MG/1
15 TABLET ORAL DAILY PRN
Qty: 30 TABLET | Refills: 0 | Status: SHIPPED | OUTPATIENT
Start: 2024-12-30 | End: 2024-12-31

## 2024-12-30 RX ORDER — LORATADINE 10 MG/1
10 TABLET ORAL DAILY
Qty: 90 TABLET | Refills: 3 | Status: SHIPPED | OUTPATIENT
Start: 2024-12-30

## 2024-12-31 DIAGNOSIS — M96.1 FAILED BACK SYNDROME, LUMBAR: ICD-10-CM

## 2024-12-31 DIAGNOSIS — M54.16 LUMBAR RADICULOPATHY: ICD-10-CM

## 2024-12-31 DIAGNOSIS — M48.061 SPINAL STENOSIS AT L4-L5 LEVEL: ICD-10-CM

## 2024-12-31 DIAGNOSIS — M51.362 DEGENERATION OF INTERVERTEBRAL DISC OF LUMBAR REGION WITH DISCOGENIC BACK PAIN AND LOWER EXTREMITY PAIN: ICD-10-CM

## 2024-12-31 RX ORDER — MELOXICAM 15 MG/1
15 TABLET ORAL PRN
Qty: 30 TABLET | Refills: 0 | Status: SHIPPED | OUTPATIENT
Start: 2024-12-31 | End: 2025-01-31 | Stop reason: SDUPTHER

## 2025-01-04 DIAGNOSIS — E83.42 HYPOMAGNESEMIA: ICD-10-CM

## 2025-01-04 DIAGNOSIS — M79.2 NEUROPATHIC PAIN: ICD-10-CM

## 2025-01-04 DIAGNOSIS — G89.29 CHRONIC LEFT-SIDED LOW BACK PAIN WITH LEFT-SIDED SCIATICA: ICD-10-CM

## 2025-01-04 DIAGNOSIS — M54.42 CHRONIC LEFT-SIDED LOW BACK PAIN WITH LEFT-SIDED SCIATICA: ICD-10-CM

## 2025-01-04 DIAGNOSIS — M62.838 MUSCLE SPASMS OF BOTH LOWER EXTREMITIES: ICD-10-CM

## 2025-01-06 RX ORDER — GABAPENTIN 100 MG/1
CAPSULE ORAL
Qty: 180 CAPSULE | Refills: 1 | Status: SHIPPED | OUTPATIENT
Start: 2025-01-06 | End: 2026-01-09

## 2025-01-06 NOTE — TELEPHONE ENCOUNTER
Please fill for Dr. Lindsey as he is out of the office;    Pharmacy requesting refill of gabapentin 100mg.      Medication active on med list yes      Date of last Rx: 11/19/2024 with 1 refills          verified by JORY YANEZ        Pharmacy requesting refill of tizanidine (patient has been taking 4mg QID).      Medication active on med list yes      Date of last Rx: 11/19/2024 with 3 refills          verified by JORY YANEZ      Date of last appointment 11/19/2024    Next Visit Date:  3/20/2025

## 2025-01-10 DIAGNOSIS — M79.2 NEUROPATHIC PAIN: ICD-10-CM

## 2025-01-10 RX ORDER — GABAPENTIN 100 MG/1
CAPSULE ORAL
Qty: 180 CAPSULE | Refills: 1 | OUTPATIENT
Start: 2025-01-10

## 2025-01-21 RX ORDER — FAMOTIDINE 40 MG/1
40 TABLET, FILM COATED ORAL NIGHTLY
Qty: 180 TABLET | Refills: 1 | Status: SHIPPED | OUTPATIENT
Start: 2025-01-21

## 2025-01-31 DIAGNOSIS — E55.9 VITAMIN D DEFICIENCY: ICD-10-CM

## 2025-01-31 DIAGNOSIS — M96.1 FAILED BACK SYNDROME, LUMBAR: ICD-10-CM

## 2025-01-31 DIAGNOSIS — M48.061 SPINAL STENOSIS AT L4-L5 LEVEL: ICD-10-CM

## 2025-01-31 DIAGNOSIS — M51.362 DEGENERATION OF INTERVERTEBRAL DISC OF LUMBAR REGION WITH DISCOGENIC BACK PAIN AND LOWER EXTREMITY PAIN: ICD-10-CM

## 2025-01-31 DIAGNOSIS — M54.16 LUMBAR RADICULOPATHY: ICD-10-CM

## 2025-01-31 DIAGNOSIS — M85.89 OSTEOPENIA OF MULTIPLE SITES: ICD-10-CM

## 2025-01-31 DIAGNOSIS — G57.93 NEUROPATHY INVOLVING BOTH LOWER EXTREMITIES: ICD-10-CM

## 2025-02-01 RX ORDER — MELOXICAM 15 MG/1
15 TABLET ORAL PRN
Qty: 30 TABLET | Refills: 0 | Status: SHIPPED | OUTPATIENT
Start: 2025-02-01

## 2025-02-01 RX ORDER — ASPIRIN 81 MG
TABLET, DELAYED RELEASE (ENTERIC COATED) ORAL
Qty: 90 TABLET | Refills: 3 | Status: SHIPPED | OUTPATIENT
Start: 2025-02-01

## 2025-02-01 RX ORDER — B-COMPLEX WITH VITAMIN C
1 TABLET ORAL DAILY
Qty: 90 TABLET | Refills: 3 | Status: SHIPPED | OUTPATIENT
Start: 2025-02-01

## 2025-02-01 NOTE — TELEPHONE ENCOUNTER
Please Approve or Refuse.  Send to Pharmacy per Pt's Request:      Next Visit Date:  1/31/2025   Last Visit Date: 8/29/2024    Hemoglobin A1C (%)   Date Value   04/16/2024 6.5 (H)   01/16/2023 6.1 (H)   09/21/2021 5.7             ( goal A1C is < 7)   BP Readings from Last 3 Encounters:   11/19/24 (!) 153/81   11/11/24 (!) 180/108   10/08/24 (!) 140/77          (goal 120/80)  BUN   Date Value Ref Range Status   10/07/2024 18 6 - 20 mg/dL Final     Creatinine   Date Value Ref Range Status   10/07/2024 0.7 0.5 - 0.9 mg/dL Final     Potassium   Date Value Ref Range Status   10/07/2024 3.5 (L) 3.7 - 5.3 mmol/L Final

## 2025-02-05 DIAGNOSIS — M48.061 SPINAL STENOSIS AT L4-L5 LEVEL: ICD-10-CM

## 2025-02-05 DIAGNOSIS — G89.29 CHRONIC LEFT-SIDED LOW BACK PAIN WITH LEFT-SIDED SCIATICA: ICD-10-CM

## 2025-02-05 DIAGNOSIS — M62.838 MUSCLE SPASMS OF BOTH LOWER EXTREMITIES: ICD-10-CM

## 2025-02-05 DIAGNOSIS — M54.16 LUMBAR RADICULOPATHY: ICD-10-CM

## 2025-02-05 DIAGNOSIS — M54.42 CHRONIC LEFT-SIDED LOW BACK PAIN WITH LEFT-SIDED SCIATICA: ICD-10-CM

## 2025-02-05 DIAGNOSIS — M51.362 DEGENERATION OF INTERVERTEBRAL DISC OF LUMBAR REGION WITH DISCOGENIC BACK PAIN AND LOWER EXTREMITY PAIN: ICD-10-CM

## 2025-02-05 DIAGNOSIS — M96.1 FAILED BACK SYNDROME, LUMBAR: ICD-10-CM

## 2025-02-05 RX ORDER — MELOXICAM 15 MG/1
15 TABLET ORAL DAILY PRN
Qty: 30 TABLET | Refills: 0 | Status: SHIPPED | OUTPATIENT
Start: 2025-02-05

## 2025-02-05 NOTE — TELEPHONE ENCOUNTER
Please Approve or Refuse.  Send to Pharmacy per Pt's Request: irena     Next Visit Date:  3/5/2025   Last Visit Date: 8/29/2024    Hemoglobin A1C (%)   Date Value   04/16/2024 6.5 (H)   01/16/2023 6.1 (H)   09/21/2021 5.7             ( goal A1C is < 7)   BP Readings from Last 3 Encounters:   11/19/24 (!) 153/81   11/11/24 (!) 180/108   10/08/24 (!) 140/77          (goal 120/80)  BUN   Date Value Ref Range Status   10/07/2024 18 6 - 20 mg/dL Final     Creatinine   Date Value Ref Range Status   10/07/2024 0.7 0.5 - 0.9 mg/dL Final     Potassium   Date Value Ref Range Status   10/07/2024 3.5 (L) 3.7 - 5.3 mmol/L Final

## 2025-02-07 DIAGNOSIS — M85.89 OSTEOPENIA OF MULTIPLE SITES: ICD-10-CM

## 2025-02-07 DIAGNOSIS — E83.42 HYPOMAGNESEMIA: ICD-10-CM

## 2025-02-07 DIAGNOSIS — E55.9 VITAMIN D DEFICIENCY: ICD-10-CM

## 2025-02-07 RX ORDER — ASPIRIN 81 MG
TABLET, DELAYED RELEASE (ENTERIC COATED) ORAL
Qty: 90 TABLET | Refills: 3 | Status: SHIPPED | OUTPATIENT
Start: 2025-02-07
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
## 2025-02-26 DIAGNOSIS — E83.42 HYPOMAGNESEMIA: ICD-10-CM

## 2025-02-26 DIAGNOSIS — M79.2 NEUROPATHIC PAIN: ICD-10-CM

## 2025-02-26 RX ORDER — GABAPENTIN 100 MG/1
200 CAPSULE ORAL 3 TIMES DAILY
Qty: 180 CAPSULE | Refills: 2 | Status: SHIPPED | OUTPATIENT
Start: 2025-02-26 | End: 2025-05-27

## 2025-03-01 ENCOUNTER — HOSPITAL ENCOUNTER (EMERGENCY)
Age: 50
Discharge: HOME | End: 2025-03-01
Attending: EMERGENCY MEDICINE
Payer: COMMERCIAL

## 2025-03-01 VITALS
BODY MASS INDEX: 48.21 KG/M2 | HEIGHT: 62 IN | OXYGEN SATURATION: 96 % | WEIGHT: 262 LBS | SYSTOLIC BLOOD PRESSURE: 166 MMHG | TEMPERATURE: 101 F | DIASTOLIC BLOOD PRESSURE: 95 MMHG | RESPIRATION RATE: 18 BRPM | HEART RATE: 100 BPM

## 2025-03-01 DIAGNOSIS — R11.2 NAUSEA AND VOMITING, UNSPECIFIED VOMITING TYPE: ICD-10-CM

## 2025-03-01 DIAGNOSIS — J10.1 INFLUENZA A: Primary | ICD-10-CM

## 2025-03-01 LAB
ALBUMIN SERPL-MCNC: 3.9 G/DL (ref 3.5–5.2)
ALBUMIN/GLOB SERPL: 1.2 {RATIO} (ref 1–2.5)
ALP SERPL-CCNC: 94 U/L (ref 35–104)
ALT SERPL-CCNC: 40 U/L (ref 10–35)
ANION GAP SERPL CALCULATED.3IONS-SCNC: 15 MMOL/L (ref 9–16)
AST SERPL-CCNC: 72 U/L (ref 10–35)
BASOPHILS # BLD: 0.06 K/UL (ref 0–0.2)
BASOPHILS NFR BLD: 1 % (ref 0–2)
BILIRUB SERPL-MCNC: 0.4 MG/DL (ref 0–1.2)
BUN SERPL-MCNC: 13 MG/DL (ref 6–20)
CALCIUM SERPL-MCNC: 9.4 MG/DL (ref 8.6–10.4)
CHLORIDE SERPL-SCNC: 98 MMOL/L (ref 98–107)
CO2 SERPL-SCNC: 26 MMOL/L (ref 20–31)
CREAT SERPL-MCNC: 0.6 MG/DL (ref 0.5–0.9)
EOSINOPHIL # BLD: 0.07 K/UL (ref 0–0.44)
EOSINOPHILS RELATIVE PERCENT: 1 % (ref 1–4)
ERYTHROCYTE [DISTWIDTH] IN BLOOD BY AUTOMATED COUNT: 13 % (ref 11.8–14.4)
FLUAV RNA RESP QL NAA+PROBE: DETECTED
FLUBV RNA RESP QL NAA+PROBE: NOT DETECTED
GFR, ESTIMATED: >90 ML/MIN/1.73M2
GLUCOSE SERPL-MCNC: 156 MG/DL (ref 74–99)
HCT VFR BLD AUTO: 36.6 % (ref 36.3–47.1)
HGB BLD-MCNC: 13.1 G/DL (ref 11.9–15.1)
IMM GRANULOCYTES # BLD AUTO: 0.05 K/UL (ref 0–0.3)
IMM GRANULOCYTES NFR BLD: 1 %
LIPASE SERPL-CCNC: 28 U/L (ref 13–60)
LYMPHOCYTES NFR BLD: 2.2 K/UL (ref 1.1–3.7)
LYMPHOCYTES RELATIVE PERCENT: 20 % (ref 24–43)
MCH RBC QN AUTO: 32.8 PG (ref 25.2–33.5)
MCHC RBC AUTO-ENTMCNC: 35.8 G/DL (ref 28.4–34.8)
MCV RBC AUTO: 91.7 FL (ref 82.6–102.9)
MONOCYTES NFR BLD: 1.09 K/UL (ref 0.1–1.2)
MONOCYTES NFR BLD: 10 % (ref 3–12)
NEUTROPHILS NFR BLD: 67 % (ref 36–65)
NEUTS SEG NFR BLD: 7.31 K/UL (ref 1.5–8.1)
NRBC BLD-RTO: 0 PER 100 WBC
PLATELET # BLD AUTO: 286 K/UL (ref 138–453)
PMV BLD AUTO: 9.3 FL (ref 8.1–13.5)
POTASSIUM SERPL-SCNC: 2.9 MMOL/L (ref 3.7–5.3)
PROT SERPL-MCNC: 7 G/DL (ref 6.6–8.7)
RBC # BLD AUTO: 3.99 M/UL (ref 3.95–5.11)
SARS-COV-2 RNA RESP QL NAA+PROBE: NOT DETECTED
SODIUM SERPL-SCNC: 139 MMOL/L (ref 136–145)
SOURCE: ABNORMAL
SPECIMEN DESCRIPTION: ABNORMAL
WBC OTHER # BLD: 10.8 K/UL (ref 3.5–11.3)

## 2025-03-01 PROCEDURE — 96375 TX/PRO/DX INJ NEW DRUG ADDON: CPT

## 2025-03-01 PROCEDURE — 80053 COMPREHEN METABOLIC PANEL: CPT

## 2025-03-01 PROCEDURE — 6370000000 HC RX 637 (ALT 250 FOR IP): Performed by: EMERGENCY MEDICINE

## 2025-03-01 PROCEDURE — 96374 THER/PROPH/DIAG INJ IV PUSH: CPT

## 2025-03-01 PROCEDURE — 6360000002 HC RX W HCPCS: Performed by: EMERGENCY MEDICINE

## 2025-03-01 PROCEDURE — 87636 SARSCOV2 & INF A&B AMP PRB: CPT

## 2025-03-01 PROCEDURE — 99284 EMERGENCY DEPT VISIT MOD MDM: CPT

## 2025-03-01 PROCEDURE — 83690 ASSAY OF LIPASE: CPT

## 2025-03-01 PROCEDURE — 2580000003 HC RX 258: Performed by: EMERGENCY MEDICINE

## 2025-03-01 PROCEDURE — 85025 COMPLETE CBC W/AUTO DIFF WBC: CPT

## 2025-03-01 RX ORDER — IBUPROFEN 600 MG/1
600 TABLET, FILM COATED ORAL 3 TIMES DAILY PRN
Qty: 30 TABLET | Refills: 0 | Status: SHIPPED | OUTPATIENT
Start: 2025-03-01

## 2025-03-01 RX ORDER — POTASSIUM CHLORIDE 1500 MG/1
40 TABLET, EXTENDED RELEASE ORAL ONCE
Status: COMPLETED | OUTPATIENT
Start: 2025-03-01 | End: 2025-03-01

## 2025-03-01 RX ORDER — BENZONATATE 100 MG/1
100 CAPSULE ORAL 3 TIMES DAILY PRN
Qty: 30 CAPSULE | Refills: 0 | Status: SHIPPED | OUTPATIENT
Start: 2025-03-01 | End: 2025-03-11

## 2025-03-01 RX ORDER — OSELTAMIVIR PHOSPHATE 75 MG/1
75 CAPSULE ORAL 2 TIMES DAILY
Qty: 10 CAPSULE | Refills: 0 | Status: SHIPPED | OUTPATIENT
Start: 2025-03-01 | End: 2025-03-06

## 2025-03-01 RX ORDER — ONDANSETRON 4 MG/1
4 TABLET, ORALLY DISINTEGRATING ORAL 3 TIMES DAILY PRN
Qty: 21 TABLET | Refills: 0 | Status: SHIPPED | OUTPATIENT
Start: 2025-03-01

## 2025-03-01 RX ORDER — ONDANSETRON 2 MG/ML
4 INJECTION INTRAMUSCULAR; INTRAVENOUS ONCE
Status: COMPLETED | OUTPATIENT
Start: 2025-03-01 | End: 2025-03-01

## 2025-03-01 RX ORDER — 0.9 % SODIUM CHLORIDE 0.9 %
1000 INTRAVENOUS SOLUTION INTRAVENOUS ONCE
Status: COMPLETED | OUTPATIENT
Start: 2025-03-01 | End: 2025-03-01

## 2025-03-01 RX ORDER — KETOROLAC TROMETHAMINE 15 MG/ML
15 INJECTION, SOLUTION INTRAMUSCULAR; INTRAVENOUS ONCE
Status: COMPLETED | OUTPATIENT
Start: 2025-03-01 | End: 2025-03-01

## 2025-03-01 RX ADMIN — SODIUM CHLORIDE 1000 ML: 0.9 INJECTION, SOLUTION INTRAVENOUS at 11:34

## 2025-03-01 RX ADMIN — ONDANSETRON 4 MG: 2 INJECTION, SOLUTION INTRAMUSCULAR; INTRAVENOUS at 11:35

## 2025-03-01 RX ADMIN — KETOROLAC TROMETHAMINE 15 MG: 15 INJECTION, SOLUTION INTRAMUSCULAR; INTRAVENOUS at 11:35

## 2025-03-01 RX ADMIN — POTASSIUM CHLORIDE 40 MEQ: 20 TABLET, EXTENDED RELEASE ORAL at 12:36

## 2025-03-01 ASSESSMENT — PAIN - FUNCTIONAL ASSESSMENT: PAIN_FUNCTIONAL_ASSESSMENT: 0-10

## 2025-03-01 ASSESSMENT — PAIN SCALES - GENERAL: PAINLEVEL_OUTOF10: 10

## 2025-03-01 ASSESSMENT — PAIN DESCRIPTION - LOCATION: LOCATION: GENERALIZED;BACK;HEAD

## 2025-03-01 NOTE — DISCHARGE INSTRUCTIONS
Keep yourself well-hydrated, use Tylenol and ibuprofen as needed for fevers and bodyaches.  Use Zofran as needed for nausea/vomiting.  Use benzonatate as needed for coughing.  Take Tamiflu twice per day for 5 days.  If you have severe worsening of your symptoms, difficulty breathing or any other concerning symptoms come back to the ER.  Follow-up with your primary care doctor.

## 2025-03-03 ENCOUNTER — TELEPHONE (OUTPATIENT)
Dept: FAMILY MEDICINE CLINIC | Age: 50
End: 2025-03-03

## 2025-03-03 NOTE — ED PROVIDER NOTES
she is hypertensive, febrile, borderline tachycardic, has the appearance of a viral illness.  She has a benign abdomen, breathing comfortably with clear lungs and normal oxygen saturation.  Differential diagnosis includes COVID, influenza, other viral URI, pancreatitis, dehydration, electrolyte derangement.  Low concern for bowel obstruction or acute surgical abdominal process given benign abdomen, low concern for ACS given no chest pain.  Labs with hypokalemia with potassium 2.9, oral repletion given here in the ED, blood work otherwise unremarkable.  Influenza A swab was positive, patient feels better after fluids, Zofran, Toradol.  Patient prescribed Tamiflu, Tessalon, ibuprofen, Zofran, given instructions for supportive care, return precautions, discharged in stable condition with instructions to follow-up with PCP.    DIAGNOSTIC RESULTS         LABS: All lab results were reviewed by myself, and all abnormals are listed below.  Labs Reviewed   COVID-19 & INFLUENZA COMBO - Abnormal; Notable for the following components:       Result Value    Influenza A DETECTED (*)     All other components within normal limits   CBC WITH AUTO DIFFERENTIAL - Abnormal; Notable for the following components:    MCHC 35.8 (*)     Neutrophils % 67 (*)     Lymphocytes % 20 (*)     Immature Granulocytes % 1 (*)     All other components within normal limits   COMPREHENSIVE METABOLIC PANEL - Abnormal; Notable for the following components:    Potassium 2.9 (*)     Glucose 156 (*)     ALT 40 (*)     AST 72 (*)     All other components within normal limits   LIPASE                Vitals:    Vitals:    03/01/25 1050   BP: (!) 166/95   Pulse: 100   Resp: 18   Temp: (!) 101 °F (38.3 °C)   SpO2: 96%   Weight: 118.8 kg (262 lb)   Height: 1.575 m (5' 2\")       The patient was given the following medications while in the emergency department:  Orders Placed This Encounter   Medications    sodium chloride 0.9 % bolus 1,000 mL    ondansetron (ZOFRAN)

## 2025-03-03 NOTE — TELEPHONE ENCOUNTER
VOICEMAIL DOCUMENTATION - ERASE IF NOT USED  Hi, this message is for  Syl  This is Aminata from Crista Reeves office. Just calling to see how you are doing after your recent visit to the Emergency Room. Crista Reeves wants to make sure you were able to fill any prescriptions and that you understand your discharge instructions. Please return our call if you need to make a follow up appointment with your provider or have any further needs.   Our phone number is [phone]. Have a great day.

## 2025-03-05 ENCOUNTER — TELEPHONE (OUTPATIENT)
Dept: FAMILY MEDICINE CLINIC | Age: 50
End: 2025-03-05

## 2025-03-05 NOTE — TELEPHONE ENCOUNTER
[facility] ED Follow up Call    Reason for ED visit:    3/1/2025 (2 hours)  [facility] Emergency Department     Jose Blanca MD  Last attending  Treatment team Influenza A +1 more  Clinical impression Vomiting  Cough  Fever   Chief complaint       Darnell Streeter ,     This is Joselin Almonte from Crista Arredondo's office, just calling to see how you are doing after your recent ED visit.       FU appts/Provider:      Future Appointments   Date Time Provider Department Center   3/20/2025 10:00 AM Kannan Lnidsey MD Neuro Spec Neurology -   4/1/2025  2:30 PM [facility]       VOICEMAIL DOCUMENTATION - ERASE IF NOT USED  Hi, this message is for  Syl    This is Joselin Almonte from Crista Reeves office. Just calling to see how you are doing after your recent visit to the Emergency Room. Crista Reeves wants to make sure you were able to fill any prescriptions and that you understand your discharge instructions. Please return our call if you need to make a follow up appointment with your provider or have any further needs.   Our phone number is [phone].     Have a great day!

## 2025-03-06 DIAGNOSIS — M51.362 DEGENERATION OF INTERVERTEBRAL DISC OF LUMBAR REGION WITH DISCOGENIC BACK PAIN AND LOWER EXTREMITY PAIN: ICD-10-CM

## 2025-03-06 DIAGNOSIS — M48.061 SPINAL STENOSIS AT L4-L5 LEVEL: ICD-10-CM

## 2025-03-06 DIAGNOSIS — M54.16 LUMBAR RADICULOPATHY: ICD-10-CM

## 2025-03-06 DIAGNOSIS — M96.1 FAILED BACK SYNDROME, LUMBAR: ICD-10-CM

## 2025-03-07 RX ORDER — MELOXICAM 15 MG/1
15 TABLET ORAL DAILY PRN
Qty: 30 TABLET | Refills: 0 | Status: SHIPPED | OUTPATIENT
Start: 2025-03-07

## 2025-03-07 NOTE — TELEPHONE ENCOUNTER
Please Approve or Refuse.  Send to Pharmacy per Pt's Request:      Next Visit Date:  Visit date not found   Last Visit Date: 8/29/2024    Hemoglobin A1C (%)   Date Value   04/16/2024 6.5 (H)   01/16/2023 6.1 (H)   09/21/2021 5.7             ( goal A1C is < 7)   BP Readings from Last 3 Encounters:   03/01/25 (!) 166/95   11/19/24 (!) 153/81   11/11/24 (!) 180/108          (goal 120/80)  BUN   Date Value Ref Range Status   03/01/2025 13 6 - 20 mg/dL Final     Creatinine   Date Value Ref Range Status   03/01/2025 0.6 0.50 - 0.90 mg/dL Final     Potassium   Date Value Ref Range Status   03/01/2025 2.9 (LL) 3.7 - 5.3 mmol/L Final

## 2025-03-13 DIAGNOSIS — G89.29 CHRONIC LEFT-SIDED LOW BACK PAIN WITH LEFT-SIDED SCIATICA: ICD-10-CM

## 2025-03-13 DIAGNOSIS — M54.42 CHRONIC LEFT-SIDED LOW BACK PAIN WITH LEFT-SIDED SCIATICA: ICD-10-CM

## 2025-03-13 DIAGNOSIS — M62.838 MUSCLE SPASMS OF BOTH LOWER EXTREMITIES: ICD-10-CM

## 2025-03-13 NOTE — TELEPHONE ENCOUNTER
Pharmacy requesting refill of Tizanidine 4 MG.      Medication active on med list yes      Date of last Rx: 01/06/2025 #120 with 1 refills          verified by NAVID LE      Date of last appointment 11/19/2024    Next Visit Date:  3/20/2025

## 2025-03-27 DIAGNOSIS — I10 ESSENTIAL HYPERTENSION: ICD-10-CM

## 2025-03-27 DIAGNOSIS — J31.0 CHRONIC RHINITIS: ICD-10-CM

## 2025-03-28 RX ORDER — OLMESARTAN MEDOXOMIL 40 MG/1
40 TABLET ORAL DAILY
Qty: 90 TABLET | Refills: 0 | Status: SHIPPED | OUTPATIENT
Start: 2025-03-28

## 2025-03-28 RX ORDER — LORATADINE 10 MG/1
10 TABLET ORAL DAILY
Qty: 90 TABLET | Refills: 0 | Status: SHIPPED | OUTPATIENT
Start: 2025-03-28

## 2025-03-31 NOTE — TELEPHONE ENCOUNTER
3/31/2025  Patient is MY-CHART STATUS ACTIVE.    A My-Chart message has been sent out to patient. Per Provider request to deliver message to patient in regard to scheduling appt.    Recent Visits  Date Type Provider Dept   04/04/24 Office Visit Crista Calixto MD [facility]   Showing recent visits within past 540 days with a meds authorizing provider and meeting all other requirements  Future Appointments  No visits were found meeting these conditions.  Showing future appointments within next 150 days with a meds authorizing provider and meeting all other requirements

## 2025-04-04 DIAGNOSIS — M96.1 FAILED BACK SYNDROME, LUMBAR: ICD-10-CM

## 2025-04-04 DIAGNOSIS — G89.29 CHRONIC MIDLINE LOW BACK PAIN WITH BILATERAL SCIATICA: ICD-10-CM

## 2025-04-04 DIAGNOSIS — M48.061 NEURAL FORAMINAL STENOSIS OF LUMBAR SPINE: ICD-10-CM

## 2025-04-04 DIAGNOSIS — M51.369 LUMBAR DEGENERATIVE DISC DISEASE: ICD-10-CM

## 2025-04-04 DIAGNOSIS — M51.362 DEGENERATION OF INTERVERTEBRAL DISC OF LUMBAR REGION WITH DISCOGENIC BACK PAIN AND LOWER EXTREMITY PAIN: ICD-10-CM

## 2025-04-04 DIAGNOSIS — M54.41 CHRONIC MIDLINE LOW BACK PAIN WITH BILATERAL SCIATICA: ICD-10-CM

## 2025-04-04 DIAGNOSIS — M54.16 LUMBAR RADICULOPATHY: ICD-10-CM

## 2025-04-04 DIAGNOSIS — M48.061 SPINAL STENOSIS AT L4-L5 LEVEL: ICD-10-CM

## 2025-04-04 DIAGNOSIS — M54.42 CHRONIC MIDLINE LOW BACK PAIN WITH BILATERAL SCIATICA: ICD-10-CM

## 2025-04-06 DIAGNOSIS — M96.1 FAILED BACK SYNDROME, LUMBAR: ICD-10-CM

## 2025-04-06 DIAGNOSIS — M48.061 SPINAL STENOSIS AT L4-L5 LEVEL: ICD-10-CM

## 2025-04-06 DIAGNOSIS — M51.362 DEGENERATION OF INTERVERTEBRAL DISC OF LUMBAR REGION WITH DISCOGENIC BACK PAIN AND LOWER EXTREMITY PAIN: ICD-10-CM

## 2025-04-06 DIAGNOSIS — M54.16 LUMBAR RADICULOPATHY: ICD-10-CM

## 2025-04-07 RX ORDER — MELOXICAM 15 MG/1
15 TABLET ORAL PRN
Qty: 30 TABLET | Refills: 0 | Status: SHIPPED | OUTPATIENT
Start: 2025-04-07

## 2025-04-07 NOTE — TELEPHONE ENCOUNTER
Needs appointment not seen since August, suggest referral to pain management for her back pain, I cannot continue to refill her meloxicam and to affect her kidneys long-term.  I understand she needs it, she needs to make an appointment and to allow me to refer her to pain management which 1?  Then I will refill her meloxicam for 1 month

## 2025-04-08 ENCOUNTER — PATIENT MESSAGE (OUTPATIENT)
Dept: FAMILY MEDICINE CLINIC | Age: 50
End: 2025-04-08

## 2025-04-08 DIAGNOSIS — M48.061 SPINAL STENOSIS AT L4-L5 LEVEL: ICD-10-CM

## 2025-04-08 DIAGNOSIS — M48.061 NEURAL FORAMINAL STENOSIS OF LUMBAR SPINE: ICD-10-CM

## 2025-04-08 DIAGNOSIS — M96.1 FAILED BACK SYNDROME, LUMBAR: ICD-10-CM

## 2025-04-08 DIAGNOSIS — M51.362 DEGENERATION OF INTERVERTEBRAL DISC OF LUMBAR REGION WITH DISCOGENIC BACK PAIN AND LOWER EXTREMITY PAIN: Primary | ICD-10-CM

## 2025-04-08 DIAGNOSIS — M54.41 CHRONIC MIDLINE LOW BACK PAIN WITH BILATERAL SCIATICA: ICD-10-CM

## 2025-04-08 DIAGNOSIS — M54.16 LUMBAR RADICULOPATHY: ICD-10-CM

## 2025-04-08 DIAGNOSIS — M54.42 CHRONIC MIDLINE LOW BACK PAIN WITH BILATERAL SCIATICA: ICD-10-CM

## 2025-04-08 DIAGNOSIS — G89.29 CHRONIC MIDLINE LOW BACK PAIN WITH BILATERAL SCIATICA: ICD-10-CM

## 2025-04-08 RX ORDER — PSEUDOEPHED/ACETAMINOPH/DIPHEN 30MG-500MG
TABLET ORAL
Qty: 120 TABLET | Refills: 3 | Status: SHIPPED | OUTPATIENT
Start: 2025-04-08

## 2025-04-08 RX ORDER — MELOXICAM 15 MG/1
15 TABLET ORAL DAILY PRN
Qty: 30 TABLET | Refills: 0 | OUTPATIENT
Start: 2025-04-08

## 2025-04-08 NOTE — TELEPHONE ENCOUNTER
Please Approve or Refuse.  Send to Pharmacy per Pt's Request:      Next Visit Date:  4/6/2025   Last Visit Date: 8/29/2024    Hemoglobin A1C (%)   Date Value   04/16/2024 6.5 (H)   01/16/2023 6.1 (H)   09/21/2021 5.7             ( goal A1C is < 7)   BP Readings from Last 3 Encounters:   03/01/25 (!) 166/95   11/19/24 (!) 153/81   11/11/24 (!) 180/108          (goal 120/80)  BUN   Date Value Ref Range Status   03/01/2025 13 6 - 20 mg/dL Final     Creatinine   Date Value Ref Range Status   03/01/2025 0.6 0.50 - 0.90 mg/dL Final     Potassium   Date Value Ref Range Status   03/01/2025 2.9 (LL) 3.7 - 5.3 mmol/L Final

## 2025-04-16 DIAGNOSIS — M62.838 MUSCLE SPASMS OF BOTH LOWER EXTREMITIES: ICD-10-CM

## 2025-04-16 DIAGNOSIS — G89.29 CHRONIC LEFT-SIDED LOW BACK PAIN WITH LEFT-SIDED SCIATICA: ICD-10-CM

## 2025-04-16 DIAGNOSIS — M54.42 CHRONIC LEFT-SIDED LOW BACK PAIN WITH LEFT-SIDED SCIATICA: ICD-10-CM

## 2025-04-16 NOTE — TELEPHONE ENCOUNTER
Patient was sent a two month supply on 3/14/2025. Patient needs to request sooner to refill date.

## 2025-05-01 DIAGNOSIS — M79.2 NEUROPATHIC PAIN: ICD-10-CM

## 2025-05-01 DIAGNOSIS — E11.42 TYPE 2 DIABETES MELLITUS WITH DIABETIC POLYNEUROPATHY, WITHOUT LONG-TERM CURRENT USE OF INSULIN (HCC): ICD-10-CM

## 2025-05-01 RX ORDER — GABAPENTIN 100 MG/1
200 CAPSULE ORAL 3 TIMES DAILY
Qty: 180 CAPSULE | Refills: 0 | OUTPATIENT
Start: 2025-05-01 | End: 2025-05-31

## 2025-05-01 RX ORDER — CALCIUM CITRATE/VITAMIN D3 200MG-6.25
TABLET ORAL
Qty: 100 STRIP | Refills: 3 | Status: SHIPPED | OUTPATIENT
Start: 2025-05-01 | End: 2025-06-12 | Stop reason: SDUPTHER

## 2025-05-01 RX ORDER — BISMUTH SUBSALICYLATE 262MG/15ML
SUSPENSION, ORAL (FINAL DOSE FORM) ORAL
Qty: 100 EACH | Refills: 3 | Status: SHIPPED | OUTPATIENT
Start: 2025-05-01 | End: 2025-06-12 | Stop reason: SDUPTHER

## 2025-05-01 NOTE — TELEPHONE ENCOUNTER
Patient needs a nurse visit for blood pressure check, her blood pressure has been consistently high

## 2025-05-01 NOTE — TELEPHONE ENCOUNTER
Please Approve or Refuse.  Send to Pharmacy per Pt's Request:      Next Visit Date:  8/22/2025   Last Visit Date: 8/29/2024    Hemoglobin A1C (%)   Date Value   04/16/2024 6.5 (H)   01/16/2023 6.1 (H)   09/21/2021 5.7             ( goal A1C is < 7)   BP Readings from Last 3 Encounters:   03/01/25 (!) 166/95   11/19/24 (!) 153/81   11/11/24 (!) 180/108          (goal 120/80)  BUN   Date Value Ref Range Status   03/01/2025 13 6 - 20 mg/dL Final     Creatinine   Date Value Ref Range Status   03/01/2025 0.6 0.50 - 0.90 mg/dL Final     Potassium   Date Value Ref Range Status   03/01/2025 2.9 (LL) 3.7 - 5.3 mmol/L Final

## 2025-05-04 DIAGNOSIS — M96.1 FAILED BACK SYNDROME, LUMBAR: ICD-10-CM

## 2025-05-04 DIAGNOSIS — M54.42 CHRONIC MIDLINE LOW BACK PAIN WITH BILATERAL SCIATICA: ICD-10-CM

## 2025-05-04 DIAGNOSIS — M48.061 NEURAL FORAMINAL STENOSIS OF LUMBAR SPINE: ICD-10-CM

## 2025-05-04 DIAGNOSIS — M48.061 SPINAL STENOSIS AT L4-L5 LEVEL: ICD-10-CM

## 2025-05-04 DIAGNOSIS — M54.16 LUMBAR RADICULOPATHY: ICD-10-CM

## 2025-05-04 DIAGNOSIS — M51.369 LUMBAR DEGENERATIVE DISC DISEASE: ICD-10-CM

## 2025-05-04 DIAGNOSIS — M54.41 CHRONIC MIDLINE LOW BACK PAIN WITH BILATERAL SCIATICA: ICD-10-CM

## 2025-05-04 DIAGNOSIS — G89.29 CHRONIC MIDLINE LOW BACK PAIN WITH BILATERAL SCIATICA: ICD-10-CM

## 2025-05-04 DIAGNOSIS — M51.362 DEGENERATION OF INTERVERTEBRAL DISC OF LUMBAR REGION WITH DISCOGENIC BACK PAIN AND LOWER EXTREMITY PAIN: ICD-10-CM

## 2025-05-04 RX ORDER — MELOXICAM 15 MG/1
15 TABLET ORAL DAILY PRN
Qty: 30 TABLET | Refills: 0 | Status: SHIPPED | OUTPATIENT
Start: 2025-05-04

## 2025-05-05 RX ORDER — PSEUDOEPHED/ACETAMINOPH/DIPHEN 30MG-500MG
TABLET ORAL
Qty: 120 TABLET | Refills: 3 | Status: SHIPPED | OUTPATIENT
Start: 2025-05-05

## 2025-05-05 NOTE — TELEPHONE ENCOUNTER
Please Approve or Refuse.  Send to Pharmacy per Pt's Request:      Next Visit Date:  5/7/2025   Last Visit Date: 8/29/2024    Hemoglobin A1C (%)   Date Value   04/16/2024 6.5 (H)   01/16/2023 6.1 (H)   09/21/2021 5.7             ( goal A1C is < 7)   BP Readings from Last 3 Encounters:   03/01/25 (!) 166/95   11/19/24 (!) 153/81   11/11/24 (!) 180/108          (goal 120/80)  BUN   Date Value Ref Range Status   03/01/2025 13 6 - 20 mg/dL Final     Creatinine   Date Value Ref Range Status   03/01/2025 0.6 0.50 - 0.90 mg/dL Final     Potassium   Date Value Ref Range Status   03/01/2025 2.9 (LL) 3.7 - 5.3 mmol/L Final

## 2025-05-07 ENCOUNTER — HOSPITAL ENCOUNTER (OUTPATIENT)
Dept: PAIN MANAGEMENT | Age: 50
Discharge: HOME | End: 2025-05-07
Payer: COMMERCIAL

## 2025-05-07 ENCOUNTER — CLINICAL SUPPORT (OUTPATIENT)
Dept: FAMILY MEDICINE CLINIC | Age: 50
End: 2025-05-07
Payer: COMMERCIAL

## 2025-05-07 VITALS
HEIGHT: 62 IN | OXYGEN SATURATION: 96 % | SYSTOLIC BLOOD PRESSURE: 132 MMHG | BODY MASS INDEX: 47.92 KG/M2 | HEART RATE: 74 BPM | DIASTOLIC BLOOD PRESSURE: 80 MMHG

## 2025-05-07 VITALS — WEIGHT: 262 LBS | BODY MASS INDEX: 48.21 KG/M2 | HEIGHT: 62 IN

## 2025-05-07 DIAGNOSIS — M54.16 LUMBAR RADICULOPATHY: Primary | ICD-10-CM

## 2025-05-07 DIAGNOSIS — E66.813 CLASS 3 SEVERE OBESITY WITH BODY MASS INDEX (BMI) OF 45.0 TO 49.9 IN ADULT, UNSPECIFIED OBESITY TYPE, UNSPECIFIED WHETHER SERIOUS COMORBIDITY PRESENT (HCC): ICD-10-CM

## 2025-05-07 DIAGNOSIS — M96.1 LUMBAR POST-LAMINECTOMY SYNDROME: ICD-10-CM

## 2025-05-07 DIAGNOSIS — I10 ESSENTIAL HYPERTENSION: Primary | ICD-10-CM

## 2025-05-07 DIAGNOSIS — M51.369 DEGENERATION OF INTERVERTEBRAL DISC OF LUMBAR REGION, UNSPECIFIED WHETHER PAIN PRESENT: ICD-10-CM

## 2025-05-07 PROBLEM — E66.9 OBESITY, UNSPECIFIED: Status: ACTIVE | Noted: 2025-05-07

## 2025-05-07 PROCEDURE — 3075F SYST BP GE 130 - 139MM HG: CPT | Performed by: FAMILY MEDICINE

## 2025-05-07 PROCEDURE — 3079F DIAST BP 80-89 MM HG: CPT | Performed by: FAMILY MEDICINE

## 2025-05-07 PROCEDURE — 99215 OFFICE O/P EST HI 40 MIN: CPT

## 2025-05-07 PROCEDURE — 99211 OFF/OP EST MAY X REQ PHY/QHP: CPT | Performed by: FAMILY MEDICINE

## 2025-05-07 PROCEDURE — 99215 OFFICE O/P EST HI 40 MIN: CPT | Performed by: STUDENT IN AN ORGANIZED HEALTH CARE EDUCATION/TRAINING PROGRAM
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
## 2025-05-07 NOTE — PROGRESS NOTES
Syl Dejesus is being seen today for a Blood Pressure Recheck.     Syl Dejesus was seen 5/7/25 and her Blood Pressure was:   BP Readings from Last 1 Encounters:   05/07/25 132/80     Tiffany Ly MA

## 2025-05-07 NOTE — PROGRESS NOTES
Chief Complaint   Patient presents with    Blood Pressure Check        Patient is here for blood pressure recheck.      Assessment & Plan  Essential hypertension   Chronic, at goal (stable), continue current treatment plan              BP Readings from Last 3 Encounters:   05/07/25 132/80   03/01/25 (!) 166/95   11/19/24 (!) 153/81       Pulse Readings from Last 3 Encounters:   05/07/25 74   03/01/25 100   11/19/24 84       Future Appointments   Date Time Provider Department Center   5/7/2025  2:45 PM Iraj Caballero DO [facility] [place]   6/4/2025  2:10 PM Arnulfo Hanna MD [facility]   7/10/2025 10:40 AM Kannan Lindsey MD Neuro Spec Neurology -   8/22/2025  2:00 PM Crista Calixto MD [facility] DEP       Electronically signed by Crista Calixto MD on 5/7/25 at 2:23 PM EDT

## 2025-05-13 ENCOUNTER — PATIENT MESSAGE (OUTPATIENT)
Dept: FAMILY MEDICINE CLINIC | Age: 50
End: 2025-05-13

## 2025-05-13 DIAGNOSIS — E55.9 VITAMIN D DEFICIENCY: ICD-10-CM

## 2025-05-13 DIAGNOSIS — E78.2 MIXED HYPERLIPIDEMIA: ICD-10-CM

## 2025-05-13 DIAGNOSIS — I10 ESSENTIAL HYPERTENSION: ICD-10-CM

## 2025-05-13 DIAGNOSIS — Z12.31 ENCOUNTER FOR SCREENING MAMMOGRAM FOR BREAST CANCER: ICD-10-CM

## 2025-05-13 DIAGNOSIS — E87.6 HYPOKALEMIA: ICD-10-CM

## 2025-05-13 DIAGNOSIS — E11.42 TYPE 2 DIABETES MELLITUS WITH DIABETIC POLYNEUROPATHY, WITHOUT LONG-TERM CURRENT USE OF INSULIN (HCC): Primary | ICD-10-CM

## 2025-05-14 NOTE — TELEPHONE ENCOUNTER
Lab Results   Component Value Date    WBC 10.8 03/01/2025    HGB 13.1 03/01/2025    HCT 36.6 03/01/2025    MCV 91.7 03/01/2025     03/01/2025       Lab Results   Component Value Date/Time     03/01/2025 11:36 AM    K 2.9 03/01/2025 11:36 AM    CL 98 03/01/2025 11:36 AM    CO2 26 03/01/2025 11:36 AM    BUN 13 03/01/2025 11:36 AM    CREATININE 0.6 03/01/2025 11:36 AM    GLUCOSE 156 03/01/2025 11:36 AM    CALCIUM 9.4 03/01/2025 11:36 AM    LABGLOM >90 03/01/2025 11:36 AM    LABGLOM 75 04/16/2024 08:46 AM          Lab Results   Component Value Date    ALT 40 (H) 03/01/2025    AST 72 (H) 03/01/2025    ALKPHOS 94 03/01/2025    BILITOT 0.4 03/01/2025       Lab Results   Component Value Date    TSH 0.33 04/16/2024       Lab Results   Component Value Date    CHOL 115 10/07/2024    CHOL 157 02/08/2024    CHOL 192 07/26/2021     Lab Results   Component Value Date    TRIG 259 (H) 10/07/2024    TRIG 490 (H) 02/08/2024    TRIG 125 07/26/2021     Lab Results   Component Value Date    HDL 27 (L) 10/07/2024    HDL 27 (L) 02/08/2024    HDL 29 (L) 01/16/2023       Lab Results   Component Value Date    LDL 36 10/07/2024    LDLDIRECT 57 02/08/2024       Lab Results   Component Value Date    CHOLHDLRATIO 4.0 10/07/2024    CHOLHDLRATIO 5.8 (H) 02/08/2024    CHOLHDLRATIO 7.6 (H) 01/16/2023       Lab Results   Component Value Date    LABA1C 6.5 (H) 04/16/2024       Lab Results   Component Value Date    RBHCLLNN30 774 04/16/2024       Lab Results   Component Value Date    FOLATE 17.8 04/16/2024       No results found for: \"IRON\", \"TIBC\", \"FERRITIN\"    Lab Results   Component Value Date    VITD25 42.5 04/16/2024

## 2025-05-15 ENCOUNTER — RESULTS FOLLOW-UP (OUTPATIENT)
Dept: FAMILY MEDICINE CLINIC | Age: 50
End: 2025-05-15

## 2025-05-15 ENCOUNTER — HOSPITAL ENCOUNTER (OUTPATIENT)
Age: 50
Discharge: HOME | End: 2025-05-15
Payer: COMMERCIAL

## 2025-05-15 DIAGNOSIS — E87.6 HYPOKALEMIA: Primary | ICD-10-CM

## 2025-05-15 DIAGNOSIS — E11.42 TYPE 2 DIABETES MELLITUS WITH DIABETIC POLYNEUROPATHY, WITHOUT LONG-TERM CURRENT USE OF INSULIN (HCC): ICD-10-CM

## 2025-05-15 DIAGNOSIS — E78.2 MIXED HYPERLIPIDEMIA: ICD-10-CM

## 2025-05-15 DIAGNOSIS — I10 ESSENTIAL HYPERTENSION: ICD-10-CM

## 2025-05-15 DIAGNOSIS — E55.9 VITAMIN D DEFICIENCY: ICD-10-CM

## 2025-05-15 DIAGNOSIS — Z51.81 MEDICATION MONITORING ENCOUNTER: ICD-10-CM

## 2025-05-15 LAB
25(OH)D3 SERPL-MCNC: 51.9 NG/ML (ref 30–100)
ALBUMIN SERPL-MCNC: 4.5 G/DL (ref 3.5–5.2)
ALBUMIN SERPL-MCNC: 4.5 G/DL (ref 3.5–5.2)
ALBUMIN/GLOB SERPL: 1.4 {RATIO} (ref 1–2.5)
ALBUMIN/GLOB SERPL: 1.4 {RATIO} (ref 1–2.5)
ALP SERPL-CCNC: 116 U/L (ref 35–104)
ALP SERPL-CCNC: 116 U/L (ref 35–104)
ALT SERPL-CCNC: 22 U/L (ref 10–35)
ALT SERPL-CCNC: 22 U/L (ref 10–35)
ANION GAP SERPL CALCULATED.3IONS-SCNC: 16 MMOL/L (ref 9–16)
AST SERPL-CCNC: 28 U/L (ref 10–35)
AST SERPL-CCNC: 28 U/L (ref 10–35)
BILIRUB DIRECT SERPL-MCNC: 0.2 MG/DL (ref 0–0.2)
BILIRUB INDIRECT SERPL-MCNC: 0.2 MG/DL (ref 0–1)
BILIRUB SERPL-MCNC: 0.4 MG/DL (ref 0–1.2)
BILIRUB SERPL-MCNC: 0.4 MG/DL (ref 0–1.2)
BUN SERPL-MCNC: 9 MG/DL (ref 6–20)
CALCIUM SERPL-MCNC: 10.2 MG/DL (ref 8.6–10.4)
CHLORIDE SERPL-SCNC: 97 MMOL/L (ref 98–107)
CHOLEST SERPL-MCNC: 126 MG/DL (ref 0–199)
CHOLESTEROL/HDL RATIO: 3.9
CO2 SERPL-SCNC: 26 MMOL/L (ref 20–31)
CREAT SERPL-MCNC: 0.6 MG/DL (ref 0.6–0.9)
CREAT UR-MCNC: 34.1 MG/DL (ref 28–217)
EST. AVERAGE GLUCOSE BLD GHB EST-MCNC: 154 MG/DL
FOLATE SERPL-MCNC: >40 NG/ML (ref 4.8–24.2)
GFR, ESTIMATED: >90 ML/MIN/1.73M2
GLOBULIN SER CALC-MCNC: 3.2 G/DL
GLUCOSE SERPL-MCNC: 172 MG/DL (ref 74–99)
HBA1C MFR BLD: 7 % (ref 4–6)
HDLC SERPL-MCNC: 32 MG/DL
LDLC SERPL CALC-MCNC: 26 MG/DL (ref 0–100)
MAGNESIUM SERPL-MCNC: 1.4 MG/DL (ref 1.6–2.6)
MICROALBUMIN UR-MCNC: <12 MG/L (ref 0–20)
MICROALBUMIN/CREAT UR-RTO: NORMAL MCG/MG CREAT (ref 0–25)
POTASSIUM SERPL-SCNC: 3.7 MMOL/L (ref 3.7–5.3)
PROT SERPL-MCNC: 7.7 G/DL (ref 6.6–8.7)
PROT SERPL-MCNC: 7.7 G/DL (ref 6.6–8.7)
SODIUM SERPL-SCNC: 139 MMOL/L (ref 136–145)
TRIGL SERPL-MCNC: 339 MG/DL
TSH SERPL DL<=0.05 MIU/L-ACNC: 0.63 UIU/ML (ref 0.27–4.2)
URATE SERPL-MCNC: 6.4 MG/DL (ref 2.4–5.7)
VIT B12 SERPL-MCNC: 666 PG/ML (ref 232–1245)
VLDLC SERPL CALC-MCNC: 68 MG/DL (ref 1–30)

## 2025-05-15 PROCEDURE — 82607 VITAMIN B-12: CPT

## 2025-05-15 PROCEDURE — 83735 ASSAY OF MAGNESIUM: CPT

## 2025-05-15 PROCEDURE — 83036 HEMOGLOBIN GLYCOSYLATED A1C: CPT

## 2025-05-15 PROCEDURE — 82043 UR ALBUMIN QUANTITATIVE: CPT

## 2025-05-15 PROCEDURE — 84550 ASSAY OF BLOOD/URIC ACID: CPT

## 2025-05-15 PROCEDURE — 82746 ASSAY OF FOLIC ACID SERUM: CPT

## 2025-05-15 PROCEDURE — 80061 LIPID PANEL: CPT

## 2025-05-15 PROCEDURE — 84443 ASSAY THYROID STIM HORMONE: CPT

## 2025-05-15 PROCEDURE — 82306 VITAMIN D 25 HYDROXY: CPT

## 2025-05-15 PROCEDURE — 82570 ASSAY OF URINE CREATININE: CPT

## 2025-05-15 PROCEDURE — 80053 COMPREHEN METABOLIC PANEL: CPT

## 2025-05-15 PROCEDURE — 80076 HEPATIC FUNCTION PANEL: CPT

## 2025-05-15 PROCEDURE — 36415 COLL VENOUS BLD VENIPUNCTURE: CPT

## 2025-05-15 NOTE — RESULT ENCOUNTER NOTE
Please notify patient: Uric acid is high but improved, low purine diet is advisable, see diet below  Magnesium is quite low, which can cause her to have muscle cramps, and having the potassium low  Triglycerides very high, if drinking pop to stop drinking pop, it will help with both the uric acid and triglycerides.  Potassium is improved but still borderline low.  What medication is she taking hydrochlorothiazide 25 mg or furosemide 40 Mg daily?  She should take only one of them  She is currently on potassium 20 mEq twice a day, will need to increase it to 3 times a day  Also if she taking magnesium 250 Mg which is on the list?  I need prior occasion of her medications as above so I know what I can adjust  Blood glucose very high 172  Alkaline phosphatase is stable but mildly increased  Diabetes is worsening hemoglobin A1c is 7, would benefit from starting a diabetic medication, would she like Jardiance?  I can send to the pharmacy    Lab Results       Component                Value               Date                       LABA1C                   7.0 (H)             05/15/2025                 LABA1C                   6.5 (H)             04/16/2024                 LABA1C                   6.1 (H)             01/16/2023                  Otherwise labs within normal limits  continue current treatment        GOUT  DIET SHORT COUNSELING:  Stop drinking any pop or alcohol if she drinks any.  Avoid eating too much high purine foods like:dried beans and dried peas, Asparagus, cauliflower, spinach, mushrooms, and green peas, seafood, Oatmeal, wheat bran, and wheat germ, Organ meats, such as liver, kidneys, sweetbreads, and brains, Meats, including alexander, beef, pork, and lamb, Game meats and any other meats in large amounts, Anchovies, sardines, herring, mackerel, and scallops, Gravy, Beer, high fructose syrup and high carbs food.    Future Appointments  6/4/2025   2:10 PM    Arnulfo Hanna MD      [facility] NEUR

## 2025-05-16 DIAGNOSIS — M48.061 NEURAL FORAMINAL STENOSIS OF LUMBAR SPINE: ICD-10-CM

## 2025-05-16 DIAGNOSIS — M54.42 CHRONIC MIDLINE LOW BACK PAIN WITH BILATERAL SCIATICA: ICD-10-CM

## 2025-05-16 DIAGNOSIS — M54.42 CHRONIC LEFT-SIDED LOW BACK PAIN WITH LEFT-SIDED SCIATICA: ICD-10-CM

## 2025-05-16 DIAGNOSIS — M51.369 LUMBAR DEGENERATIVE DISC DISEASE: ICD-10-CM

## 2025-05-16 DIAGNOSIS — G89.29 CHRONIC MIDLINE LOW BACK PAIN WITH BILATERAL SCIATICA: ICD-10-CM

## 2025-05-16 DIAGNOSIS — M54.41 CHRONIC MIDLINE LOW BACK PAIN WITH BILATERAL SCIATICA: ICD-10-CM

## 2025-05-16 DIAGNOSIS — M62.838 MUSCLE SPASMS OF BOTH LOWER EXTREMITIES: Primary | ICD-10-CM

## 2025-05-16 DIAGNOSIS — M96.1 FAILED BACK SYNDROME, LUMBAR: ICD-10-CM

## 2025-05-16 DIAGNOSIS — G89.29 CHRONIC LEFT-SIDED LOW BACK PAIN WITH LEFT-SIDED SCIATICA: ICD-10-CM

## 2025-05-16 RX ORDER — PSEUDOEPHED/ACETAMINOPH/DIPHEN 30MG-500MG
TABLET ORAL
Qty: 120 TABLET | Refills: 3 | Status: SHIPPED | OUTPATIENT
Start: 2025-05-16

## 2025-05-16 RX ORDER — POTASSIUM CHLORIDE 1500 MG/1
20 TABLET, EXTENDED RELEASE ORAL 3 TIMES DAILY
Qty: 270 TABLET | Refills: 0 | Status: SHIPPED | OUTPATIENT
Start: 2025-05-16

## 2025-05-16 RX ORDER — FUROSEMIDE 40 MG/1
40 TABLET ORAL DAILY
Qty: 90 TABLET | Refills: 3 | Status: SHIPPED | OUTPATIENT
Start: 2025-05-16

## 2025-05-16 NOTE — TELEPHONE ENCOUNTER
Pharmacy requesting refill of tiZANidine (ZANAFLEX) 4 MG tablet       Medication active on med list yes      Date of last Rx: 3/14/2025 with 1 refills          verified by ANNETTA ACUNA      Date of last appointment 11/19/2024    Next Visit Date:  7/10/2025      Patient had lab work completed. Please review result dated 5/15/2025.

## 2025-05-16 NOTE — TELEPHONE ENCOUNTER
Please Approve or Refuse.  Send to Pharmacy per Pt's Request:      Next Visit Date:  8/22/2025   Last Visit Date: 5/7/2025    Hemoglobin A1C (%)   Date Value   05/15/2025 7.0 (H)   04/16/2024 6.5 (H)   01/16/2023 6.1 (H)             ( goal A1C is < 7)   BP Readings from Last 3 Encounters:   05/07/25 132/80   03/01/25 (!) 166/95   11/19/24 (!) 153/81          (goal 120/80)  BUN   Date Value Ref Range Status   05/15/2025 9 6 - 20 mg/dL Final     Creatinine   Date Value Ref Range Status   05/15/2025 0.6 0.6 - 0.9 mg/dL Final     Potassium   Date Value Ref Range Status   05/15/2025 3.7 3.7 - 5.3 mmol/L Final

## 2025-05-30 DIAGNOSIS — M96.1 FAILED BACK SYNDROME, LUMBAR: ICD-10-CM

## 2025-05-30 DIAGNOSIS — M54.16 LUMBAR RADICULOPATHY: ICD-10-CM

## 2025-05-30 DIAGNOSIS — M48.061 SPINAL STENOSIS AT L4-L5 LEVEL: ICD-10-CM

## 2025-05-30 DIAGNOSIS — M51.362 DEGENERATION OF INTERVERTEBRAL DISC OF LUMBAR REGION WITH DISCOGENIC BACK PAIN AND LOWER EXTREMITY PAIN: ICD-10-CM

## 2025-05-30 RX ORDER — MELOXICAM 15 MG/1
15 TABLET ORAL DAILY PRN
Qty: 30 TABLET | Refills: 0 | Status: SHIPPED | OUTPATIENT
Start: 2025-05-30

## 2025-05-31 DIAGNOSIS — M96.1 FAILED BACK SYNDROME, LUMBAR: ICD-10-CM

## 2025-05-31 DIAGNOSIS — M54.16 LUMBAR RADICULOPATHY: ICD-10-CM

## 2025-05-31 DIAGNOSIS — M48.061 SPINAL STENOSIS AT L4-L5 LEVEL: ICD-10-CM

## 2025-05-31 DIAGNOSIS — M51.362 DEGENERATION OF INTERVERTEBRAL DISC OF LUMBAR REGION WITH DISCOGENIC BACK PAIN AND LOWER EXTREMITY PAIN: ICD-10-CM

## 2025-06-01 RX ORDER — MELOXICAM 15 MG/1
15 TABLET ORAL DAILY PRN
Qty: 30 TABLET | Refills: 0 | OUTPATIENT
Start: 2025-06-01

## 2025-06-01 NOTE — TELEPHONE ENCOUNTER
Noted. Thank you!  This was refilled yesterday  Future Appointments   Date Time Provider Department Center   6/4/2025  2:10 PM [facility]   7/10/2025 10:40 AM Kannan Lindsey MD Neuro Spec Neurology -   8/22/2025  2:00 PM Crista Calixto MD fp sc [facility] DEP

## 2025-06-04 ENCOUNTER — OFFICE VISIT (OUTPATIENT)
Age: 50
End: 2025-06-04
Payer: COMMERCIAL

## 2025-06-04 VITALS
WEIGHT: 262 LBS | SYSTOLIC BLOOD PRESSURE: 138 MMHG | HEART RATE: 87 BPM | BODY MASS INDEX: 48.21 KG/M2 | DIASTOLIC BLOOD PRESSURE: 72 MMHG | HEIGHT: 62 IN

## 2025-06-04 DIAGNOSIS — M54.16 LUMBAR RADICULOPATHY: Primary | ICD-10-CM

## 2025-06-04 DIAGNOSIS — M48.062 SPINAL STENOSIS OF LUMBAR REGION WITH NEUROGENIC CLAUDICATION: ICD-10-CM

## 2025-06-04 PROCEDURE — G8427 DOCREV CUR MEDS BY ELIG CLIN: HCPCS | Performed by: NEUROLOGICAL SURGERY

## 2025-06-04 PROCEDURE — 3075F SYST BP GE 130 - 139MM HG: CPT | Performed by: NEUROLOGICAL SURGERY

## 2025-06-04 PROCEDURE — 3078F DIAST BP <80 MM HG: CPT | Performed by: NEUROLOGICAL SURGERY

## 2025-06-04 PROCEDURE — G8417 CALC BMI ABV UP PARAM F/U: HCPCS | Performed by: NEUROLOGICAL SURGERY

## 2025-06-04 PROCEDURE — 99213 OFFICE O/P EST LOW 20 MIN: CPT | Performed by: NEUROLOGICAL SURGERY

## 2025-06-04 PROCEDURE — 1036F TOBACCO NON-USER: CPT | Performed by: NEUROLOGICAL SURGERY

## 2025-06-04 NOTE — PROGRESS NOTES
[facility], [facility], [facility] NEUROSURGERY  [address], [address]  [place] [id number]  Dept: [phone]  Dept Fax: [phone]     Patient:  Syl Dejesus  YOB: 1975  Date: 6/4/25      Chief Complaint   Patient presents with    Follow-up     Back pain - last seen 7/10/2024  No recent testing              HPI:     I had the pleasure of seeing this patient back in the office today in follow-up.  As you know the patient is a 50-year-old female who was last seen in my office approximately 1 year ago.  At that time she was describing pain in her lower back.  She does have a history of lumbar fusion surgery at the L5-S1 level in the past and does have a partial foot drop at baseline.  In addition the patient has been diagnosed with a peripheral neuropathy and has been treated by neurology for this condition.  The patient presents now with worsening lower back discomfort as well as pain coursing into her buttocks extending in the posterior lateral thigh and calf regions predominately.  Her symptoms are worse on the left compared to the right.  She has been through physical therapy as well as referred to pain management.  Despite these efforts she continues to experience discomfort as outlined above.    Examination today demonstrates a well-healed midline lumbar incision.  She does have diffuse tenderness diffusely across the lower lumbar region.  The patient does have weakness of the left dorsiflexor muscles at baseline otherwise has normal strength and sensation in both lower extremities.  Gait is steady.    Previous MRI imaging demonstrated postoperative changes of the L5-S1 level as well as at least moderate spinal stenosis at the L4-5 level    Patient is a 50-year-old female who presents with a progressive worsening of back as well as bilateral lower extremity discomfort consistent with lumbar radiculopathy.  Patient does

## 2025-06-12 DIAGNOSIS — J31.0 CHRONIC RHINITIS: ICD-10-CM

## 2025-06-12 DIAGNOSIS — M62.838 MUSCLE SPASMS OF BOTH LOWER EXTREMITIES: ICD-10-CM

## 2025-06-12 DIAGNOSIS — M54.42 CHRONIC LEFT-SIDED LOW BACK PAIN WITH LEFT-SIDED SCIATICA: ICD-10-CM

## 2025-06-12 DIAGNOSIS — E11.42 TYPE 2 DIABETES MELLITUS WITH DIABETIC POLYNEUROPATHY, WITHOUT LONG-TERM CURRENT USE OF INSULIN (HCC): ICD-10-CM

## 2025-06-12 DIAGNOSIS — G89.29 CHRONIC LEFT-SIDED LOW BACK PAIN WITH LEFT-SIDED SCIATICA: ICD-10-CM

## 2025-06-12 RX ORDER — CALCIUM CITRATE/VITAMIN D3 200MG-6.25
TABLET ORAL
Qty: 100 STRIP | Refills: 3 | Status: SHIPPED | OUTPATIENT
Start: 2025-06-12

## 2025-06-12 RX ORDER — BISMUTH SUBSALICYLATE 262MG/15ML
SUSPENSION, ORAL (FINAL DOSE FORM) ORAL
Qty: 100 EACH | Refills: 3 | Status: SHIPPED | OUTPATIENT
Start: 2025-06-12

## 2025-06-12 RX ORDER — LORATADINE 10 MG/1
10 TABLET ORAL DAILY
Qty: 90 TABLET | Refills: 0 | Status: SHIPPED | OUTPATIENT
Start: 2025-06-12

## 2025-06-12 NOTE — TELEPHONE ENCOUNTER
Please Approve or Refuse.  Send to Pharmacy per Pt's Request:      Next Visit Date:  8/22/2025   Last Visit Date: 5/7/2025    Hemoglobin A1C (%)   Date Value   05/15/2025 7.0 (H)   04/16/2024 6.5 (H)   01/16/2023 6.1 (H)             ( goal A1C is < 7)   BP Readings from Last 3 Encounters:   06/04/25 138/72   05/07/25 132/80   03/01/25 (!) 166/95          (goal 120/80)  BUN   Date Value Ref Range Status   05/15/2025 9 6 - 20 mg/dL Final     Creatinine   Date Value Ref Range Status   05/15/2025 0.6 0.6 - 0.9 mg/dL Final     Potassium   Date Value Ref Range Status   05/15/2025 3.7 3.7 - 5.3 mmol/L Final

## 2025-06-14 DIAGNOSIS — M62.838 MUSCLE SPASMS OF BOTH LOWER EXTREMITIES: ICD-10-CM

## 2025-06-14 DIAGNOSIS — G89.29 CHRONIC LEFT-SIDED LOW BACK PAIN WITH LEFT-SIDED SCIATICA: ICD-10-CM

## 2025-06-14 DIAGNOSIS — M54.42 CHRONIC LEFT-SIDED LOW BACK PAIN WITH LEFT-SIDED SCIATICA: ICD-10-CM

## 2025-06-20 ENCOUNTER — HOSPITAL ENCOUNTER (OUTPATIENT)
Dept: MRI IMAGING | Age: 50
Discharge: HOME | End: 2025-06-22
Attending: NEUROLOGICAL SURGERY
Payer: COMMERCIAL

## 2025-06-20 DIAGNOSIS — M54.16 LUMBAR RADICULOPATHY: ICD-10-CM

## 2025-06-20 DIAGNOSIS — M48.062 SPINAL STENOSIS OF LUMBAR REGION WITH NEUROGENIC CLAUDICATION: ICD-10-CM

## 2025-06-20 PROCEDURE — 72148 MRI LUMBAR SPINE W/O DYE: CPT

## 2025-06-24 DIAGNOSIS — E83.42 HYPOMAGNESEMIA: ICD-10-CM

## 2025-06-24 DIAGNOSIS — J45.40 MODERATE PERSISTENT REACTIVE AIRWAY DISEASE WITH WHEEZING WITHOUT COMPLICATION: ICD-10-CM

## 2025-06-24 DIAGNOSIS — I10 ESSENTIAL HYPERTENSION: ICD-10-CM

## 2025-06-24 DIAGNOSIS — M79.2 NEUROPATHIC PAIN: ICD-10-CM

## 2025-06-24 RX ORDER — POTASSIUM CHLORIDE 1500 MG/1
20 TABLET, EXTENDED RELEASE ORAL 3 TIMES DAILY
Qty: 270 TABLET | Refills: 3 | Status: SHIPPED | OUTPATIENT
Start: 2025-06-24

## 2025-06-24 RX ORDER — ALBUTEROL SULFATE 0.83 MG/ML
2.5 SOLUTION RESPIRATORY (INHALATION) EVERY 6 HOURS PRN
Qty: 150 ML | Refills: 0 | Status: SHIPPED | OUTPATIENT
Start: 2025-06-24

## 2025-06-24 RX ORDER — OLMESARTAN MEDOXOMIL 40 MG/1
40 TABLET ORAL DAILY
Qty: 90 TABLET | Refills: 3 | Status: SHIPPED | OUTPATIENT
Start: 2025-06-24 | End: 2025-06-25

## 2025-06-24 RX ORDER — GABAPENTIN 100 MG/1
200 CAPSULE ORAL 3 TIMES DAILY
Qty: 180 CAPSULE | Refills: 2 | Status: SHIPPED | OUTPATIENT
Start: 2025-06-24 | End: 2025-09-22

## 2025-06-24 NOTE — TELEPHONE ENCOUNTER
Patient calling for refill of Gabapentin 100mg.      Medication active on med list yes      Date of last fill: 2/26/25 #180 R-2  verified on 6/24/2025   verified by VS LPN      Date of last appointment 11/19/24    Next Visit Date:  7/10/2025    OARRS report unable to be reviewed by writer.

## 2025-06-25 DIAGNOSIS — I10 ESSENTIAL HYPERTENSION: ICD-10-CM

## 2025-06-25 RX ORDER — OLMESARTAN MEDOXOMIL 40 MG/1
40 TABLET ORAL DAILY
Qty: 90 TABLET | Refills: 3 | Status: SHIPPED | OUTPATIENT
Start: 2025-06-25

## 2025-07-02 ENCOUNTER — OFFICE VISIT (OUTPATIENT)
Age: 50
End: 2025-07-02
Payer: COMMERCIAL

## 2025-07-02 VITALS
WEIGHT: 262 LBS | HEIGHT: 62 IN | HEART RATE: 75 BPM | BODY MASS INDEX: 48.21 KG/M2 | DIASTOLIC BLOOD PRESSURE: 88 MMHG | SYSTOLIC BLOOD PRESSURE: 160 MMHG

## 2025-07-02 DIAGNOSIS — M48.062 SPINAL STENOSIS OF LUMBAR REGION WITH NEUROGENIC CLAUDICATION: Primary | ICD-10-CM

## 2025-07-02 PROCEDURE — G8417 CALC BMI ABV UP PARAM F/U: HCPCS | Performed by: NEUROLOGICAL SURGERY

## 2025-07-02 PROCEDURE — 1036F TOBACCO NON-USER: CPT | Performed by: NEUROLOGICAL SURGERY

## 2025-07-02 PROCEDURE — 99213 OFFICE O/P EST LOW 20 MIN: CPT | Performed by: NEUROLOGICAL SURGERY

## 2025-07-02 PROCEDURE — G8427 DOCREV CUR MEDS BY ELIG CLIN: HCPCS | Performed by: NEUROLOGICAL SURGERY

## 2025-07-02 PROCEDURE — 3079F DIAST BP 80-89 MM HG: CPT | Performed by: NEUROLOGICAL SURGERY

## 2025-07-02 PROCEDURE — 3077F SYST BP >= 140 MM HG: CPT | Performed by: NEUROLOGICAL SURGERY

## 2025-07-02 NOTE — PROGRESS NOTES
[facility], [facility], [facility] NEUROSURGERY  [address], [address]  [place] [address]  Dept: [phone]  Dept Fax: [phone]     Patient:  Syl Dejesus  YOB: 1975  Date: 7/2/25      Chief Complaint   Patient presents with    Follow-up     Lumbar- follow up, [email or web address] 06/20/2025           HPI:     I had the pleasure of seeing this patient back in the office today in follow-up.  As you know she is a 50-year-old female who has a previous history of lumbar fusion with instrumentation at the L5-S1 level in the past.  She does have a partial foot drop at baseline.  The patient presents now with ongoing complaints of back as well as bilateral lower extremity discomfort consistent with ongoing lumbar radiculopathy.  Her pain is worse on the left compared to the right.  Her pain is specifically brought on with standing ambulation and is improved with rest.  The patient has been through physical therapy as well as pain management thus far without improvement.  We did review her recent follow-up lumbar MRI.  This study again demonstrates at least moderate to early severe spinal stenosis at the L4-5 level.  She does have a distal SIs phytic complex paracentric to the right at this level.  Degenerative changes with a slight grade 1 spondylolisthesis are noted at the L4-5 level as well.  Lastly instrumentation from previous surgery at the L5-S1 level is present.  I did review these images in the office today with the patient.  At this point time I do feel surgical intervention would be beneficial.  Prior to making final decisions regarding surgery, the patient would require a lumbar myelogram CT as well as lumbar flexion-extension x-rays.  The patient does have a cardiac history and is on blood thinners.  She is seeing her cardiologist tomorrow.  If she does obtain cardiac clearance, we will then proceed with the lumbar

## 2025-07-03 DIAGNOSIS — M54.16 LUMBAR RADICULOPATHY: ICD-10-CM

## 2025-07-03 DIAGNOSIS — M96.1 FAILED BACK SYNDROME, LUMBAR: ICD-10-CM

## 2025-07-03 DIAGNOSIS — M48.061 SPINAL STENOSIS AT L4-L5 LEVEL: ICD-10-CM

## 2025-07-03 DIAGNOSIS — M51.362 DEGENERATION OF INTERVERTEBRAL DISC OF LUMBAR REGION WITH DISCOGENIC BACK PAIN AND LOWER EXTREMITY PAIN: ICD-10-CM

## 2025-07-07 DIAGNOSIS — G89.29 CHRONIC LEFT-SIDED LOW BACK PAIN WITH LEFT-SIDED SCIATICA: ICD-10-CM

## 2025-07-07 DIAGNOSIS — M54.42 CHRONIC LEFT-SIDED LOW BACK PAIN WITH LEFT-SIDED SCIATICA: ICD-10-CM

## 2025-07-07 DIAGNOSIS — M62.838 MUSCLE SPASMS OF BOTH LOWER EXTREMITIES: ICD-10-CM

## 2025-07-07 RX ORDER — MELOXICAM 15 MG/1
TABLET ORAL
Qty: 30 TABLET | Refills: 0 | Status: SHIPPED | OUTPATIENT
Start: 2025-07-07

## 2025-07-07 NOTE — TELEPHONE ENCOUNTER
Please Approve or Refuse.  Send to Pharmacy per Pt's Request:      Next Visit Date:  8/22/2025   Last Visit Date: 5/7/2025    Hemoglobin A1C (%)   Date Value   05/15/2025 7.0 (H)   04/16/2024 6.5 (H)   01/16/2023 6.1 (H)             ( goal A1C is < 7)   BP Readings from Last 3 Encounters:   07/03/25 (!) 144/84   07/02/25 (!) 160/88   06/04/25 138/72          (goal 120/80)  BUN   Date Value Ref Range Status   05/15/2025 9 6 - 20 mg/dL Final     Creatinine   Date Value Ref Range Status   05/15/2025 0.6 0.6 - 0.9 mg/dL Final     Potassium   Date Value Ref Range Status   05/15/2025 3.7 3.7 - 5.3 mmol/L Final

## 2025-07-07 NOTE — TELEPHONE ENCOUNTER
Please send for Dr. Lindsey    Pharmacy requesting refill of tizanidine 4 mg.      Medication active on med list yes      Date of last Rx: 5/16/2025 with 1 refills          verified by DEEPA, ANNETTA      Date of last appointment 11/19/2024    Next Visit Date:  7/10/2025

## 2025-07-10 ENCOUNTER — HOSPITAL ENCOUNTER (OUTPATIENT)
Age: 50
Discharge: HOME | End: 2025-07-10
Payer: COMMERCIAL

## 2025-07-10 ENCOUNTER — OFFICE VISIT (OUTPATIENT)
Dept: NEUROLOGY | Age: 50
End: 2025-07-10
Payer: COMMERCIAL

## 2025-07-10 ENCOUNTER — RESULTS FOLLOW-UP (OUTPATIENT)
Dept: NEUROLOGY | Age: 50
End: 2025-07-10

## 2025-07-10 VITALS
BODY MASS INDEX: 45.08 KG/M2 | DIASTOLIC BLOOD PRESSURE: 81 MMHG | WEIGHT: 245 LBS | HEIGHT: 62 IN | HEART RATE: 66 BPM | SYSTOLIC BLOOD PRESSURE: 119 MMHG

## 2025-07-10 DIAGNOSIS — M54.42 CHRONIC LEFT-SIDED LOW BACK PAIN WITH LEFT-SIDED SCIATICA: ICD-10-CM

## 2025-07-10 DIAGNOSIS — M48.061 NEURAL FORAMINAL STENOSIS OF LUMBAR SPINE: ICD-10-CM

## 2025-07-10 DIAGNOSIS — G89.29 CHRONIC LEFT-SIDED LOW BACK PAIN WITH LEFT-SIDED SCIATICA: ICD-10-CM

## 2025-07-10 DIAGNOSIS — M96.1 FAILED BACK SYNDROME, LUMBAR: ICD-10-CM

## 2025-07-10 DIAGNOSIS — E61.1 IRON DEFICIENCY: ICD-10-CM

## 2025-07-10 DIAGNOSIS — M54.42 CHRONIC MIDLINE LOW BACK PAIN WITH BILATERAL SCIATICA: ICD-10-CM

## 2025-07-10 DIAGNOSIS — M54.41 CHRONIC MIDLINE LOW BACK PAIN WITH BILATERAL SCIATICA: ICD-10-CM

## 2025-07-10 DIAGNOSIS — G89.29 CHRONIC MIDLINE LOW BACK PAIN WITH BILATERAL SCIATICA: ICD-10-CM

## 2025-07-10 DIAGNOSIS — M79.2 NEUROPATHIC PAIN: ICD-10-CM

## 2025-07-10 DIAGNOSIS — M51.369 LUMBAR DEGENERATIVE DISC DISEASE: ICD-10-CM

## 2025-07-10 DIAGNOSIS — M54.59 INTRACTABLE LOW BACK PAIN: Primary | ICD-10-CM

## 2025-07-10 DIAGNOSIS — G25.81 RLS (RESTLESS LEGS SYNDROME): ICD-10-CM

## 2025-07-10 DIAGNOSIS — M62.838 MUSCLE SPASMS OF BOTH LOWER EXTREMITIES: ICD-10-CM

## 2025-07-10 DIAGNOSIS — I10 ESSENTIAL HYPERTENSION: ICD-10-CM

## 2025-07-10 LAB — FERRITIN SERPL-MCNC: 288 NG/ML (ref 15–150)

## 2025-07-10 PROCEDURE — 82728 ASSAY OF FERRITIN: CPT

## 2025-07-10 PROCEDURE — 3079F DIAST BP 80-89 MM HG: CPT | Performed by: PSYCHIATRY & NEUROLOGY

## 2025-07-10 PROCEDURE — 3074F SYST BP LT 130 MM HG: CPT | Performed by: PSYCHIATRY & NEUROLOGY

## 2025-07-10 PROCEDURE — 36415 COLL VENOUS BLD VENIPUNCTURE: CPT

## 2025-07-10 PROCEDURE — 4004F PT TOBACCO SCREEN RCVD TLK: CPT | Performed by: PSYCHIATRY & NEUROLOGY

## 2025-07-10 PROCEDURE — G8417 CALC BMI ABV UP PARAM F/U: HCPCS | Performed by: PSYCHIATRY & NEUROLOGY

## 2025-07-10 PROCEDURE — G8427 DOCREV CUR MEDS BY ELIG CLIN: HCPCS | Performed by: PSYCHIATRY & NEUROLOGY

## 2025-07-10 PROCEDURE — 99214 OFFICE O/P EST MOD 30 MIN: CPT | Performed by: PSYCHIATRY & NEUROLOGY

## 2025-07-10 RX ORDER — LIDOCAINE 4 G/G
1 PATCH TOPICAL DAILY
Qty: 30 EACH | Refills: 3 | Status: SHIPPED | OUTPATIENT
Start: 2025-07-10

## 2025-07-10 RX ORDER — PSEUDOEPHED/ACETAMINOPH/DIPHEN 30MG-500MG
TABLET ORAL
Qty: 120 TABLET | Refills: 3 | Status: SHIPPED | OUTPATIENT
Start: 2025-07-10

## 2025-07-10 RX ORDER — GABAPENTIN 300 MG/1
CAPSULE ORAL
Qty: 270 CAPSULE | Refills: 1 | Status: SHIPPED | OUTPATIENT
Start: 2025-07-10 | End: 2026-07-13

## 2025-07-10 RX ORDER — POTASSIUM CHLORIDE 1500 MG/1
20 TABLET, EXTENDED RELEASE ORAL 3 TIMES DAILY
Qty: 270 TABLET | Refills: 3 | Status: SHIPPED | OUTPATIENT
Start: 2025-07-10 | End: 2025-07-11 | Stop reason: SDUPTHER

## 2025-07-10 NOTE — TELEPHONE ENCOUNTER
Please Approve or Refuse.  Send to Pharmacy per Pt's Request:      Next Visit Date:  8/22/2025   Last Visit Date: 5/7/2025    Hemoglobin A1C (%)   Date Value   05/15/2025 7.0 (H)   04/16/2024 6.5 (H)   01/16/2023 6.1 (H)             ( goal A1C is < 7)   BP Readings from Last 3 Encounters:   07/10/25 119/81   07/03/25 (!) 144/84   07/02/25 (!) 160/88          (goal 120/80)  BUN   Date Value Ref Range Status   05/15/2025 9 6 - 20 mg/dL Final     Creatinine   Date Value Ref Range Status   05/15/2025 0.6 0.6 - 0.9 mg/dL Final     Potassium   Date Value Ref Range Status   05/15/2025 3.7 3.7 - 5.3 mmol/L Final

## 2025-07-10 NOTE — RESULT ENCOUNTER NOTE
Please let the patient know that ferritin level is not low making unlikely to be the cause of restless leg syndrome.    But the ferritin level is elevated at 288 ( <150).  She needs to discuss with her PCP regarding for further testing and management    If not I can refer her to hematologist for further evaluation for abnormal ferritin level.    Thank you.  -Dr. Lindsey

## 2025-07-10 NOTE — PROGRESS NOTES
NEUROLOGY FOLLOW-UP VISIT   Patient Name:       Syl Dejesus  :        1975  Clinic Visit Date:    7/10/2025  LOV: 2024      Dear Crista Arredondo MD     I saw Ms. Syl Dejesus in follow-up visit today in continuation of neurologic care.    As you know she  is a 50 y.o. left handed  female initially seen in 2024 for evaluation of neuropathy.  Today is the first follow-up visit.  She comes to clinic stating that she is being evaluated by neurosurgeon, Dr Hanna for lumbar spinal stenosis and she is being evaluated for surgical intervention.  She needs to get lumbar myelogram and also she needs cardiology clearance for the procedure.  She has been having intractable back pain and she is not able to work and she is asking for \"work excuse until surgical intervention done\".  She again states \"even though her surgeon is doing the procedure, he does not want to give any work excuse\".  Patient is in tears requesting for \"work excuse letter stating that she cannot work until surgery done by neurosurgeon, Dr Hanna for lumbar spinal stenosis\".    She reports that her current medication, gabapentin, is not alleviating her pain. She is currently taking gabapentin 100 mg, two capsules three times a day. She is scheduled for back surgery due to a disc issue but needs to consult her cardiologist first. A heart catheterization is planned for 2025 to ensure she can safely undergo the surgery and temporarily stop her Plavix. Her back pain radiates down her right leg, causing significant discomfort and limiting her mobility. She has tried capsaicin cream and IcyHot, but found lidocaine patches more effective, which she applies to her lower back. However, she has not been able to get a prescription for these patches recently. She used them years ago. She is not allergic to lidocaine. She is requesting a note stating that she is unable to work due to her condition. She

## 2025-07-15 ENCOUNTER — TELEPHONE (OUTPATIENT)
Dept: NEUROLOGY | Age: 50
End: 2025-07-15

## 2025-07-24 ENCOUNTER — TELEPHONE (OUTPATIENT)
Dept: FAMILY MEDICINE CLINIC | Age: 50
End: 2025-07-24

## 2025-07-24 DIAGNOSIS — Z12.31 ENCOUNTER FOR SCREENING MAMMOGRAM FOR BREAST CANCER: ICD-10-CM

## 2025-07-24 DIAGNOSIS — I10 ESSENTIAL HYPERTENSION: ICD-10-CM

## 2025-07-24 DIAGNOSIS — R79.89 ELEVATED FERRITIN: Primary | ICD-10-CM

## 2025-07-24 NOTE — TELEPHONE ENCOUNTER
To stop taking any multivitamin with iron, stop eating red meat, stop taking any vitamin C supplements, to recheck the labs before the next appointment I will place orders.  Also to schedule her mammogram    Labs are fasting, can do them about a week before your next appointment if still high, then we will do even further testing       Diagnosis Orders   1. Elevated ferritin  CBC with Auto Differential    Ferritin    Iron and TIBC      2. Essential hypertension  CBC with Auto Differential    Ferritin    Iron and TIBC      3. Encounter for screening mammogram for breast cancer  CHE ELI DIGITAL SCREEN BILATERAL           Future Appointments   Date Time Provider Department Center   8/22/2025  2:00 PM Crista Calixto MD fp sc BS E[facility] DEP   9/8/2025  1:00 PM Daisy Epperson APRN - CNP [facility]   10/16/2025 11:20 AM Kannan Lindsey MD Neuro Spec Neurology -

## 2025-07-24 NOTE — TELEPHONE ENCOUNTER
Neurologist did blood work and her Feratin levels are high, She has appointment with you next months but she wants to know if you want her to do more blood work or if she needs to be on medication? Please advise.

## 2025-07-29 DIAGNOSIS — J30.2 SEASONAL ALLERGIES: ICD-10-CM

## 2025-07-29 DIAGNOSIS — J45.41 MODERATE PERSISTENT ASTHMA WITH ACUTE EXACERBATION: ICD-10-CM

## 2025-07-29 RX ORDER — MONTELUKAST SODIUM 10 MG/1
10 TABLET ORAL NIGHTLY
Qty: 90 TABLET | Refills: 3 | Status: SHIPPED | OUTPATIENT
Start: 2025-07-29

## 2025-07-29 NOTE — TELEPHONE ENCOUNTER
Please Approve or Refuse.  Send to Pharmacy per Pt's Request: [facility]     Next Visit Date:  8/22/2025   Last Visit Date: 5/7/2025    Hemoglobin A1C (%)   Date Value   05/15/2025 7.0 (H)   04/16/2024 6.5 (H)   01/16/2023 6.1 (H)             ( goal A1C is < 7)   BP Readings from Last 3 Encounters:   07/10/25 119/81   07/03/25 (!) 144/84   07/02/25 (!) 160/88          (goal 120/80)  BUN   Date Value Ref Range Status   05/15/2025 9 6 - 20 mg/dL Final     Creatinine   Date Value Ref Range Status   05/15/2025 0.6 0.6 - 0.9 mg/dL Final     Potassium   Date Value Ref Range Status   05/15/2025 3.7 3.7 - 5.3 mmol/L Final

## 2025-08-05 DIAGNOSIS — M54.42 CHRONIC LEFT-SIDED LOW BACK PAIN WITH LEFT-SIDED SCIATICA: ICD-10-CM

## 2025-08-05 DIAGNOSIS — G89.29 CHRONIC LEFT-SIDED LOW BACK PAIN WITH LEFT-SIDED SCIATICA: ICD-10-CM

## 2025-08-05 DIAGNOSIS — M62.838 MUSCLE SPASMS OF BOTH LOWER EXTREMITIES: ICD-10-CM

## 2025-08-17 RX ORDER — AMLODIPINE BESYLATE 5 MG/1
5 TABLET ORAL NIGHTLY
COMMUNITY
Start: 2025-08-03 | End: 2025-08-22 | Stop reason: DRUGHIGH

## 2025-08-17 RX ORDER — POTASSIUM CHLORIDE 1500 MG/1
20 TABLET, EXTENDED RELEASE ORAL
COMMUNITY
Start: 2025-07-15 | End: 2025-08-22 | Stop reason: DRUGHIGH

## 2025-08-17 RX ORDER — PNV NO.95/FERROUS FUM/FOLIC AC 28MG-0.8MG
1 TABLET ORAL
COMMUNITY
Start: 2025-07-31

## 2025-08-18 ENCOUNTER — HOSPITAL ENCOUNTER (OUTPATIENT)
Dept: LAB | Age: 50
Discharge: HOME | End: 2025-08-18
Payer: COMMERCIAL

## 2025-08-18 ENCOUNTER — HOSPITAL ENCOUNTER (OUTPATIENT)
Dept: MAMMOGRAPHY | Age: 50
Discharge: HOME | End: 2025-08-20
Payer: COMMERCIAL

## 2025-08-18 VITALS — WEIGHT: 251 LBS | BODY MASS INDEX: 46.19 KG/M2 | HEIGHT: 62 IN

## 2025-08-18 DIAGNOSIS — M54.16 LUMBAR RADICULOPATHY: ICD-10-CM

## 2025-08-18 DIAGNOSIS — M48.061 SPINAL STENOSIS AT L4-L5 LEVEL: ICD-10-CM

## 2025-08-18 DIAGNOSIS — I10 ESSENTIAL HYPERTENSION: ICD-10-CM

## 2025-08-18 DIAGNOSIS — R79.89 ELEVATED FERRITIN: ICD-10-CM

## 2025-08-18 DIAGNOSIS — M96.1 FAILED BACK SYNDROME, LUMBAR: ICD-10-CM

## 2025-08-18 DIAGNOSIS — Z12.31 ENCOUNTER FOR SCREENING MAMMOGRAM FOR BREAST CANCER: ICD-10-CM

## 2025-08-18 DIAGNOSIS — M51.362 DEGENERATION OF INTERVERTEBRAL DISC OF LUMBAR REGION WITH DISCOGENIC BACK PAIN AND LOWER EXTREMITY PAIN: ICD-10-CM

## 2025-08-18 DIAGNOSIS — E87.6 HYPOKALEMIA: ICD-10-CM

## 2025-08-18 LAB
ANION GAP SERPL CALCULATED.3IONS-SCNC: 10 MMOL/L (ref 9–16)
BASOPHILS # BLD: 0.06 K/UL (ref 0–0.2)
BASOPHILS NFR BLD: 0 % (ref 0–2)
BUN SERPL-MCNC: 10 MG/DL (ref 6–20)
CALCIUM SERPL-MCNC: 9.4 MG/DL (ref 8.6–10.4)
CHLORIDE SERPL-SCNC: 102 MMOL/L (ref 98–107)
CO2 SERPL-SCNC: 27 MMOL/L (ref 20–31)
CREAT SERPL-MCNC: 0.6 MG/DL (ref 0.6–0.9)
EOSINOPHIL # BLD: 0.11 K/UL (ref 0–0.44)
EOSINOPHILS RELATIVE PERCENT: 1 % (ref 1–4)
ERYTHROCYTE [DISTWIDTH] IN BLOOD BY AUTOMATED COUNT: 12.5 % (ref 11.8–14.4)
FERRITIN SERPL-MCNC: 238 NG/ML (ref 15–150)
GFR, ESTIMATED: >90 ML/MIN/1.73M2
GLUCOSE SERPL-MCNC: 175 MG/DL (ref 74–99)
HCT VFR BLD AUTO: 37.3 % (ref 36.3–47.1)
HGB BLD-MCNC: 12.6 G/DL (ref 11.9–15.1)
IMM GRANULOCYTES # BLD AUTO: 0.06 K/UL (ref 0–0.3)
IMM GRANULOCYTES NFR BLD: 0 %
IRON SATN MFR SERPL: 31 % (ref 20–55)
IRON SERPL-MCNC: 88 UG/DL (ref 37–145)
LYMPHOCYTES NFR BLD: 4.05 K/UL (ref 1.1–3.7)
LYMPHOCYTES RELATIVE PERCENT: 29 % (ref 24–43)
MAGNESIUM SERPL-MCNC: 1.8 MG/DL (ref 1.6–2.6)
MCH RBC QN AUTO: 31.7 PG (ref 25.2–33.5)
MCHC RBC AUTO-ENTMCNC: 33.8 G/DL (ref 28.4–34.8)
MCV RBC AUTO: 94 FL (ref 82.6–102.9)
MONOCYTES NFR BLD: 0.56 K/UL (ref 0.1–1.2)
MONOCYTES NFR BLD: 4 % (ref 3–12)
NEUTROPHILS NFR BLD: 66 % (ref 36–65)
NEUTS SEG NFR BLD: 9.17 K/UL (ref 1.5–8.1)
NRBC BLD-RTO: 0 PER 100 WBC
PLATELET # BLD AUTO: 322 K/UL (ref 138–453)
PMV BLD AUTO: 9.7 FL (ref 8.1–13.5)
POTASSIUM SERPL-SCNC: 4.6 MMOL/L (ref 3.7–5.3)
RBC # BLD AUTO: 3.97 M/UL (ref 3.95–5.11)
SODIUM SERPL-SCNC: 139 MMOL/L (ref 136–145)
TIBC SERPL-MCNC: 285 UG/DL (ref 250–450)
UNSATURATED IRON BINDING CAPACITY: 197 UG/DL (ref 112–347)
WBC OTHER # BLD: 14 K/UL (ref 3.5–11.3)

## 2025-08-18 PROCEDURE — 83540 ASSAY OF IRON: CPT

## 2025-08-18 PROCEDURE — 80048 BASIC METABOLIC PNL TOTAL CA: CPT

## 2025-08-18 PROCEDURE — 83735 ASSAY OF MAGNESIUM: CPT

## 2025-08-18 PROCEDURE — 83550 IRON BINDING TEST: CPT

## 2025-08-18 PROCEDURE — 36415 COLL VENOUS BLD VENIPUNCTURE: CPT

## 2025-08-18 PROCEDURE — 82728 ASSAY OF FERRITIN: CPT

## 2025-08-18 PROCEDURE — 85025 COMPLETE CBC W/AUTO DIFF WBC: CPT

## 2025-08-18 PROCEDURE — 77063 BREAST TOMOSYNTHESIS BI: CPT

## 2025-08-18 RX ORDER — MELOXICAM 15 MG/1
15 TABLET ORAL DAILY PRN
Qty: 30 TABLET | Refills: 0 | Status: SHIPPED | OUTPATIENT
Start: 2025-08-18

## 2025-08-22 ENCOUNTER — OFFICE VISIT (OUTPATIENT)
Dept: FAMILY MEDICINE CLINIC | Age: 50
End: 2025-08-22
Payer: COMMERCIAL

## 2025-08-22 VITALS
WEIGHT: 246.2 LBS | BODY MASS INDEX: 45.3 KG/M2 | OXYGEN SATURATION: 96 % | HEART RATE: 77 BPM | HEIGHT: 62 IN | SYSTOLIC BLOOD PRESSURE: 138 MMHG | DIASTOLIC BLOOD PRESSURE: 86 MMHG | TEMPERATURE: 97.6 F

## 2025-08-22 DIAGNOSIS — J01.00 ACUTE NON-RECURRENT MAXILLARY SINUSITIS: ICD-10-CM

## 2025-08-22 DIAGNOSIS — D72.829 LEUKOCYTOSIS, UNSPECIFIED TYPE: ICD-10-CM

## 2025-08-22 DIAGNOSIS — M48.061 SPINAL STENOSIS OF LUMBAR REGION WITHOUT NEUROGENIC CLAUDICATION: ICD-10-CM

## 2025-08-22 DIAGNOSIS — I10 PRIMARY HYPERTENSION: ICD-10-CM

## 2025-08-22 DIAGNOSIS — E11.42 TYPE 2 DIABETES MELLITUS WITH DIABETIC POLYNEUROPATHY, WITHOUT LONG-TERM CURRENT USE OF INSULIN (HCC): ICD-10-CM

## 2025-08-22 DIAGNOSIS — R79.89 ELEVATED FERRITIN: ICD-10-CM

## 2025-08-22 DIAGNOSIS — R74.8 ALKALINE PHOSPHATASE ELEVATION: ICD-10-CM

## 2025-08-22 DIAGNOSIS — I25.118 CORONARY ARTERY DISEASE OF NATIVE ARTERY OF NATIVE HEART WITH STABLE ANGINA PECTORIS: ICD-10-CM

## 2025-08-22 DIAGNOSIS — J31.0 CHRONIC RHINITIS: ICD-10-CM

## 2025-08-22 DIAGNOSIS — R10.30 LOWER ABDOMINAL PAIN: ICD-10-CM

## 2025-08-22 DIAGNOSIS — I87.2 VENOUS STASIS DERMATITIS OF BOTH LOWER EXTREMITIES: Primary | ICD-10-CM

## 2025-08-22 DIAGNOSIS — E78.2 MIXED HYPERLIPIDEMIA: ICD-10-CM

## 2025-08-22 LAB — HBA1C MFR BLD: 6.9 %

## 2025-08-22 PROCEDURE — 3079F DIAST BP 80-89 MM HG: CPT | Performed by: FAMILY MEDICINE

## 2025-08-22 PROCEDURE — G8417 CALC BMI ABV UP PARAM F/U: HCPCS | Performed by: FAMILY MEDICINE

## 2025-08-22 PROCEDURE — 2022F DILAT RTA XM EVC RTNOPTHY: CPT | Performed by: FAMILY MEDICINE

## 2025-08-22 PROCEDURE — 3075F SYST BP GE 130 - 139MM HG: CPT | Performed by: FAMILY MEDICINE

## 2025-08-22 PROCEDURE — 1036F TOBACCO NON-USER: CPT | Performed by: FAMILY MEDICINE

## 2025-08-22 PROCEDURE — 83036 HEMOGLOBIN GLYCOSYLATED A1C: CPT | Performed by: FAMILY MEDICINE

## 2025-08-22 PROCEDURE — 99214 OFFICE O/P EST MOD 30 MIN: CPT | Performed by: FAMILY MEDICINE

## 2025-08-22 PROCEDURE — 3044F HG A1C LEVEL LT 7.0%: CPT | Performed by: FAMILY MEDICINE

## 2025-08-22 PROCEDURE — G8427 DOCREV CUR MEDS BY ELIG CLIN: HCPCS | Performed by: FAMILY MEDICINE

## 2025-08-22 RX ORDER — NITROGLYCERIN 0.4 MG/1
0.4 TABLET SUBLINGUAL EVERY 5 MIN PRN
Qty: 25 TABLET | Refills: 3 | Status: SHIPPED | OUTPATIENT
Start: 2025-08-22

## 2025-08-22 RX ORDER — LORATADINE 10 MG/1
10 TABLET ORAL DAILY
Qty: 90 TABLET | Refills: 0 | Status: SHIPPED | OUTPATIENT
Start: 2025-08-22

## 2025-08-22 SDOH — ECONOMIC STABILITY: FOOD INSECURITY: WITHIN THE PAST 12 MONTHS, THE FOOD YOU BOUGHT JUST DIDN'T LAST AND YOU DIDN'T HAVE MONEY TO GET MORE.: NEVER TRUE

## 2025-08-22 SDOH — ECONOMIC STABILITY: FOOD INSECURITY: WITHIN THE PAST 12 MONTHS, YOU WORRIED THAT YOUR FOOD WOULD RUN OUT BEFORE YOU GOT MONEY TO BUY MORE.: NEVER TRUE

## 2025-08-22 ASSESSMENT — PATIENT HEALTH QUESTIONNAIRE - PHQ9
SUM OF ALL RESPONSES TO PHQ QUESTIONS 1-9: 17
7. TROUBLE CONCENTRATING ON THINGS, SUCH AS READING THE NEWSPAPER OR WATCHING TELEVISION: NEARLY EVERY DAY
8. MOVING OR SPEAKING SO SLOWLY THAT OTHER PEOPLE COULD HAVE NOTICED. OR THE OPPOSITE, BEING SO FIGETY OR RESTLESS THAT YOU HAVE BEEN MOVING AROUND A LOT MORE THAN USUAL: NOT AT ALL
2. FEELING DOWN, DEPRESSED OR HOPELESS: NEARLY EVERY DAY
SUM OF ALL RESPONSES TO PHQ QUESTIONS 1-9: 17
6. FEELING BAD ABOUT YOURSELF - OR THAT YOU ARE A FAILURE OR HAVE LET YOURSELF OR YOUR FAMILY DOWN: NOT AT ALL
1. LITTLE INTEREST OR PLEASURE IN DOING THINGS: NEARLY EVERY DAY
9. THOUGHTS THAT YOU WOULD BE BETTER OFF DEAD, OR OF HURTING YOURSELF: NOT AT ALL
10. IF YOU CHECKED OFF ANY PROBLEMS, HOW DIFFICULT HAVE THESE PROBLEMS MADE IT FOR YOU TO DO YOUR WORK, TAKE CARE OF THINGS AT HOME, OR GET ALONG WITH OTHER PEOPLE: VERY DIFFICULT
5. POOR APPETITE OR OVEREATING: MORE THAN HALF THE DAYS
SUM OF ALL RESPONSES TO PHQ QUESTIONS 1-9: 17
4. FEELING TIRED OR HAVING LITTLE ENERGY: NEARLY EVERY DAY
3. TROUBLE FALLING OR STAYING ASLEEP: NEARLY EVERY DAY
SUM OF ALL RESPONSES TO PHQ QUESTIONS 1-9: 17

## 2025-08-22 ASSESSMENT — LIFESTYLE VARIABLES
HOW MANY STANDARD DRINKS CONTAINING ALCOHOL DO YOU HAVE ON A TYPICAL DAY: PATIENT DOES NOT DRINK
HOW OFTEN DO YOU HAVE A DRINK CONTAINING ALCOHOL: NEVER

## 2025-08-25 PROBLEM — I87.2 VENOUS STASIS DERMATITIS OF BOTH LOWER EXTREMITIES: Status: ACTIVE | Noted: 2025-08-25

## 2025-08-25 PROBLEM — R74.8 ALKALINE PHOSPHATASE ELEVATION: Status: ACTIVE | Noted: 2025-08-25

## 2025-08-25 PROBLEM — R79.89 ELEVATED FERRITIN: Status: ACTIVE | Noted: 2025-08-25

## 2025-08-25 ASSESSMENT — ENCOUNTER SYMPTOMS: ABDOMINAL PAIN: 1

## 2025-08-27 ENCOUNTER — PATIENT MESSAGE (OUTPATIENT)
Dept: FAMILY MEDICINE CLINIC | Age: 50
End: 2025-08-27

## 2025-08-27 DIAGNOSIS — B37.9 YEAST INFECTION: Primary | ICD-10-CM

## 2025-08-28 RX ORDER — FLUCONAZOLE 150 MG/1
150 TABLET ORAL
Qty: 2 TABLET | Refills: 0 | Status: SHIPPED | OUTPATIENT
Start: 2025-08-28 | End: 2025-09-03

## (undated) DEVICE — GUIDEWIRE VASC J 3 MM 0.035 INX210 CM FIX COR INQWIRE

## (undated) DEVICE — BLOCK BITE 60FR RUBBER ADLT DENTAL

## (undated) DEVICE — FORCEPS BX L240CM WRK CHN 2.8MM STD CAP W/ NDL MIC MESH

## (undated) DEVICE — GAUZE,SPONGE,4"X4",16PLY,STRL,LF,10/TRAY: Brand: MEDLINE

## (undated) DEVICE — TUBING, SUCTION, 1/4" X 12', STRAIGHT: Brand: MEDLINE

## (undated) DEVICE — BASIN EMSIS 700ML GRAPHITE PLAS KID SHP GRAD

## (undated) DEVICE — CATHETER DIAG AD 5FR L110CM 145DEG COR GRY HYDRPHLC NYL

## (undated) DEVICE — SUTURE ETHLN SZ 3-0 L18IN NONABSORBABLE BLK FS-1 L24MM 3/8 663H

## (undated) DEVICE — ZIMMER® STERILE DISPOSABLE TOURNIQUET CUFF WITH PLC, DUAL PORT, SINGLE BLADDER, 30 IN. (76 CM)

## (undated) DEVICE — GOWN,AURORA,NONREINFORCED,LARGE: Brand: MEDLINE

## (undated) DEVICE — STRAP POS MP 30X3 IN HK LOOP CLOSURE FOAM DISP

## (undated) DEVICE — [TOMCAT CUTTER, ARTHROSCOPIC SHAVER BLADE,  DO NOT RESTERILIZE,  DO NOT USE IF PACKAGE IS DAMAGED,  KEEP DRY,  KEEP AWAY FROM SUNLIGHT]: Brand: FORMULA

## (undated) DEVICE — CATHETER ANGIO 6FR L100CM DIA0.056IN FR4 CRV VASC ACCS EXPO

## (undated) DEVICE — CATH BLLN ANGIO 3.50X12MM NC EUPHORIA RX

## (undated) DEVICE — Device

## (undated) DEVICE — COPILOT BLEEDBACK CONTROL VALVE: Brand: COPILOT

## (undated) DEVICE — JELLY,LUBE,STERILE,FLIP TOP,TUBE,2-OZ: Brand: MEDLINE

## (undated) DEVICE — CATHETER GUID EBU3.5 2.5 MM 5 FRX100 CM VISTA BRT TIP

## (undated) DEVICE — STRAP,POSITIONING,KNEE/BODY,FOAM,4X60": Brand: MEDLINE

## (undated) DEVICE — SOLUTION IV IRRIG LACTATED RINGERS 3000ML 2B7487

## (undated) DEVICE — GLOVE ORTHO 8   MSG9480

## (undated) DEVICE — BAND COMPR L29CM XLN CLR PLAS HEMSTAT EXT HK AND LOOP RETEN

## (undated) DEVICE — ADAPTER TBNG LUER STUB 15 GA INTMED

## (undated) DEVICE — PACK ARTHRO W PCH

## (undated) DEVICE — CO2 CANNULA,SUPERSOFT, ADLT,7'O2,7'CO2: Brand: MEDLINE

## (undated) DEVICE — Device: Brand: DEFENDO VALVE AND CONNECTOR KIT

## (undated) DEVICE — SINGLE PORT MANIFOLD: Brand: NEPTUNE 2

## (undated) DEVICE — MEDICINE CUP, GRADUATED, STER: Brand: MEDLINE

## (undated) DEVICE — BAND COMPR L24CM REG CLR PLAS HEMSTAT EXT HK AND LOOP RETEN

## (undated) DEVICE — CATHETER DIAG SM AD 6FR L100CM 0.038IN COR POLYUR JUDKINS L

## (undated) DEVICE — CATH BLLN ANGIO 2X12MM SC EUPHORA RX

## (undated) DEVICE — GLIDESHEATH SLENDER STAINLESS STEEL KIT: Brand: GLIDESHEATH SLENDER

## (undated) DEVICE — RUNTHROUGH NS EXTRA FLOPPY PTCA GUIDEWIRE: Brand: RUNTHROUGH

## (undated) DEVICE — Device: Brand: PROWATERFLEX

## (undated) DEVICE — DRESSING,GAUZE,XEROFORM,CURAD,1"X8",ST: Brand: CURAD

## (undated) DEVICE — SYRINGE MED 50ML LUERLOCK TIP

## (undated) DEVICE — GLOVE SURG 8 11.7IN BEAD CUF LIGHT BRN SENSICARE LTX FREE

## (undated) DEVICE — PADDING UNDERCAST W6INXL4YD WYTEX 6 PER BG